# Patient Record
Sex: MALE | Race: WHITE | HISPANIC OR LATINO | URBAN - METROPOLITAN AREA
[De-identification: names, ages, dates, MRNs, and addresses within clinical notes are randomized per-mention and may not be internally consistent; named-entity substitution may affect disease eponyms.]

---

## 2018-01-14 ENCOUNTER — EMERGENCY (EMERGENCY)
Facility: HOSPITAL | Age: 78
LOS: 1 days | Discharge: HOME | End: 2018-01-14
Attending: EMERGENCY MEDICINE | Admitting: EMERGENCY MEDICINE
Payer: MEDICARE

## 2018-01-14 VITALS
DIASTOLIC BLOOD PRESSURE: 76 MMHG | HEART RATE: 102 BPM | RESPIRATION RATE: 18 BRPM | SYSTOLIC BLOOD PRESSURE: 140 MMHG | OXYGEN SATURATION: 96 %

## 2018-01-14 VITALS — RESPIRATION RATE: 18 BRPM | TEMPERATURE: 98 F | HEART RATE: 75 BPM | OXYGEN SATURATION: 100 %

## 2018-01-14 LAB
ALBUMIN SERPL ELPH-MCNC: 4.1 G/DL — SIGNIFICANT CHANGE UP (ref 3.3–5)
ALP SERPL-CCNC: 125 U/L — HIGH (ref 40–120)
ALT FLD-CCNC: <5 U/L RC — LOW (ref 10–45)
ANION GAP SERPL CALC-SCNC: 12 MMOL/L — SIGNIFICANT CHANGE UP (ref 5–17)
AST SERPL-CCNC: 11 U/L — SIGNIFICANT CHANGE UP (ref 10–40)
BASOPHILS # BLD AUTO: 0 K/UL — SIGNIFICANT CHANGE UP (ref 0–0.2)
BASOPHILS NFR BLD AUTO: 0.5 % — SIGNIFICANT CHANGE UP (ref 0–2)
BILIRUB SERPL-MCNC: 0.4 MG/DL — SIGNIFICANT CHANGE UP (ref 0.2–1.2)
BUN SERPL-MCNC: 18 MG/DL — SIGNIFICANT CHANGE UP (ref 7–23)
CALCIUM SERPL-MCNC: 9.1 MG/DL — SIGNIFICANT CHANGE UP (ref 8.4–10.5)
CHLORIDE SERPL-SCNC: 98 MMOL/L — SIGNIFICANT CHANGE UP (ref 96–108)
CO2 SERPL-SCNC: 26 MMOL/L — SIGNIFICANT CHANGE UP (ref 22–31)
CREAT SERPL-MCNC: 0.63 MG/DL — SIGNIFICANT CHANGE UP (ref 0.5–1.3)
EOSINOPHIL # BLD AUTO: 0.5 K/UL — SIGNIFICANT CHANGE UP (ref 0–0.5)
EOSINOPHIL NFR BLD AUTO: 5.4 % — SIGNIFICANT CHANGE UP (ref 0–6)
GLUCOSE SERPL-MCNC: 209 MG/DL — HIGH (ref 70–99)
HCT VFR BLD CALC: 37.4 % — LOW (ref 39–50)
HGB BLD-MCNC: 12.7 G/DL — LOW (ref 13–17)
LYMPHOCYTES # BLD AUTO: 2 K/UL — SIGNIFICANT CHANGE UP (ref 1–3.3)
LYMPHOCYTES # BLD AUTO: 22.3 % — SIGNIFICANT CHANGE UP (ref 13–44)
MCHC RBC-ENTMCNC: 32 PG — SIGNIFICANT CHANGE UP (ref 27–34)
MCHC RBC-ENTMCNC: 33.9 GM/DL — SIGNIFICANT CHANGE UP (ref 32–36)
MCV RBC AUTO: 94.3 FL — SIGNIFICANT CHANGE UP (ref 80–100)
MONOCYTES # BLD AUTO: 0.8 K/UL — SIGNIFICANT CHANGE UP (ref 0–0.9)
MONOCYTES NFR BLD AUTO: 8.2 % — SIGNIFICANT CHANGE UP (ref 2–14)
NEUTROPHILS # BLD AUTO: 5.8 K/UL — SIGNIFICANT CHANGE UP (ref 1.8–7.4)
NEUTROPHILS NFR BLD AUTO: 63.5 % — SIGNIFICANT CHANGE UP (ref 43–77)
PLATELET # BLD AUTO: 288 K/UL — SIGNIFICANT CHANGE UP (ref 150–400)
POTASSIUM SERPL-MCNC: 4.3 MMOL/L — SIGNIFICANT CHANGE UP (ref 3.5–5.3)
POTASSIUM SERPL-SCNC: 4.3 MMOL/L — SIGNIFICANT CHANGE UP (ref 3.5–5.3)
PROT SERPL-MCNC: 7.6 G/DL — SIGNIFICANT CHANGE UP (ref 6–8.3)
RBC # BLD: 3.97 M/UL — LOW (ref 4.2–5.8)
RBC # FLD: 11.4 % — SIGNIFICANT CHANGE UP (ref 10.3–14.5)
SODIUM SERPL-SCNC: 136 MMOL/L — SIGNIFICANT CHANGE UP (ref 135–145)
WBC # BLD: 9.1 K/UL — SIGNIFICANT CHANGE UP (ref 3.8–10.5)
WBC # FLD AUTO: 9.1 K/UL — SIGNIFICANT CHANGE UP (ref 3.8–10.5)

## 2018-01-14 PROCEDURE — 99284 EMERGENCY DEPT VISIT MOD MDM: CPT | Mod: 25,GC

## 2018-01-14 PROCEDURE — 85027 COMPLETE CBC AUTOMATED: CPT

## 2018-01-14 PROCEDURE — 94640 AIRWAY INHALATION TREATMENT: CPT

## 2018-01-14 PROCEDURE — 99283 EMERGENCY DEPT VISIT LOW MDM: CPT | Mod: 25

## 2018-01-14 PROCEDURE — 71046 X-RAY EXAM CHEST 2 VIEWS: CPT | Mod: 26

## 2018-01-14 PROCEDURE — 93010 ELECTROCARDIOGRAM REPORT: CPT

## 2018-01-14 PROCEDURE — 80053 COMPREHEN METABOLIC PANEL: CPT

## 2018-01-14 PROCEDURE — 93005 ELECTROCARDIOGRAM TRACING: CPT

## 2018-01-14 PROCEDURE — 82962 GLUCOSE BLOOD TEST: CPT

## 2018-01-14 PROCEDURE — 71046 X-RAY EXAM CHEST 2 VIEWS: CPT

## 2018-01-14 RX ORDER — SODIUM CHLORIDE 9 MG/ML
1000 INJECTION INTRAMUSCULAR; INTRAVENOUS; SUBCUTANEOUS ONCE
Qty: 0 | Refills: 0 | Status: COMPLETED | OUTPATIENT
Start: 2018-01-14 | End: 2018-01-14

## 2018-01-14 RX ORDER — IPRATROPIUM/ALBUTEROL SULFATE 18-103MCG
3 AEROSOL WITH ADAPTER (GRAM) INHALATION ONCE
Qty: 0 | Refills: 0 | Status: COMPLETED | OUTPATIENT
Start: 2018-01-14 | End: 2018-01-14

## 2018-01-14 RX ORDER — IPRATROPIUM/ALBUTEROL SULFATE 18-103MCG
3 AEROSOL WITH ADAPTER (GRAM) INHALATION
Qty: 0 | Refills: 0 | COMMUNITY
Start: 2018-01-14

## 2018-01-14 RX ORDER — IPRATROPIUM/ALBUTEROL SULFATE 18-103MCG
3 AEROSOL WITH ADAPTER (GRAM) INHALATION
Qty: 50 | Refills: 0 | OUTPATIENT
Start: 2018-01-14 | End: 2018-01-18

## 2018-01-14 RX ADMIN — SODIUM CHLORIDE 1000 MILLILITER(S): 9 INJECTION INTRAMUSCULAR; INTRAVENOUS; SUBCUTANEOUS at 14:19

## 2018-01-14 RX ADMIN — Medication 60 MILLIGRAM(S): at 14:42

## 2018-01-14 RX ADMIN — Medication 3 MILLILITER(S): at 14:19

## 2018-01-14 NOTE — ED PROVIDER NOTE - CARE PLAN
Instructions for follow-up, activity and diet:	You have been diagnosed with bronchitis that has likely been caused by a virus. Your symptoms should improve with Instructions for follow-up, activity and diet:	You have been diagnosed with bronchitis that has likely been caused by a virus. Your symptoms should improve with time. We sent some medications to the pharmacy for you that will help. If you have any new or concerning symptoms please come right back to the emergency room. You will likely need to increase your insulin while you are taking the steroids. Principal Discharge DX:	Bronchitis  Assessment and plan of treatment:	You have been diagnosed with bronchitis that has likely been caused by a virus. Your symptoms should improve with time. We sent some medications to the pharmacy for you that will help. If you have any new or concerning symptoms please come right back to the emergency room. You will likely need to increase your insulin while you are taking the steroids.

## 2018-01-14 NOTE — ED PROVIDER NOTE - PLAN OF CARE
You have been diagnosed with bronchitis that has likely been caused by a virus. Your symptoms should improve with You have been diagnosed with bronchitis that has likely been caused by a virus. Your symptoms should improve with time. We sent some medications to the pharmacy for you that will help. If you have any new or concerning symptoms please come right back to the emergency room. You will likely need to increase your insulin while you are taking the steroids.

## 2018-01-14 NOTE — ED ADULT NURSE NOTE - PMH
CAD (coronary artery disease)    Cancer of Prostate Seed implant 2004    Diabetes Mellitus    Hyperlipidemia    Hypertension    Neuropathy    Osteoarthrosis    Parkinson's Disease

## 2018-01-14 NOTE — ED PROVIDER NOTE - MEDICAL DECISION MAKING DETAILS
Patient is 77 year old male with cough x 2 weeks. DDx: Bronchitis, asthma, PNA, tracheitis. Will get CXR, basic labs and start a breathing tx for wheeze. Given dry mucous membranes will get BMP to eval for JAKUB and likely DC home if there are no lab abnormalities.

## 2018-01-14 NOTE — ED PROVIDER NOTE - OBJECTIVE STATEMENT
Patient is a 77 year old male with PMHx of Parkinson's DM, abdominal hernia who presents with cough x 2 week. Patient had sick contact at home before the cough started. He saw his primary care provider and was diagnosed with bronchitis, he was given rx for amoxicillin and prednisone on the 27th. His symptoms improved and he felt fine on Wednesday and was able to clean the bathroom. Yesterday he began having a very severe cough that did not improve with tessalon pearls or the nebulizer that he had from his primary doctor. He states that his cough is non-productive but so severe that he cannot sleep and feels like he is choking. He denies fever, nausea, vomiting, weakness, syncope, or exertional dyspnea.

## 2018-01-14 NOTE — ED ADULT NURSE NOTE - CHPI ED SYMPTOMS NEG
no decreased eating/drinking/no chills/no pain/no numbness/no fever/no nausea/no dizziness/no vomiting/no tingling/no weakness

## 2018-01-14 NOTE — ED PROVIDER NOTE - PHYSICAL EXAMINATION
Gen: WDWN, NAD  HEENT: EOMI, no nasal discharge, mucous membranes DRY  CV: RRR, +S1/S2, no M/R/G  Resp: Bilateral diffuse wheezing  GI: Abdomen soft non-distended, NTTP, no masses  MSK: No open wounds, no bruising, no LE edema  Neuro: A&Ox4, following commands, moving all four extremities spontaneously  Psych: appropriate mood, denies AH, VH, SI

## 2018-01-14 NOTE — ED ADULT NURSE NOTE - PSH
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

## 2018-01-14 NOTE — ED ADULT NURSE NOTE - OBJECTIVE STATEMENT
76 y/o male BIBA for worsening cough. Pt states he was diagnosed with bronchitis last week and has been treating with nebulizer treatments. He began to feel better and then cleaned the house with bleach and started to feel sick again. Denies chest pain, sob, ha, n/v/d, abdominal pain, f/c, urinary symptoms, hematuria. A&Ox4, vss, skin warm dry and intact, MAEx4, , none productive dry cough, lungs CTA, abd soft nondistended. Pt resting comfortably with VSS, no complaints at this time. Patient's bed in the lowest position, explained plan of care to patient and family members. Will continue to reassess. 76 y/o male BIBA for worsening cough. Pt states he was diagnosed with bronchitis last week and has been treating with nebulizer treatments. He began to feel better and then cleaned the house with bleach and started to feel sick again. Denies chest pain, sob, ha, n/v/d, abdominal pain, f/c, urinary symptoms, hematuria. A&Ox4, vss, skin warm dry and intact, MAEx4, , none productive dry cough, wheezing noted to lungs, abd soft nondistended. Pt resting comfortably with VSS, no complaints at this time. Patient's bed in the lowest position, explained plan of care to patient and family members. Will continue to reassess.

## 2018-01-14 NOTE — ED PROVIDER NOTE - NS ED ROS FT
Gen: Denies fever, weight loss  CV: Denies chest pain, palpitations  Skin: Denies rash, erythema, color changes  Resp: Admits to  cough, Denies SOB,  Endo: Denies sensitivity to heat, cold, increased urination  GI: Denies constipation, nausea, vomiting  Msk: Denies back pain, LE swelling, extremity pain  : Denies dysuria, increased frequency  Neuro: Denies LOC, weakness, seizures  Psych: Denies hx of psych, hallucinations

## 2018-01-14 NOTE — ED PROVIDER NOTE - ATTENDING CONTRIBUTION TO CARE
76yo M with cough and wheezing.  NAD, nontoxic, afebrile, NCAT, PERRL, CN grossly intact, S1S2, wheezing B, abd soft nontender with no rebound/guarding/masses, no LE edema.  Nebs, cxr, steroids, reassess.

## 2018-01-25 ENCOUNTER — INPATIENT (INPATIENT)
Facility: HOSPITAL | Age: 78
LOS: 4 days | Discharge: HOME | DRG: 175 | End: 2018-01-30
Attending: INTERNAL MEDICINE | Admitting: INTERNAL MEDICINE
Payer: MEDICARE

## 2018-01-25 VITALS
HEART RATE: 89 BPM | RESPIRATION RATE: 20 BRPM | TEMPERATURE: 99 F | SYSTOLIC BLOOD PRESSURE: 167 MMHG | OXYGEN SATURATION: 97 % | DIASTOLIC BLOOD PRESSURE: 61 MMHG

## 2018-01-25 DIAGNOSIS — I10 ESSENTIAL (PRIMARY) HYPERTENSION: ICD-10-CM

## 2018-01-25 DIAGNOSIS — E78.5 HYPERLIPIDEMIA, UNSPECIFIED: ICD-10-CM

## 2018-01-25 DIAGNOSIS — E11.65 TYPE 2 DIABETES MELLITUS WITH HYPERGLYCEMIA: ICD-10-CM

## 2018-01-25 DIAGNOSIS — J40 BRONCHITIS, NOT SPECIFIED AS ACUTE OR CHRONIC: ICD-10-CM

## 2018-01-25 LAB
ALBUMIN SERPL ELPH-MCNC: 4 G/DL — SIGNIFICANT CHANGE UP (ref 3.3–5)
ALP SERPL-CCNC: 130 U/L — HIGH (ref 40–120)
ALT FLD-CCNC: <5 U/L RC — LOW (ref 10–45)
ANION GAP SERPL CALC-SCNC: 13 MMOL/L — SIGNIFICANT CHANGE UP (ref 5–17)
AST SERPL-CCNC: 8 U/L — LOW (ref 10–40)
BASOPHILS # BLD AUTO: 0.1 K/UL — SIGNIFICANT CHANGE UP (ref 0–0.2)
BASOPHILS NFR BLD AUTO: 1 % — SIGNIFICANT CHANGE UP (ref 0–2)
BILIRUB SERPL-MCNC: 0.2 MG/DL — SIGNIFICANT CHANGE UP (ref 0.2–1.2)
BUN SERPL-MCNC: 18 MG/DL — SIGNIFICANT CHANGE UP (ref 7–23)
CALCIUM SERPL-MCNC: 8.8 MG/DL — SIGNIFICANT CHANGE UP (ref 8.4–10.5)
CHLORIDE SERPL-SCNC: 100 MMOL/L — SIGNIFICANT CHANGE UP (ref 96–108)
CO2 SERPL-SCNC: 27 MMOL/L — SIGNIFICANT CHANGE UP (ref 22–31)
CREAT SERPL-MCNC: 0.62 MG/DL — SIGNIFICANT CHANGE UP (ref 0.5–1.3)
EOSINOPHIL # BLD AUTO: 0.7 K/UL — HIGH (ref 0–0.5)
EOSINOPHIL NFR BLD AUTO: 6.9 % — HIGH (ref 0–6)
GAS PNL BLDV: SIGNIFICANT CHANGE UP
GLUCOSE BLDC GLUCOMTR-MCNC: 194 MG/DL — HIGH (ref 70–99)
GLUCOSE BLDC GLUCOMTR-MCNC: 232 MG/DL — HIGH (ref 70–99)
GLUCOSE BLDC GLUCOMTR-MCNC: 339 MG/DL — HIGH (ref 70–99)
GLUCOSE BLDC GLUCOMTR-MCNC: 366 MG/DL — HIGH (ref 70–99)
GLUCOSE SERPL-MCNC: 282 MG/DL — HIGH (ref 70–99)
HCT VFR BLD CALC: 38.4 % — LOW (ref 39–50)
HGB BLD-MCNC: 12.7 G/DL — LOW (ref 13–17)
LYMPHOCYTES # BLD AUTO: 2.2 K/UL — SIGNIFICANT CHANGE UP (ref 1–3.3)
LYMPHOCYTES # BLD AUTO: 20.9 % — SIGNIFICANT CHANGE UP (ref 13–44)
MCHC RBC-ENTMCNC: 31.3 PG — SIGNIFICANT CHANGE UP (ref 27–34)
MCHC RBC-ENTMCNC: 33 GM/DL — SIGNIFICANT CHANGE UP (ref 32–36)
MCV RBC AUTO: 94.9 FL — SIGNIFICANT CHANGE UP (ref 80–100)
MONOCYTES # BLD AUTO: 0.9 K/UL — SIGNIFICANT CHANGE UP (ref 0–0.9)
MONOCYTES NFR BLD AUTO: 8.1 % — SIGNIFICANT CHANGE UP (ref 2–14)
NEUTROPHILS # BLD AUTO: 6.7 K/UL — SIGNIFICANT CHANGE UP (ref 1.8–7.4)
NEUTROPHILS NFR BLD AUTO: 63.1 % — SIGNIFICANT CHANGE UP (ref 43–77)
PLATELET # BLD AUTO: 320 K/UL — SIGNIFICANT CHANGE UP (ref 150–400)
POTASSIUM SERPL-MCNC: 3.9 MMOL/L — SIGNIFICANT CHANGE UP (ref 3.5–5.3)
POTASSIUM SERPL-SCNC: 3.9 MMOL/L — SIGNIFICANT CHANGE UP (ref 3.5–5.3)
PROT SERPL-MCNC: 7.3 G/DL — SIGNIFICANT CHANGE UP (ref 6–8.3)
RAPID RVP RESULT: SIGNIFICANT CHANGE UP
RBC # BLD: 4.05 M/UL — LOW (ref 4.2–5.8)
RBC # FLD: 11.8 % — SIGNIFICANT CHANGE UP (ref 10.3–14.5)
SODIUM SERPL-SCNC: 140 MMOL/L — SIGNIFICANT CHANGE UP (ref 135–145)
WBC # BLD: 10.6 K/UL — HIGH (ref 3.8–10.5)
WBC # FLD AUTO: 10.6 K/UL — HIGH (ref 3.8–10.5)

## 2018-01-25 PROCEDURE — 71045 X-RAY EXAM CHEST 1 VIEW: CPT | Mod: 26

## 2018-01-25 PROCEDURE — 99223 1ST HOSP IP/OBS HIGH 75: CPT

## 2018-01-25 PROCEDURE — 99285 EMERGENCY DEPT VISIT HI MDM: CPT | Mod: GC

## 2018-01-25 RX ORDER — OXYCODONE HYDROCHLORIDE 5 MG/1
5 TABLET ORAL EVERY 4 HOURS
Qty: 0 | Refills: 0 | Status: DISCONTINUED | OUTPATIENT
Start: 2018-01-25 | End: 2018-01-30

## 2018-01-25 RX ORDER — GLUCAGON INJECTION, SOLUTION 0.5 MG/.1ML
1 INJECTION, SOLUTION SUBCUTANEOUS ONCE
Qty: 0 | Refills: 0 | Status: DISCONTINUED | OUTPATIENT
Start: 2018-01-25 | End: 2018-01-30

## 2018-01-25 RX ORDER — DOCUSATE SODIUM 100 MG
100 CAPSULE ORAL THREE TIMES A DAY
Qty: 0 | Refills: 0 | Status: DISCONTINUED | OUTPATIENT
Start: 2018-01-25 | End: 2018-01-30

## 2018-01-25 RX ORDER — IPRATROPIUM/ALBUTEROL SULFATE 18-103MCG
3 AEROSOL WITH ADAPTER (GRAM) INHALATION ONCE
Qty: 0 | Refills: 0 | Status: COMPLETED | OUTPATIENT
Start: 2018-01-25 | End: 2018-01-25

## 2018-01-25 RX ORDER — INSULIN GLARGINE 100 [IU]/ML
40 INJECTION, SOLUTION SUBCUTANEOUS AT BEDTIME
Qty: 0 | Refills: 0 | Status: DISCONTINUED | OUTPATIENT
Start: 2018-01-25 | End: 2018-01-25

## 2018-01-25 RX ORDER — CARBIDOPA AND LEVODOPA 25; 100 MG/1; MG/1
2 TABLET ORAL THREE TIMES A DAY
Qty: 0 | Refills: 0 | Status: DISCONTINUED | OUTPATIENT
Start: 2018-01-25 | End: 2018-01-25

## 2018-01-25 RX ORDER — DEXTROSE 50 % IN WATER 50 %
25 SYRINGE (ML) INTRAVENOUS ONCE
Qty: 0 | Refills: 0 | Status: DISCONTINUED | OUTPATIENT
Start: 2018-01-25 | End: 2018-01-30

## 2018-01-25 RX ORDER — CARBIDOPA AND LEVODOPA 25; 100 MG/1; MG/1
5 TABLET ORAL EVERY 4 HOURS
Qty: 0 | Refills: 0 | Status: DISCONTINUED | OUTPATIENT
Start: 2018-01-25 | End: 2018-01-26

## 2018-01-25 RX ORDER — INSULIN GLARGINE 100 [IU]/ML
20 INJECTION, SOLUTION SUBCUTANEOUS AT BEDTIME
Qty: 0 | Refills: 0 | Status: DISCONTINUED | OUTPATIENT
Start: 2018-01-25 | End: 2018-01-26

## 2018-01-25 RX ORDER — INSULIN LISPRO 100/ML
VIAL (ML) SUBCUTANEOUS AT BEDTIME
Qty: 0 | Refills: 0 | Status: DISCONTINUED | OUTPATIENT
Start: 2018-01-25 | End: 2018-01-30

## 2018-01-25 RX ORDER — SODIUM CHLORIDE 9 MG/ML
1000 INJECTION, SOLUTION INTRAVENOUS
Qty: 0 | Refills: 0 | Status: DISCONTINUED | OUTPATIENT
Start: 2018-01-25 | End: 2018-01-30

## 2018-01-25 RX ORDER — ALBUTEROL 90 UG/1
1.25 AEROSOL, METERED ORAL EVERY 6 HOURS
Qty: 0 | Refills: 0 | Status: DISCONTINUED | OUTPATIENT
Start: 2018-01-25 | End: 2018-01-28

## 2018-01-25 RX ORDER — BUDESONIDE, MICRONIZED 100 %
0.25 POWDER (GRAM) MISCELLANEOUS
Qty: 0 | Refills: 0 | Status: DISCONTINUED | OUTPATIENT
Start: 2018-01-25 | End: 2018-01-29

## 2018-01-25 RX ORDER — DEXTROSE 50 % IN WATER 50 %
1 SYRINGE (ML) INTRAVENOUS ONCE
Qty: 0 | Refills: 0 | Status: DISCONTINUED | OUTPATIENT
Start: 2018-01-25 | End: 2018-01-30

## 2018-01-25 RX ORDER — LOSARTAN POTASSIUM 100 MG/1
50 TABLET, FILM COATED ORAL DAILY
Qty: 0 | Refills: 0 | Status: DISCONTINUED | OUTPATIENT
Start: 2018-01-25 | End: 2018-01-30

## 2018-01-25 RX ORDER — HEPARIN SODIUM 5000 [USP'U]/ML
5000 INJECTION INTRAVENOUS; SUBCUTANEOUS EVERY 12 HOURS
Qty: 0 | Refills: 0 | Status: DISCONTINUED | OUTPATIENT
Start: 2018-01-25 | End: 2018-01-26

## 2018-01-25 RX ORDER — SODIUM CHLORIDE 9 MG/ML
1000 INJECTION INTRAMUSCULAR; INTRAVENOUS; SUBCUTANEOUS
Qty: 0 | Refills: 0 | Status: COMPLETED | OUTPATIENT
Start: 2018-01-25 | End: 2018-01-26

## 2018-01-25 RX ORDER — INSULIN LISPRO 100/ML
8 VIAL (ML) SUBCUTANEOUS
Qty: 0 | Refills: 0 | Status: DISCONTINUED | OUTPATIENT
Start: 2018-01-25 | End: 2018-01-26

## 2018-01-25 RX ORDER — DEXTROSE 50 % IN WATER 50 %
12.5 SYRINGE (ML) INTRAVENOUS ONCE
Qty: 0 | Refills: 0 | Status: DISCONTINUED | OUTPATIENT
Start: 2018-01-25 | End: 2018-01-30

## 2018-01-25 RX ORDER — INSULIN LISPRO 100/ML
VIAL (ML) SUBCUTANEOUS
Qty: 0 | Refills: 0 | Status: DISCONTINUED | OUTPATIENT
Start: 2018-01-25 | End: 2018-01-30

## 2018-01-25 RX ORDER — ASPIRIN/CALCIUM CARB/MAGNESIUM 324 MG
325 TABLET ORAL DAILY
Qty: 0 | Refills: 0 | Status: DISCONTINUED | OUTPATIENT
Start: 2018-01-25 | End: 2018-01-30

## 2018-01-25 RX ADMIN — ALBUTEROL 1.25 MILLIGRAM(S): 90 AEROSOL, METERED ORAL at 18:41

## 2018-01-25 RX ADMIN — Medication 100 MILLIGRAM(S): at 22:20

## 2018-01-25 RX ADMIN — Medication 3 MILLILITER(S): at 02:36

## 2018-01-25 RX ADMIN — INSULIN GLARGINE 20 UNIT(S): 100 INJECTION, SOLUTION SUBCUTANEOUS at 22:20

## 2018-01-25 RX ADMIN — SODIUM CHLORIDE 50 MILLILITER(S): 9 INJECTION INTRAMUSCULAR; INTRAVENOUS; SUBCUTANEOUS at 08:09

## 2018-01-25 RX ADMIN — LOSARTAN POTASSIUM 50 MILLIGRAM(S): 100 TABLET, FILM COATED ORAL at 13:24

## 2018-01-25 RX ADMIN — Medication 20 MILLIGRAM(S): at 22:20

## 2018-01-25 RX ADMIN — Medication 20 MILLIGRAM(S): at 13:24

## 2018-01-25 RX ADMIN — Medication 50 MILLIGRAM(S): at 04:01

## 2018-01-25 RX ADMIN — Medication 10: at 18:40

## 2018-01-25 RX ADMIN — Medication 8: at 13:23

## 2018-01-25 RX ADMIN — SODIUM CHLORIDE 50 MILLILITER(S): 9 INJECTION INTRAMUSCULAR; INTRAVENOUS; SUBCUTANEOUS at 22:24

## 2018-01-25 RX ADMIN — Medication 8 UNIT(S): at 18:41

## 2018-01-25 RX ADMIN — ALBUTEROL 1.25 MILLIGRAM(S): 90 AEROSOL, METERED ORAL at 23:03

## 2018-01-25 RX ADMIN — Medication 325 MILLIGRAM(S): at 13:24

## 2018-01-25 RX ADMIN — Medication 100 MILLIGRAM(S): at 13:24

## 2018-01-25 RX ADMIN — OXYCODONE HYDROCHLORIDE 5 MILLIGRAM(S): 5 TABLET ORAL at 13:55

## 2018-01-25 RX ADMIN — HEPARIN SODIUM 5000 UNIT(S): 5000 INJECTION INTRAVENOUS; SUBCUTANEOUS at 18:41

## 2018-01-25 NOTE — H&P ADULT - ASSESSMENT
pt w/ sob/ wheezing /ochoa/ cough / w/ bronchitis/ bronchospasm/ hypoxia  pulm evalid eval  dm cont lantus  fsg riss  endo eval  dvt proph  parkinsonns / cont sinemet  dm diet  pt  htn / cont meds

## 2018-01-25 NOTE — ED ADULT NURSE NOTE - OBJECTIVE STATEMENT
77 yr old male by EMS from home with wife (in wheelchair) with c/o difficulty breathing, +cough productive of green phlegm, +taking cough medicine and duoneb nebulizers, dx: bronchitis, treated with amoxicillin by pulmonologist, +stopped prednisone, +nonsmoker, (denies fever/chills), +lives with wife, ambulatory, independent with ADLs, at present A&ox3, +wheezing throughout, skin wdi, (denies cp/dizzy/lightheaded, +"tightness")

## 2018-01-25 NOTE — H&P ADULT - NSHPLABSRESULTS_GEN_ALL_CORE
12.7   10.6  )-----------( 320      ( 25 Jan 2018 03:01 )             38.4       01-25    140  |  100  |  18  ----------------------------<  282<H>  3.9   |  27  |  0.62    Ca    8.8      25 Jan 2018 03:01    TPro  7.3  /  Alb  4.0  /  TBili  0.2  /  DBili  x   /  AST  8<L>  /  ALT  <5<L>  /  AlkPhos  130<H>  01-25                      Lactate Trend            CAPILLARY BLOOD GLUCOSE      POCT Blood Glucose.: 248 mg/dL (25 Jan 2018 02:33)

## 2018-01-25 NOTE — ED PROVIDER NOTE - ATTENDING CONTRIBUTION TO CARE
77M hx of Parkinsons, DM, abdominal hernia, anxiety on xanax presents cough and shortness of breath tonight.  Improved with nebulizer.  Recent dx of bronchitis 10d, on last day of amoxicillin.  Cough productive of white sputum.  No fever/chills.  Received influenza vaccine.  Has had prior episodes of bronchitis.  Never smoked.  Wife/daughter with recent viral uri/sinusitis.

## 2018-01-25 NOTE — CONSULT NOTE ADULT - PROBLEM SELECTOR RECOMMENDATION 9
Diabetes Education and Nutrition Eval  Start Lantus 20 units qhs  Start Humalog 8 units qac  Mod correction scale qac + bedtime  Goal glucose 100-180  Outpt. endo follow-up  Outpt. optho, podiatry, micro/cr  Plan to d/c on basal bolus. Needs education.

## 2018-01-25 NOTE — ED PROVIDER NOTE - OBJECTIVE STATEMENT
77M hx of Parkinsons, DM, abdominal hernia, anxiety on xanax presents cough and shortness of breath tonight.  Improved with nebulizer.  Recent dx of bronchitis 10d, on last day of amoxicillin.  Cough productive of white sputum.  No fever/chills.  Received influenza vaccine.  Has had multiple prior episodes of bronchitis, follows with pulmonologist.  Never smoked.  Wife/daughter with recent viral uri/sinusitis.

## 2018-01-25 NOTE — ED ADULT NURSE REASSESSMENT NOTE - NS ED NURSE REASSESS COMMENT FT1
pt assisted to bathroom ambulatory no distress lung sound clear wife at bedside pt pending med bed assignnment vital signs stable

## 2018-01-25 NOTE — ED ADULT NURSE REASSESSMENT NOTE - NS ED NURSE REASSESS COMMENT FT1
Received report from previous shift RN. Patient completed neb treatment, reports improvement in breathing. Denies CP or discomfort at this time, abd pain, n/v/d, fever, chills. Patient reporting productive cough with green sputum. Pt able to speak in full sentences without difficulty breathing. VSS

## 2018-01-25 NOTE — PROGRESS NOTE ADULT - SUBJECTIVE AND OBJECTIVE BOX
PULMONARY PROGRESS NOTE    INGE ESPOSITO  MRN-[id number]    Patient is a 77y old  Male who presents with a chief complaint of     HPI:  -shortness of breath somewhat improving, still with dry cough.  Reports never being hospitalized for his asthma in the past.  Taking only PRN nebs at home. RVP negative but sick contacts.    ROS:   -denies abdominal pain  -appetite good    ACTIVE MEDICATION LIST:  MEDICATIONS  (STANDING):  ALBUTerol   0.042% 1.25 milliGRAM(s) Nebulizer every 6 hours  aspirin enteric coated 325 milliGRAM(s) Oral daily  buDESOnide   0.25 milliGRAM(s) Respule 0.25 milliGRAM(s) Inhalation two times a day  carbidopa/levodopa  25/100 2 Tablet(s) Oral three times a day  dextrose 5%. 1000 milliLiter(s) (50 mL/Hr) IV Continuous <Continuous>  dextrose 50% Injectable 12.5 Gram(s) IV Push once  dextrose 50% Injectable 25 Gram(s) IV Push once  dextrose 50% Injectable 25 Gram(s) IV Push once  docusate sodium 100 milliGRAM(s) Oral three times a day  heparin  Injectable 5000 Unit(s) SubCutaneous every 12 hours  insulin glargine Injectable (LANTUS) 40 Unit(s) SubCutaneous at bedtime  insulin lispro (HumaLOG) corrective regimen sliding scale   SubCutaneous three times a day before meals  losartan 50 milliGRAM(s) Oral daily  methylPREDNISolone sodium succinate Injectable 20 milliGRAM(s) IV Push every 8 hours  sodium chloride 0.9%. 1000 milliLiter(s) (50 mL/Hr) IV Continuous <Continuous>    MEDICATIONS  (PRN):  dextrose Gel 1 Dose(s) Oral once PRN Blood Glucose LESS THAN 70 milliGRAM(s)/deciliter  glucagon  Injectable 1 milliGRAM(s) IntraMuscular once PRN Glucose LESS THAN 70 milligrams/deciliter  oxyCODONE    IR 5 milliGRAM(s) Oral every 4 hours PRN Moderate Pain (4 - 6)      EXAM:  Vital Signs Last 24 Hrs  T(C): 36.9 (25 Jan 2018 10:00), Max: 37 (25 Jan 2018 02:30)  T(F): 98.4 (25 Jan 2018 10:00), Max: 98.6 (25 Jan 2018 02:30)  HR: 80 (25 Jan 2018 10:00) (77 - 89)  BP: 145/77 (25 Jan 2018 10:00) (139/79 - 167/61)  BP(mean): --  RR: 18 (25 Jan 2018 10:00) (16 - 20)  SpO2: 95% (25 Jan 2018 10:00) (95% - 97%)    GENERAL: The patient is awake and alert in no apparent distress.     SKIN: Warm, dry, no rashes    LUNGS: bilat exp wheeze    HEART: S1/S2    ABDOMEN: +BS, Soft, Nontender    EXTREMITIES: No clubbing, cyanosis, edema    LABS/IMGAING: reviewed                          12.7   10.6  )-----------( 320      ( 25 Jan 2018 03:01 )             38.4     01-25    140  |  100  |  18  ----------------------------<  282<H>  3.9   |  27  |  0.62    Ca    8.8      25 Jan 2018 03:01    TPro  7.3  /  Alb  4.0  /  TBili  0.2  /  DBili  x   /  AST  8<L>  /  ALT  <5<L>  /  AlkPhos  130<H>  01-25    < from: Xray Chest 1 View AP- PORTABLE-Urgent (01.25.18 @ 03:47) >  IMPRESSION: Clear lungs.    < end of copied text >        PROBLEM LIST:  77y Male with HEALTH ISSUES - PROBLEM Dx:  Asthma exacerbation    RECS:  -Continue pulmicort nebs bid and albuterol Q6  -Continue solumedrol 20mgIV Q8 will taper per clinical course  -Incentive spirometry, OOB to chair      Rita Pearson MD  [phone]

## 2018-01-25 NOTE — ED PROVIDER NOTE - PHYSICAL EXAMINATION
(Attending - Khushbu) mild respiratory distress, AAOx3, NCAT, MMM, Trachea midline, PERRL, mild tachypnea, coarse expiratory wheeze b/l throuhgout, s1s2, Normal perfusion, soft, NTND, No CVAT b/l, No edema, No deformity of extremities, Appropriate, Cooperative, No rashes, CN grossly intact,

## 2018-01-25 NOTE — H&P ADULT - HISTORY OF PRESENT ILLNESS
pt with increasing sob/ cough  pt saw pmd who referred to pulmonologist dr evans  given meds without much improvement  no chills

## 2018-01-25 NOTE — ED PROVIDER NOTE - NS ED ROS FT
No fever, no chills, no change in vision, no chest pain, + SOB, + cough, no abdominal pain, no nausea, no vomiting, no joint pain, no rashes, +tremors, no psychiatric issues, otherwise as HPI or negative

## 2018-01-25 NOTE — ED PROVIDER NOTE - MEDICAL DECISION MAKING DETAILS
exacerbation of obstructive lung pathology likely viral bronchitis.  will continue nebs, steroids and continue to monitor finger sticks given hx DM.  chest xray, rvp.  reassess, tba given failed outpatient treatment

## 2018-01-25 NOTE — H&P ADULT - FAMILY HISTORY
Family history of diabetes mellitus     Father  Still living? Unknown  Family history of cancer, Age at diagnosis: Age Unknown

## 2018-01-26 ENCOUNTER — TRANSCRIPTION ENCOUNTER (OUTPATIENT)
Age: 78
End: 2018-01-26

## 2018-01-26 LAB
ANION GAP SERPL CALC-SCNC: 16 MMOL/L — SIGNIFICANT CHANGE UP (ref 5–17)
BUN SERPL-MCNC: 25 MG/DL — HIGH (ref 7–23)
CALCIUM SERPL-MCNC: 8.5 MG/DL — SIGNIFICANT CHANGE UP (ref 8.4–10.5)
CHLORIDE SERPL-SCNC: 100 MMOL/L — SIGNIFICANT CHANGE UP (ref 96–108)
CO2 SERPL-SCNC: 22 MMOL/L — SIGNIFICANT CHANGE UP (ref 22–31)
CREAT SERPL-MCNC: 1.01 MG/DL — SIGNIFICANT CHANGE UP (ref 0.5–1.3)
GLUCOSE BLDC GLUCOMTR-MCNC: 154 MG/DL — HIGH (ref 70–99)
GLUCOSE BLDC GLUCOMTR-MCNC: 177 MG/DL — HIGH (ref 70–99)
GLUCOSE BLDC GLUCOMTR-MCNC: 190 MG/DL — HIGH (ref 70–99)
GLUCOSE BLDC GLUCOMTR-MCNC: 292 MG/DL — HIGH (ref 70–99)
GLUCOSE BLDC GLUCOMTR-MCNC: 297 MG/DL — HIGH (ref 70–99)
GLUCOSE SERPL-MCNC: 321 MG/DL — HIGH (ref 70–99)
HBA1C BLD-MCNC: 6.9 % — HIGH (ref 4–5.6)
POTASSIUM SERPL-MCNC: 4.7 MMOL/L — SIGNIFICANT CHANGE UP (ref 3.5–5.3)
POTASSIUM SERPL-SCNC: 4.7 MMOL/L — SIGNIFICANT CHANGE UP (ref 3.5–5.3)
SODIUM SERPL-SCNC: 138 MMOL/L — SIGNIFICANT CHANGE UP (ref 135–145)

## 2018-01-26 PROCEDURE — 93010 ELECTROCARDIOGRAM REPORT: CPT

## 2018-01-26 PROCEDURE — 71275 CT ANGIOGRAPHY CHEST: CPT | Mod: 26

## 2018-01-26 PROCEDURE — 99232 SBSQ HOSP IP/OBS MODERATE 35: CPT

## 2018-01-26 RX ORDER — ALPRAZOLAM 0.25 MG
0.5 TABLET ORAL AT BEDTIME
Qty: 0 | Refills: 0 | Status: DISCONTINUED | OUTPATIENT
Start: 2018-01-26 | End: 2018-01-30

## 2018-01-26 RX ORDER — ENOXAPARIN SODIUM 100 MG/ML
80 INJECTION SUBCUTANEOUS
Qty: 0 | Refills: 0 | Status: DISCONTINUED | OUTPATIENT
Start: 2018-01-26 | End: 2018-01-30

## 2018-01-26 RX ORDER — CARBIDOPA AND LEVODOPA 25; 100 MG/1; MG/1
5 TABLET ORAL EVERY 4 HOURS
Qty: 0 | Refills: 0 | Status: DISCONTINUED | OUTPATIENT
Start: 2018-01-26 | End: 2018-01-30

## 2018-01-26 RX ORDER — ACETAMINOPHEN 500 MG
650 TABLET ORAL ONCE
Qty: 0 | Refills: 0 | Status: COMPLETED | OUTPATIENT
Start: 2018-01-26 | End: 2018-01-26

## 2018-01-26 RX ORDER — CARBIDOPA AND LEVODOPA 25; 100 MG/1; MG/1
4 TABLET ORAL ONCE
Qty: 0 | Refills: 0 | Status: COMPLETED | OUTPATIENT
Start: 2018-01-26 | End: 2018-01-26

## 2018-01-26 RX ORDER — ALPRAZOLAM 0.25 MG
0.25 TABLET ORAL ONCE
Qty: 0 | Refills: 0 | Status: DISCONTINUED | OUTPATIENT
Start: 2018-01-26 | End: 2018-01-26

## 2018-01-26 RX ORDER — INSULIN LISPRO 100/ML
14 VIAL (ML) SUBCUTANEOUS
Qty: 0 | Refills: 0 | Status: DISCONTINUED | OUTPATIENT
Start: 2018-01-26 | End: 2018-01-27

## 2018-01-26 RX ORDER — INSULIN GLARGINE 100 [IU]/ML
30 INJECTION, SOLUTION SUBCUTANEOUS AT BEDTIME
Qty: 0 | Refills: 0 | Status: DISCONTINUED | OUTPATIENT
Start: 2018-01-26 | End: 2018-01-27

## 2018-01-26 RX ORDER — CARBIDOPA AND LEVODOPA 25; 100 MG/1; MG/1
1 TABLET ORAL
Qty: 0 | Refills: 0 | Status: DISCONTINUED | OUTPATIENT
Start: 2018-01-26 | End: 2018-01-26

## 2018-01-26 RX ADMIN — CARBIDOPA AND LEVODOPA 5 TABLET(S): 25; 100 TABLET ORAL at 17:38

## 2018-01-26 RX ADMIN — HEPARIN SODIUM 5000 UNIT(S): 5000 INJECTION INTRAVENOUS; SUBCUTANEOUS at 17:37

## 2018-01-26 RX ADMIN — CARBIDOPA AND LEVODOPA 5 TABLET(S): 25; 100 TABLET ORAL at 05:45

## 2018-01-26 RX ADMIN — CARBIDOPA AND LEVODOPA 1 TABLET(S): 25; 100 TABLET ORAL at 22:44

## 2018-01-26 RX ADMIN — ALBUTEROL 1.25 MILLIGRAM(S): 90 AEROSOL, METERED ORAL at 05:45

## 2018-01-26 RX ADMIN — ENOXAPARIN SODIUM 80 MILLIGRAM(S): 100 INJECTION SUBCUTANEOUS at 19:52

## 2018-01-26 RX ADMIN — Medication 650 MILLIGRAM(S): at 23:23

## 2018-01-26 RX ADMIN — Medication 2: at 18:32

## 2018-01-26 RX ADMIN — ALBUTEROL 1.25 MILLIGRAM(S): 90 AEROSOL, METERED ORAL at 12:15

## 2018-01-26 RX ADMIN — SODIUM CHLORIDE 50 MILLILITER(S): 9 INJECTION INTRAMUSCULAR; INTRAVENOUS; SUBCUTANEOUS at 08:15

## 2018-01-26 RX ADMIN — Medication 14 UNIT(S): at 18:32

## 2018-01-26 RX ADMIN — Medication 20 MILLIGRAM(S): at 17:37

## 2018-01-26 RX ADMIN — Medication 100 MILLIGRAM(S): at 13:30

## 2018-01-26 RX ADMIN — CARBIDOPA AND LEVODOPA 5 TABLET(S): 25; 100 TABLET ORAL at 01:31

## 2018-01-26 RX ADMIN — Medication 100 MILLIGRAM(S): at 22:43

## 2018-01-26 RX ADMIN — Medication 0.25 MILLIGRAM(S): at 15:17

## 2018-01-26 RX ADMIN — Medication 8 UNIT(S): at 08:14

## 2018-01-26 RX ADMIN — ALBUTEROL 1.25 MILLIGRAM(S): 90 AEROSOL, METERED ORAL at 18:36

## 2018-01-26 RX ADMIN — Medication 0.25 MILLIGRAM(S): at 18:36

## 2018-01-26 RX ADMIN — Medication 8 UNIT(S): at 12:13

## 2018-01-26 RX ADMIN — Medication 20 MILLIGRAM(S): at 05:45

## 2018-01-26 RX ADMIN — CARBIDOPA AND LEVODOPA 5 TABLET(S): 25; 100 TABLET ORAL at 09:51

## 2018-01-26 RX ADMIN — INSULIN GLARGINE 30 UNIT(S): 100 INJECTION, SOLUTION SUBCUTANEOUS at 22:43

## 2018-01-26 RX ADMIN — Medication 0.25 MILLIGRAM(S): at 05:45

## 2018-01-26 RX ADMIN — CARBIDOPA AND LEVODOPA 5 TABLET(S): 25; 100 TABLET ORAL at 13:30

## 2018-01-26 RX ADMIN — Medication 325 MILLIGRAM(S): at 12:15

## 2018-01-26 RX ADMIN — LOSARTAN POTASSIUM 50 MILLIGRAM(S): 100 TABLET, FILM COATED ORAL at 05:45

## 2018-01-26 RX ADMIN — Medication 6: at 12:13

## 2018-01-26 RX ADMIN — Medication 100 MILLIGRAM(S): at 05:45

## 2018-01-26 RX ADMIN — Medication 6: at 08:11

## 2018-01-26 RX ADMIN — HEPARIN SODIUM 5000 UNIT(S): 5000 INJECTION INTRAVENOUS; SUBCUTANEOUS at 05:45

## 2018-01-26 RX ADMIN — Medication 0.5 MILLIGRAM(S): at 22:45

## 2018-01-26 NOTE — DISCHARGE NOTE ADULT - MEDICATION SUMMARY - MEDICATIONS TO STOP TAKING
I will STOP taking the medications listed below when I get home from the hospital:    Avalide 12.5 mg-150 mg oral tablet  -- 1 tab(s) by mouth once a day

## 2018-01-26 NOTE — DISCHARGE NOTE ADULT - CARE PROVIDER_API CALL
Rito Martinez), Vascular Surgery  [address] [facility]  [address] [place] [address]  Phone: [phone]  Fax: [phone]    Alexis Hutchinson), Critical Care Medicine; Internal Medicine; Pulmonary Disease; Sleep Medicine  [address] [facility]  [address]  [place] [address]  Phone: [phone]  Fax: [phone]    Ngozi Erickson), Hematology; Internal Medicine; Medical Oncology  [address]  [facility] [address]  [place] [address]  Phone: [phone]  Fax: [phone] Rito Martinez), Vascular Surgery  [address] [facility] [address] [place] [address]  Phone: [phone]  Fax: [phone]    Alexis Hutchinson), Critical Care Medicine; Internal Medicine; Pulmonary Disease; Sleep Medicine  [facility] [facility] [address]  [place] [address]  Phone: [phone]  Fax: [phone]    Jose Tobar), [facility]; Internal Medicine  [address] [facility] [address]  [place] [address]  Phone: [phone]  Fax: [phone] Rito Martinez), Vascular Surgery  [address] [facility]  [address] [place] [address]  Phone: [phone]  Fax: [phone]    Alexis Hutchinson), Critical Care Medicine; Internal Medicine; Pulmonary Disease; Sleep Medicine  [address] [facility] [address]  [place] [address]  Phone: [phone]  Fax: [phone]    Jose Tobar), [facility]; Internal Medicine  [address] [facility] [address]  [place] [address]  Phone: [phone]  Fax: [phone]    Cr Escobar (DO), EndocrinologyMetabDiabetes; Internal Medicine  [address] [facility] [address]  [place] [address]  Phone: [phone]  Fax: [phone]

## 2018-01-26 NOTE — PROGRESS NOTE ADULT - SUBJECTIVE AND OBJECTIVE BOX
PULMONARY PROGRESS NOTE    INGE ESPOSITO  MRN-[id number]    Patient is a 77y old  Male who presents with a chief complaint of     HPI:  -feels much better, cough improving, feels a little shakey and requests xanax low dose which usually helps him at home.    ROS:   -denies abdominal pain  -appetite good    MEDICATIONS  (STANDING):  ALBUTerol   0.042% 1.25 milliGRAM(s) Nebulizer every 6 hours  aspirin enteric coated 325 milliGRAM(s) Oral daily  buDESOnide   0.25 milliGRAM(s) Respule 0.25 milliGRAM(s) Inhalation two times a day  carbidopa/levodopa  25/100 5 Tablet(s) Oral every 4 hours  dextrose 5%. 1000 milliLiter(s) (50 mL/Hr) IV Continuous <Continuous>  dextrose 50% Injectable 12.5 Gram(s) IV Push once  dextrose 50% Injectable 25 Gram(s) IV Push once  dextrose 50% Injectable 25 Gram(s) IV Push once  docusate sodium 100 milliGRAM(s) Oral three times a day  heparin  Injectable 5000 Unit(s) SubCutaneous every 12 hours  insulin glargine Injectable (LANTUS) 20 Unit(s) SubCutaneous at bedtime  insulin lispro (HumaLOG) corrective regimen sliding scale   SubCutaneous at bedtime  insulin lispro (HumaLOG) corrective regimen sliding scale   SubCutaneous three times a day before meals  insulin lispro Injectable (HumaLOG) 8 Unit(s) SubCutaneous three times a day before meals  losartan 50 milliGRAM(s) Oral daily  methylPREDNISolone sodium succinate Injectable 20 milliGRAM(s) IV Push every 12 hours    MEDICATIONS  (PRN):  dextrose Gel 1 Dose(s) Oral once PRN Blood Glucose LESS THAN 70 milliGRAM(s)/deciliter  glucagon  Injectable 1 milliGRAM(s) IntraMuscular once PRN Glucose LESS THAN 70 milligrams/deciliter  oxyCODONE    IR 5 milliGRAM(s) Oral every 4 hours PRN Moderate Pain (4 - 6)        EXAM:  Vital Signs Last 24 Hrs  T(C): 36.7 (26 Jan 2018 04:34), Max: 36.8 (25 Jan 2018 19:31)  T(F): 98 (26 Jan 2018 04:34), Max: 98.2 (25 Jan 2018 19:31)  HR: 76 (26 Jan 2018 04:34) (76 - 83)  BP: 150/67 (26 Jan 2018 04:34) (147/85 - 168/68)  BP(mean): --  RR: 17 (26 Jan 2018 04:34) (17 - 18)  SpO2: 94% (26 Jan 2018 04:34) (93% - 95%)    GENERAL: The patient is awake and alert in no apparent distress.     SKIN: Warm, dry    LUNGS: clear lungs    HEART: S1/S2    ABDOMEN: +BS, Soft, Nontender    EXTREMITIES: No clubbing, cyanosis    LABS/IMGAING: reviewed                          12.7   10.6  )-----------( 320      ( 25 Jan 2018 03:01 )             38.4     01-26    138  |  100  |  25<H>  ----------------------------<  321<H>  4.7   |  22  |  1.01    Ca    8.5      26 Jan 2018 07:33    TPro  7.3  /  Alb  4.0  /  TBili  0.2  /  DBili  x   /  AST  8<L>  /  ALT  <5<L>  /  AlkPhos  130<H>  01-25      < from: Xray Chest 1 View AP- PORTABLE-Urgent (01.25.18 @ 03:47) >  IMPRESSION: Clear lungs.    < end of copied text >      PROBLEM LIST:  77y Male with HEALTH ISSUES - PROBLEM Dx:  Asthma exacerbation    RECS:  -Continue pulmicort nebs bid and albuterol Q6  -Can change solumedrol to prednisone 40mg PO Qd  -Pt request home med of xanax 0.5mg PRN for tremulousness  -Incentive spirometry, OOB to chair      Rita Pearson MD  [phone]

## 2018-01-26 NOTE — PROGRESS NOTE ADULT - ASSESSMENT
pt w/ sob/ wheezing /ochoa/ cough / w/ bronchitis/ bronchospasm/ hypoxia  pulm eval  taper steroids  some exer cp  cards eval  check cta of chest   dr evans saw recently  dm cont lantus  fsg riss  endo eval noted  dvt proph  parkinsonns / cont sinemet/dr rodríguez notified to see  dm diet  pt  htn / cont meds

## 2018-01-26 NOTE — DISCHARGE NOTE ADULT - PATIENT PORTAL LINK FT
“You can access the [employer] Patient Portal, offered by [facility], by registering with the following website: [email or web address]”

## 2018-01-26 NOTE — PROGRESS NOTE ADULT - SUBJECTIVE AND OBJECTIVE BOX
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 No nausea, vomiting, or hematemesis; No diarrhea or constipation. No melena or hematochezia.  GENITOURINARY: No dysuria, frequency, hematuria, or incontinence  NEUROLOGICAL: No headaches, memory loss, loss of strength, numbness, or tremors  MUSCULOSKELETAL: No joint pain or swelling; No muscle, back, or extremity pain    Medications:  MEDICATIONS  (STANDING):  ALBUTerol   0.042% 1.25 milliGRAM(s) Nebulizer every 6 hours  aspirin enteric coated 325 milliGRAM(s) Oral daily  buDESOnide   0.25 milliGRAM(s) Respule 0.25 milliGRAM(s) Inhalation two times a day  carbidopa/levodopa  25/100 5 Tablet(s) Oral every 4 hours  dextrose 5%. 1000 milliLiter(s) (50 mL/Hr) IV Continuous <Continuous>  dextrose 50% Injectable 12.5 Gram(s) IV Push once  dextrose 50% Injectable 25 Gram(s) IV Push once  dextrose 50% Injectable 25 Gram(s) IV Push once  docusate sodium 100 milliGRAM(s) Oral three times a day  heparin  Injectable 5000 Unit(s) SubCutaneous every 12 hours  insulin glargine Injectable (LANTUS) 20 Unit(s) SubCutaneous at bedtime  insulin lispro (HumaLOG) corrective regimen sliding scale   SubCutaneous at bedtime  insulin lispro (HumaLOG) corrective regimen sliding scale   SubCutaneous three times a day before meals  insulin lispro Injectable (HumaLOG) 8 Unit(s) SubCutaneous three times a day before meals  losartan 50 milliGRAM(s) Oral daily  methylPREDNISolone sodium succinate Injectable 40 milliGRAM(s) IV Push every 12 hours    MEDICATIONS  (PRN):  dextrose Gel 1 Dose(s) Oral once PRN Blood Glucose LESS THAN 70 milliGRAM(s)/deciliter  glucagon  Injectable 1 milliGRAM(s) IntraMuscular once PRN Glucose LESS THAN 70 milligrams/deciliter  oxyCODONE    IR 5 milliGRAM(s) Oral every 4 hours PRN Moderate Pain (4 - 6)    	    PHYSICAL EXAM:  T(C): 36.7 (01-26-18 @ 04:34), Max: 36.8 (01-25-18 @ 19:31)  HR: 76 (01-26-18 @ 04:34) (76 - 83)  BP: 150/67 (01-26-18 @ 04:34) (147/85 - 168/68)  RR: 17 (01-26-18 @ 04:34) (17 - 18)  SpO2: 94% (01-26-18 @ 04:34) (93% - 95%)  Wt(kg): --  I&O's Summary    25 Jan 2018 07:01  -  26 Jan 2018 07:00  --------------------------------------------------------  IN: 240 mL / OUT: 600 mL / NET: -360 mL    26 Jan 2018 07:01  -  26 Jan 2018 11:11  --------------------------------------------------------  IN: 240 mL / OUT: 0 mL / NET: 240 mL        Appearance: Normal	  HEENT:   Normal oral mucosa, PERRL, EOMI	  Lymphatic: No lymphadenopathy  Cardiovascular: Normal S1 S2, No JVD, No murmurs, No edema  Respiratory: dec wheezes   Psychiatry: A & O x 3,   Gastrointestinal:  Soft, Non-tender, + BS	  Skin: No rashes, No ecchymoses, No cyanosis	  Neurologic: Non-focal parkinsonian movements  Extremities: Normal range of motion, No clubbing, cyanosis or edema  Vascular: Peripheral pulses palpable     TELEMETRY: 	    ECG:  	  RADIOLOGY:  OTHER: 	  	  LABS:	 	    CARDIAC MARKERS:                                12.7   10.6  )-----------( 320      ( 25 Jan 2018 03:01 )             38.4     01-26    138  |  100  |  25<H>  ----------------------------<  321<H>  4.7   |  22  |  1.01    Ca    8.5      26 Jan 2018 07:33    TPro  7.3  /  Alb  4.0  /  TBili  0.2  /  DBili  x   /  AST  8<L>  /  ALT  <5<L>  /  AlkPhos  130<H>  01-25    proBNP:   Lipid Profile:   HgA1c: Hemoglobin A1C, Whole Blood: 6.9 % (01-26 @ 07:33)    TSH:

## 2018-01-26 NOTE — DISCHARGE NOTE ADULT - CARE PROVIDERS DIRECT ADDRESSES
,[email or web address],DirectAddress_Unknown,DirectAddress_Unknown ,[email or web address],DirectAddress_Unknown,DirectAddress_Unknown,[email or web address]

## 2018-01-26 NOTE — DISCHARGE NOTE ADULT - MEDICATION SUMMARY - MEDICATIONS TO CHANGE
I will SWITCH the dose or number of times a day I take the medications listed below when I get home from the hospital:    Humalog 100 units/mL subcutaneous solution  --  subcutaneous    Lantus 100 units/mL subcutaneous solution  --  subcutaneous 36 units at hs    Lantus Solostar Pen 100 units/mL subcutaneous solution  -- 40 unit(s) subcutaneous once a day at night

## 2018-01-26 NOTE — PROGRESS NOTE ADULT - SUBJECTIVE AND OBJECTIVE BOX
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 Fevers absent    Skin:No rash  	  Ophthalmologic:Denies any visual complaints,discharge redness or photophobia  	  ENMT:No nasal discharge,headache,sinus congestion or throat pain.No dental complaints    Respiratory and Thorax:No cough,sputum or chest pain.Denies shortness of breath  	  Cardiovascular:  chest pain, this am     Gastrointestinal:	NO nausea,abdominal pain or diarrhea.    Genitourinary:	No dysuria,frequency. No flank pain    Musculoskeletal:	No joint swelling or pain.No weakness    Neurological:No confusion,diziness.No extremity weakness.No bladder or bowel incontinence	    Psychiatric:No delusions or hallucinations	    Hematology/Lymphatics:	No LN swelling.No gum bleeding     Endocrine:	No recent weight gain or loss.No abnormal heat/cold intolerance    Allergic/Immunologic:	No hives or rash   Allergies    No Known Allergies    Intolerances        Antimicrobials:          Vital Signs Last 24 Hrs  T(C): 36.7 (26 Jan 2018 04:34), Max: 36.9 (25 Jan 2018 10:00)  T(F): 98 (26 Jan 2018 04:34), Max: 98.4 (25 Jan 2018 10:00)  HR: 76 (26 Jan 2018 04:34) (76 - 83)  BP: 150/67 (26 Jan 2018 04:34) (145/77 - 168/68)  BP(mean): --  RR: 17 (26 Jan 2018 04:34) (17 - 18)  SpO2: 94% (26 Jan 2018 04:34) (93% - 95%)    PHYSICAL EXAM:Pleasant patient in no acute distress.           Eyes:PERRL EOMI.NO discharge or conjunctival injection    ENMT:No sinus tenderness.No thrush.No pharyngeal exudate or erythema.Fair dental hygiene    Neck:Supple,No LN,no JVD      Respiratory:Good air entry bilaterally,CTA          Gastrointestinal:Soft BS(+) no tenderness no masses ,No rebound or guarding    Genitourinary:No CVA tendereness     Rectal:    Extremities:No cyanosis,clubbing or edema.                                        12.7   10.6  )-----------( 320      ( 25 Jan 2018 03:01 )             38.4         01-25    140  |  100  |  18  ----------------------------<  282<H>  3.9   |  27  |  0.62    Ca    8.8      25 Jan 2018 03:01    TPro  7.3  /  Alb  4.0  /  TBili  0.2  /  DBili  x   /  AST  8<L>  /  ALT  <5<L>  /  AlkPhos  130<H>  01-25      RECENT CULTURES:      MICROBIOLOGY:          Radiology:      Assessment:        Recommendations and Plan:    Pager [phone]  After 5 pm/weekends or if no response :[phone]

## 2018-01-26 NOTE — PHYSICAL THERAPY INITIAL EVALUATION ADULT - BALANCE DISTURBANCE, IDENTIFIED IMPAIRMENT CONTRIBUTE, REHAB EVAL
Pt has PD with R LE tigthness at times/decreased strength/abnormal muscle tone/impaired sensory feedback

## 2018-01-26 NOTE — PROGRESS NOTE ADULT - ASSESSMENT
77 yr old with exacerbation of possible asthma or lung disease. Multiple members of his family have sandra sick despite the negative RVP.    No evidence of pneumonia  defer to medicine and pulmonary the need for CTA.    Can hold off on antibiotics at present   cp workup as per team  thx

## 2018-01-26 NOTE — CHART NOTE - NSCHARTNOTEFT_GEN_A_CORE
Called by radiologist that patient has a PE in the RUL.  Discussed with Dr. Thomas who states to start patient on therapeutic lovenox.  Dr. Thomas to follow up with patient in am.

## 2018-01-26 NOTE — DISCHARGE NOTE ADULT - PROVIDER TOKENS
TOKEN:'[id number]:'[id number]' TOKEN:'[id number]:'[id number]' TOKEN:'[id number]'

## 2018-01-26 NOTE — DISCHARGE NOTE ADULT - HOSPITAL COURSE
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 seen by pulm, [facility], vascular. incidental renal lesion found on CT and US. started on xarelto. d.c home. 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 seen by pulm, heme, vascular. incidental renal lesion found on CT and US. started on xarelto. d.c home. outpt f/u endo, heme, vascular, pulm.

## 2018-01-26 NOTE — DISCHARGE NOTE ADULT - PLAN OF CARE
resolution of symptoms Asthma attacks happen when the airways in the lungs become narrow and inflamed  Asthma symptoms can include - Wheezing or noisy breathing, coughing, tight feeling in the chest, shortness of breath  Almost everyone with asthma has a quick-relief inhaler that works in 5- 10mins that they carry with them and long-term controller medicines control asthma and prevent future symptoms. People with frequent asthma symptoms take these 1 or 2 times each day.  You can stay away from things that cause your symptoms or make them worse. Doctors call these "triggers."  avoid them as much as possible. Some common triggers include - Dust, mold, animals, such as dogs and cats, pollen and plants, cigarette smoke, getting sick with a cold or flu (that's why it's important to get a flu shot), stress  Talk to your caregiver about an action plan for managing asthma attacks. This includes the use of a peak flow meter which measures the severity of the attack and medicines that can help stop the attack.   SEEK MEDICAL CARE IF:  You have wheezing, shortness of breath, or a cough even if taking medicine to prevent attacks.   You have thickening of sputum, sputum changes from clear or white to yellow, green, gray, or bloody.   You have any problems that may be related to the medicines you are taking (such as a rash, itching, swelling, or trouble breathing).  You are using a reliever medicine more than 2–3 times per week.  Your peak flow is still at 50–79% of personal best after following your action plan for 1 hour.  SEEK IMMEDIATE MEDICAL CARE IF:  You are short of breath even at rest,or with very little physical activity, chest pain, heart beating fast, bluish color to your lips or fingernails, fever, dizziness,   You seem to be getting worse and are unresponsive to treatment during an asthma attack.   Your peak flow is less than 50% of personal best. Take your anticoagulation (lovenox, coumadin) as directed.  Follow up with your health care provider within one week. Call for appointment.  If you develop shortness of breath or if your shortness of breath worsens call your Health Care Provider or go to the Emergency Department. Continue medications as prescribed  Follow up with Vascular surgery to monitor HgA1C this admission -6.9  Make sure you get your HgA1c checked every three months.  If you take oral diabetes medications, check your blood glucose two times a day.  If you take insulin, check your blood glucose before meals and at bedtime.  It's important not to skip any meals.  Keep a log of your blood glucose results and always take it with you to your doctor appointments.  Keep a list of your current medications including injectables and over the counter medications and bring this medication list with you to all your doctor appointments.  If you have not seen your ophthalmologist this year call for appointment.  Check your feet daily for redness, sores, or openings. Do not self treat. If no improvement in two days call your primary care physician for an appointment.  Low blood sugar (hypoglycemia) is a blood sugar below 70mg/dl. Check your blood sugar if you feel signs/symptoms of hypoglycemia. If your blood sugar is below 70 take 15 grams of carbohydrates (ex 4 oz of apple juice, 3-4 glucose tablets, or 4-6 oz of regular soda) wait 15 minutes and repeat blood sugar to make sure it comes up above 70.  If your blood sugar is above 70 and you are due for a meal, have a meal.  If you are not due for a meal have a snack.  This snack helps keeps your blood sugar at a safe range. Take your anticoagulation as directed.  Follow up with your health care provider within one week. Call for appointment.  If you develop shortness of breath or if your shortness of breath worsens call your Health Care Provider or go to the Emergency Department. Follow up with PMD to monitor

## 2018-01-26 NOTE — PHYSICAL THERAPY INITIAL EVALUATION ADULT - ADDITIONAL COMMENTS
Pt lives with his wife in a private house with 3 steps to enter with + handrail and living area all one level. Pt lives with his wife in a private house with 3 steps to enter with + handrail and living area all one level. Pt primarily used cane for amb but has RW in home.

## 2018-01-26 NOTE — DISCHARGE NOTE ADULT - SECONDARY DIAGNOSIS.
Other pulmonary embolism without acute cor pulmonale, unspecified chronicity Mesenteric artery thrombosis Renal cyst Type 2 diabetes mellitus with hyperglycemia, with long-term current use of insulin

## 2018-01-26 NOTE — PROGRESS NOTE ADULT - SUBJECTIVE AND OBJECTIVE BOX
Chief Complaint: Evaluating this 76 y/o M for uncontrolled Type 2 DM w/ hyperglycemia exacerbated by steroids      Interval History: Hyperglycemia on steroids. Good appetite. SOB improved, + chest pain only when he coughs. No nausea or vomiting.     MEDICATIONS  (STANDING):  ALBUTerol   0.042% 1.25 milliGRAM(s) Nebulizer every 6 hours  aspirin enteric coated 325 milliGRAM(s) Oral daily  buDESOnide   0.25 milliGRAM(s) Respule 0.25 milliGRAM(s) Inhalation two times a day  carbidopa/levodopa  25/100 5 Tablet(s) Oral every 4 hours  dextrose 5%. 1000 milliLiter(s) (50 mL/Hr) IV Continuous <Continuous>  dextrose 50% Injectable 12.5 Gram(s) IV Push once  dextrose 50% Injectable 25 Gram(s) IV Push once  dextrose 50% Injectable 25 Gram(s) IV Push once  docusate sodium 100 milliGRAM(s) Oral three times a day  heparin  Injectable 5000 Unit(s) SubCutaneous every 12 hours  insulin glargine Injectable (LANTUS) 30 Unit(s) SubCutaneous at bedtime  insulin lispro (HumaLOG) corrective regimen sliding scale   SubCutaneous at bedtime  insulin lispro (HumaLOG) corrective regimen sliding scale   SubCutaneous three times a day before meals  insulin lispro Injectable (HumaLOG) 14 Unit(s) SubCutaneous three times a day before meals  losartan 50 milliGRAM(s) Oral daily  methylPREDNISolone sodium succinate Injectable 20 milliGRAM(s) IV Push every 12 hours    MEDICATIONS  (PRN):  ALPRAZolam 0.5 milliGRAM(s) Oral at bedtime PRN Insomnia/Tremulousness  dextrose Gel 1 Dose(s) Oral once PRN Blood Glucose LESS THAN 70 milliGRAM(s)/deciliter  glucagon  Injectable 1 milliGRAM(s) IntraMuscular once PRN Glucose LESS THAN 70 milligrams/deciliter  oxyCODONE    IR 5 milliGRAM(s) Oral every 4 hours PRN Moderate Pain (4 - 6)      Allergies    No Known Allergies    Intolerances      Review of Systems:  Constitutional: No fever  Eyes: No blurry vision  Cardiovascular: +chest pain with coughing  Respiratory: +SOB improved  GI: No abdominal pain, No nausea, No vomiting  Endocrine: as noted in HPI    All other negative      PHYSICAL EXAM:  VITALS: T(C): 36.7 (01-26-18 @ 12:14)  T(F): 98.1 (01-26-18 @ 12:14), Max: 98.2 (01-25-18 @ 19:31)  HR: 77 (01-26-18 @ 12:14) (76 - 83)  BP: 127/59 (01-26-18 @ 12:14) (127/59 - 168/68)  RR:  (17 - 18)  SpO2:  (93% - 95%)  Wt(kg): --  GENERAL: NAD at this time  EYES: EOMI, No proptosis  HEENT:  Atraumatic, Normocephalic,   RESPIRATORY: full excursion, non labored  CARDIOVASCULAR: Regular rhythm; normal S1/S2, no peripheral edema  GI: Soft, nontender, non distended, normal bowel sounds  SKIN: Warm and dry  PSYCH: normal affect, normal mood      POCT Blood Glucose.: 297 mg/dL (01-26-18 @ 12:04)  POCT Blood Glucose.: 292 mg/dL (01-26-18 @ 08:08)  POCT Blood Glucose.: 194 mg/dL (01-25-18 @ 21:52)  POCT Blood Glucose.: 366 mg/dL (01-25-18 @ 17:58)  POCT Blood Glucose.: 339 mg/dL (01-25-18 @ 12:32)  POCT Blood Glucose.: 232 mg/dL (01-25-18 @ 07:52)  POCT Blood Glucose.: 248 mg/dL (01-25-18 @ 02:33)        01-26    138  |  100  |  25<H>  ----------------------------<  321<H>  4.7   |  22  |  1.01    EGFR if : 82  EGFR if non : 70    Ca    8.5      01-26    TPro  7.3  /  Alb  4.0  /  TBili  0.2  /  DBili  x   /  AST  8<L>  /  ALT  <5<L>  /  AlkPhos  130<H>  01-25        Thyroid Function Tests:      Hemoglobin A1C, Whole Blood: 6.9 % <H> [4.0 - 5.6] (01-26-18 @ 07:33)

## 2018-01-26 NOTE — DISCHARGE NOTE ADULT - CARE PLAN
Principal Discharge DX:	Exacerbation of asthma, unspecified asthma severity, unspecified whether persistent  Goal:	resolution of symptoms  Assessment and plan of treatment:	Asthma attacks happen when the airways in the lungs become narrow and inflamed  Asthma symptoms can include - Wheezing or noisy breathing, coughing, tight feeling in the chest, shortness of breath  Almost everyone with asthma has a quick-relief inhaler that works in 5- 10mins that they carry with them and long-term controller medicines control asthma and prevent future symptoms. People with frequent asthma symptoms take these 1 or 2 times each day.  You can stay away from things that cause your symptoms or make them worse. Doctors call these "triggers."  avoid them as much as possible. Some common triggers include - Dust, mold, animals, such as dogs and cats, pollen and plants, cigarette smoke, getting sick with a cold or flu (that's why it's important to get a flu shot), stress  Talk to your caregiver about an action plan for managing asthma attacks. This includes the use of a peak flow meter which measures the severity of the attack and medicines that can help stop the attack.   SEEK MEDICAL CARE IF:  You have wheezing, shortness of breath, or a cough even if taking medicine to prevent attacks.   You have thickening of sputum, sputum changes from clear or white to yellow, green, gray, or bloody.   You have any problems that may be related to the medicines you are taking (such as a rash, itching, swelling, or trouble breathing).  You are using a reliever medicine more than 2–3 times per week.  Your peak flow is still at 50–79% of personal best after following your action plan for 1 hour.  SEEK IMMEDIATE MEDICAL CARE IF:  You are short of breath even at rest,or with very little physical activity, chest pain, heart beating fast, bluish color to your lips or fingernails, fever, dizziness,   You seem to be getting worse and are unresponsive to treatment during an asthma attack.   Your peak flow is less than 50% of personal best.  Secondary Diagnosis:	Other pulmonary embolism without acute cor pulmonale, unspecified chronicity  Assessment and plan of treatment:	Take your anticoagulation (lovenox, coumadin) as directed.  Follow up with your health care provider within one week. Call for appointment.  If you develop shortness of breath or if your shortness of breath worsens call your Health Care Provider or go to the Emergency Department.  Secondary Diagnosis:	Mesenteric artery thrombosis  Assessment and plan of treatment:	Continue medications as prescribed  Follow up with Vascular surgery to monitor  Secondary Diagnosis:	Renal cyst  Secondary Diagnosis:	Type 2 diabetes mellitus with hyperglycemia, with long-term current use of insulin  Assessment and plan of treatment:	HgA1C this admission -6.9  Make sure you get your HgA1c checked every three months.  If you take oral diabetes medications, check your blood glucose two times a day.  If you take insulin, check your blood glucose before meals and at bedtime.  It's important not to skip any meals.  Keep a log of your blood glucose results and always take it with you to your doctor appointments.  Keep a list of your current medications including injectables and over the counter medications and bring this medication list with you to all your doctor appointments.  If you have not seen your ophthalmologist this year call for appointment.  Check your feet daily for redness, sores, or openings. Do not self treat. If no improvement in two days call your primary care physician for an appointment.  Low blood sugar (hypoglycemia) is a blood sugar below 70mg/dl. Check your blood sugar if you feel signs/symptoms of hypoglycemia. If your blood sugar is below 70 take 15 grams of carbohydrates (ex 4 oz of apple juice, 3-4 glucose tablets, or 4-6 oz of regular soda) wait 15 minutes and repeat blood sugar to make sure it comes up above 70.  If your blood sugar is above 70 and you are due for a meal, have a meal.  If you are not due for a meal have a snack.  This snack helps keeps your blood sugar at a safe range. Principal Discharge DX:	Exacerbation of asthma, unspecified asthma severity, unspecified whether persistent  Goal:	resolution of symptoms  Assessment and plan of treatment:	Asthma attacks happen when the airways in the lungs become narrow and inflamed  Asthma symptoms can include - Wheezing or noisy breathing, coughing, tight feeling in the chest, shortness of breath  Almost everyone with asthma has a quick-relief inhaler that works in 5- 10mins that they carry with them and long-term controller medicines control asthma and prevent future symptoms. People with frequent asthma symptoms take these 1 or 2 times each day.  You can stay away from things that cause your symptoms or make them worse. Doctors call these "triggers."  avoid them as much as possible. Some common triggers include - Dust, mold, animals, such as dogs and cats, pollen and plants, cigarette smoke, getting sick with a cold or flu (that's why it's important to get a flu shot), stress  Talk to your caregiver about an action plan for managing asthma attacks. This includes the use of a peak flow meter which measures the severity of the attack and medicines that can help stop the attack.   SEEK MEDICAL CARE IF:  You have wheezing, shortness of breath, or a cough even if taking medicine to prevent attacks.   You have thickening of sputum, sputum changes from clear or white to yellow, green, gray, or bloody.   You have any problems that may be related to the medicines you are taking (such as a rash, itching, swelling, or trouble breathing).  You are using a reliever medicine more than 2–3 times per week.  Your peak flow is still at 50–79% of personal best after following your action plan for 1 hour.  SEEK IMMEDIATE MEDICAL CARE IF:  You are short of breath even at rest,or with very little physical activity, chest pain, heart beating fast, bluish color to your lips or fingernails, fever, dizziness,   You seem to be getting worse and are unresponsive to treatment during an asthma attack.   Your peak flow is less than 50% of personal best.  Secondary Diagnosis:	Other pulmonary embolism without acute cor pulmonale, unspecified chronicity  Assessment and plan of treatment:	Take your anticoagulation as directed.  Follow up with your health care provider within one week. Call for appointment.  If you develop shortness of breath or if your shortness of breath worsens call your Health Care Provider or go to the Emergency Department.  Secondary Diagnosis:	Mesenteric artery thrombosis  Assessment and plan of treatment:	Continue medications as prescribed  Follow up with Vascular surgery to monitor  Secondary Diagnosis:	Renal cyst  Secondary Diagnosis:	Type 2 diabetes mellitus with hyperglycemia, with long-term current use of insulin  Assessment and plan of treatment:	HgA1C this admission -6.9  Make sure you get your HgA1c checked every three months.  If you take oral diabetes medications, check your blood glucose two times a day.  If you take insulin, check your blood glucose before meals and at bedtime.  It's important not to skip any meals.  Keep a log of your blood glucose results and always take it with you to your doctor appointments.  Keep a list of your current medications including injectables and over the counter medications and bring this medication list with you to all your doctor appointments.  If you have not seen your ophthalmologist this year call for appointment.  Check your feet daily for redness, sores, or openings. Do not self treat. If no improvement in two days call your primary care physician for an appointment.  Low blood sugar (hypoglycemia) is a blood sugar below 70mg/dl. Check your blood sugar if you feel signs/symptoms of hypoglycemia. If your blood sugar is below 70 take 15 grams of carbohydrates (ex 4 oz of apple juice, 3-4 glucose tablets, or 4-6 oz of regular soda) wait 15 minutes and repeat blood sugar to make sure it comes up above 70.  If your blood sugar is above 70 and you are due for a meal, have a meal.  If you are not due for a meal have a snack.  This snack helps keeps your blood sugar at a safe range. Principal Discharge DX:	Exacerbation of asthma, unspecified asthma severity, unspecified whether persistent  Goal:	resolution of symptoms  Assessment and plan of treatment:	Asthma attacks happen when the airways in the lungs become narrow and inflamed  Asthma symptoms can include - Wheezing or noisy breathing, coughing, tight feeling in the chest, shortness of breath  Almost everyone with asthma has a quick-relief inhaler that works in 5- 10mins that they carry with them and long-term controller medicines control asthma and prevent future symptoms. People with frequent asthma symptoms take these 1 or 2 times each day.  You can stay away from things that cause your symptoms or make them worse. Doctors call these "triggers."  avoid them as much as possible. Some common triggers include - Dust, mold, animals, such as dogs and cats, pollen and plants, cigarette smoke, getting sick with a cold or flu (that's why it's important to get a flu shot), stress  Talk to your caregiver about an action plan for managing asthma attacks. This includes the use of a peak flow meter which measures the severity of the attack and medicines that can help stop the attack.   SEEK MEDICAL CARE IF:  You have wheezing, shortness of breath, or a cough even if taking medicine to prevent attacks.   You have thickening of sputum, sputum changes from clear or white to yellow, green, gray, or bloody.   You have any problems that may be related to the medicines you are taking (such as a rash, itching, swelling, or trouble breathing).  You are using a reliever medicine more than 2–3 times per week.  Your peak flow is still at 50–79% of personal best after following your action plan for 1 hour.  SEEK IMMEDIATE MEDICAL CARE IF:  You are short of breath even at rest,or with very little physical activity, chest pain, heart beating fast, bluish color to your lips or fingernails, fever, dizziness,   You seem to be getting worse and are unresponsive to treatment during an asthma attack.   Your peak flow is less than 50% of personal best.  Secondary Diagnosis:	Other pulmonary embolism without acute cor pulmonale, unspecified chronicity  Assessment and plan of treatment:	Take your anticoagulation as directed.  Follow up with your health care provider within one week. Call for appointment.  If you develop shortness of breath or if your shortness of breath worsens call your Health Care Provider or go to the Emergency Department.  Secondary Diagnosis:	Mesenteric artery thrombosis  Assessment and plan of treatment:	Continue medications as prescribed  Follow up with Vascular surgery to monitor  Secondary Diagnosis:	Renal cyst  Assessment and plan of treatment:	Follow up with PMD to monitor  Secondary Diagnosis:	Type 2 diabetes mellitus with hyperglycemia, with long-term current use of insulin  Assessment and plan of treatment:	HgA1C this admission -6.9  Make sure you get your HgA1c checked every three months.  If you take oral diabetes medications, check your blood glucose two times a day.  If you take insulin, check your blood glucose before meals and at bedtime.  It's important not to skip any meals.  Keep a log of your blood glucose results and always take it with you to your doctor appointments.  Keep a list of your current medications including injectables and over the counter medications and bring this medication list with you to all your doctor appointments.  If you have not seen your ophthalmologist this year call for appointment.  Check your feet daily for redness, sores, or openings. Do not self treat. If no improvement in two days call your primary care physician for an appointment.  Low blood sugar (hypoglycemia) is a blood sugar below 70mg/dl. Check your blood sugar if you feel signs/symptoms of hypoglycemia. If your blood sugar is below 70 take 15 grams of carbohydrates (ex 4 oz of apple juice, 3-4 glucose tablets, or 4-6 oz of regular soda) wait 15 minutes and repeat blood sugar to make sure it comes up above 70.  If your blood sugar is above 70 and you are due for a meal, have a meal.  If you are not due for a meal have a snack.  This snack helps keeps your blood sugar at a safe range.

## 2018-01-26 NOTE — DISCHARGE NOTE ADULT - ADDITIONAL INSTRUCTIONS
Follow up with PMD in 1 week  Follow up with Hematologist  Follow up with Vascular surgery  Follow up with Pulmonologist  Follow up with Endocrinologist Follow up with PMD in 1 week  Follow up with Hematologist in 1-2 weeks  Follow up with Vascular surgery in 2-3 weeks  Follow up with Pulmonologist 1-2 weeks  Follow up with Endocrinologist in 1 week Follow up with PMD in 1 week  Follow up with Hematologist in 1-2 weeks  Follow up with Vascular surgery in 2-3 weeks  Follow up with Pulmonologist 1-2 weeks Follow up with PMD in 1 week  Follow up with Hematologist in 1-2 weeks  Follow up with Vascular surgery in 2-3 weeks  Follow up with Endocrinologist in 1 week   Follow up with Pulmonologist 1-2 weeks

## 2018-01-26 NOTE — DISCHARGE NOTE ADULT - MEDICATION SUMMARY - MEDICATIONS TO TAKE
I will START or STAY ON the medications listed below when I get home from the hospital:    Rolling walker  -- Rolling walker    Length of need - 99    ICD 10 code - G20  -- Indication: For walker    aspirin 325 mg oral delayed release tablet  -- 1 tab(s) by mouth once a day  -- Indication: For Other pulmonary embolism without acute cor pulmonale, unspecified chronicity    losartan 50 mg oral tablet  -- 1 tab(s) by mouth once a day  -- Indication: For Htn    Xarelto 15 mg oral tablet  -- 1 tab(s) by mouth 2 times a day   -- Check with your doctor before becoming pregnant.  It is very important that you take or use this exactly as directed.  Do not skip doses or discontinue unless directed by your doctor.  Obtain medical advice before taking any non-prescription drugs as some may affect the action of this medication.  Take with food.    -- Indication: For Other pulmonary embolism without acute cor pulmonale, unspecified chronicity    Lantus Solostar Pen 100 units/mL subcutaneous solution  -- 16 unit(s) subcutaneous once a day  -- Indication: For diabetes    NovoLOG FlexPen 100 units/mL injectable solution  -- 3 unit(s) injectable 3 times a day (before meals)  -- Indication: For diabetes    carbidopa-levodopa 25 mg-100 mg oral tablet  -- 5 tab(s) by mouth every 4 hours  -- Indication: For parkinsons    Xanax 0.5 mg oral tablet  -- 0.5 tab(s) by mouth once a day at night as needed  -- Indication: For anxiety     budesonide-formoterol 160 mcg-4.5 mcg/inh inhalation aerosol  -- 2 puff(s) inhaled 2 times a day  -- Indication: For Exacerbation of asthma, unspecified asthma severity, unspecified whether persistent    albuterol 1.25 mg/3 mL (0.042%) inhalation solution  -- 1.25 milligram(s) inhaled every 6 hours, As Needed for SOB  -- Indication: For Exacerbation of asthma, unspecified asthma severity, unspecified whether persistent    docusate sodium 100 mg oral capsule  -- 1 cap(s) by mouth 3 times a day  -- Indication: For constipation    polyethylene glycol 3350 oral powder for reconstitution  -- 17 gram(s) by mouth 2 times a day  -- Indication: For constipation    Protonix 40 mg oral delayed release tablet  -- 1 tab(s) by mouth once a day   -- It is very important that you take or use this exactly as directed.  Do not skip doses or discontinue unless directed by your doctor.  Obtain medical advice before taking any non-prescription drugs as some may affect the action of this medication.  Swallow whole.  Do not crush.    -- Indication: For gerd I will START or STAY ON the medications listed below when I get home from the hospital:    Rolling walker  -- Rolling walker    Length of need - 99    ICD 10 code - G20  -- Indication: For walker    aspirin 325 mg oral delayed release tablet  -- 1 tab(s) by mouth once a day  -- Indication: For Other pulmonary embolism without acute cor pulmonale, unspecified chronicity    losartan 50 mg oral tablet  -- 1 tab(s) by mouth once a day  -- Indication: For Htn    Xarelto 15 mg oral tablet  -- 1 tab(s) by mouth 2 times a day x 21 days then 20 mg po daily  -- Check with your doctor before becoming pregnant.  It is very important that you take or use this exactly as directed.  Do not skip doses or discontinue unless directed by your doctor.  Obtain medical advice before taking any non-prescription drugs as some may affect the action of this medication.  Take with food.    -- Indication: For Other pulmonary embolism without acute cor pulmonale, unspecified chronicity    Lantus Solostar Pen 100 units/mL subcutaneous solution  -- 16 unit(s) subcutaneous once a day  -- Indication: For diabetes    NovoLOG FlexPen 100 units/mL injectable solution  -- 3 unit(s) injectable 3 times a day (before meals)  -- Indication: For diabetes    carbidopa-levodopa 25 mg-100 mg oral tablet  -- 5 tab(s) by mouth every 4 hours  -- Indication: For parkinsons    Xanax 0.5 mg oral tablet  -- 0.5 tab(s) by mouth once a day at night as needed  -- Indication: For anxiety     budesonide-formoterol 160 mcg-4.5 mcg/inh inhalation aerosol  -- 2 puff(s) inhaled 2 times a day  -- Indication: For Exacerbation of asthma, unspecified asthma severity, unspecified whether persistent    albuterol 1.25 mg/3 mL (0.042%) inhalation solution  -- 1.25 milligram(s) inhaled every 6 hours, As Needed for SOB  -- Indication: For Exacerbation of asthma, unspecified asthma severity, unspecified whether persistent    docusate sodium 100 mg oral capsule  -- 1 cap(s) by mouth 3 times a day  -- Indication: For constipation    polyethylene glycol 3350 oral powder for reconstitution  -- 17 gram(s) by mouth 2 times a day  -- Indication: For constipation    Protonix 40 mg oral delayed release tablet  -- 1 tab(s) by mouth once a day   -- It is very important that you take or use this exactly as directed.  Do not skip doses or discontinue unless directed by your doctor.  Obtain medical advice before taking any non-prescription drugs as some may affect the action of this medication.  Swallow whole.  Do not crush.    -- Indication: For gerd

## 2018-01-26 NOTE — PROGRESS NOTE ADULT - PROBLEM SELECTOR PLAN 1
Goal glucose 100-180  Increase Lantus to 30 units qhs  Increase Humalog to 14 untis with meals  Moderate correction qac and bedtime  Adjust further if needed. May need to titrate insulin doses down as steroids are tapered.   Plan to d/c on basal bolus

## 2018-01-26 NOTE — PHYSICAL THERAPY INITIAL EVALUATION ADULT - ACTIVE RANGE OF MOTION EXAMINATION, REHAB EVAL
bilateral upper extremity Active ROM was WFL (within functional limits)/bilateral  lower extremity Active ROM was WFL (within functional limits)/R shoulder 0-90 flexion and abd

## 2018-01-27 LAB
ANION GAP SERPL CALC-SCNC: 13 MMOL/L — SIGNIFICANT CHANGE UP (ref 5–17)
ANION GAP SERPL CALC-SCNC: 15 MMOL/L — SIGNIFICANT CHANGE UP (ref 5–17)
BUN SERPL-MCNC: 22 MG/DL — SIGNIFICANT CHANGE UP (ref 7–23)
BUN SERPL-MCNC: 27 MG/DL — HIGH (ref 7–23)
CALCIUM SERPL-MCNC: 8.7 MG/DL — SIGNIFICANT CHANGE UP (ref 8.4–10.5)
CALCIUM SERPL-MCNC: 9.2 MG/DL — SIGNIFICANT CHANGE UP (ref 8.4–10.5)
CHLORIDE SERPL-SCNC: 100 MMOL/L — SIGNIFICANT CHANGE UP (ref 96–108)
CHLORIDE SERPL-SCNC: 101 MMOL/L — SIGNIFICANT CHANGE UP (ref 96–108)
CK MB CFR SERPL CALC: 3.2 NG/ML — SIGNIFICANT CHANGE UP (ref 0–6.7)
CK SERPL-CCNC: 79 U/L — SIGNIFICANT CHANGE UP (ref 30–200)
CO2 SERPL-SCNC: 22 MMOL/L — SIGNIFICANT CHANGE UP (ref 22–31)
CO2 SERPL-SCNC: 26 MMOL/L — SIGNIFICANT CHANGE UP (ref 22–31)
CREAT SERPL-MCNC: 0.65 MG/DL — SIGNIFICANT CHANGE UP (ref 0.5–1.3)
CREAT SERPL-MCNC: 0.79 MG/DL — SIGNIFICANT CHANGE UP (ref 0.5–1.3)
GAS PNL BLDA: SIGNIFICANT CHANGE UP
GLUCOSE BLDC GLUCOMTR-MCNC: 108 MG/DL — HIGH (ref 70–99)
GLUCOSE BLDC GLUCOMTR-MCNC: 157 MG/DL — HIGH (ref 70–99)
GLUCOSE BLDC GLUCOMTR-MCNC: 161 MG/DL — HIGH (ref 70–99)
GLUCOSE BLDC GLUCOMTR-MCNC: 182 MG/DL — HIGH (ref 70–99)
GLUCOSE BLDC GLUCOMTR-MCNC: 206 MG/DL — HIGH (ref 70–99)
GLUCOSE BLDC GLUCOMTR-MCNC: 62 MG/DL — LOW (ref 70–99)
GLUCOSE BLDC GLUCOMTR-MCNC: 64 MG/DL — LOW (ref 70–99)
GLUCOSE BLDC GLUCOMTR-MCNC: 71 MG/DL — SIGNIFICANT CHANGE UP (ref 70–99)
GLUCOSE SERPL-MCNC: 181 MG/DL — HIGH (ref 70–99)
GLUCOSE SERPL-MCNC: 204 MG/DL — HIGH (ref 70–99)
HCT VFR BLD CALC: 35.2 % — LOW (ref 39–50)
HCT VFR BLD CALC: 40.2 % — SIGNIFICANT CHANGE UP (ref 39–50)
HGB BLD-MCNC: 11 G/DL — LOW (ref 13–17)
HGB BLD-MCNC: 13.4 G/DL — SIGNIFICANT CHANGE UP (ref 13–17)
MAGNESIUM SERPL-MCNC: 1.9 MG/DL — SIGNIFICANT CHANGE UP (ref 1.6–2.6)
MCHC RBC-ENTMCNC: 29.4 PG — SIGNIFICANT CHANGE UP (ref 27–34)
MCHC RBC-ENTMCNC: 31.3 GM/DL — LOW (ref 32–36)
MCHC RBC-ENTMCNC: 31.3 PG — SIGNIFICANT CHANGE UP (ref 27–34)
MCHC RBC-ENTMCNC: 33.2 GM/DL — SIGNIFICANT CHANGE UP (ref 32–36)
MCV RBC AUTO: 94.1 FL — SIGNIFICANT CHANGE UP (ref 80–100)
MCV RBC AUTO: 94.2 FL — SIGNIFICANT CHANGE UP (ref 80–100)
PHOSPHATE SERPL-MCNC: 3.3 MG/DL — SIGNIFICANT CHANGE UP (ref 2.5–4.5)
PLATELET # BLD AUTO: 324 K/UL — SIGNIFICANT CHANGE UP (ref 150–400)
PLATELET # BLD AUTO: 340 K/UL — SIGNIFICANT CHANGE UP (ref 150–400)
POTASSIUM SERPL-MCNC: 4.5 MMOL/L — SIGNIFICANT CHANGE UP (ref 3.5–5.3)
POTASSIUM SERPL-MCNC: 4.6 MMOL/L — SIGNIFICANT CHANGE UP (ref 3.5–5.3)
POTASSIUM SERPL-SCNC: 4.5 MMOL/L — SIGNIFICANT CHANGE UP (ref 3.5–5.3)
POTASSIUM SERPL-SCNC: 4.6 MMOL/L — SIGNIFICANT CHANGE UP (ref 3.5–5.3)
RBC # BLD: 3.74 M/UL — LOW (ref 4.2–5.8)
RBC # BLD: 4.27 M/UL — SIGNIFICANT CHANGE UP (ref 4.2–5.8)
RBC # FLD: 11.5 % — SIGNIFICANT CHANGE UP (ref 10.3–14.5)
RBC # FLD: 13.5 % — SIGNIFICANT CHANGE UP (ref 10.3–14.5)
SODIUM SERPL-SCNC: 137 MMOL/L — SIGNIFICANT CHANGE UP (ref 135–145)
SODIUM SERPL-SCNC: 140 MMOL/L — SIGNIFICANT CHANGE UP (ref 135–145)
TROPONIN T SERPL-MCNC: 0.01 NG/ML — SIGNIFICANT CHANGE UP (ref 0–0.06)
WBC # BLD: 13.2 K/UL — HIGH (ref 3.8–10.5)
WBC # BLD: 13.75 K/UL — HIGH (ref 3.8–10.5)
WBC # FLD AUTO: 13.2 K/UL — HIGH (ref 3.8–10.5)
WBC # FLD AUTO: 13.75 K/UL — HIGH (ref 3.8–10.5)

## 2018-01-27 PROCEDURE — 93010 ELECTROCARDIOGRAM REPORT: CPT

## 2018-01-27 PROCEDURE — 99221 1ST HOSP IP/OBS SF/LOW 40: CPT | Mod: GC

## 2018-01-27 RX ORDER — ACETAMINOPHEN 500 MG
650 TABLET ORAL ONCE
Qty: 0 | Refills: 0 | Status: COMPLETED | OUTPATIENT
Start: 2018-01-27 | End: 2018-01-27

## 2018-01-27 RX ORDER — INSULIN LISPRO 100/ML
10 VIAL (ML) SUBCUTANEOUS
Qty: 0 | Refills: 0 | Status: DISCONTINUED | OUTPATIENT
Start: 2018-01-27 | End: 2018-01-28

## 2018-01-27 RX ORDER — DEXTROSE 50 % IN WATER 50 %
1 SYRINGE (ML) INTRAVENOUS ONCE
Qty: 0 | Refills: 0 | Status: COMPLETED | OUTPATIENT
Start: 2018-01-27 | End: 2018-01-27

## 2018-01-27 RX ORDER — ALPRAZOLAM 0.25 MG
0.25 TABLET ORAL ONCE
Qty: 0 | Refills: 0 | Status: DISCONTINUED | OUTPATIENT
Start: 2018-01-27 | End: 2018-01-27

## 2018-01-27 RX ORDER — INSULIN GLARGINE 100 [IU]/ML
25 INJECTION, SOLUTION SUBCUTANEOUS AT BEDTIME
Qty: 0 | Refills: 0 | Status: DISCONTINUED | OUTPATIENT
Start: 2018-01-27 | End: 2018-01-29

## 2018-01-27 RX ADMIN — Medication 0.5 MILLIGRAM(S): at 21:25

## 2018-01-27 RX ADMIN — Medication 0.25 MILLIGRAM(S): at 13:57

## 2018-01-27 RX ADMIN — CARBIDOPA AND LEVODOPA 5 TABLET(S): 25; 100 TABLET ORAL at 03:01

## 2018-01-27 RX ADMIN — ENOXAPARIN SODIUM 80 MILLIGRAM(S): 100 INJECTION SUBCUTANEOUS at 17:17

## 2018-01-27 RX ADMIN — Medication 0.25 MILLIGRAM(S): at 23:38

## 2018-01-27 RX ADMIN — CARBIDOPA AND LEVODOPA 5 TABLET(S): 25; 100 TABLET ORAL at 17:20

## 2018-01-27 RX ADMIN — ALBUTEROL 1.25 MILLIGRAM(S): 90 AEROSOL, METERED ORAL at 17:17

## 2018-01-27 RX ADMIN — Medication 2: at 08:24

## 2018-01-27 RX ADMIN — LOSARTAN POTASSIUM 50 MILLIGRAM(S): 100 TABLET, FILM COATED ORAL at 06:52

## 2018-01-27 RX ADMIN — ALBUTEROL 1.25 MILLIGRAM(S): 90 AEROSOL, METERED ORAL at 00:11

## 2018-01-27 RX ADMIN — Medication 650 MILLIGRAM(S): at 23:45

## 2018-01-27 RX ADMIN — Medication 0.25 MILLIGRAM(S): at 17:18

## 2018-01-27 RX ADMIN — ALBUTEROL 1.25 MILLIGRAM(S): 90 AEROSOL, METERED ORAL at 23:38

## 2018-01-27 RX ADMIN — Medication 1 DOSE(S): at 17:16

## 2018-01-27 RX ADMIN — Medication 20 MILLIGRAM(S): at 06:51

## 2018-01-27 RX ADMIN — Medication 650 MILLIGRAM(S): at 21:40

## 2018-01-27 RX ADMIN — Medication 2: at 12:23

## 2018-01-27 RX ADMIN — Medication 100 MILLIGRAM(S): at 13:57

## 2018-01-27 RX ADMIN — ENOXAPARIN SODIUM 80 MILLIGRAM(S): 100 INJECTION SUBCUTANEOUS at 06:51

## 2018-01-27 RX ADMIN — CARBIDOPA AND LEVODOPA 5 TABLET(S): 25; 100 TABLET ORAL at 14:30

## 2018-01-27 RX ADMIN — CARBIDOPA AND LEVODOPA 5 TABLET(S): 25; 100 TABLET ORAL at 21:25

## 2018-01-27 RX ADMIN — Medication 20 MILLIGRAM(S): at 17:18

## 2018-01-27 RX ADMIN — CARBIDOPA AND LEVODOPA 5 TABLET(S): 25; 100 TABLET ORAL at 11:19

## 2018-01-27 RX ADMIN — CARBIDOPA AND LEVODOPA 5 TABLET(S): 25; 100 TABLET ORAL at 00:09

## 2018-01-27 RX ADMIN — Medication 14 UNIT(S): at 12:23

## 2018-01-27 RX ADMIN — CARBIDOPA AND LEVODOPA 5 TABLET(S): 25; 100 TABLET ORAL at 06:53

## 2018-01-27 RX ADMIN — CARBIDOPA AND LEVODOPA 4 TABLET(S): 25; 100 TABLET ORAL at 00:11

## 2018-01-27 RX ADMIN — Medication 0.25 MILLIGRAM(S): at 06:51

## 2018-01-27 RX ADMIN — Medication 325 MILLIGRAM(S): at 11:20

## 2018-01-27 RX ADMIN — Medication 14 UNIT(S): at 08:23

## 2018-01-27 RX ADMIN — ALBUTEROL 1.25 MILLIGRAM(S): 90 AEROSOL, METERED ORAL at 11:20

## 2018-01-27 RX ADMIN — Medication 100 MILLIGRAM(S): at 06:52

## 2018-01-27 RX ADMIN — ALBUTEROL 1.25 MILLIGRAM(S): 90 AEROSOL, METERED ORAL at 06:51

## 2018-01-27 RX ADMIN — INSULIN GLARGINE 25 UNIT(S): 100 INJECTION, SOLUTION SUBCUTANEOUS at 23:34

## 2018-01-27 NOTE — CONSULT NOTE ADULT - ASSESSMENT
77yM w/ Subsegmental PE and mild SMA thrombosis    - c/w therapeutic anticoagulation  - Venous duplex of BLE to assess for DVT  - SMA thrombosis asymptomatic  - No acute surgical intervention at this time  - Pt discussed w/ Vascular surgery fellow, Dr. Yu  - Please page [phone] w/ any questions    EMMANUELLE Meadows PGY-2
76 yo M with h/o osteoarthrosis, HTN, DM, CAD, Parkinsons, distant history of prostate cancer admitted with bronchitis and found to have PE and SMA thrombosis.    - agree with lovenox q12 dosing for now. may be related to recent illness. will consider switch to NOAC for discharge.  - will check partial hypercoag w/up for LA, APLS antibodies  - agree with LE dopplers  - up to date with malignancy screeing    #Bronchitis - on steroids, pulm following    #Parkinson's - on sinemet, neuro consulted
77 yr old with exacerbation of possible asthma or lung disease. Multiple members of his family have sandra sick despite the negative RVP.    No evidence of pneumonia  defer to medicine and pulmonary the need for CTA.    Can hold off on antibiotics at present
76 y/o M w/ uncontrolled Type 2 DM w/ neuropathy exacerbated by steroids with glucose values > 300, HTN, HLD, a/w SOB (high risk patient with high level decision-making).
77 year old man with parkinson's admitted for bronchitis.  At this time I would recommend that he continue his home medications with strict adherence to medication timing.  10, 2, 6, 10, 2, 6.      Will follow up as outpatient.

## 2018-01-27 NOTE — CONSULT NOTE ADULT - SUBJECTIVE AND OBJECTIVE BOX
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 He denies any food fear, bloody diarrhea, or abdominal pain. He is c/o of bloating after meals, but this has been an issue for over 50 years. He is also chronically constipated, for which he takes Miralax w/ some relief. The patient denies fever, chills; chest pain, palpitation; dizziness, weakness; nausea, vomiting; diarrhea; abdominal pain; dysuria, hematuria; leg swelling; sick contacts; and recent travel.      ROS: 10-system review is otherwise negative except HPI above.      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  History of Total Knee Replacement left: 2004  History of Cholecystectomy: 1998  History of Appendectomy  left Inguinal Hernia    FAMILY HISTORY:  Family history of cancer (Father)  Family history of diabetes mellitus      SOCIAL HISTORY:      ALLERGIES: No Known Allergies      HOME MEDICATIONS:     CURRENT MEDICATIONS  MEDICATIONS (STANDING): ALBUTerol   0.042% 1.25 milliGRAM(s) Nebulizer every 6 hours  aspirin enteric coated 325 milliGRAM(s) Oral daily  buDESOnide   0.25 milliGRAM(s) Respule 0.25 milliGRAM(s) Inhalation two times a day  carbidopa/levodopa  25/100 5 Tablet(s) Oral every 4 hours  dextrose 5%. 1000 milliLiter(s) IV Continuous <Continuous>  dextrose 50% Injectable 12.5 Gram(s) IV Push once  dextrose 50% Injectable 25 Gram(s) IV Push once  dextrose 50% Injectable 25 Gram(s) IV Push once  docusate sodium 100 milliGRAM(s) Oral three times a day  enoxaparin Injectable 80 milliGRAM(s) SubCutaneous two times a day  insulin glargine Injectable (LANTUS) 30 Unit(s) SubCutaneous at bedtime  insulin lispro (HumaLOG) corrective regimen sliding scale   SubCutaneous at bedtime  insulin lispro (HumaLOG) corrective regimen sliding scale   SubCutaneous three times a day before meals  insulin lispro Injectable (HumaLOG) 14 Unit(s) SubCutaneous three times a day before meals  losartan 50 milliGRAM(s) Oral daily  methylPREDNISolone sodium succinate Injectable 20 milliGRAM(s) IV Push every 12 hours    MEDICATIONS (PRN):ALPRAZolam 0.5 milliGRAM(s) Oral at bedtime PRN Insomnia/Tremulousness  dextrose Gel 1 Dose(s) Oral once PRN Blood Glucose LESS THAN 70 milliGRAM(s)/deciliter  glucagon  Injectable 1 milliGRAM(s) IntraMuscular once PRN Glucose LESS THAN 70 milligrams/deciliter  oxyCODONE    IR 5 milliGRAM(s) Oral every 4 hours PRN Moderate Pain (4 - 6)    --------------------------------------------------------------------------------------------    Vitals:   T(C): 37.2 (01-27-18 @ 11:52), Max: 37.2 (01-27-18 @ 11:52)  HR: 80 (01-27-18 @ 11:52) (70 - 80)  BP: 152/72 (01-27-18 @ 11:52) (137/79 - 178/81)  BP(mean): --  RR: 18 (01-27-18 @ 11:52) (17 - 18)  SpO2: 94% (01-27-18 @ 11:52) (94% - 96%)  Wt(kg): --  CAPILLARY BLOOD GLUCOSE      POCT Blood Glucose.: 182 mg/dL (27 Jan 2018 11:31)  POCT Blood Glucose.: 161 mg/dL (27 Jan 2018 08:04)  POCT Blood Glucose.: 190 mg/dL (26 Jan 2018 21:48)  POCT Blood Glucose.: 154 mg/dL (26 Jan 2018 18:28)  POCT Blood Glucose.: 177 mg/dL (26 Jan 2018 16:45)    CAPILLARY BLOOD GLUCOSE      POCT Blood Glucose.: 182 mg/dL (27 Jan 2018 11:31)  POCT Blood Glucose.: 161 mg/dL (27 Jan 2018 08:04)  POCT Blood Glucose.: 190 mg/dL (26 Jan 2018 21:48)  POCT Blood Glucose.: 154 mg/dL (26 Jan 2018 18:28)  POCT Blood Glucose.: 177 mg/dL (26 Jan 2018 16:45)      01-26 @ 07:01 - 01-27 @ 07:00  --------------------------------------------------------  IN:    Oral Fluid: 720 mL  Total IN: 720 mL    OUT:    Voided: 1350 mL  Total OUT: 1350 mL    Total NET: -630 mL      01-27 @ 07:01 - 01-27 @ 15:21  --------------------------------------------------------  IN:    Oral Fluid: 480 mL  Total IN: 480 mL    OUT:  Total OUT: 0 mL    Total NET: 480 mL        Height (cm): 170.18 (01-25 @ 19:31)  Weight (kg): 82 (01-25 @ 19:31)  BMI (kg/m2): 28.3 (01-25 @ 19:31)  BSA (m2): 1.94 (01-25 @ 19:31)    PHYSICAL EXAM:   General: NAD, Sitting in chair comfortably  Neuro: A+Ox3  HEENT: NC/AT, EOMI  Neck: Soft, supple  Cardio: RRR, nml S1/S2  Resp: Good effort, CTA b/l  GI/Abd: Soft, NT/ND, no rebound/guarding, no masses palpated  Vascular: Palp distal pulses  Skin: Intact, no breakdown  Lymphatic/Nodes: No palpable lymphadenopathy  Musculoskeletal: All 4 extremities moving spontaneously, no limitations.  --------------------------------------------------------------------------------------------    LABS  CBC (01-27 @ 09:02)                              11.0<L>                         13.75<H>  )----------------(  324        --    % Neutrophils, --    % Lymphocytes, ANC: --                                  35.2<L>    BMP (01-27 @ 09:13)             137     |  100     |  27<H> 		Ca++ --      Ca 8.7                ---------------------------------( 204<H>		Mg --                 4.6     |  22      |  0.79  			Ph --      BMP (01-26 @ 07:33)             138     |  100     |  25<H> 		Ca++ --      Ca 8.5                ---------------------------------( 321<H>		Mg --                 4.7     |  22      |  1.01  			Ph --                  --------------------------------------------------------------------------------------------    MICROBIOLOGY      --------------------------------------------------------------------------------------------    IMAGING  < from: CT Angio Chest w/ IV Cont (01.26.18 @ 18:02) >  FINDINGS:    CHEST:     LUNGS, PLEURA, AND LARGE AIRWAYS: Bibasilar atelectasis. Left upper lobe   calcified granuloma. Patent central airways. Nonspecific mild bilateral   lower lobe bronchiectasis. No pleural effusion orpneumothorax.  VESSELS: Subsegmental filling defect in right upper lobe pulmonary artery   (series 6, image 198). Normal size thoracic aorta and main pulmonary   artery. Atherosclerotic calcification with mild narrowing of   brachiocephalic trunk  HEART: Normal size. No pericardial effusion. Coronary artery   calcifications.  MEDIASTINUM AND JESSI: No lymphadenopathy. Tiny right hilar calcified   nodes.  CHEST WALL AND LOWER NECK: Within normal limits.  VISUALIZED UPPER ABDOMEN: Postcholecystectomy. Diverticulosis. Partially   imaged right renal hypodense lesion with greater than fluid Hounsfield   units.  Mild to moderate mural thrombus is noted to involve the proximal superior   mesenteric artery.  BONES: Degenerative changes of the shoulders with effusion, subchondral   cyst formation and nonspecific heterotopic calcifications. Spinal   degenerative changes.    IMPRESSION:   1.  Subsegmental right upper lobe pulmonary embolism. This finding was   discussed by Dr. Massey with SILVIA Michel 1/26/2018 at 1839 hours.  2.  Indeterminate, partially imaged right renal hypodense lesion.   Recommend correlation with ultrasound to assess for complexity.  3. Mild to moderate mural thrombus of the proximal superior mesenteric   artery.      < end of copied text >
Chief Complaint:    HPI:  pt has been sick with bronchitis for the last few weeks. He had worsening sob/ cough and some tightness with coughing, so he came to hospital. CTA done in ED showed subsegmental PE. also showed SMA thrombosis. 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 He has been more sedentary the last few weeks with the bronchitis. He reports having had a colonoscopy a few times, most recently about two years ago. His prostate cancer was treated with brachytherapy about 10 years ago and his last PSA was 0.007. He has no personal history of blood clots and no FH of clots. He is now on lovenox. He feels better today.   He reports that when he was a child he had esophagram that showed narrowing of the esophagus, but he never had it repaired. He reports chronic abdominal pain and constipation and sometimes feeling like the food does not go down.    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  History of Total Knee Replacement left: 2004  History of Cholecystectomy: 1998  History of Appendectomy  left Inguinal Hernia      SOCIAL HISTORY:  Smoking - Non smoker   Alcohol - Social  Drugs - No drug use  , 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 1000 milliLiter(s) (50 mL/Hr) IV Continuous <Continuous>  dextrose 50% Injectable 12.5 Gram(s) IV Push once  dextrose 50% Injectable 25 Gram(s) IV Push once  dextrose 50% Injectable 25 Gram(s) IV Push once  docusate sodium 100 milliGRAM(s) Oral three times a day  enoxaparin Injectable 80 milliGRAM(s) SubCutaneous two times a day  insulin glargine Injectable (LANTUS) 25 Unit(s) SubCutaneous at bedtime  insulin lispro (HumaLOG) corrective regimen sliding scale   SubCutaneous at bedtime  insulin lispro (HumaLOG) corrective regimen sliding scale   SubCutaneous three times a day before meals  insulin lispro Injectable (HumaLOG) 10 Unit(s) SubCutaneous three times a day before meals  losartan 50 milliGRAM(s) Oral daily  methylPREDNISolone sodium succinate Injectable 20 milliGRAM(s) IV Push every 12 hours    MEDICATIONS  (PRN):  ALPRAZolam 0.5 milliGRAM(s) Oral at bedtime PRN Insomnia/Tremulousness  dextrose Gel 1 Dose(s) Oral once PRN Blood Glucose LESS THAN 70 milliGRAM(s)/deciliter  glucagon  Injectable 1 milliGRAM(s) IntraMuscular once PRN Glucose LESS THAN 70 milligrams/deciliter  oxyCODONE    IR 5 milliGRAM(s) Oral every 4 hours PRN Moderate Pain (4 - 6)      Allergies    No Known Allergies    Intolerances        Vital Signs Last 24 Hrs  T(C): 37.2 (27 Jan 2018 11:52), Max: 37.2 (27 Jan 2018 11:52)  T(F): 99 (27 Jan 2018 11:52), Max: 99 (27 Jan 2018 11:52)  HR: 80 (27 Jan 2018 11:52) (70 - 80)  BP: 152/72 (27 Jan 2018 11:52) (137/79 - 178/81)  BP(mean): --  RR: 18 (27 Jan 2018 11:52) (17 - 18)  SpO2: 94% (27 Jan 2018 11:52) (94% - 96%)    PHYSICAL EXAM  General: NAD  HEENT: clear oropharynx, anicteric sclera, pink conjunctiva  Neck: supple  CV: normal S1/S2 with no murmur rubs or gallops  Lungs: clear to auscultation, no wheezes, no rales  Abdomen: soft non-tender non-distended, no hepatosplenomegaly, positive bowel sounds  Ext: no clubbing cyanosis or edema  Skin: no rashes and no petechiae  Lymph Nodes: No LAD in axillae, neck  Neuro: alert and oriented X 3, no focal deficits, slight tremors    LABS:                          11.0   13.75 )-----------( 324      ( 27 Jan 2018 09:02 )             35.2         Mean Cell Volume : 94.1 fl  Mean Cell Hemoglobin : 29.4 pg  Mean Cell Hemoglobin Concentration : 31.3 gm/dL  Auto Neutrophil # : x  Auto Lymphocyte # : x  Auto Monocyte # : x  Auto Eosinophil # : x  Auto Basophil # : x  Auto Neutrophil % : x  Auto Lymphocyte % : x  Auto Monocyte % : x  Auto Eosinophil % : x  Auto Basophil % : x      Serial CBC's  01-27 @ 09:02  Hct-35.2 / Hgb-11.0 / Plat-324 / RBC-3.74 / WBC-13.75  Serial CBC's  01-25 @ 03:01  Hct-38.4 / Hgb-12.7 / Plat-320 / RBC-4.05 / WBC-10.6      01-27    137  |  100  |  27<H>  ----------------------------<  204<H>  4.6   |  22  |  0.79    Ca    8.7      27 Jan 2018 09:13    < from: CT Angio Chest w/ IV Cont (01.26.18 @ 18:02) >  IMPRESSION:   1.  Subsegmental right upper lobe pulmonary embolism. This finding was   discussed by Dr. Massey with NP Anand 1/26/2018 at 1839 hours.  2.  Indeterminate, partially imaged right renal hypodense lesion.   Recommend correlation with ultrasound to assess for complexity.  3. Mild to moderate mural thrombus of the proximal superior mesenteric   artery.    < end of copied text >    < from: CT Abdomen w/o Cont (11.19.07 @ 11:18) >  IMPRESSION:       Fluid filled distended gallbladder with moderate adjacent inflammatory   change consistent with acute cholecystitis.    < end of copied text >
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 Fevers absent    Skin:No rash  	  Ophthalmologic:Denies any visual complaints,discharge redness or photophobia  	  ENMT:No nasal discharge,headache,sinus congestion or throat pain.No dental complaints    Respiratory and Thorax:  cough,sputum or chest pain.  shortness of breath  	  Cardiovascular:	No chest pain,palpitaions or dizziness    Gastrointestinal:	NO nausea,abdominal pain or diarrhea.    Genitourinary:	No dysuria,frequency. No flank pain    Musculoskeletal:	No joint swelling or pain.No weakness    Neurological:No confusion,diziness.No extremity weakness.No bladder or bowel incontinence	    Psychiatric:No delusions or hallucinations	    Hematology/Lymphatics:	No LN swelling.No gum bleeding     Endocrine:	No recent weight gain or loss.No abnormal heat/cold intolerance    Allergic/Immunologic:	No hives or rash   Allergies    No Known Allergies    Intolerances        Antimicrobials:          Vital Signs Last 24 Hrs  T(C): 36.6 (25 Jan 2018 07:35), Max: 37 (25 Jan 2018 02:30)  T(F): 97.9 (25 Jan 2018 07:35), Max: 98.6 (25 Jan 2018 02:30)  HR: 77 (25 Jan 2018 07:35) (77 - 89)  BP: 161/74 (25 Jan 2018 07:35) (139/79 - 167/61)  BP(mean): --  RR: 16 (25 Jan 2018 07:35) (16 - 20)  SpO2: 96% (25 Jan 2018 07:35) (95% - 97%)    PHYSICAL EXAM:Pleasant patient in no acute distress.      Constitutional:    No cachexia     Eyes:PERRL EOMI.NO discharge or conjunctival injection    ENMT:No sinus tenderness.No thrush.No pharyngeal exudate or erythema.Fair dental hygiene    Neck:Supple,No LN,no JVD      Respiratory:Good air entry  no wheezes    Cardiovascular:S1 S2 wnl, No murmurs,rub or gallops    Gastrointestinal:Soft BS(+) no tenderness no masses ,No rebound or guarding    Genitourinary:No CVA tendereness     Rectal:    Extremities:No cyanosis,clubbing or edema.    Vascular:peripheral pulses felt    Neurological:AAO X 3,No grossly focal deficits    Skin:No rash     Lymph Nodes:No palpable LNs    Musculoskeletal:No joint swelling or LOM    Psychiatric:Affect normal.                                12.7   10.6  )-----------( 320      ( 25 Jan 2018 03:01 )             38.4         01-25    140  |  100  |  18  ----------------------------<  282<H>  3.9   |  27  |  0.62    Ca    8.8      25 Jan 2018 03:01    TPro  7.3  /  Alb  4.0  /  TBili  0.2  /  DBili  x   /  AST  8<L>  /  ALT  <5<L>  /  AlkPhos  130<H>  01-25      RECENT CULTURES:      MICROBIOLOGY:          Radiology:      Assessment:        Recommendations and Plan:    Pager [phone]  After 5 pm/weekends or if no response :[phone]
77 year old make with a history of HTN, IDDM, elevated cholesterol and asthma / COPD presents with increased cough and wheezing.  He is followed by Dr [facility] who performed a stress test on him 2 months ago which is reportedly normal.  He has chest pain now due to increased cough.    PMH: Parkinson's    Meds: [facility] (at home)            Insulin            Livalo (at home)             mg qd            Solumedrol    /59  HR 77 sinus  Bilateral wheezing and rhonchi  RR 1/6 systolic murmur  No edema    EKG: NSR first degree AV block  non specific ST-T changes    Imp: Chest pain is not ischemic in etiology given its atypical nature and reported normal stress test 2 months ago  No further cardiac work up  Please reconsult as needed.
HPI:  78 y/o M w/ hx of Type 2 DM x 30 years with neuropathy. No other known microvascular complications. Seen by optho this year without retinopathy. No recent A1c. At home on Lantus 40 units qhs and Humalog 14-18 units only if glucose >250 at mealtime. If <250 does not take any Humalog. Checks glucose 3 x/day. With variable values from . No pattern to hyper or hypoglycemia. Hypoglycemia occurs with symptoms occasionally fasting and occasioanlly after meals when he gives himself too much Humalog. Was on metformin in the past with GI side effects. Also was on Actos but discontinued. Does not take any oral hypoglycemic agents with current regimen. Parents and siblings with Type 2 DM. Drinks regular soda. Drinks pear juice. Eats pasta and rice. Limits bread. +occasional polydipsia, no polyuria, no nausea or vomiting. +weight loss. Admitted with SOB now on steroids. Received prednisone 50mg x 1 today at 3am now on solumedrol 20 IV q8.    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  History of Total Knee Replacement left: 2004  History of Cholecystectomy: 1998  History of Appendectomy  left Inguinal Hernia      FAMILY HISTORY:  Family history of cancer (Father)  Family history of diabetes mellitus- Parents, Siblings      Social History: Denies tobacco use or alcohol use    Outpatient Medications:  · 	DuoNeb 0.5 mg-2.5 mg/3 mL inhalation solution: 3 milliliter(s) by nebulizer every 8 hours   · 	HYDROcodone-homatropine 5 mg-1.5 mg/5 mL oral syrup: 5 milliliter(s) orally every 8 hours x 3 days, As Needed -for cough MDD:Maximum 15 ML per day   · 	oxyCODONE 10 mg oral tablet: 1 tab(s) orally every 4 hours, As needed, Moderate Pain  · 	oxyCODONE 5 mg oral tablet: 1 tab(s) orally every 4 hours, As needed, Mild Pain  · 	aspirin 325 mg oral delayed release tablet: 1 tab(s) orally 2 times a day  · 	docusate sodium 100 mg oral capsule: 1 cap(s) orally 3 times a day  · 	Multiple Vitamins oral tablet: 1 tab(s) orally once a day  · 	Humalog 100 units/mL subcutaneous solution:  subcutaneous   · 	Lantus 100 units/mL subcutaneous solution:  subcutaneous 40 units at hs  · 	levodopa: 25/100  2 tablets 3x/day  · 	Allegra:  orally a tab daily  · 	Livalo 4 mg oral tablet: 1 tab(s) orally once a day  · 	Avalide 12.5 mg-150 mg oral tablet: 1 tab(s) orally once a day    MEDICATIONS  (STANDING):  ALBUTerol   0.042% 1.25 milliGRAM(s) Nebulizer every 6 hours  aspirin enteric coated 325 milliGRAM(s) Oral daily  buDESOnide   0.25 milliGRAM(s) Respule 0.25 milliGRAM(s) Inhalation two times a day  carbidopa/levodopa  25/100 2 Tablet(s) Oral three times a day  dextrose 5%. 1000 milliLiter(s) (50 mL/Hr) IV Continuous <Continuous>  dextrose 50% Injectable 12.5 Gram(s) IV Push once  dextrose 50% Injectable 25 Gram(s) IV Push once  dextrose 50% Injectable 25 Gram(s) IV Push once  docusate sodium 100 milliGRAM(s) Oral three times a day  heparin  Injectable 5000 Unit(s) SubCutaneous every 12 hours  insulin glargine Injectable (LANTUS) 40 Unit(s) SubCutaneous at bedtime  insulin lispro (HumaLOG) corrective regimen sliding scale   SubCutaneous three times a day before meals  losartan 50 milliGRAM(s) Oral daily  methylPREDNISolone sodium succinate Injectable 20 milliGRAM(s) IV Push every 8 hours  sodium chloride 0.9%. 1000 milliLiter(s) (50 mL/Hr) IV Continuous <Continuous>    MEDICATIONS  (PRN):  dextrose Gel 1 Dose(s) Oral once PRN Blood Glucose LESS THAN 70 milliGRAM(s)/deciliter  glucagon  Injectable 1 milliGRAM(s) IntraMuscular once PRN Glucose LESS THAN 70 milligrams/deciliter  oxyCODONE    IR 5 milliGRAM(s) Oral every 4 hours PRN Moderate Pain (4 - 6)      Allergies    No Known Allergies    Intolerances      Review of Systems:  Constitutional: No fever, +weight loss  Eyes: No blurry vision  Neuro: No headache, +neuropathy  HEENT: No throat pain  Cardiovascular: No chest pain  Respiratory: +SOB  GI: No nausea or vomiting  : No polyuria  Skin: no rash  Psych: no depression  Endocrine: +occasional polydipsia, No heat or cold intolerance, rest as noted in HPI  Hem/lymph: no swelling    All other review of systems negative      PHYSICAL EXAM:  VITALS: T(C): 36.6 (01-25-18 @ 16:04)  T(F): 97.9 (01-25-18 @ 16:04), Max: 98.6 (01-25-18 @ 02:30)  HR: 78 (01-25-18 @ 16:04) (77 - 89)  BP: 147/85 (01-25-18 @ 16:04) (139/79 - 167/61)  RR:  (16 - 20)  SpO2:  (93% - 97%)  Wt(kg): --  GENERAL: NAD at this time  EYES: No proptosis, EOMI  HEENT:  Atraumatic, Normocephalic,   THYROID: Normal size, no palpable nodules  RESPIRATORY: Clear to auscultation bilaterally, full excursion, non-labored  CARDIOVASCULAR: Regular rhythm; No murmurs; +peripheral edema  GI: Soft, nontender, non distended, normal bowel sounds  SKIN: Dry, intact,   MUSCULOSKELETAL: normal strength  NEURO: follows commands  PSYCH: Alert and oriented x 3, normal affect, normal mood  CUSHING'S SIGNS: no striae      POCT Blood Glucose.: 339 mg/dL (01-25-18 @ 12:32)  POCT Blood Glucose.: 232 mg/dL (01-25-18 @ 07:52)  POCT Blood Glucose.: 248 mg/dL (01-25-18 @ 02:33)                              12.7   10.6  )-----------( 320      ( 25 Jan 2018 03:01 )             38.4       01-25    140  |  100  |  18  ----------------------------<  282<H>  3.9   |  27  |  0.62    EGFR if : 109  EGFR if non : 94    Ca    8.8      01-25    TPro  7.3  /  Alb  4.0  /  TBili  0.2  /  DBili  x   /  AST  8<L>  /  ALT  <5<L>  /  AlkPhos  130<H>  01-25    Thyroid Function Tests:            Radiology:
Neurology Consult    Patient is a 77y old  Male who presents with a chief complaint of " I am having knee pain" (26 Jan 2018 09:34).  The patient reports that he is having some increasing tremor on the right hand side of his body no marked rigidity. The patient notes rare dyskinesia.      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  History of Total Knee Replacement left: 2004  History of Cholecystectomy: 1998  History of Appendectomy  left Inguinal Hernia      Allergies    No Known Allergies    Intolerances        MEDICATIONS  (STANDING):  ALBUTerol   0.042% 1.25 milliGRAM(s) Nebulizer every 6 hours  aspirin enteric coated 325 milliGRAM(s) Oral daily  buDESOnide   0.25 milliGRAM(s) Respule 0.25 milliGRAM(s) Inhalation two times a day  carbidopa/levodopa  25/100 5 Tablet(s) Oral every 4 hours  heparin  Injectable 5000 Unit(s) SubCutaneous every 12 hours  insulin glargine Injectable (LANTUS) 30 Unit(s) SubCutaneous at bedtime  insulin lispro (HumaLOG) corrective regimen sliding scale   SubCutaneous at bedtime  insulin lispro (HumaLOG) corrective regimen sliding scale   SubCutaneous three times a day before meals  insulin lispro Injectable (HumaLOG) 14 Unit(s) SubCutaneous three times a day before meals  losartan 50 milliGRAM(s) Oral daily  methylPREDNISolone sodium succinate Injectable 20 milliGRAM(s) IV Push every 12 hours    MEDICATIONS  (PRN):  ALPRAZolam 0.5 milliGRAM(s) Oral at bedtime PRN Insomnia/Tremulousness  dextrose Gel 1 Dose(s) Oral once PRN Blood Glucose LESS THAN 70 milliGRAM(s)/deciliter  glucagon  Injectable 1 milliGRAM(s) IntraMuscular once PRN Glucose LESS THAN 70 milligrams/deciliter  oxyCODONE    IR 5 milliGRAM(s) Oral every 4 hours PRN Moderate Pain (4 - 6)      Social History: Denies tob, EtOH use     FAMILY HISTORY:  Family history of cancer (Father)  Family history of diabetes mellitus      Review of Systems:  CONSTITUTIONAL:  No weight loss, fever, chills, weakness or fatigue.  HEENT:  Eyes:  No visual loss, blurred vision, double vision or yellow sclerae. Ears, Nose, Throat:  No hearing loss, sneezing, congestion, runny nose or sore throat.  SKIN:  No rash or itching.  CARDIOVASCULAR:  No chest pain, chest pressure or chest discomfort. No palpitations or edema.  RESPIRATORY:  No shortness of breath, cough or sputum.  GASTROINTESTINAL:  No anorexia, nausea, vomiting or diarrhea. No abdominal pain or blood.  GENITOURINARY:  Burning on urination. Pregnancy. Last menstrual period, MM/DD/YYYY.  NEUROLOGICAL:  No headache, dizziness, syncope, paralysis, ataxia, numbness or tingling in the extremities. No change in bowel or bladder control.  MUSCULOSKELETAL:  No muscle, back pain, joint pain or stiffness.  HEMATOLOGIC:  No anemia, bleeding or bruising.  LYMPHATICS:  No enlarged nodes. No history of splenectomy.  PSYCHIATRIC:  No history of depression or anxiety.  ENDOCRINOLOGIC:  No reports of sweating, cold or heat intolerance. No polyuria or polydipsia.      Physical Exam:   Vital Signs Last 24 Hrs  T(C): 36.7 (26 Jan 2018 12:14), Max: 36.8 (25 Jan 2018 19:31)  T(F): 98.1 (26 Jan 2018 12:14), Max: 98.2 (25 Jan 2018 19:31)  HR: 77 (26 Jan 2018 12:14) (76 - 83)  BP: 127/59 (26 Jan 2018 12:14) (127/59 - 168/68)  BP(mean): --  RR: 18 (26 Jan 2018 12:14) (17 - 18)  SpO2: 94% (26 Jan 2018 12:14) (94% - 95%)    General Exam:   General appearance: No acute distress                   Neurological Exam:  Mental Status: AAOx3, fluent speech, follows simple commands, able to name    Cranial Nerves: EOMI, PERRL, VFF, V1-V3 intact, facial symmetric, no dysarthria, tongue midline    Motor: No drift in UE  Left UE:  5/5  Right UE:  5/5  Left LE:  5/5  Right LE:  5/5    Sensation:   Intact to LT throughout  Intact to PP throughout  Intact to Vibration throughout    Coordination: FTN intact b/l    Increased tone on right, tremor 5-7 Hz,     Labs:     CBC Full  -  ( 25 Jan 2018 03:01 )  WBC Count : 10.6 K/uL  Hemoglobin : 12.7 g/dL  Hematocrit : 38.4 %  Platelet Count - Automated : 320 K/uL  Mean Cell Volume : 94.9 fl  Mean Cell Hemoglobin : 31.3 pg  Mean Cell Hemoglobin Concentration : 33.0 gm/dL  Auto Neutrophil # : 6.7 K/uL  Auto Lymphocyte # : 2.2 K/uL  Auto Monocyte # : 0.9 K/uL  Auto Eosinophil # : 0.7 K/uL  Auto Basophil # : 0.1 K/uL  Auto Neutrophil % : 63.1 %  Auto Lymphocyte % : 20.9 %  Auto Monocyte % : 8.1 %  Auto Eosinophil % : 6.9 %  Auto Basophil % : 1.0 %    01-26    138  |  100  |  25<H>  ----------------------------<  321<H>  4.7   |  22  |  1.01    Ca    8.5      26 Jan 2018 07:33    TPro  7.3  /  Alb  4.0  /  TBili  0.2  /  DBili  x   /  AST  8<L>  /  ALT  <5<L>  /  AlkPhos  130<H>  01-25    LIVER FUNCTIONS - ( 25 Jan 2018 03:01 )  Alb: 4.0 g/dL / Pro: 7.3 g/dL / ALK PHOS: 130 U/L / ALT: <5 U/L RC / AST: 8 U/L / GGT: x                 Radiology:

## 2018-01-27 NOTE — CONSULT NOTE ADULT - CONSULT REASON
Pulmonary embolism
sob
Parkinson's
Uncontrolled Type 2 DM w/ hyperglycemia exacerbated by steroids.
chest pain
SMA thrombosis

## 2018-01-27 NOTE — CHART NOTE - NSCHARTNOTEFT_GEN_A_CORE
MEDICINE NP    pt w/ sob/ wheezing /ochoa/ cough / w/ bronchitis/ bronchospasm/ hypoxia. patient home dose of sinemet 25/100 5 tab 4x daily. Dr. Lorenz changed the dose to 1 tab 5x daily. Patient complained that he needs his home dose or he'll become symptomatic. Spoke with Dr. Nissenbaum from [facility] and he agreed to start the patient on his home dose verified by patient's pharmacy.      John MCNULTY

## 2018-01-27 NOTE — CONSULT NOTE ADULT - ATTENDING COMMENTS
77 year old male with history of PD currently suffering bronchitis.
Seen ex'ed dw'ed res/fellow agree w above  Incidental SMA mural thrombus  No signs/symp of mesenteric isch  CTA reviewed: SMA lesion appears to be ch soft plaque.  Rec:   Med mgmt  Antiplt/statins if safe (pt on AC already)  Outpt fu

## 2018-01-27 NOTE — PROGRESS NOTE ADULT - SUBJECTIVE AND OBJECTIVE BOX
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 No nausea, vomiting, or hematemesis; No diarrhea or constipation. No melena or hematochezia.  GENITOURINARY: No dysuria, frequency, hematuria, or incontinence  NEUROLOGICAL: No headaches, memory loss, loss of strength, numbness, or tremors  MUSCULOSKELETAL: No joint pain or swelling; No muscle, back, or extremity pain      Medications:  MEDICATIONS  (STANDING):  ALBUTerol   0.042% 1.25 milliGRAM(s) Nebulizer every 6 hours  aspirin enteric coated 325 milliGRAM(s) Oral daily  buDESOnide   0.25 milliGRAM(s) Respule 0.25 milliGRAM(s) Inhalation two times a day  carbidopa/levodopa  25/100 5 Tablet(s) Oral every 4 hours  dextrose 5%. 1000 milliLiter(s) (50 mL/Hr) IV Continuous <Continuous>  dextrose 50% Injectable 12.5 Gram(s) IV Push once  dextrose 50% Injectable 25 Gram(s) IV Push once  dextrose 50% Injectable 25 Gram(s) IV Push once  docusate sodium 100 milliGRAM(s) Oral three times a day  enoxaparin Injectable 80 milliGRAM(s) SubCutaneous two times a day  insulin glargine Injectable (LANTUS) 30 Unit(s) SubCutaneous at bedtime  insulin lispro (HumaLOG) corrective regimen sliding scale   SubCutaneous at bedtime  insulin lispro (HumaLOG) corrective regimen sliding scale   SubCutaneous three times a day before meals  insulin lispro Injectable (HumaLOG) 14 Unit(s) SubCutaneous three times a day before meals  losartan 50 milliGRAM(s) Oral daily  methylPREDNISolone sodium succinate Injectable 20 milliGRAM(s) IV Push every 12 hours    MEDICATIONS  (PRN):  ALPRAZolam 0.5 milliGRAM(s) Oral at bedtime PRN Insomnia/Tremulousness  dextrose Gel 1 Dose(s) Oral once PRN Blood Glucose LESS THAN 70 milliGRAM(s)/deciliter  glucagon  Injectable 1 milliGRAM(s) IntraMuscular once PRN Glucose LESS THAN 70 milligrams/deciliter  oxyCODONE    IR 5 milliGRAM(s) Oral every 4 hours PRN Moderate Pain (4 - 6)    	    PHYSICAL EXAM:  T(C): 37.2 (01-27-18 @ 11:52), Max: 37.2 (01-27-18 @ 11:52)  HR: 80 (01-27-18 @ 11:52) (70 - 80)  BP: 152/72 (01-27-18 @ 11:52) (127/59 - 178/81)  RR: 18 (01-27-18 @ 11:52) (17 - 18)  SpO2: 94% (01-27-18 @ 11:52) (94% - 96%)  Wt(kg): --  I&O's Summary    26 Jan 2018 07:01  -  27 Jan 2018 07:00  --------------------------------------------------------  IN: 720 mL / OUT: 1350 mL / NET: -630 mL    27 Jan 2018 07:01  -  27 Jan 2018 11:58  --------------------------------------------------------  IN: 240 mL / OUT: 0 mL / NET: 240 mL      Appearance: Normal	  HEENT:   Normal oral mucosa, PERRL, EOMI	  Lymphatic: No lymphadenopathy  Cardiovascular: Normal S1 S2, No JVD, No murmurs, No edema  Respiratory: dec wheezes   Psychiatry: A & O x 3,   Gastrointestinal:  Soft, Non-tender, + BS	  Skin: No rashes, No ecchymoses, No cyanosis	  Neurologic: Non-focal parkinsonian movements  Extremities: Normal range of motion, No clubbing, cyanosis or edema  Vascular: Peripheral pulses palpable     LABS:	 	    CARDIAC MARKERS:                                11.0   13.75 )-----------( 324      ( 27 Jan 2018 09:02 )             35.2     01-27    137  |  100  |  27<H>  ----------------------------<  204<H>  4.6   |  22  |  0.79    Ca    8.7      27 Jan 2018 09:13      proBNP:   Lipid Profile:   HgA1c:   TSH:

## 2018-01-27 NOTE — PROGRESS NOTE ADULT - SUBJECTIVE AND OBJECTIVE BOX
PULMONARY PROGRESS NOTE    INGE ESPOSITO  MRN-[id number]    Patient is a 77y old  Male who presents with a chief complaint of " I am having knee pain" (26 Jan 2018 09:34)      HPI:  Pt feeling significantly improved. Pt had cta revealing subsegmental PE.  Did have some left chest pain. Pt unclear symptom CTA ordered for.       ROS:   neg    ACTIVE MEDICATION LIST:  MEDICATIONS  (STANDING):  ALBUTerol   0.042% 1.25 milliGRAM(s) Nebulizer every 6 hours  aspirin enteric coated 325 milliGRAM(s) Oral daily  buDESOnide   0.25 milliGRAM(s) Respule 0.25 milliGRAM(s) Inhalation two times a day  carbidopa/levodopa  25/100 5 Tablet(s) Oral every 4 hours  dextrose 5%. 1000 milliLiter(s) (50 mL/Hr) IV Continuous <Continuous>  dextrose 50% Injectable 12.5 Gram(s) IV Push once  dextrose 50% Injectable 25 Gram(s) IV Push once  dextrose 50% Injectable 25 Gram(s) IV Push once  docusate sodium 100 milliGRAM(s) Oral three times a day  enoxaparin Injectable 80 milliGRAM(s) SubCutaneous two times a day  insulin glargine Injectable (LANTUS) 30 Unit(s) SubCutaneous at bedtime  insulin lispro (HumaLOG) corrective regimen sliding scale   SubCutaneous at bedtime  insulin lispro (HumaLOG) corrective regimen sliding scale   SubCutaneous three times a day before meals  insulin lispro Injectable (HumaLOG) 14 Unit(s) SubCutaneous three times a day before meals  losartan 50 milliGRAM(s) Oral daily  methylPREDNISolone sodium succinate Injectable 20 milliGRAM(s) IV Push every 12 hours    MEDICATIONS  (PRN):  ALPRAZolam 0.5 milliGRAM(s) Oral at bedtime PRN Insomnia/Tremulousness  dextrose Gel 1 Dose(s) Oral once PRN Blood Glucose LESS THAN 70 milliGRAM(s)/deciliter  glucagon  Injectable 1 milliGRAM(s) IntraMuscular once PRN Glucose LESS THAN 70 milligrams/deciliter  oxyCODONE    IR 5 milliGRAM(s) Oral every 4 hours PRN Moderate Pain (4 - 6)      EXAM:  Vital Signs Last 24 Hrs  T(C): 36.7 (27 Jan 2018 03:32), Max: 37.1 (26 Jan 2018 22:38)  T(F): 98.1 (27 Jan 2018 03:32), Max: 98.7 (26 Jan 2018 22:38)  HR: 70 (27 Jan 2018 03:32) (70 - 77)  BP: 137/79 (27 Jan 2018 03:32) (127/59 - 178/81)  BP(mean): --  RR: 17 (27 Jan 2018 03:32) (17 - 18)  SpO2: 95% (27 Jan 2018 03:32) (94% - 96%)    GENERAL: The patient is awake and alert in no apparent distress.     LUNGS: Clear to auscultation without wheezing, rales or rhonchi; respirations unlabored    HEART: Regular rate and rhythm without murmur.    LABS/IMAGING: reviewed      PROBLEM LIST:  77y Male with HEALTH ISSUES - PROBLEM Dx:  Hyperlipidemia, unspecified hyperlipidemia type: Hyperlipidemia, unspecified hyperlipidemia type  Essential hypertension: Essential hypertension  Type 2 diabetes mellitus with hyperglycemia, with long-term current use of insulin: Type 2 diabetes mellitus with hyperglycemia, with long-term current use of insulin  Subsegmental PE  Mesenteric art thrombosis          RECS:   1. Cont anticoag. for now.   2. Venous dopplers  3. ? vascular eval.  4. Discuss with PMD CTA  5. Ok to change to oral steroids. Prednisone 40 0D        Jerzy Barreto MD   [phone]

## 2018-01-27 NOTE — PROGRESS NOTE ADULT - ASSESSMENT
pt w/ sob/ wheezing /ochoa/ cough / w/ bronchitis/ bronchospasm/ hypoxia  pulm eval  taper steroids  some exer cp  cards eval  check cta of chest - demonstrating PE and mesenteric artery thrombus  Lovenox for AC  Vascular and Heme eval  Pulm appreciated  dm cont lantus  fsg riss  endo eval noted  dvt proph  parkinsonns / cont sinemet/dr rodríguez notified to see  dm diet  pt  htn / cont meds

## 2018-01-28 LAB
CK MB CFR SERPL CALC: 2.2 NG/ML — SIGNIFICANT CHANGE UP (ref 0–6.7)
CK SERPL-CCNC: 55 U/L — SIGNIFICANT CHANGE UP (ref 30–200)
GLUCOSE BLDC GLUCOMTR-MCNC: 104 MG/DL — HIGH (ref 70–99)
GLUCOSE BLDC GLUCOMTR-MCNC: 180 MG/DL — HIGH (ref 70–99)
GLUCOSE BLDC GLUCOMTR-MCNC: 212 MG/DL — HIGH (ref 70–99)
GLUCOSE BLDC GLUCOMTR-MCNC: 216 MG/DL — HIGH (ref 70–99)
GLUCOSE BLDC GLUCOMTR-MCNC: 61 MG/DL — LOW (ref 70–99)
GLUCOSE BLDC GLUCOMTR-MCNC: 67 MG/DL — LOW (ref 70–99)
GLUCOSE BLDC GLUCOMTR-MCNC: 98 MG/DL — SIGNIFICANT CHANGE UP (ref 70–99)
TROPONIN T SERPL-MCNC: 0.02 NG/ML — SIGNIFICANT CHANGE UP (ref 0–0.06)

## 2018-01-28 PROCEDURE — 99232 SBSQ HOSP IP/OBS MODERATE 35: CPT

## 2018-01-28 RX ORDER — INSULIN GLARGINE 100 [IU]/ML
20 INJECTION, SOLUTION SUBCUTANEOUS ONCE
Qty: 0 | Refills: 0 | Status: COMPLETED | OUTPATIENT
Start: 2018-01-28 | End: 2018-01-28

## 2018-01-28 RX ORDER — ALBUTEROL 90 UG/1
1.25 AEROSOL, METERED ORAL EVERY 6 HOURS
Qty: 0 | Refills: 0 | Status: DISCONTINUED | OUTPATIENT
Start: 2018-01-28 | End: 2018-01-30

## 2018-01-28 RX ORDER — ALPRAZOLAM 0.25 MG
0.5 TABLET ORAL ONCE
Qty: 0 | Refills: 0 | Status: DISCONTINUED | OUTPATIENT
Start: 2018-01-28 | End: 2018-01-28

## 2018-01-28 RX ORDER — POLYETHYLENE GLYCOL 3350 17 G/17G
17 POWDER, FOR SOLUTION ORAL
Qty: 0 | Refills: 0 | Status: DISCONTINUED | OUTPATIENT
Start: 2018-01-28 | End: 2018-01-30

## 2018-01-28 RX ORDER — INSULIN LISPRO 100/ML
8 VIAL (ML) SUBCUTANEOUS
Qty: 0 | Refills: 0 | Status: DISCONTINUED | OUTPATIENT
Start: 2018-01-28 | End: 2018-01-29

## 2018-01-28 RX ORDER — BUDESONIDE AND FORMOTEROL FUMARATE DIHYDRATE 160; 4.5 UG/1; UG/1
2 AEROSOL RESPIRATORY (INHALATION)
Qty: 0 | Refills: 0 | Status: DISCONTINUED | OUTPATIENT
Start: 2018-01-28 | End: 2018-01-30

## 2018-01-28 RX ADMIN — Medication 100 MILLIGRAM(S): at 18:01

## 2018-01-28 RX ADMIN — CARBIDOPA AND LEVODOPA 5 TABLET(S): 25; 100 TABLET ORAL at 18:00

## 2018-01-28 RX ADMIN — Medication 0.5 MILLIGRAM(S): at 22:18

## 2018-01-28 RX ADMIN — Medication 4: at 18:13

## 2018-01-28 RX ADMIN — Medication 100 MILLIGRAM(S): at 05:12

## 2018-01-28 RX ADMIN — Medication 10 UNIT(S): at 08:38

## 2018-01-28 RX ADMIN — Medication 100 MILLIGRAM(S): at 22:18

## 2018-01-28 RX ADMIN — Medication 100 MILLIGRAM(S): at 00:20

## 2018-01-28 RX ADMIN — INSULIN GLARGINE 20 UNIT(S): 100 INJECTION, SOLUTION SUBCUTANEOUS at 23:04

## 2018-01-28 RX ADMIN — BUDESONIDE AND FORMOTEROL FUMARATE DIHYDRATE 2 PUFF(S): 160; 4.5 AEROSOL RESPIRATORY (INHALATION) at 18:59

## 2018-01-28 RX ADMIN — Medication 0.5 MILLIGRAM(S): at 02:44

## 2018-01-28 RX ADMIN — Medication 0.25 MILLIGRAM(S): at 18:14

## 2018-01-28 RX ADMIN — Medication 20 MILLIGRAM(S): at 05:13

## 2018-01-28 RX ADMIN — CARBIDOPA AND LEVODOPA 5 TABLET(S): 25; 100 TABLET ORAL at 13:57

## 2018-01-28 RX ADMIN — ALBUTEROL 1.25 MILLIGRAM(S): 90 AEROSOL, METERED ORAL at 18:14

## 2018-01-28 RX ADMIN — Medication 0.25 MILLIGRAM(S): at 05:11

## 2018-01-28 RX ADMIN — Medication 2: at 08:38

## 2018-01-28 RX ADMIN — Medication 8 UNIT(S): at 18:13

## 2018-01-28 RX ADMIN — CARBIDOPA AND LEVODOPA 5 TABLET(S): 25; 100 TABLET ORAL at 02:09

## 2018-01-28 RX ADMIN — CARBIDOPA AND LEVODOPA 5 TABLET(S): 25; 100 TABLET ORAL at 22:17

## 2018-01-28 RX ADMIN — ENOXAPARIN SODIUM 80 MILLIGRAM(S): 100 INJECTION SUBCUTANEOUS at 18:00

## 2018-01-28 RX ADMIN — Medication 325 MILLIGRAM(S): at 12:56

## 2018-01-28 RX ADMIN — LOSARTAN POTASSIUM 50 MILLIGRAM(S): 100 TABLET, FILM COATED ORAL at 05:12

## 2018-01-28 RX ADMIN — ENOXAPARIN SODIUM 80 MILLIGRAM(S): 100 INJECTION SUBCUTANEOUS at 05:13

## 2018-01-28 RX ADMIN — ALBUTEROL 1.25 MILLIGRAM(S): 90 AEROSOL, METERED ORAL at 05:10

## 2018-01-28 RX ADMIN — CARBIDOPA AND LEVODOPA 5 TABLET(S): 25; 100 TABLET ORAL at 06:14

## 2018-01-28 RX ADMIN — POLYETHYLENE GLYCOL 3350 17 GRAM(S): 17 POWDER, FOR SOLUTION ORAL at 10:56

## 2018-01-28 RX ADMIN — CARBIDOPA AND LEVODOPA 5 TABLET(S): 25; 100 TABLET ORAL at 10:06

## 2018-01-28 NOTE — CHART NOTE - NSCHARTNOTEFT_GEN_A_CORE
CHIEF COMPLAINT: palpitations    SUBJECTIVE / OVERNIGHT EVENTS: Notified by RN that patient is complaining of palpitations. Patient states that in the past 2 weeks he has felt similar to this feeling. EKG done and similar to baseline normal sinus rhythm. Pt also complained of generalized headache relieved by Tylenol. Patient also belched and the feeling decreased. patient attributing respiratory treatments to the feeling of palpitations although last treatment was 3 hours prior to feeling palpitations Denies chest pain, shortness of breath, nausea, vomiting, dizziness    Vital Signs Last 24 Hrs  T(C): 36.7 (28 Jan 2018 04:08), Max: 37.2 (27 Jan 2018 11:52)  T(F): 98.1 (28 Jan 2018 04:08), Max: 99 (27 Jan 2018 11:52)  HR: 75 (28 Jan 2018 04:08) (75 - 82)  BP: 143/67 (28 Jan 2018 04:08) (143/67 - 192/90)  BP(mean): --  RR: 18 (28 Jan 2018 04:08) (18 - 20)  SpO2: 96% (28 Jan 2018 04:08) (94% - 97%)    REVIEW OF SYSTEMS:  Constitutional: No fever, fatigue or weight loss.  Skin: No rash.  Eyes: No recent vision problems or eye pain.  ENT: No congestion, ear pain, or sore throat.  Endocrine: No thyroid problems.  Cardiovascular: No chest pain or palpation.  Respiratory: frequent dry cough, shortness of breath, congestion, or wheezing.  Gastrointestinal: No abdominal pain, nausea, vomiting, or diarrhea.  Genitourinary: No dysuria.  Musculoskeletal: No joint swelling.  Neurologic: No headache.    CAPILLARY BLOOD GLUCOSE    POCT Blood Glucose.: 206 mg/dL (27 Jan 2018 23:32)  POCT Blood Glucose.: 157 mg/dL (27 Jan 2018 20:56)  POCT Blood Glucose.: 108 mg/dL (27 Jan 2018 17:36)  POCT Blood Glucose.: 71 mg/dL (27 Jan 2018 17:07)  POCT Blood Glucose.: 64 mg/dL (27 Jan 2018 16:54)  POCT Blood Glucose.: 62 mg/dL (27 Jan 2018 16:51)  POCT Blood Glucose.: 182 mg/dL (27 Jan 2018 11:31)  POCT Blood Glucose.: 161 mg/dL (27 Jan 2018 08:04)    I&O's Summary    26 Jan 2018 07:01  -  27 Jan 2018 07:00  --------------------------------------------------------  IN: 720 mL / OUT: 1350 mL / NET: -630 mL    27 Jan 2018 07:01  -  28 Jan 2018 04:10  --------------------------------------------------------  IN: 760 mL / OUT: 0 mL / NET: 760 mL        LABS:                        13.4   13.2  )-----------( 340      ( 27 Jan 2018 21:37 )             40.2     01-27    140  |  101  |  22  ----------------------------<  181<H>  4.5   |  26  |  0.65    Ca    9.2      27 Jan 2018 21:37  Phos  3.3     01-27  Mg     1.9     01-27        CARDIAC MARKERS ( 27 Jan 2018 21:37 )  x     / 0.01 ng/mL / 79 U/L / x     / 3.2 ng/mL    RADIOLOGY & ADDITIONAL TESTS:  < from: CT Angio Chest w/ IV Cont (01.26.18 @ 18:02) >    IMPRESSION:   1.  Subsegmental right upper lobe pulmonary embolism. This finding was   discussed by Dr. Massey with SILVIA Michel 1/26/2018 at 1839 hours.  2.  Indeterminate, partially imaged right renal hypodense lesion.   Recommend correlation with ultrasound to assess for complexity.  3. Mild to moderate mural thrombus of the proximal superior mesenteric   artery.      < end of copied text >      MEDICATIONS  (STANDING):  ALBUTerol   0.042% 1.25 milliGRAM(s) Nebulizer every 6 hours  aspirin enteric coated 325 milliGRAM(s) Oral daily  buDESOnide   0.25 milliGRAM(s) Respule 0.25 milliGRAM(s) Inhalation two times a day  carbidopa/levodopa  25/100 5 Tablet(s) Oral every 4 hours  dextrose 5%. 1000 milliLiter(s) (50 mL/Hr) IV Continuous <Continuous>  dextrose 50% Injectable 12.5 Gram(s) IV Push once  dextrose 50% Injectable 25 Gram(s) IV Push once  dextrose 50% Injectable 25 Gram(s) IV Push once  docusate sodium 100 milliGRAM(s) Oral three times a day  enoxaparin Injectable 80 milliGRAM(s) SubCutaneous two times a day  insulin glargine Injectable (LANTUS) 25 Unit(s) SubCutaneous at bedtime  insulin lispro (HumaLOG) corrective regimen sliding scale   SubCutaneous at bedtime  insulin lispro (HumaLOG) corrective regimen sliding scale   SubCutaneous three times a day before meals  insulin lispro Injectable (HumaLOG) 10 Unit(s) SubCutaneous three times a day before meals  losartan 50 milliGRAM(s) Oral daily  methylPREDNISolone sodium succinate Injectable 20 milliGRAM(s) IV Push every 12 hours    MEDICATIONS  (PRN):  ALPRAZolam 0.5 milliGRAM(s) Oral at bedtime PRN Insomnia/Tremulousness  dextrose Gel 1 Dose(s) Oral once PRN Blood Glucose LESS THAN 70 milliGRAM(s)/deciliter  glucagon  Injectable 1 milliGRAM(s) IntraMuscular once PRN Glucose LESS THAN 70 milligrams/deciliter  oxyCODONE    IR 5 milliGRAM(s) Oral every 4 hours PRN Moderate Pain (4 - 6)      PHYSICAL EXAM:  GENERAL: anxious NAD, well-developed  NEUROLOGY/PSYCHIATRIC: tremulous, dysarthria (speech at baseline) awake alert and oriented x3 non-focal deficits  CARDIOVASCULAR: Regular rate and rhythm; No murmurs, rubs, or gallops  RESPIRATORY: Clear to auscultation bilaterally; No wheeze, NO accessory muscle use   GASTROINTESTINAL: Soft, Nontender, Nondistended; Bowel sounds present  EXTREMITIES:  edema to bilateral lower extremities 2+ Peripheral Pulses, No clubbing, cyanosis  SKIN: No rashes or lesions  GENITOURINARY: Non distended bladder    PMH/PSH  Osteoarthrosis  Neuropathy  Hypertension  CAD (coronary artery disease)  Hyperlipidemia  Parkinson's Disease  Hypertension  Dyslipidemia  Diabetes Mellitus  Cancer of Prostate Seed implant 2004  CAD (Coronary Artery Disease)  Bronchitis  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    DDx anxiety ddx parkinson's, panic attack     Plan  EKG  cardiac enzymes x1 and in AM 1/28/18  bmp, mag, phos  xanax given for tremors  Tylenol for headache    Discussed with Dr. Jung regarding the above no need for tele at time.    will endorse to primary day team    Andreea Cope NP  [id number]

## 2018-01-28 NOTE — CHART NOTE - NSCHARTNOTEFT_GEN_A_CORE
MEDICINE PA - LATE NOTE ENTRY  1/28/18 @22:12      INGE ESPOSITO  Patient is a 77 year old male who presents with a chief complaint of "I am having knee pain." (26 Jan 2018 09:34)       Event Summary:  Called by RN to report patient with hypoglycemia - fingerstick of 61 and repeat of 67.  Patient asymptomatic at the time.      CAPILLARY BLOOD GLUCOSE  POCT Blood Glucose.: 104 mg/dL (28 Jan 2018 22:31)  POCT Blood Glucose.: 67 mg/dL (28 Jan 2018 22:12)  POCT Blood Glucose.: 61 mg/dL (28 Jan 2018 22:07)      Plan: Hypoglycemia  - Hypoglycemia protocol followed.  Repeat fingerstick noted to be 104  - Discussed with endocrine fellow, Dr. Mooney.  Per her recommendations, bedtime Lantus decreased to 20 units for tonight  - Will continue to monitor closely  - Primary team to follow up in AM      #[phone]  Mary Mariscal PA-C  Medicine Department

## 2018-01-28 NOTE — PROGRESS NOTE ADULT - ASSESSMENT
77 year old male w/T2DM (controlled) w/neuropathy a/w asthma exacerbation on steroid taper. Pt w/erratic PO intake and fluctuating glucose levels. Had hypoglycemic episode 1/27 w/decrease of basal/bolus. Given decrease of steroids will further decrease basal/bolus to prevent hypoglycemia. BG goal (100-180mg/dl). Pt requiring less insulin while on steroids vs home doses-discussed importance of following cons carb diet as outpt to decrease insulin requirements.

## 2018-01-28 NOTE — PROGRESS NOTE ADULT - ASSESSMENT
76 yo M with h/o osteoarthrosis, HTN, DM, CAD, Parkinsons, distant history of prostate cancer admitted with bronchitis and found to have PE and SMA thrombosis.    - agree with lovenox q12 dosing for now. PE may be related to recent illness, not likely the cause of his symptoms. will consider switch to NOAC for discharge.  - will check partial hypercoag w/up for LA, APLS antibodies  - agree with LE dopplers  - Patient is up to date with malignancy screening    #Bronchitis - on steroids, pulm following    #Parkinson's - on sinemet, neuro consulted    #tachycardia / palpitations - likely related to steroids - taper per pulm recs

## 2018-01-28 NOTE — PROGRESS NOTE ADULT - SUBJECTIVE AND OBJECTIVE BOX
Chief Complaint:  fu  History of Present Illness:  had some palpitations and tremors overnight but given Xanax and slept well. He feels better today. Denies bleeding, CP, SOB, ABd pain, N/V/D/C    MEDICATIONS  (STANDING):  aspirin enteric coated 325 milliGRAM(s) Oral daily  buDESOnide   0.25 milliGRAM(s) Respule 0.25 milliGRAM(s) Inhalation two times a day  buDESOnide 160 MICROgram(s)/formoterol 4.5 MICROgram(s) Inhaler 2 Puff(s) Inhalation two times a day  carbidopa/levodopa  25/100 5 Tablet(s) Oral every 4 hours  dextrose 5%. 1000 milliLiter(s) (50 mL/Hr) IV Continuous <Continuous>  dextrose 50% Injectable 12.5 Gram(s) IV Push once  dextrose 50% Injectable 25 Gram(s) IV Push once  dextrose 50% Injectable 25 Gram(s) IV Push once  docusate sodium 100 milliGRAM(s) Oral three times a day  enoxaparin Injectable 80 milliGRAM(s) SubCutaneous two times a day  insulin glargine Injectable (LANTUS) 25 Unit(s) SubCutaneous at bedtime  insulin lispro (HumaLOG) corrective regimen sliding scale   SubCutaneous at bedtime  insulin lispro (HumaLOG) corrective regimen sliding scale   SubCutaneous three times a day before meals  insulin lispro Injectable (HumaLOG) 8 Unit(s) SubCutaneous three times a day before meals  losartan 50 milliGRAM(s) Oral daily  polyethylene glycol 3350 17 Gram(s) Oral two times a day  predniSONE   Tablet 40 milliGRAM(s) Oral daily    MEDICATIONS  (PRN):  ALBUTerol   0.042% 1.25 milliGRAM(s) Nebulizer every 6 hours PRN Shortness of Breath and/or Wheezing  ALPRAZolam 0.5 milliGRAM(s) Oral at bedtime PRN Insomnia/Tremulousness  dextrose Gel 1 Dose(s) Oral once PRN Blood Glucose LESS THAN 70 milliGRAM(s)/deciliter  glucagon  Injectable 1 milliGRAM(s) IntraMuscular once PRN Glucose LESS THAN 70 milligrams/deciliter  oxyCODONE    IR 5 milliGRAM(s) Oral every 4 hours PRN Moderate Pain (4 - 6)      Allergies    No Known Allergies    Intolerances        Vital Signs Last 24 Hrs  T(C): 36.7 (28 Jan 2018 04:08), Max: 37.1 (27 Jan 2018 20:38)  T(F): 98.1 (28 Jan 2018 04:08), Max: 98.8 (27 Jan 2018 20:38)  HR: 75 (28 Jan 2018 04:08) (75 - 82)  BP: 143/67 (28 Jan 2018 04:08) (143/67 - 192/90)  BP(mean): --  RR: 18 (28 Jan 2018 04:08) (18 - 20)  SpO2: 96% (28 Jan 2018 04:08) (95% - 97%)    PHYSICAL EXAM  General: NAD  HEENT: clear oropharynx, anicteric sclera, pink conjunctiva  CV: normal S1/S2 with no murmur rubs or gallops  Abdomen: soft non-tender non-distended, no hepatosplenomegaly, positive bowel sounds  Ext: trace edema B/L LE  Skin: no rashes and no petechiae  Neuro: alert and oriented X 3, no focal deficits, slight tremors    LABS:                          13.4   13.2  )-----------( 340      ( 27 Jan 2018 21:37 )             40.2         Mean Cell Volume : 94.2 fl  Mean Cell Hemoglobin : 31.3 pg  Mean Cell Hemoglobin Concentration : 33.2 gm/dL  Auto Neutrophil # : x  Auto Lymphocyte # : x  Auto Monocyte # : x  Auto Eosinophil # : x  Auto Basophil # : x  Auto Neutrophil % : x  Auto Lymphocyte % : x  Auto Monocyte % : x  Auto Eosinophil % : x  Auto Basophil % : x      Serial CBC's  01-27 @ 21:37  Hct-40.2 / Hgb-13.4 / Plat-340 / RBC-4.27 / WBC-13.2  Serial CBC's  01-27 @ 09:02  Hct-35.2 / Hgb-11.0 / Plat-324 / RBC-3.74 / WBC-13.75  Serial CBC's  01-25 @ 03:01  Hct-38.4 / Hgb-12.7 / Plat-320 / RBC-4.05 / WBC-10.6      01-27    140  |  101  |  22  ----------------------------<  181<H>  4.5   |  26  |  0.65    Ca    9.2      27 Jan 2018 21:37  Phos  3.3     01-27  Mg     1.9     01-27

## 2018-01-28 NOTE — PROGRESS NOTE ADULT - PROBLEM SELECTOR PLAN 1
-test BG AC/HS  -c/w Lantus 25 units QHS  -Decrease Humalog 8 units AC meals  -c/w Humalog moderate correction scale AC and Mod HS scale  -will need careful titration as steroids further tapered. CONTACT ENDO TEAM WHEN DOSE CHANGES  -discharge on basal/bolus w/dose adjustment  -discussed w/pt and RN  pager: [phone]

## 2018-01-28 NOTE — PROGRESS NOTE ADULT - SUBJECTIVE AND OBJECTIVE BOX
Diabetes Follow up note:  Interval Hx:  77 year old male w/T2DM (basal/bolus at home) w/neuropathy here w/asthma exacerbation on steroids. Pt w/hypoglycemic episode yesterday at dinner time. basal/bolus reduced. Today, changed from solumedrol to Prednisone 40mg x 2 days. Fasting glucose 180 this AM but now 98 at lunch. Pt reports having decreased appetite. Has snacks at bedside in case BG drops.     Review of Systems:  General: Denies pain.   GI: Tolerating POs without any N/V/D/ABD PAIN.  CV: No CP/SOB  ENDO: No S&Sx of hypoglycemia. Reports gets symptomatic when hypoglycemic.   MEDS:    insulin glargine Injectable (LANTUS) 25 Unit(s) SubCutaneous at bedtime  insulin lispro (HumaLOG) corrective regimen sliding scale   SubCutaneous at bedtime  insulin lispro (HumaLOG) corrective regimen sliding scale   SubCutaneous three times a day before meals  insulin lispro Injectable (HumaLOG) 10 Unit(s) SubCutaneous three times a day before meals  predniSONE   Tablet 40 milliGRAM(s) Oral daily      Allergies    No Known Allergies        PE:  General: Male sitting in chair. NAD.   Vital Signs Last 24 Hrs  T(C): 36.7 (28 Jan 2018 04:08), Max: 37.1 (27 Jan 2018 20:38)  T(F): 98.1 (28 Jan 2018 04:08), Max: 98.8 (27 Jan 2018 20:38)  HR: 75 (28 Jan 2018 04:08) (75 - 82)  BP: 143/67 (28 Jan 2018 04:08) (143/67 - 192/90)  BP(mean): --  RR: 18 (28 Jan 2018 04:08) (18 - 20)  SpO2: 96% (28 Jan 2018 04:08) (95% - 97%)  Abd: Soft, NT,ND,   Extremities: Warm. No edema  Neuro: A&O X3    LABS:    POCT Blood Glucose.: 98 mg/dL (01-28-18 @ 11:58)  POCT Blood Glucose.: 180 mg/dL (01-28-18 @ 08:20)  POCT Blood Glucose.: 206 mg/dL (01-27-18 @ 23:32)  POCT Blood Glucose.: 157 mg/dL (01-27-18 @ 20:56)  POCT Blood Glucose.: 108 mg/dL (01-27-18 @ 17:36)  POCT Blood Glucose.: 71 mg/dL (01-27-18 @ 17:07)  POCT Blood Glucose.: 64 mg/dL (01-27-18 @ 16:54)  POCT Blood Glucose.: 62 mg/dL (01-27-18 @ 16:51)  POCT Blood Glucose.: 182 mg/dL (01-27-18 @ 11:31)  POCT Blood Glucose.: 161 mg/dL (01-27-18 @ 08:04)  POCT Blood Glucose.: 190 mg/dL (01-26-18 @ 21:48)  POCT Blood Glucose.: 154 mg/dL (01-26-18 @ 18:28)  POCT Blood Glucose.: 177 mg/dL (01-26-18 @ 16:45)  POCT Blood Glucose.: 297 mg/dL (01-26-18 @ 12:04)  POCT Blood Glucose.: 292 mg/dL (01-26-18 @ 08:08)  POCT Blood Glucose.: 194 mg/dL (01-25-18 @ 21:52)  POCT Blood Glucose.: 366 mg/dL (01-25-18 @ 17:58)  POCT Blood Glucose.: 339 mg/dL (01-25-18 @ 12:32)                            13.4   13.2  )-----------( 340      ( 27 Jan 2018 21:37 )             40.2       01-27    140  |  101  |  22  ----------------------------<  181<H>  4.5   |  26  |  0.65    Ca    9.2      27 Jan 2018 21:37  Phos  3.3     01-27  Mg     1.9     01-27            Hemoglobin A1C, Whole Blood: 6.9 % <H> [4.0 - 5.6] (01-26-18 @ 07:33)            Contact number: jenise [phone] or [phone]

## 2018-01-28 NOTE — PROGRESS NOTE ADULT - SUBJECTIVE AND OBJECTIVE BOX
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 No nausea, vomiting, or hematemesis; No diarrhea or constipation. No melena or hematochezia.  GENITOURINARY: No dysuria, frequency, hematuria, or incontinence  NEUROLOGICAL: No headaches, memory loss, loss of strength, numbness, or tremors  MUSCULOSKELETAL: No joint pain or swelling; No muscle, back, or extremity pain        Medications:  MEDICATIONS  (STANDING):  aspirin enteric coated 325 milliGRAM(s) Oral daily  buDESOnide   0.25 milliGRAM(s) Respule 0.25 milliGRAM(s) Inhalation two times a day  buDESOnide 160 MICROgram(s)/formoterol 4.5 MICROgram(s) Inhaler 2 Puff(s) Inhalation two times a day  carbidopa/levodopa  25/100 5 Tablet(s) Oral every 4 hours  dextrose 5%. 1000 milliLiter(s) (50 mL/Hr) IV Continuous <Continuous>  dextrose 50% Injectable 12.5 Gram(s) IV Push once  dextrose 50% Injectable 25 Gram(s) IV Push once  dextrose 50% Injectable 25 Gram(s) IV Push once  docusate sodium 100 milliGRAM(s) Oral three times a day  enoxaparin Injectable 80 milliGRAM(s) SubCutaneous two times a day  insulin glargine Injectable (LANTUS) 25 Unit(s) SubCutaneous at bedtime  insulin lispro (HumaLOG) corrective regimen sliding scale   SubCutaneous at bedtime  insulin lispro (HumaLOG) corrective regimen sliding scale   SubCutaneous three times a day before meals  insulin lispro Injectable (HumaLOG) 8 Unit(s) SubCutaneous three times a day before meals  losartan 50 milliGRAM(s) Oral daily  polyethylene glycol 3350 17 Gram(s) Oral two times a day  predniSONE   Tablet 40 milliGRAM(s) Oral daily    MEDICATIONS  (PRN):  ALBUTerol   0.042% 1.25 milliGRAM(s) Nebulizer every 6 hours PRN Shortness of Breath and/or Wheezing  ALPRAZolam 0.5 milliGRAM(s) Oral at bedtime PRN Insomnia/Tremulousness  dextrose Gel 1 Dose(s) Oral once PRN Blood Glucose LESS THAN 70 milliGRAM(s)/deciliter  glucagon  Injectable 1 milliGRAM(s) IntraMuscular once PRN Glucose LESS THAN 70 milligrams/deciliter  oxyCODONE    IR 5 milliGRAM(s) Oral every 4 hours PRN Moderate Pain (4 - 6)    	    PHYSICAL EXAM:  T(C): 36.7 (01-28-18 @ 04:08), Max: 37.1 (01-27-18 @ 20:38)  HR: 75 (01-28-18 @ 04:08) (75 - 82)  BP: 143/67 (01-28-18 @ 04:08) (143/67 - 192/90)  RR: 18 (01-28-18 @ 04:08) (18 - 20)  SpO2: 96% (01-28-18 @ 04:08) (95% - 97%)  Wt(kg): --  I&O's Summary    27 Jan 2018 07:01  -  28 Jan 2018 07:00  --------------------------------------------------------  IN: 760 mL / OUT: 0 mL / NET: 760 mL    28 Jan 2018 07:01  -  28 Jan 2018 13:03  --------------------------------------------------------  IN: 200 mL / OUT: 0 mL / NET: 200 mL      Appearance: Normal	  HEENT:   Normal oral mucosa, PERRL, EOMI	  Lymphatic: No lymphadenopathy  Cardiovascular: Normal S1 S2, No JVD, No murmurs, No edema  Respiratory: dec wheezes   Psychiatry: A & O x 3,   Gastrointestinal:  Soft, Non-tender, + BS	  Skin: No rashes, No ecchymoses, No cyanosis	  Neurologic: Non-focal parkinsonian movements  Extremities: Normal range of motion, No clubbing, cyanosis or edema  Vascular: Peripheral pulses palpable     LABS:	 	    CARDIAC MARKERS:  CARDIAC MARKERS ( 28 Jan 2018 05:24 )  x     / 0.02 ng/mL / 55 U/L / x     / 2.2 ng/mL  CARDIAC MARKERS ( 27 Jan 2018 21:37 )  x     / 0.01 ng/mL / 79 U/L / x     / 3.2 ng/mL                                13.4   13.2  )-----------( 340      ( 27 Jan 2018 21:37 )             40.2     01-27    140  |  101  |  22  ----------------------------<  181<H>  4.5   |  26  |  0.65    Ca    9.2      27 Jan 2018 21:37  Phos  3.3     01-27  Mg     1.9     01-27      proBNP:   Lipid Profile:   HgA1c:   TSH:

## 2018-01-28 NOTE — PROGRESS NOTE ADULT - SUBJECTIVE AND OBJECTIVE BOX
PULMONARY PROGRESS NOTE    INGE ESPOSITO  MRN-[id number]    Patient is a 77y old  Male who presents with a chief complaint of " I am having knee pain" (26 Jan 2018 09:34). noted increasing cough. Found to have asthma exacerbation. Workup in hosp showed subsegmental PE and SMA thrombosis. Seen by heme,    HPI: Still coughing but better. Very anxious; possibly from steroids.   -Increased tremor noted.  Hx of parkinson disease; coughing with meals reported.    -  -  -    ACTIVE MEDICATION LIST:  MEDICATIONS  (STANDING):  ALBUTerol   0.042% 1.25 milliGRAM(s) Nebulizer every 6 hours  aspirin enteric coated 325 milliGRAM(s) Oral daily  buDESOnide   0.25 milliGRAM(s) Respule 0.25 milliGRAM(s) Inhalation two times a day  carbidopa/levodopa  25/100 5 Tablet(s) Oral every 4 hours  dextrose 5%. 1000 milliLiter(s) (50 mL/Hr) IV Continuous <Continuous>  dextrose 50% Injectable 12.5 Gram(s) IV Push once  dextrose 50% Injectable 25 Gram(s) IV Push once  dextrose 50% Injectable 25 Gram(s) IV Push once  docusate sodium 100 milliGRAM(s) Oral three times a day  enoxaparin Injectable 80 milliGRAM(s) SubCutaneous two times a day  insulin glargine Injectable (LANTUS) 25 Unit(s) SubCutaneous at bedtime  insulin lispro (HumaLOG) corrective regimen sliding scale   SubCutaneous at bedtime  insulin lispro (HumaLOG) corrective regimen sliding scale   SubCutaneous three times a day before meals  insulin lispro Injectable (HumaLOG) 10 Unit(s) SubCutaneous three times a day before meals  losartan 50 milliGRAM(s) Oral daily  polyethylene glycol 3350 17 Gram(s) Oral two times a day  predniSONE   Tablet 40 milliGRAM(s) Oral daily    MEDICATIONS  (PRN):  ALPRAZolam 0.5 milliGRAM(s) Oral at bedtime PRN Insomnia/Tremulousness  dextrose Gel 1 Dose(s) Oral once PRN Blood Glucose LESS THAN 70 milliGRAM(s)/deciliter  glucagon  Injectable 1 milliGRAM(s) IntraMuscular once PRN Glucose LESS THAN 70 milligrams/deciliter  oxyCODONE    IR 5 milliGRAM(s) Oral every 4 hours PRN Moderate Pain (4 - 6)      EXAM:  Vital Signs Last 24 Hrs  T(C): 36.7 (28 Jan 2018 04:08), Max: 37.2 (27 Jan 2018 11:52)  T(F): 98.1 (28 Jan 2018 04:08), Max: 99 (27 Jan 2018 11:52)  HR: 75 (28 Jan 2018 04:08) (75 - 82)  BP: 143/67 (28 Jan 2018 04:08) (143/67 - 192/90)  BP(mean): --  RR: 18 (28 Jan 2018 04:08) (18 - 20)  SpO2: 96% (28 Jan 2018 04:08) (94% - 97%)    GENERAL: The patient is awake and alert in no apparent distress.     SKIN: Warm, dry, no rashes    LUNGS: Clear to auscultation without wheezing, rales or rhonchi; respirations unlabored    HEART: Regular rate and rhythm without murmur.    ABDOMEN: +BS, Soft, Nontender    EXTREMITIES: No clubbing, cyanosis, edema    ABG - ( 27 Jan 2018 22:06 )  pH: 7.44  /  pCO2: 40    /  pO2: 91    / HCO3: 27    / Base Excess: 2.9   /  SaO2: 97                                13.4   13.2  )-----------( 340      ( 27 Jan 2018 21:37 )             40.2       01-27    140  |  101  |  22  ----------------------------<  181<H>  4.5   |  26  |  0.65    Ca    9.2      27 Jan 2018 21:37  Phos  3.3     01-27  Mg     1.9     01-27    < from: CT Angio Chest w/ IV Cont (01.26.18 @ 18:02) >  IMPRESSION:   1.  Subsegmental right upper lobe pulmonary embolism. This finding was   discussed by Dr. Massey with NP Anand 1/26/2018 at 1839 hours.  2.  Indeterminate, partially imaged right renal hypodense lesion.   Recommend correlation with ultrasound to assess for complexity.  3. Mild to moderate mural thrombus of the proximal superior mesenteric   artery.    < end of copied text >          PROBLEM LIST:  77y Male with HEALTH ISSUES - PROBLEM Dx:  Hyperlipidemia, unspecified hyperlipidemia type: Hyperlipidemia, unspecified hyperlipidemia type  Essential hypertension: Essential hypertension  Type 2 diabetes mellitus with hyperglycemia, with long-term current use of insulin: Type 2 diabetes mellitus with hyperglycemia, with long-term current use of insulin  Parkinsons disease.  SMA thombosis; subsegmental PE. PE not likely cause of current resp symptoms.         RECS:  Change to po steroids times 2 days, then d/c  Change nebs to PRN  Add symbicort,.  Anticoagulation and workup per heme.   Swallow evaluation       Alexis Hutchinson MD  [phone]

## 2018-01-28 NOTE — PROGRESS NOTE ADULT - ASSESSMENT
pt w/ sob/ wheezing /ochoa/ cough / w/ bronchitis/ bronchospasm/ hypoxia  pulm eval  taper steroids  some exer cp  cards eval  check cta of chest - demonstrating PE and mesenteric artery thrombus  Lovenox for AC  Vascular and Heme eval  Pulm appreciated  dm better controlled as steroids tapered  endo eval noted  today lunchtime Insulin held 2/2 pt not eating and FS 98, likely need to further decrease regimen  dvt proph  parkinsonns / cont sinemet/dr rodríguez notified to see  dm diet  pt  htn / cont meds  Xanax PRN anxiety

## 2018-01-29 LAB
ANION GAP SERPL CALC-SCNC: 13 MMOL/L — SIGNIFICANT CHANGE UP (ref 5–17)
BUN SERPL-MCNC: 19 MG/DL — SIGNIFICANT CHANGE UP (ref 7–23)
CALCIUM SERPL-MCNC: 8.7 MG/DL — SIGNIFICANT CHANGE UP (ref 8.4–10.5)
CHLORIDE SERPL-SCNC: 97 MMOL/L — SIGNIFICANT CHANGE UP (ref 96–108)
CO2 SERPL-SCNC: 25 MMOL/L — SIGNIFICANT CHANGE UP (ref 22–31)
CREAT SERPL-MCNC: 0.45 MG/DL — LOW (ref 0.5–1.3)
DRVVT SCREEN TO CONFIRM RATIO: SIGNIFICANT CHANGE UP
GLUCOSE BLDC GLUCOMTR-MCNC: 115 MG/DL — HIGH (ref 70–99)
GLUCOSE BLDC GLUCOMTR-MCNC: 169 MG/DL — HIGH (ref 70–99)
GLUCOSE BLDC GLUCOMTR-MCNC: 254 MG/DL — HIGH (ref 70–99)
GLUCOSE BLDC GLUCOMTR-MCNC: 357 MG/DL — HIGH (ref 70–99)
GLUCOSE BLDC GLUCOMTR-MCNC: 77 MG/DL — SIGNIFICANT CHANGE UP (ref 70–99)
GLUCOSE BLDC GLUCOMTR-MCNC: 93 MG/DL — SIGNIFICANT CHANGE UP (ref 70–99)
GLUCOSE SERPL-MCNC: 324 MG/DL — HIGH (ref 70–99)
HCT VFR BLD CALC: 38.6 % — LOW (ref 39–50)
HGB BLD-MCNC: 13.2 G/DL — SIGNIFICANT CHANGE UP (ref 13–17)
LA NT DPL PPP QL: 25.2 SEC — SIGNIFICANT CHANGE UP
MCHC RBC-ENTMCNC: 31.9 PG — SIGNIFICANT CHANGE UP (ref 27–34)
MCHC RBC-ENTMCNC: 34.1 GM/DL — SIGNIFICANT CHANGE UP (ref 32–36)
MCV RBC AUTO: 93.6 FL — SIGNIFICANT CHANGE UP (ref 80–100)
NORMALIZED SCT PPP-RTO: 0.8 RATIO — SIGNIFICANT CHANGE UP (ref 0–1.16)
NORMALIZED SCT PPP-RTO: SIGNIFICANT CHANGE UP
PLATELET # BLD AUTO: 323 K/UL — SIGNIFICANT CHANGE UP (ref 150–400)
POTASSIUM SERPL-MCNC: 4.4 MMOL/L — SIGNIFICANT CHANGE UP (ref 3.5–5.3)
POTASSIUM SERPL-SCNC: 4.4 MMOL/L — SIGNIFICANT CHANGE UP (ref 3.5–5.3)
RBC # BLD: 4.12 M/UL — LOW (ref 4.2–5.8)
RBC # FLD: 11.6 % — SIGNIFICANT CHANGE UP (ref 10.3–14.5)
SODIUM SERPL-SCNC: 135 MMOL/L — SIGNIFICANT CHANGE UP (ref 135–145)
WBC # BLD: 9.5 K/UL — SIGNIFICANT CHANGE UP (ref 3.8–10.5)
WBC # FLD AUTO: 9.5 K/UL — SIGNIFICANT CHANGE UP (ref 3.8–10.5)

## 2018-01-29 PROCEDURE — 99232 SBSQ HOSP IP/OBS MODERATE 35: CPT

## 2018-01-29 PROCEDURE — 93970 EXTREMITY STUDY: CPT | Mod: 26

## 2018-01-29 RX ORDER — INSULIN LISPRO 100/ML
4 VIAL (ML) SUBCUTANEOUS
Qty: 0 | Refills: 0 | Status: DISCONTINUED | OUTPATIENT
Start: 2018-01-29 | End: 2018-01-29

## 2018-01-29 RX ORDER — LACTULOSE 10 G/15ML
20 SOLUTION ORAL ONCE
Qty: 0 | Refills: 0 | Status: COMPLETED | OUTPATIENT
Start: 2018-01-29 | End: 2018-01-29

## 2018-01-29 RX ORDER — INSULIN LISPRO 100/ML
4 VIAL (ML) SUBCUTANEOUS
Qty: 0 | Refills: 0 | Status: DISCONTINUED | OUTPATIENT
Start: 2018-01-29 | End: 2018-01-30

## 2018-01-29 RX ORDER — SIMETHICONE 80 MG/1
80 TABLET, CHEWABLE ORAL ONCE
Qty: 0 | Refills: 0 | Status: COMPLETED | OUTPATIENT
Start: 2018-01-29 | End: 2018-01-29

## 2018-01-29 RX ORDER — INSULIN LISPRO 100/ML
5 VIAL (ML) SUBCUTANEOUS
Qty: 0 | Refills: 0 | Status: DISCONTINUED | OUTPATIENT
Start: 2018-01-29 | End: 2018-01-29

## 2018-01-29 RX ORDER — ALPRAZOLAM 0.25 MG
0.25 TABLET ORAL ONCE
Qty: 0 | Refills: 0 | Status: DISCONTINUED | OUTPATIENT
Start: 2018-01-29 | End: 2018-01-29

## 2018-01-29 RX ORDER — INSULIN GLARGINE 100 [IU]/ML
16 INJECTION, SOLUTION SUBCUTANEOUS AT BEDTIME
Qty: 0 | Refills: 0 | Status: DISCONTINUED | OUTPATIENT
Start: 2018-01-29 | End: 2018-01-30

## 2018-01-29 RX ORDER — ALPRAZOLAM 0.25 MG
0.5 TABLET ORAL ONCE
Qty: 0 | Refills: 0 | Status: DISCONTINUED | OUTPATIENT
Start: 2018-01-29 | End: 2018-01-30

## 2018-01-29 RX ORDER — INSULIN LISPRO 100/ML
5 VIAL (ML) SUBCUTANEOUS
Qty: 0 | Refills: 0 | Status: DISCONTINUED | OUTPATIENT
Start: 2018-01-29 | End: 2018-01-30

## 2018-01-29 RX ADMIN — LACTULOSE 20 GRAM(S): 10 SOLUTION ORAL at 15:18

## 2018-01-29 RX ADMIN — Medication 2: at 17:34

## 2018-01-29 RX ADMIN — Medication 100 MILLIGRAM(S): at 14:03

## 2018-01-29 RX ADMIN — BUDESONIDE AND FORMOTEROL FUMARATE DIHYDRATE 2 PUFF(S): 160; 4.5 AEROSOL RESPIRATORY (INHALATION) at 08:29

## 2018-01-29 RX ADMIN — Medication 4 UNIT(S): at 14:01

## 2018-01-29 RX ADMIN — ENOXAPARIN SODIUM 80 MILLIGRAM(S): 100 INJECTION SUBCUTANEOUS at 05:55

## 2018-01-29 RX ADMIN — POLYETHYLENE GLYCOL 3350 17 GRAM(S): 17 POWDER, FOR SOLUTION ORAL at 17:35

## 2018-01-29 RX ADMIN — CARBIDOPA AND LEVODOPA 5 TABLET(S): 25; 100 TABLET ORAL at 21:59

## 2018-01-29 RX ADMIN — CARBIDOPA AND LEVODOPA 5 TABLET(S): 25; 100 TABLET ORAL at 05:56

## 2018-01-29 RX ADMIN — Medication 325 MILLIGRAM(S): at 14:03

## 2018-01-29 RX ADMIN — Medication 40 MILLIGRAM(S): at 05:56

## 2018-01-29 RX ADMIN — POLYETHYLENE GLYCOL 3350 17 GRAM(S): 17 POWDER, FOR SOLUTION ORAL at 05:56

## 2018-01-29 RX ADMIN — Medication 0.25 MILLIGRAM(S): at 06:42

## 2018-01-29 RX ADMIN — LOSARTAN POTASSIUM 50 MILLIGRAM(S): 100 TABLET, FILM COATED ORAL at 05:56

## 2018-01-29 RX ADMIN — Medication 10 MILLIGRAM(S): at 21:59

## 2018-01-29 RX ADMIN — Medication 100 MILLIGRAM(S): at 05:55

## 2018-01-29 RX ADMIN — CARBIDOPA AND LEVODOPA 5 TABLET(S): 25; 100 TABLET ORAL at 17:35

## 2018-01-29 RX ADMIN — Medication 0.5 MILLIGRAM(S): at 19:31

## 2018-01-29 RX ADMIN — BUDESONIDE AND FORMOTEROL FUMARATE DIHYDRATE 2 PUFF(S): 160; 4.5 AEROSOL RESPIRATORY (INHALATION) at 17:35

## 2018-01-29 RX ADMIN — CARBIDOPA AND LEVODOPA 5 TABLET(S): 25; 100 TABLET ORAL at 14:02

## 2018-01-29 RX ADMIN — Medication 10: at 14:00

## 2018-01-29 RX ADMIN — Medication 0.25 MILLIGRAM(S): at 05:56

## 2018-01-29 RX ADMIN — CARBIDOPA AND LEVODOPA 5 TABLET(S): 25; 100 TABLET ORAL at 02:52

## 2018-01-29 RX ADMIN — Medication 100 MILLIGRAM(S): at 21:59

## 2018-01-29 RX ADMIN — Medication 4 UNIT(S): at 17:33

## 2018-01-29 RX ADMIN — ENOXAPARIN SODIUM 80 MILLIGRAM(S): 100 INJECTION SUBCUTANEOUS at 17:35

## 2018-01-29 RX ADMIN — SIMETHICONE 80 MILLIGRAM(S): 80 TABLET, CHEWABLE ORAL at 10:21

## 2018-01-29 RX ADMIN — CARBIDOPA AND LEVODOPA 5 TABLET(S): 25; 100 TABLET ORAL at 10:43

## 2018-01-29 RX ADMIN — INSULIN GLARGINE 16 UNIT(S): 100 INJECTION, SOLUTION SUBCUTANEOUS at 22:26

## 2018-01-29 NOTE — SWALLOW BEDSIDE ASSESSMENT ADULT - SWALLOW EVAL: DIAGNOSIS
Pt presents with 1. an oral and suspected pharyngoesphageal dysphagia marked by mildly prolonged (but functional) mastication, delayed oral transit time, suspected uncontrolled loss, episodes of delayed pharyngeal swallow, and history of c/o midthoracic retention of material. 2. Dysarthria with reduced vocal volume, reduced intelligibility, and episodes of neurogenic stuttering.

## 2018-01-29 NOTE — CHART NOTE - NSCHARTNOTEFT_GEN_A_CORE
MEDICINE INGE [facility]ILE[facility]  Patient is a 77 year old male who presents with a chief complaint of "I am having knee pain." (26 Jan 2018 09:34)       Event Summary:  Called by RN to report patient complaining of anxiety this AM.  Patient seen at bedside.  He reports feeling extremely anxious this morning and tremulous.  Patient states that he gets this feeling often and Xanax usually helps to resolve the symptoms.  Does not endorse any further symptoms.      Vital Signs Last 24 Hrs  T(C): 36.8 (29 Jan 2018 03:49), Max: 36.8 (29 Jan 2018 03:49)  T(F): 98.2 (29 Jan 2018 03:49), Max: 98.2 (29 Jan 2018 03:49)  HR: 69 (29 Jan 2018 03:49) (69 - 74)  BP: 167/75 (29 Jan 2018 03:49) (133/63 - 167/75)  RR: 18 (29 Jan 2018 03:49) (18 - 18)  SpO2: 96% (29 Jan 2018 03:49) (95% - 96%)      Plan: Anxiety / Tremulous  - Xanax 0.25mg PO x1 dose ordered  - Will continue to monitor closely  - Primary team to follow up this AM      #[id number]  Mary Mariscal PA-C  Medicine Department

## 2018-01-29 NOTE — SWALLOW BEDSIDE ASSESSMENT ADULT - SLP PERTINENT HISTORY OF CURRENT PROBLEM
pt with increasing sob/ cough. pt saw pmd who referred to pulmonologist dr evans. given meds without much improvement. no chills. Per Assessment: pt w/ sob/ wheezing /ochoa/ cough / w/ bronchitis/ bronchospasm/ hypoxia. pulm evalid eval. dm cont lantus fsg riss. endo eval. dvt proph. parkinsonns / cont sinemet. dm diet. pt. htn / cont meds. HC: 1/25: CXR on admit showed clear lungs. ID called-> 77 yr old with exacerbation of possible asthma or lung disease. Multiple members of his family have sandra sick despite the negative RVP. No evidence of pneumonia  defer to medicine and pulmonary the need for CTA. Can hold off on antibiotics at present.

## 2018-01-29 NOTE — SWALLOW BEDSIDE ASSESSMENT ADULT - ORAL PHASE
Delayed oral transit time/suspect uncontrolled loss suspect uncontrolled loss Delayed oral transit time

## 2018-01-29 NOTE — PROGRESS NOTE ADULT - ASSESSMENT
77 yr old with exacerbation of possible asthma or lung disease. Multiple members of his family have sandra sick despite the negative RVP.    No evidence of pneumonia  defer to medicine and pulmonary the need for CTA.     pt stable off  off on antibiotics  will see prn thx

## 2018-01-29 NOTE — PROGRESS NOTE ADULT - ASSESSMENT
78 yo M with h/o osteoarthrosis, HTN, DM, CAD, Parkinsons, distant history of prostate cancer admitted with bronchitis and found to have PE and SMA thrombosis.    #PE- no prior thrombosis, no family history  - also with small SMA thrombosis- Vascular input noted  - with decreased mobility with recent illness, also reports much less active last few months during football season  - unlikely to be cause of symptom  - on Lovenox BID currently; repeat labs ordered, if Cr remains stable, can be changed to Xarelto 15mg bid on discharge  - LE duplex pending  - up to date with malignancy screening; hx of prostate ca s/p seed implant, PSA few months prior normal  - APLS panel- DRVVT negative, antibodies pending- can fu as outpatient    #renal lesion- US renal ordered for further evaluation, to determine if simple cyst prior to placing on NOAC    #Bronchitis - on steroids, pulm following    #Parkinson's - on sinemet    d/w NP  d/w Dr. Jung

## 2018-01-29 NOTE — SWALLOW BEDSIDE ASSESSMENT ADULT - MODE OF PRESENTATION
~ 2 ounces ensure pudding and applesauce/spoon/self fed ~ 3 ounces thickened apple juice ~ 8 ounces water, single sips and consecutive sips/straw/self fed/cup 2 [place] cookies/self fed

## 2018-01-29 NOTE — SWALLOW BEDSIDE ASSESSMENT ADULT - ASR SWALLOW ASPIRATION MONITOR
fever/pneumonia/change of breathing pattern/cough/gurgly voice/throat clearing/upper respiratory infection/Monitor for s/s aspiration/laryngeal penetration. If noted:  D/C p.o. intake, provide non-oral nutrition/hydration/meds, and contact this service @ [phone]

## 2018-01-29 NOTE — PROGRESS NOTE ADULT - SUBJECTIVE AND OBJECTIVE BOX
PULMONARY PROGRESS NOTE    INGE ESPOSITO  MRN-[id number]    Patient is a 77y old  Male who presents with a chief complaint of     HPI:  breathing comfortably, no cough or wheeze, is constipated.    ROS:   -afebrile  -appetite good    MEDICATIONS  (STANDING):  aspirin enteric coated 325 milliGRAM(s) Oral daily  bisacodyl Suppository 10 milliGRAM(s) Rectal once  buDESOnide 160 MICROgram(s)/formoterol 4.5 MICROgram(s) Inhaler 2 Puff(s) Inhalation two times a day  carbidopa/levodopa  25/100 5 Tablet(s) Oral every 4 hours  dextrose 5%. 1000 milliLiter(s) (50 mL/Hr) IV Continuous <Continuous>  dextrose 50% Injectable 12.5 Gram(s) IV Push once  dextrose 50% Injectable 25 Gram(s) IV Push once  dextrose 50% Injectable 25 Gram(s) IV Push once  docusate sodium 100 milliGRAM(s) Oral three times a day  enoxaparin Injectable 80 milliGRAM(s) SubCutaneous two times a day  insulin glargine Injectable (LANTUS) 16 Unit(s) SubCutaneous at bedtime  insulin lispro (HumaLOG) corrective regimen sliding scale   SubCutaneous at bedtime  insulin lispro (HumaLOG) corrective regimen sliding scale   SubCutaneous three times a day before meals  insulin lispro Injectable (HumaLOG) 5 Unit(s) SubCutaneous before breakfast  insulin lispro Injectable (HumaLOG) 4 Unit(s) SubCutaneous before lunch  insulin lispro Injectable (HumaLOG) 4 Unit(s) SubCutaneous before dinner  losartan 50 milliGRAM(s) Oral daily  polyethylene glycol 3350 17 Gram(s) Oral two times a day  predniSONE   Tablet 40 milliGRAM(s) Oral daily    MEDICATIONS  (PRN):  ALBUTerol   0.042% 1.25 milliGRAM(s) Nebulizer every 6 hours PRN Shortness of Breath and/or Wheezing  ALPRAZolam 0.5 milliGRAM(s) Oral at bedtime PRN Insomnia/Tremulousness  dextrose Gel 1 Dose(s) Oral once PRN Blood Glucose LESS THAN 70 milliGRAM(s)/deciliter  glucagon  Injectable 1 milliGRAM(s) IntraMuscular once PRN Glucose LESS THAN 70 milligrams/deciliter  oxyCODONE    IR 5 milliGRAM(s) Oral every 4 hours PRN Moderate Pain (4 - 6)      Vital Signs Last 24 Hrs  T(C): 36.8 (29 Jan 2018 11:45), Max: 36.8 (29 Jan 2018 03:49)  T(F): 98.3 (29 Jan 2018 11:45), Max: 98.3 (29 Jan 2018 11:45)  HR: 73 (29 Jan 2018 11:45) (69 - 74)  BP: 178/84 (29 Jan 2018 11:45) (152/85 - 178/84)  BP(mean): --  RR: 18 (29 Jan 2018 11:45) (18 - 18)  SpO2: 95% (29 Jan 2018 11:45) (95% - 96%)    GENERAL: The patient is awake and alert in no apparent distress.     SKIN: Warm, dry    LUNGS: clear lungs    HEART: S1/S2    ABDOMEN: +BS, Soft, Nontender    EXTREMITIES: No clubbing, cyanosis    LABS/IMGAING: reviewed                                  13.2   9.5   )-----------( 323      ( 29 Jan 2018 15:07 )             38.6     01-29    135  |  97  |  19  ----------------------------<  324<H>  4.4   |  25  |  0.45<L>    Ca    8.7      29 Jan 2018 15:07  Phos  3.3     01-27  Mg     1.9     01-27    < from: CT Angio Chest w/ IV Cont (01.26.18 @ 18:02) >  IMPRESSION:   1.  Subsegmental right upper lobe pulmonary embolism. This finding was   discussed by Dr. Massey with SILVIA Michel 1/26/2018 at 1839 hours.  2.  Indeterminate, partially imaged right renal hypodense lesion.   Recommend correlation with ultrasound to assess for complexity.  3. Mild to moderate mural thrombus of the proximal superior mesenteric   artery.    < end of copied text >        PROBLEM LIST:  77y Male with HEALTH ISSUES - PROBLEM Dx:  Asthma exacerbation    RECS:  -Would send patient home on pulmicort nebs bid and albuterol Q6 PRN  -prednisone 40mg PO daily x 5 days total  -full ac for subsegmental PE, if goes home on coumadin can follow up with  for INR check.  -Incentive spirometry, OOB to chair  -Follow up with  in 1 week for asthma check up      Rita Pearson MD  [phone]

## 2018-01-29 NOTE — SWALLOW BEDSIDE ASSESSMENT ADULT - SWALLOW EVAL: PATIENT/FAMILY GOALS STATEMENT
Pt was diagnosed with Parkinson's Disease ~ 7-8 years ago subsequently developed neurogenic stuttering and dysarthria. Pt reported longstanding history of trouble swallowing (> 50 years) initially describing choking with eating and drinking but later specified that choking indicates food feeling stuck in esophagus (pointing to midthoracic area). Pt reported when this occurs he coughs to try and alleviate discomfort and pain. Pt reported XR evaluation ? esophagram ~ 40 years ago where he was found to have "small opening" and advised to have surgery however he did not follow through. Pt is followed by a GI doctor as outpatient.

## 2018-01-29 NOTE — PROGRESS NOTE ADULT - ASSESSMENT
77 year old male w/T2DM (controlled) w/neuropathy a/w asthma exacerbation on steroid taper. Pt w/erratic PO intake and fluctuating glucose levels. Had hypoglycemic episode 1/27 and 1/28 w/decrease of basal/bolus. Given decrease of steroid doses, unpredictable and variable PO intake  will further decrease basal/bolus to prevent hypoglycemia. BG goal (100-180mg/dl).   Discussed with pt and wife in depth about action of basal and bolus insulins and how they are making his BGs levels so variable that his A1C refelcts a "goal number" that probably combines hypo and hyperglycemia given a false result. Pt is aware that high doses of Lantus are causing hiw hypoglycemia at home and that he should take Humalog ac meals (lower doses)  regardless of BG levels if eating carbs with meals.  Pt verbalized understanding and agree with plan but asked if ok to give Humalog right after eating because of his unpredictable PO intake.   Spent over 35 minutes providing this education to pt and wife.

## 2018-01-29 NOTE — PROGRESS NOTE ADULT - SUBJECTIVE AND OBJECTIVE BOX
Chief Complaint: fu    History of Present Illness: denies dyspnea, sl cough; mid chest discomfort- not new; no n/v, no abd pain, +constipation, no leg pain, no bleeding,       MEDICATIONS  (STANDING):  aspirin enteric coated 325 milliGRAM(s) Oral daily  bisacodyl Suppository 10 milliGRAM(s) Rectal once  buDESOnide 160 MICROgram(s)/formoterol 4.5 MICROgram(s) Inhaler 2 Puff(s) Inhalation two times a day  carbidopa/levodopa  25/100 5 Tablet(s) Oral every 4 hours  dextrose 5%. 1000 milliLiter(s) (50 mL/Hr) IV Continuous <Continuous>  dextrose 50% Injectable 12.5 Gram(s) IV Push once  dextrose 50% Injectable 25 Gram(s) IV Push once  dextrose 50% Injectable 25 Gram(s) IV Push once  docusate sodium 100 milliGRAM(s) Oral three times a day  enoxaparin Injectable 80 milliGRAM(s) SubCutaneous two times a day  insulin glargine Injectable (LANTUS) 25 Unit(s) SubCutaneous at bedtime  insulin lispro (HumaLOG) corrective regimen sliding scale   SubCutaneous at bedtime  insulin lispro (HumaLOG) corrective regimen sliding scale   SubCutaneous three times a day before meals  insulin lispro Injectable (HumaLOG) 5 Unit(s) SubCutaneous before breakfast  insulin lispro Injectable (HumaLOG) 4 Unit(s) SubCutaneous before lunch  insulin lispro Injectable (HumaLOG) 4 Unit(s) SubCutaneous before dinner  lactulose Syrup 20 Gram(s) Oral once  losartan 50 milliGRAM(s) Oral daily  polyethylene glycol 3350 17 Gram(s) Oral two times a day  predniSONE   Tablet 40 milliGRAM(s) Oral daily    MEDICATIONS  (PRN):  ALBUTerol   0.042% 1.25 milliGRAM(s) Nebulizer every 6 hours PRN Shortness of Breath and/or Wheezing  ALPRAZolam 0.5 milliGRAM(s) Oral at bedtime PRN Insomnia/Tremulousness  dextrose Gel 1 Dose(s) Oral once PRN Blood Glucose LESS THAN 70 milliGRAM(s)/deciliter  glucagon  Injectable 1 milliGRAM(s) IntraMuscular once PRN Glucose LESS THAN 70 milligrams/deciliter  oxyCODONE    IR 5 milliGRAM(s) Oral every 4 hours PRN Moderate Pain (4 - 6)      Allergies    No Known Allergies    Intolerances        Vital Signs Last 24 Hrs  T(C): 36.8 (29 Jan 2018 11:45), Max: 36.8 (29 Jan 2018 03:49)  T(F): 98.3 (29 Jan 2018 11:45), Max: 98.3 (29 Jan 2018 11:45)  HR: 73 (29 Jan 2018 11:45) (69 - 74)  BP: 178/84 (29 Jan 2018 11:45) (152/85 - 178/84)  BP(mean): --  RR: 18 (29 Jan 2018 11:45) (18 - 18)  SpO2: 95% (29 Jan 2018 11:45) (95% - 96%)    PHYSICAL EXAM  General: adult in NAD  HEENT: clear oropharynx, anicteric sclera, pink conjunctiva  Neck: supple  CV: normal S1/S2 with no murmur rubs or gallops  Lungs: clear to auscultation, no wheezes, no rales  Abdomen: soft non-tender non-distended, positive bowel sounds  Ext: no clubbing cyanosis or edema      LABS:                          13.4   13.2  )-----------( 340      ( 27 Jan 2018 21:37 )             40.2         Mean Cell Volume : 94.2 fl  Mean Cell Hemoglobin : 31.3 pg  Mean Cell Hemoglobin Concentration : 33.2 gm/dL  Auto Neutrophil # : x  Auto Lymphocyte # : x  Auto Monocyte # : x  Auto Eosinophil # : x  Auto Basophil # : x  Auto Neutrophil % : x  Auto Lymphocyte % : x  Auto Monocyte % : x  Auto Eosinophil % : x  Auto Basophil % : x      Serial CBC's  01-27 @ 21:37  Hct-40.2 / Hgb-13.4 / Plat-340 / RBC-4.27 / WBC-13.2  Serial CBC's  01-27 @ 09:02  Hct-35.2 / Hgb-11.0 / Plat-324 / RBC-3.74 / WBC-13.75      01-27    140  |  101  |  22  ----------------------------<  181<H>  4.5   |  26  |  0.65    Ca    9.2      27 Jan 2018 21:37  Phos  3.3     01-27  Mg     1.9     01-27              Radiology;    < from: CT Angio Chest w/ IV Cont (01.26.18 @ 18:02) >    IMPRESSION:   1.  Subsegmental right upper lobe pulmonary embolism. This finding was   discussed by Dr. Massey with NP Anand 1/26/2018 at 1839 hours.  2.  Indeterminate, partially imaged right renal hypodense lesion.   Recommend correlation with ultrasound to assess for complexity.  3. Mild to moderate mural thrombus of the proximal superior mesenteric   artery.

## 2018-01-29 NOTE — PROGRESS NOTE ADULT - SUBJECTIVE AND OBJECTIVE BOX
Diabetes Follow up note:  Interval Hx: 77 year old male w/T2DM (basal/bolus at home) w/neuropathy here w/asthma exacerbation on steroids> Prednisone 40mg today and tomorrow before stopping. Pt w/hypoglycemic episode yesterday again at bedtime (received lower dose of Lantus last nigth). Pt reports he eats but has a very variable PO intake.   Noted pt takes Lantus 40 units at hs and 14-18 units of Humalog at home for BG >250 while OFF steroids. However pt is hypoglycemic here while on lower doses of Lantus and humalog even though pt is ON steroids. Discussed this with pt who states having frequent hypoglycemic events at night or early morning at home but denies events during the day if he takes Humalog only for BG >250.   Pt FBG was 115 this am (per pt he tested with his own meter later on and was 98)> He refused premeal humalog this am even though he ate, had Prednisone and then had a swallowing test for evaluation of shocking sensation> BG ac lunch >250s.     Review of Systems:  General:  Reports breathing better but c/o shocking sensation this morning after feeling "gas" pain. Also can't recall when was his last BM.  GI: Tolerating POs without any N/V/D/ABD PAIN.  CV: No CP/SOB  ENDO: No S&Sx of hypoglycemia today but shaky and dizzy last night       MEDS:  insulin glargine Injectable (LANTUS) 25 Unit(s) SubCutaneous at bedtime  insulin lispro (HumaLOG) corrective regimen sliding scale   SubCutaneous at bedtime  insulin lispro (HumaLOG) corrective regimen sliding scale   SubCutaneous three times a day before meals  insulin lispro Injectable (HumaLOG) 8 Unit(s) SubCutaneous three times a day before meals  predniSONE   Tablet 40 milliGRAM(s) Oral daily      Allergies    No Known Allergies    PE:  General: Male laying in bed in NAD. Wife at bedside  Vital Signs Last 24 Hrs  T(C): 36.8 (01-29-18 @ 11:45), Max: 36.8 (01-29-18 @ 03:49)  T(F): 98.3 (01-29-18 @ 11:45), Max: 98.3 (01-29-18 @ 11:45)  HR: 73 (01-29-18 @ 11:45) (69 - 74)  BP: 178/84 (01-29-18 @ 11:45) (152/85 - 178/84)  BP(mean): --  RR: 18 (01-29-18 @ 11:45) (18 - 18)  SpO2: 95% (01-29-18 @ 11:45) (95% - 96%)  Abd: Soft, NT, slightly distended at touch.   Extremities: Warm. No edema in all 4 exts   Neuro: A&O X3    LABS:  POCT Blood Glucose.: 357 mg/dL (01-29-18 @ 13:54)  POCT Blood Glucose.: 254 mg/dL (01-29-18 @ 12:04)  POCT Blood Glucose.: 115 mg/dL (01-29-18 @ 07:40)  POCT Blood Glucose.: 104 mg/dL (01-28-18 @ 22:31)  POCT Blood Glucose.: 67 mg/dL (01-28-18 @ 22:12)  POCT Blood Glucose.: 61 mg/dL (01-28-18 @ 22:07)  POCT Blood Glucose.: 212 mg/dL (01-28-18 @ 18:03)  POCT Blood Glucose.: 216 mg/dL (01-28-18 @ 16:40)  POCT Blood Glucose.: 98 mg/dL (01-28-18 @ 11:58)  POCT Blood Glucose.: 180 mg/dL (01-28-18 @ 08:20)  POCT Blood Glucose.: 206 mg/dL (01-27-18 @ 23:32)  POCT Blood Glucose.: 157 mg/dL (01-27-18 @ 20:56)  POCT Blood Glucose.: 108 mg/dL (01-27-18 @ 17:36)  POCT Blood Glucose.: 71 mg/dL (01-27-18 @ 17:07)  POCT Blood Glucose.: 64 mg/dL (01-27-18 @ 16:54)  POCT Blood Glucose.: 62 mg/dL (01-27-18 @ 16:51)                          13.4   13.2  )-----------( 340      ( 27 Jan 2018 21:37 )             40.2       01-27    140  |  101  |  22  ----------------------------<  181<H>  4.5   |  26  |  0.65    Ca    9.2      27 Jan 2018 21:37  Phos  3.3     01-27  Mg     1.9     01-27        Hemoglobin A1C, Whole Blood: 6.9 % <H> [4.0 - 5.6] (01-26-18 @ 07:33)

## 2018-01-29 NOTE — SWALLOW BEDSIDE ASSESSMENT ADULT - SLP GENERAL OBSERVATIONS
Pt awake in bed, oriented x3, able to communicate needs and follow directions for exam. Spouse present at bedside. + Episodes of neurogenic stuttering. + Dysarthria. + Facial and lingual tremors.

## 2018-01-29 NOTE — PROGRESS NOTE ADULT - SUBJECTIVE AND OBJECTIVE BOX
infectious diseases progress note:    Patient is a 77y old  Male who presents with a chief complaint of " I am having knee pain" (26 Jan 2018 09:34)        Bronchitis             Allergies    No Known Allergies    Intolerances        ANTIBIOTICS/RELEVANT:  antimicrobials    immunologic:    OTHER:  ALBUTerol   0.042% 1.25 milliGRAM(s) Nebulizer every 6 hours PRN  ALPRAZolam 0.5 milliGRAM(s) Oral at bedtime PRN  aspirin enteric coated 325 milliGRAM(s) Oral daily  buDESOnide   0.25 milliGRAM(s) Respule 0.25 milliGRAM(s) Inhalation two times a day  buDESOnide 160 MICROgram(s)/formoterol 4.5 MICROgram(s) Inhaler 2 Puff(s) Inhalation two times a day  carbidopa/levodopa  25/100 5 Tablet(s) Oral every 4 hours  dextrose 5%. 1000 milliLiter(s) IV Continuous <Continuous>  dextrose 50% Injectable 12.5 Gram(s) IV Push once  dextrose 50% Injectable 25 Gram(s) IV Push once  dextrose 50% Injectable 25 Gram(s) IV Push once  dextrose Gel 1 Dose(s) Oral once PRN  docusate sodium 100 milliGRAM(s) Oral three times a day  enoxaparin Injectable 80 milliGRAM(s) SubCutaneous two times a day  glucagon  Injectable 1 milliGRAM(s) IntraMuscular once PRN  insulin glargine Injectable (LANTUS) 25 Unit(s) SubCutaneous at bedtime  insulin lispro (HumaLOG) corrective regimen sliding scale   SubCutaneous at bedtime  insulin lispro (HumaLOG) corrective regimen sliding scale   SubCutaneous three times a day before meals  insulin lispro Injectable (HumaLOG) 8 Unit(s) SubCutaneous three times a day before meals  losartan 50 milliGRAM(s) Oral daily  oxyCODONE    IR 5 milliGRAM(s) Oral every 4 hours PRN  polyethylene glycol 3350 17 Gram(s) Oral two times a day  predniSONE   Tablet 40 milliGRAM(s) Oral daily      Objective:  Vital Signs Last 24 Hrs  T(C): 36.8 (29 Jan 2018 03:49), Max: 36.8 (29 Jan 2018 03:49)  T(F): 98.2 (29 Jan 2018 03:49), Max: 98.2 (29 Jan 2018 03:49)  HR: 69 (29 Jan 2018 03:49) (69 - 74)  BP: 167/75 (29 Jan 2018 03:49) (133/63 - 167/75)  BP(mean): --  RR: 18 (29 Jan 2018 03:49) (18 - 18)  SpO2: 96% (29 Jan 2018 03:49) (95% - 96%)    PHYSICAL EXAM:    well nourished--no acute distress  Eyes:ELSY, EOMI  Ear/Nose/Throat: no oral lesion, no sinus tenderness on percussion	  Neck:no JVD, no lymphadenopathy, supple  Respiratory: CTA romi  Cardiovascular: S1S2 RRR, no murmurs  Gastrointestinal:soft, (+) BS, no HSM  Extremities:no e/e/c        LABS:                        13.4   13.2  )-----------( 340      ( 27 Jan 2018 21:37 )             40.2     01-27    140  |  101  |  22  ----------------------------<  181<H>  4.5   |  26  |  0.65    Ca    9.2      27 Jan 2018 21:37  Phos  3.3     01-27  Mg     1.9     01-27              MICROBIOLOGY:    RECENT CULTURES:        RESPIRATORY CULTURES:              RADIOLOGY & ADDITIONAL STUDIES:        Pager [phone]  After 5 pm/weekends or if no response :[phone]

## 2018-01-29 NOTE — SWALLOW BEDSIDE ASSESSMENT ADULT - SWALLOW EVAL: RECOMMENDED FEEDING/EATING TECHNIQUES
strict reflux precautions/maintain upright posture during/after eating for 30 mins/position upright (90 degrees)/small sips/bites

## 2018-01-29 NOTE — PROGRESS NOTE ADULT - SUBJECTIVE AND OBJECTIVE BOX
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 No nausea, vomiting, or hematemesis; No diarrhea or constipation. No melena or hematochezia.  GENITOURINARY: No dysuria, frequency, hematuria, or incontinence  NEUROLOGICAL: No headaches, memory loss, loss of strength, numbness, or tremors  MUSCULOSKELETAL: No joint pain or swelling; No muscle, back, or extremity pain        Medications:  MEDICATIONS  (STANDING):  aspirin enteric coated 325 milliGRAM(s) Oral daily  buDESOnide 160 MICROgram(s)/formoterol 4.5 MICROgram(s) Inhaler 2 Puff(s) Inhalation two times a day  carbidopa/levodopa  25/100 5 Tablet(s) Oral every 4 hours  dextrose 5%. 1000 milliLiter(s) (50 mL/Hr) IV Continuous <Continuous>  dextrose 50% Injectable 12.5 Gram(s) IV Push once  dextrose 50% Injectable 25 Gram(s) IV Push once  dextrose 50% Injectable 25 Gram(s) IV Push once  docusate sodium 100 milliGRAM(s) Oral three times a day  enoxaparin Injectable 80 milliGRAM(s) SubCutaneous two times a day  insulin glargine Injectable (LANTUS) 25 Unit(s) SubCutaneous at bedtime  insulin lispro (HumaLOG) corrective regimen sliding scale   SubCutaneous at bedtime  insulin lispro (HumaLOG) corrective regimen sliding scale   SubCutaneous three times a day before meals  insulin lispro Injectable (HumaLOG) 5 Unit(s) SubCutaneous before breakfast  insulin lispro Injectable (HumaLOG) 4 Unit(s) SubCutaneous before lunch  insulin lispro Injectable (HumaLOG) 4 Unit(s) SubCutaneous before dinner  losartan 50 milliGRAM(s) Oral daily  polyethylene glycol 3350 17 Gram(s) Oral two times a day  predniSONE   Tablet 40 milliGRAM(s) Oral daily    MEDICATIONS  (PRN):  ALBUTerol   0.042% 1.25 milliGRAM(s) Nebulizer every 6 hours PRN Shortness of Breath and/or Wheezing  ALPRAZolam 0.5 milliGRAM(s) Oral at bedtime PRN Insomnia/Tremulousness  dextrose Gel 1 Dose(s) Oral once PRN Blood Glucose LESS THAN 70 milliGRAM(s)/deciliter  glucagon  Injectable 1 milliGRAM(s) IntraMuscular once PRN Glucose LESS THAN 70 milligrams/deciliter  oxyCODONE    IR 5 milliGRAM(s) Oral every 4 hours PRN Moderate Pain (4 - 6)    	    PHYSICAL EXAM:  T(C): 36.8 (01-29-18 @ 11:45), Max: 36.8 (01-29-18 @ 03:49)  HR: 73 (01-29-18 @ 11:45) (69 - 74)  BP: 178/84 (01-29-18 @ 11:45) (133/63 - 178/84)  RR: 18 (01-29-18 @ 11:45) (18 - 18)  SpO2: 95% (01-29-18 @ 11:45) (95% - 96%)  Wt(kg): --  I&O's Summary    28 Jan 2018 07:01  -  29 Jan 2018 07:00  --------------------------------------------------------  IN: 400 mL / OUT: 0 mL / NET: 400 mL    29 Jan 2018 07:01  -  29 Jan 2018 12:12  --------------------------------------------------------  IN: 360 mL / OUT: 0 mL / NET: 360 mL      Appearance: Normal	  HEENT:   Normal oral mucosa, PERRL, EOMI	  Lymphatic: No lymphadenopathy  Cardiovascular: Normal S1 S2, No JVD, No murmurs, No edema  Respiratory: dec wheezes   Psychiatry: A & O x 3,   Gastrointestinal:  Soft, Non-tender, + BS	  Skin: No rashes, No ecchymoses, No cyanosis	  Neurologic: Non-focal parkinsonian movements  Extremities: Normal range of motion, No clubbing, cyanosis or edema  Vascular: Peripheral pulses palpable     LABS:	 	    CARDIAC MARKERS:  CARDIAC MARKERS ( 28 Jan 2018 05:24 )  x     / 0.02 ng/mL / 55 U/L / x     / 2.2 ng/mL  CARDIAC MARKERS ( 27 Jan 2018 21:37 )  x     / 0.01 ng/mL / 79 U/L / x     / 3.2 ng/mL                                13.4   13.2  )-----------( 340      ( 27 Jan 2018 21:37 )             40.2     01-27    140  |  101  |  22  ----------------------------<  181<H>  4.5   |  26  |  0.65    Ca    9.2      27 Jan 2018 21:37  Phos  3.3     01-27  Mg     1.9     01-27      proBNP:   Lipid Profile:   HgA1c:   TSH:

## 2018-01-29 NOTE — PROGRESS NOTE ADULT - PROBLEM SELECTOR PLAN 1
-test BG AC/HS/2am every day  -Decrease Lantus dose to 16 units QHS  -Decrease Humalog to 5-4-4 units AC meals  -c/w Humalog moderate correction scale AC and Mod HS scale for now  -discharge on basal/bolus w/dose adjustment TBD  -discussed w/pt/wife/staff/team. Team aware of GI complaints.   pager: [phone](24/7) -test BG AC/HS/2am every day  -Decrease Lantus dose to 16 units QHS  -Decrease Humalog to 5-4-4 units right after pt eats his meals  -c/w Humalog moderate correction scale AC and Mod HS scale for now  -discharge on basal/bolus w/dose adjustment TBD  -discussed w/pt/wife/staff/team. Team aware of GI complaints.   pager: [phone](24/7)

## 2018-01-29 NOTE — SWALLOW BEDSIDE ASSESSMENT ADULT - COMMENTS
Pulm called. Dx: Asthma exacerbation. Recommend: Continue pulmicort nebs bid and albuterol Q6, continue solumedrol 20mgIV Q8 will taper per clinical course, incentive spirometry, OOB to chair.  Endocrine following for Uncontrolled Type 2 DM w/ hyperglycemia exacerbated by steroids.  1/26: Cardiology called for chest pain-> Chest pain is not ischemic in etiology given its atypical nature and reported normal stress test 2 months ago. No further cardiac work up  Please reconsult as needed.  CTA chest: 1.  Subsegmental right upper lobe pulmonary embolism. This finding was discussed by Dr. Massey with NP Anand 1/26/2018 at 1839 hours. 2.  Indeterminate, partially imaged right renal hypodense lesion.   Recommend correlation with ultrasound to assess for complexity. 3. Mild to moderate mural thrombus of the proximal superior mesenteric artery.  Event note initiated: NP called by radiologist that patient has a PE in the RUL.  Discussed with Dr. Thomas who states to start patient on therapeutic lovenox.  Dr. Thomas to follow up with patient in am.  1/27: Vascular called for subsegmental PE and mild SMA thrombosis. Per attending: No signs/symp of mesenteric isch. CTA reviewed: SMA lesion appears to be ch soft plaque. Recommend: Med mgmt  Antiplt/statins if safe (pt on AC already) and outpt fu.  Heme onc called for Pulmonary embolism-> agree with lovenox q12 dosing for now. may be related to recent illness. will consider switch to NOAC for discharge. Will check partial hypercoag w/up for LA, APLS antibodies. Up to date with malignancy screeing.  1/28: Event note initiated as pt c/o palpitations-> DDx anxiety ddx parkinson's, panic attack. Per NP: All ordered labs reviewed and wnl or at baseline. Patient woke up this early morning and complained of anxiety asking for more xanax to calm him down. xanax x1 given, consider increasing frequency, consider psych consult for anxiety disorder  Pulm f/u: coughing with meals reported->s+s ordered.   Hypoglycemia event noted.

## 2018-01-29 NOTE — PROGRESS NOTE ADULT - ASSESSMENT
pt w/ sob/ wheezing /ochoa/ cough / w/ bronchitis/ bronchospasm/ hypoxia  pulm eval  taper steroids  some exer cp  cards eval  check cta of chest - demonstrating PE and mesenteric artery thrombus  Lovenox for AC, transition to NOAC  Vascular and Heme eval appreciated  Pulm appreciated  dm better controlled as steroids tapered  hypoglycemia noted, now FS up, Endo to modify Insulin regimen  dvt proph  parkinsonns / cont sinemet/dr rodríguez notified to see  dm diet  pt  htn / cont meds  Xanax PRN anxiety  d/c planning pt w/ sob/ wheezing /ochoa/ cough / w/ bronchitis/ bronchospasm/ hypoxia  pulm eval  taper steroids  some exer cp  cards eval  check cta of chest - demonstrating PE and mesenteric artery thrombus  Lovenox for AC, transition to NOAC if Cr ok todya  Rt renal hypodense lesion - US renal intp vs outpt  Vascular and Heme eval appreciated  Pulm appreciated  dm better controlled as steroids tapered  hypoglycemia noted, now FS up, Endo to modify Insulin regimen  dvt proph  parkinsonns / cont sinemet/dr rodríguez notified to see  dm diet  pt  htn / cont meds  Xanax PRN anxiety  d/c planning

## 2018-01-30 VITALS
OXYGEN SATURATION: 95 % | DIASTOLIC BLOOD PRESSURE: 76 MMHG | HEART RATE: 79 BPM | SYSTOLIC BLOOD PRESSURE: 138 MMHG | TEMPERATURE: 99 F | RESPIRATION RATE: 18 BRPM

## 2018-01-30 LAB
ANION GAP SERPL CALC-SCNC: 13 MMOL/L — SIGNIFICANT CHANGE UP (ref 5–17)
APTT BLD: 30.3 SEC — SIGNIFICANT CHANGE UP (ref 27.5–37.4)
BUN SERPL-MCNC: 20 MG/DL — SIGNIFICANT CHANGE UP (ref 7–23)
CALCIUM SERPL-MCNC: 9 MG/DL — SIGNIFICANT CHANGE UP (ref 8.4–10.5)
CHLORIDE SERPL-SCNC: 100 MMOL/L — SIGNIFICANT CHANGE UP (ref 96–108)
CO2 SERPL-SCNC: 26 MMOL/L — SIGNIFICANT CHANGE UP (ref 22–31)
CREAT SERPL-MCNC: 0.77 MG/DL — SIGNIFICANT CHANGE UP (ref 0.5–1.3)
GLUCOSE BLDC GLUCOMTR-MCNC: 152 MG/DL — HIGH (ref 70–99)
GLUCOSE BLDC GLUCOMTR-MCNC: 232 MG/DL — HIGH (ref 70–99)
GLUCOSE BLDC GLUCOMTR-MCNC: 335 MG/DL — HIGH (ref 70–99)
GLUCOSE BLDC GLUCOMTR-MCNC: 82 MG/DL — SIGNIFICANT CHANGE UP (ref 70–99)
GLUCOSE SERPL-MCNC: 114 MG/DL — HIGH (ref 70–99)
HCT VFR BLD CALC: 38.1 % — LOW (ref 39–50)
HGB BLD-MCNC: 11.8 G/DL — LOW (ref 13–17)
INR BLD: 1.06 RATIO — SIGNIFICANT CHANGE UP (ref 0.88–1.16)
MCHC RBC-ENTMCNC: 29.4 PG — SIGNIFICANT CHANGE UP (ref 27–34)
MCHC RBC-ENTMCNC: 31 GM/DL — LOW (ref 32–36)
MCV RBC AUTO: 95 FL — SIGNIFICANT CHANGE UP (ref 80–100)
PLATELET # BLD AUTO: 266 K/UL — SIGNIFICANT CHANGE UP (ref 150–400)
POTASSIUM SERPL-MCNC: 3.5 MMOL/L — SIGNIFICANT CHANGE UP (ref 3.5–5.3)
POTASSIUM SERPL-SCNC: 3.5 MMOL/L — SIGNIFICANT CHANGE UP (ref 3.5–5.3)
PROTHROM AB SERPL-ACNC: 12 SEC — SIGNIFICANT CHANGE UP (ref 10–13.1)
RBC # BLD: 4.01 M/UL — LOW (ref 4.2–5.8)
RBC # FLD: 12.9 % — SIGNIFICANT CHANGE UP (ref 10.3–14.5)
SODIUM SERPL-SCNC: 139 MMOL/L — SIGNIFICANT CHANGE UP (ref 135–145)
WBC # BLD: 12.16 K/UL — HIGH (ref 3.8–10.5)
WBC # FLD AUTO: 12.16 K/UL — HIGH (ref 3.8–10.5)

## 2018-01-30 PROCEDURE — 85027 COMPLETE CBC AUTOMATED: CPT

## 2018-01-30 PROCEDURE — 92610 EVALUATE SWALLOWING FUNCTION: CPT

## 2018-01-30 PROCEDURE — 99285 EMERGENCY DEPT VISIT HI MDM: CPT | Mod: 25

## 2018-01-30 PROCEDURE — 93005 ELECTROCARDIOGRAM TRACING: CPT

## 2018-01-30 PROCEDURE — 82962 GLUCOSE BLOOD TEST: CPT

## 2018-01-30 PROCEDURE — 84100 ASSAY OF PHOSPHORUS: CPT

## 2018-01-30 PROCEDURE — 83036 HEMOGLOBIN GLYCOSYLATED A1C: CPT

## 2018-01-30 PROCEDURE — 76775 US EXAM ABDO BACK WALL LIM: CPT

## 2018-01-30 PROCEDURE — 82550 ASSAY OF CK (CPK): CPT

## 2018-01-30 PROCEDURE — 36600 WITHDRAWAL OF ARTERIAL BLOOD: CPT

## 2018-01-30 PROCEDURE — 93970 EXTREMITY STUDY: CPT

## 2018-01-30 PROCEDURE — 83605 ASSAY OF LACTIC ACID: CPT

## 2018-01-30 PROCEDURE — 84132 ASSAY OF SERUM POTASSIUM: CPT

## 2018-01-30 PROCEDURE — 97161 PT EVAL LOW COMPLEX 20 MIN: CPT

## 2018-01-30 PROCEDURE — 87633 RESP VIRUS 12-25 TARGETS: CPT

## 2018-01-30 PROCEDURE — 87486 CHLMYD PNEUM DNA AMP PROBE: CPT

## 2018-01-30 PROCEDURE — 71045 X-RAY EXAM CHEST 1 VIEW: CPT

## 2018-01-30 PROCEDURE — 80053 COMPREHEN METABOLIC PANEL: CPT

## 2018-01-30 PROCEDURE — 85014 HEMATOCRIT: CPT

## 2018-01-30 PROCEDURE — 82553 CREATINE MB FRACTION: CPT

## 2018-01-30 PROCEDURE — 84295 ASSAY OF SERUM SODIUM: CPT

## 2018-01-30 PROCEDURE — 94640 AIRWAY INHALATION TREATMENT: CPT

## 2018-01-30 PROCEDURE — 80048 BASIC METABOLIC PNL TOTAL CA: CPT

## 2018-01-30 PROCEDURE — 87798 DETECT AGENT NOS DNA AMP: CPT

## 2018-01-30 PROCEDURE — 87581 M.PNEUMON DNA AMP PROBE: CPT

## 2018-01-30 PROCEDURE — 82803 BLOOD GASES ANY COMBINATION: CPT

## 2018-01-30 PROCEDURE — 82435 ASSAY OF BLOOD CHLORIDE: CPT

## 2018-01-30 PROCEDURE — 85610 PROTHROMBIN TIME: CPT

## 2018-01-30 PROCEDURE — 71275 CT ANGIOGRAPHY CHEST: CPT

## 2018-01-30 PROCEDURE — 82947 ASSAY GLUCOSE BLOOD QUANT: CPT

## 2018-01-30 PROCEDURE — 83735 ASSAY OF MAGNESIUM: CPT

## 2018-01-30 PROCEDURE — 76775 US EXAM ABDO BACK WALL LIM: CPT | Mod: 26

## 2018-01-30 PROCEDURE — 82330 ASSAY OF CALCIUM: CPT

## 2018-01-30 PROCEDURE — 85730 THROMBOPLASTIN TIME PARTIAL: CPT

## 2018-01-30 PROCEDURE — 84484 ASSAY OF TROPONIN QUANT: CPT

## 2018-01-30 RX ORDER — CARBIDOPA AND LEVODOPA 25; 100 MG/1; MG/1
5 TABLET ORAL
Qty: 0 | Refills: 0 | DISCHARGE
Start: 2018-01-30

## 2018-01-30 RX ORDER — PANTOPRAZOLE SODIUM 20 MG/1
1 TABLET, DELAYED RELEASE ORAL
Qty: 30 | Refills: 0
Start: 2018-01-30 | End: 2018-02-28

## 2018-01-30 RX ORDER — FONDAPARINUX SODIUM 2.5 MG/.5ML
1 INJECTION, SOLUTION SUBCUTANEOUS
Qty: 42 | Refills: 0 | OUTPATIENT
Start: 2018-01-30 | End: 2018-02-19

## 2018-01-30 RX ORDER — INSULIN LISPRO 100/ML
3 VIAL (ML) SUBCUTANEOUS
Qty: 0 | Refills: 0 | Status: DISCONTINUED | OUTPATIENT
Start: 2018-01-30 | End: 2018-01-30

## 2018-01-30 RX ORDER — POLYETHYLENE GLYCOL 3350 17 G/17G
17 POWDER, FOR SOLUTION ORAL
Qty: 0 | Refills: 0 | DISCHARGE
Start: 2018-01-30

## 2018-01-30 RX ORDER — ASPIRIN/CALCIUM CARB/MAGNESIUM 324 MG
1 TABLET ORAL
Qty: 0 | Refills: 0 | DISCHARGE
Start: 2018-01-30

## 2018-01-30 RX ORDER — FONDAPARINUX SODIUM 2.5 MG/.5ML
1 INJECTION, SOLUTION SUBCUTANEOUS
Qty: 42 | Refills: 0
Start: 2018-01-30 | End: 2018-02-19

## 2018-01-30 RX ORDER — BUDESONIDE AND FORMOTEROL FUMARATE DIHYDRATE 160; 4.5 UG/1; UG/1
2 AEROSOL RESPIRATORY (INHALATION)
Qty: 1 | Refills: 0
Start: 2018-01-30 | End: 2018-02-28

## 2018-01-30 RX ORDER — INSULIN ASPART 100 [IU]/ML
0 INJECTION, SOLUTION SUBCUTANEOUS
Qty: 0 | Refills: 0 | COMMUNITY

## 2018-01-30 RX ORDER — ALBUTEROL 90 UG/1
2 AEROSOL, METERED ORAL
Qty: 0 | Refills: 0 | COMMUNITY

## 2018-01-30 RX ORDER — DOCUSATE SODIUM 100 MG
1 CAPSULE ORAL
Qty: 0 | Refills: 0 | DISCHARGE
Start: 2018-01-30

## 2018-01-30 RX ORDER — LOSARTAN POTASSIUM 100 MG/1
1 TABLET, FILM COATED ORAL
Qty: 30 | Refills: 0
Start: 2018-01-30 | End: 2018-02-28

## 2018-01-30 RX ORDER — EPINEPHRINE 0.3 MG/.3ML
1.25 INJECTION INTRAMUSCULAR; SUBCUTANEOUS
Qty: 0 | Refills: 0 | DISCHARGE
Start: 2018-01-30

## 2018-01-30 RX ORDER — CARBIDOPA AND LEVODOPA 25; 100 MG/1; MG/1
5 TABLET ORAL
Qty: 0 | Refills: 0 | COMMUNITY

## 2018-01-30 RX ORDER — MOMETASONE FUROATE 50 UG/1
1 SPRAY NASAL
Qty: 0 | Refills: 0 | COMMUNITY

## 2018-01-30 RX ORDER — ENOXAPARIN SODIUM 100 MG/ML
40 INJECTION SUBCUTANEOUS
Qty: 0 | Refills: 0 | COMMUNITY

## 2018-01-30 RX ORDER — RIVAROXABAN 15 MG-20MG
15 KIT ORAL
Qty: 0 | Refills: 0 | Status: DISCONTINUED | OUTPATIENT
Start: 2018-01-30 | End: 2018-01-30

## 2018-01-30 RX ADMIN — BUDESONIDE AND FORMOTEROL FUMARATE DIHYDRATE 2 PUFF(S): 160; 4.5 AEROSOL RESPIRATORY (INHALATION) at 05:48

## 2018-01-30 RX ADMIN — RIVAROXABAN 15 MILLIGRAM(S): KIT at 17:57

## 2018-01-30 RX ADMIN — POLYETHYLENE GLYCOL 3350 17 GRAM(S): 17 POWDER, FOR SOLUTION ORAL at 08:17

## 2018-01-30 RX ADMIN — CARBIDOPA AND LEVODOPA 5 TABLET(S): 25; 100 TABLET ORAL at 13:46

## 2018-01-30 RX ADMIN — Medication 5 UNIT(S): at 08:17

## 2018-01-30 RX ADMIN — ENOXAPARIN SODIUM 80 MILLIGRAM(S): 100 INJECTION SUBCUTANEOUS at 05:47

## 2018-01-30 RX ADMIN — Medication 100 MILLIGRAM(S): at 08:17

## 2018-01-30 RX ADMIN — Medication 0.5 MILLIGRAM(S): at 11:20

## 2018-01-30 RX ADMIN — Medication 0.5 MILLIGRAM(S): at 01:28

## 2018-01-30 RX ADMIN — CARBIDOPA AND LEVODOPA 5 TABLET(S): 25; 100 TABLET ORAL at 10:30

## 2018-01-30 RX ADMIN — Medication 8: at 17:02

## 2018-01-30 RX ADMIN — Medication 2: at 08:17

## 2018-01-30 RX ADMIN — LOSARTAN POTASSIUM 50 MILLIGRAM(S): 100 TABLET, FILM COATED ORAL at 05:47

## 2018-01-30 RX ADMIN — Medication 3 UNIT(S): at 17:02

## 2018-01-30 RX ADMIN — CARBIDOPA AND LEVODOPA 5 TABLET(S): 25; 100 TABLET ORAL at 05:47

## 2018-01-30 RX ADMIN — Medication 325 MILLIGRAM(S): at 12:48

## 2018-01-30 RX ADMIN — Medication 4: at 12:47

## 2018-01-30 RX ADMIN — CARBIDOPA AND LEVODOPA 5 TABLET(S): 25; 100 TABLET ORAL at 17:57

## 2018-01-30 RX ADMIN — BUDESONIDE AND FORMOTEROL FUMARATE DIHYDRATE 2 PUFF(S): 160; 4.5 AEROSOL RESPIRATORY (INHALATION) at 17:02

## 2018-01-30 RX ADMIN — Medication 40 MILLIGRAM(S): at 05:47

## 2018-01-30 RX ADMIN — Medication 4 UNIT(S): at 12:47

## 2018-01-30 RX ADMIN — CARBIDOPA AND LEVODOPA 5 TABLET(S): 25; 100 TABLET ORAL at 01:28

## 2018-01-30 RX ADMIN — Medication 100 MILLIGRAM(S): at 13:46

## 2018-01-30 NOTE — PROGRESS NOTE ADULT - ASSESSMENT
77 year old male w/T2DM (controlled) w/neuropathy a/w asthma exacerbation on steroid taper. Pt w/variable PO intake and fluctuating glucose levels. Had hypoglycemic episode 1/27 and 1/28 w/decrease of basal/bolus. Completed prednisone therapy and is going home today. No hypoglycemia in last 24 hours.  Reinforced with pt need to adjust insulin doses due to frequent hypoglycemic episodes with in hospital. Pt verbalized understanding 77 year old male w/T2DM (controlled) w/neuropathy a/w asthma exacerbation on steroid taper. Pt w/variable PO intake and fluctuating glucose levels. Had hypoglycemic episode 1/27 and 1/28 w/decrease of basal/bolus. Completed prednisone therapy and is going home today. No hypoglycemia in last 24 hours.  Came to see pt this pm but pt had already left. Spoke to team early about plan of care as follows.

## 2018-01-30 NOTE — PROGRESS NOTE ADULT - SUBJECTIVE AND OBJECTIVE BOX
Chief Complaint: fu    History of Present Illness: no complaints; no f/c, no cp, no dyspnea, no n/v/abd pain, +BM, no leg pain, +ambulating, no bleeding    MEDICATIONS  (STANDING):  aspirin enteric coated 325 milliGRAM(s) Oral daily  buDESOnide 160 MICROgram(s)/formoterol 4.5 MICROgram(s) Inhaler 2 Puff(s) Inhalation two times a day  carbidopa/levodopa  25/100 5 Tablet(s) Oral every 4 hours  dextrose 5%. 1000 milliLiter(s) (50 mL/Hr) IV Continuous <Continuous>  dextrose 50% Injectable 12.5 Gram(s) IV Push once  dextrose 50% Injectable 25 Gram(s) IV Push once  dextrose 50% Injectable 25 Gram(s) IV Push once  docusate sodium 100 milliGRAM(s) Oral three times a day  enoxaparin Injectable 80 milliGRAM(s) SubCutaneous two times a day  insulin glargine Injectable (LANTUS) 16 Unit(s) SubCutaneous at bedtime  insulin lispro (HumaLOG) corrective regimen sliding scale   SubCutaneous at bedtime  insulin lispro (HumaLOG) corrective regimen sliding scale   SubCutaneous three times a day before meals  insulin lispro Injectable (HumaLOG) 3 Unit(s) SubCutaneous before dinner  insulin lispro Injectable (HumaLOG) 5 Unit(s) SubCutaneous before breakfast  insulin lispro Injectable (HumaLOG) 4 Unit(s) SubCutaneous before lunch  losartan 50 milliGRAM(s) Oral daily  polyethylene glycol 3350 17 Gram(s) Oral two times a day    MEDICATIONS  (PRN):  ALBUTerol   0.042% 1.25 milliGRAM(s) Nebulizer every 6 hours PRN Shortness of Breath and/or Wheezing  ALPRAZolam 0.5 milliGRAM(s) Oral at bedtime PRN Insomnia/Tremulousness  dextrose Gel 1 Dose(s) Oral once PRN Blood Glucose LESS THAN 70 milliGRAM(s)/deciliter  glucagon  Injectable 1 milliGRAM(s) IntraMuscular once PRN Glucose LESS THAN 70 milligrams/deciliter  oxyCODONE    IR 5 milliGRAM(s) Oral every 4 hours PRN Moderate Pain (4 - 6)      Allergies    No Known Allergies    Intolerances        Vital Signs Last 24 Hrs  T(C): 37.1 (30 Jan 2018 12:37), Max: 37.1 (30 Jan 2018 12:37)  T(F): 98.8 (30 Jan 2018 12:37), Max: 98.8 (30 Jan 2018 12:37)  HR: 79 (30 Jan 2018 12:37) (71 - 80)  BP: 138/76 (30 Jan 2018 12:37) (116/67 - 178/72)  BP(mean): --  RR: 18 (30 Jan 2018 12:37) (17 - 18)  SpO2: 95% (30 Jan 2018 12:37) (95% - 98%)    PHYSICAL EXAM  General: adult in NAD  HEENT: clear oropharynx, anicteric sclera, pink conjunctiva  Neck: supple  CV: normal S1/S2 with no murmur rubs or gallops  Lungs: clear to auscultation, no wheezes, no rales  Abdomen: soft non-tender non-distended, positive bowel sounds  Ext: no clubbing cyanosis or edema      LABS:                          11.8   12.16 )-----------( 266      ( 30 Jan 2018 09:02 )             38.1         Mean Cell Volume : 95.0 fl  Mean Cell Hemoglobin : 29.4 pg  Mean Cell Hemoglobin Concentration : 31.0 gm/dL  Auto Neutrophil # : x  Auto Lymphocyte # : x  Auto Monocyte # : x  Auto Eosinophil # : x  Auto Basophil # : x  Auto Neutrophil % : x  Auto Lymphocyte % : x  Auto Monocyte % : x  Auto Eosinophil % : x  Auto Basophil % : x      Serial CBC's  01-30 @ 09:02  Hct-38.1 / Hgb-11.8 / Plat-266 / RBC-4.01 / WBC-12.16  Serial CBC's  01-29 @ 15:07  Hct-38.6 / Hgb-13.2 / Plat-323 / RBC-4.12 / WBC-9.5  Serial CBC's  01-27 @ 21:37  Hct-40.2 / Hgb-13.4 / Plat-340 / RBC-4.27 / WBC-13.2  Serial CBC's  01-27 @ 09:02  Hct-35.2 / Hgb-11.0 / Plat-324 / RBC-3.74 / WBC-13.75      01-30    139  |  100  |  20  ----------------------------<  114<H>  3.5   |  26  |  0.77    Ca    9.0      30 Jan 2018 09:03        PT/INR - ( 30 Jan 2018 07:45 )   PT: 12.0 sec;   INR: 1.06 ratio         PTT - ( 30 Jan 2018 07:45 )  PTT:30.3 sec      < from: US Renal (01.30.18 @ 11:49) >    IMPRESSION:     No hydronephrosis.    A 3.4 cm right renal interpolar cyst with a thin septation corresponds to   the indeterminate lesion noted on chest CT dated 1/26/2018.    < from: [facility] Duplex Lower Ext Vein Scan, Bilat (01.29.18 @ 13:03) >  IMPRESSION:     No evidence of bilateral lower extremity deep venous thrombosis.              Radiology:

## 2018-01-30 NOTE — PROGRESS NOTE ADULT - PROVIDER SPECIALTY LIST ADULT
Endocrinology
Heme/Onc
Infectious Disease
Infectious Disease
Internal Medicine
Pulmonology
Internal Medicine

## 2018-01-30 NOTE — PROGRESS NOTE ADULT - ASSESSMENT
pt w/ sob/ wheezing /ochoa/ cough / w/ bronchitis/ bronchospasm/ hypoxia  pulm eval  taper steroids  some exer cp  cards eval  check cta of chest - demonstrating PE and mesenteric artery thrombus  Lovenox for AC, transition to NOAC if Cr ok todya  Rt renal hypodense lesion - US showing Rt renal cyst  heme to comment if any more w/u for cyst needed prior to transitioning to PO AC  Vascular and Heme eval appreciated  Pulm appreciated  dm better controlled as steroids tapered  hypoglycemia noted, now FS up, Endo to modify Insulin regimen  dvt proph  parkinsonns / cont sinemet/dr rodríguez notified to see  dm diet  pt  htn / cont meds  Xanax PRN anxiety  difficulty swallowing chronic for pt, will f/u with his outpt GI  d/c planning

## 2018-01-30 NOTE — PROGRESS NOTE ADULT - SUBJECTIVE AND OBJECTIVE BOX
Diabetes Follow up note:  Interval Hx: 77 year old male w/T2DM (basal/bolus at home) w/neuropathy here w/asthma exacerbation on steroids> Prednisone 40mg (last dose today). Per primary team pt going home this pm. Tolerating POs. Last night BG in 70s without hypoglycemia while on present insulin doses which are much less than home regimen. Per primary team pt won't need any further steroids at discharge. Hyperglycemic ac lunch due to Prednisone effect.     Review of Systems:  General:   GI: Tolerating POs without any N/V/D/ABD PAIN.  CV: No CP/SOB  ENDO: No S&Sx of hypoglycemia today but shaky and dizzy last night       MEDS:  insulin glargine Injectable (LANTUS) 16 Unit(s) SubCutaneous at bedtime  insulin lispro (HumaLOG) corrective regimen sliding scale   SubCutaneous at bedtime  insulin lispro (HumaLOG) corrective regimen sliding scale   SubCutaneous three times a day before meals  insulin lispro Injectable (HumaLOG) 4 Unit(s) SubCutaneous before dinner  insulin lispro Injectable (HumaLOG) 5 Unit(s) SubCutaneous before breakfast  insulin lispro Injectable (HumaLOG) 4 Unit(s) SubCutaneous before lunch      Allergies    No Known Allergies    PE:  General: Male laying in bed in NAD. Wife at bedside  Vital Signs Last 24 Hrs  T(C): 37.1 (01-30-18 @ 12:37), Max: 37.1 (01-30-18 @ 12:37)  T(F): 98.8 (01-30-18 @ 12:37), Max: 98.8 (01-30-18 @ 12:37)  HR: 79 (01-30-18 @ 12:37) (71 - 80)  BP: 138/76 (01-30-18 @ 12:37) (116/67 - 178/72)  BP(mean): --  RR: 18 (01-30-18 @ 12:37) (17 - 18)  SpO2: 95% (01-30-18 @ 12:37) (95% - 98%)  Abd: Soft, NT, slightly distended at touch.   Extremities: Warm. No edema in all 4 exts   Neuro: A&O X3    LABS:  POCT Blood Glucose.: 232 mg/dL (01-30-18 @ 12:34)  POCT Blood Glucose.: 152 mg/dL (01-30-18 @ 07:45)  POCT Blood Glucose.: 82 mg/dL (01-30-18 @ 01:39)  POCT Blood Glucose.: 93 mg/dL (01-29-18 @ 22:05)  POCT Blood Glucose.: 77 mg/dL (01-29-18 @ 21:48)  POCT Blood Glucose.: 169 mg/dL (01-29-18 @ 16:55)  POCT Blood Glucose.: 357 mg/dL (01-29-18 @ 13:54)  POCT Blood Glucose.: 254 mg/dL (01-29-18 @ 12:04)  POCT Blood Glucose.: 115 mg/dL (01-29-18 @ 07:40)  POCT Blood Glucose.: 104 mg/dL (01-28-18 @ 22:31)  POCT Blood Glucose.: 67 mg/dL (01-28-18 @ 22:12)  POCT Blood Glucose.: 61 mg/dL (01-28-18 @ 22:07)  POCT Blood Glucose.: 212 mg/dL (01-28-18 @ 18:03)  POCT Blood Glucose.: 216 mg/dL (01-28-18 @ 16:40)                              11.8   12.16 )-----------( 266      ( 30 Jan 2018 09:02 )             38.1     01-30    139  |  100  |  20  ----------------------------<  114<H>  3.5   |  26  |  0.77    Ca    9.0      30 Jan 2018 09:03        Hemoglobin A1C, Whole Blood: 6.9 % <H> [4.0 - 5.6] (01-26-18 @ 07:33) Diabetes Follow up note:  Interval Hx: 77 year old male w/T2DM (basal/bolus at home) w/neuropathy here w/asthma exacerbation on steroids> Prednisone 40mg (last dose today). Per primary team pt going home this pm. Tolerating POs. Last night BG in 70s without hypoglycemia while on present insulin doses which are much less than home regimen. Per primary team pt won't need any further steroids at discharge. Hyperglycemic ac lunch due to Prednisone effect.   Pt was off the unit having a sono.       MEDS:  insulin glargine Injectable (LANTUS) 16 Unit(s) SubCutaneous at bedtime  insulin lispro (HumaLOG) corrective regimen sliding scale   SubCutaneous at bedtime  insulin lispro (HumaLOG) corrective regimen sliding scale   SubCutaneous three times a day before meals  insulin lispro Injectable (HumaLOG) 4 Unit(s) SubCutaneous before dinner  insulin lispro Injectable (HumaLOG) 5 Unit(s) SubCutaneous before breakfast  insulin lispro Injectable (HumaLOG) 4 Unit(s) SubCutaneous before lunch      Allergies    No Known Allergies    LABS:  POCT Blood Glucose.: 232 mg/dL (01-30-18 @ 12:34)  POCT Blood Glucose.: 152 mg/dL (01-30-18 @ 07:45)  POCT Blood Glucose.: 82 mg/dL (01-30-18 @ 01:39)  POCT Blood Glucose.: 93 mg/dL (01-29-18 @ 22:05)  POCT Blood Glucose.: 77 mg/dL (01-29-18 @ 21:48)  POCT Blood Glucose.: 169 mg/dL (01-29-18 @ 16:55)  POCT Blood Glucose.: 357 mg/dL (01-29-18 @ 13:54)  POCT Blood Glucose.: 254 mg/dL (01-29-18 @ 12:04)  POCT Blood Glucose.: 115 mg/dL (01-29-18 @ 07:40)  POCT Blood Glucose.: 104 mg/dL (01-28-18 @ 22:31)  POCT Blood Glucose.: 67 mg/dL (01-28-18 @ 22:12)  POCT Blood Glucose.: 61 mg/dL (01-28-18 @ 22:07)  POCT Blood Glucose.: 212 mg/dL (01-28-18 @ 18:03)  POCT Blood Glucose.: 216 mg/dL (01-28-18 @ 16:40)                              11.8   12.16 )-----------( 266      ( 30 Jan 2018 09:02 )             38.1     01-30    139  |  100  |  20  ----------------------------<  114<H>  3.5   |  26  |  0.77    Ca    9.0      30 Jan 2018 09:03        Hemoglobin A1C, Whole Blood: 6.9 % <H> [4.0 - 5.6] (01-26-18 @ 07:33)

## 2018-01-30 NOTE — PROGRESS NOTE ADULT - SUBJECTIVE AND OBJECTIVE BOX
PULMONARY PROGRESS NOTE    INGE ESPOSITO  MRN-[id number]    Patient is a 77y old  Male who presents with a chief complaint of     HPI:  breathing comfortably, no cough or wheeze, wants to go home    ROS:   -afebrile  -appetite good    MEDICATIONS  (STANDING):  aspirin enteric coated 325 milliGRAM(s) Oral daily  buDESOnide 160 MICROgram(s)/formoterol 4.5 MICROgram(s) Inhaler 2 Puff(s) Inhalation two times a day  carbidopa/levodopa  25/100 5 Tablet(s) Oral every 4 hours  dextrose 5%. 1000 milliLiter(s) (50 mL/Hr) IV Continuous <Continuous>  dextrose 50% Injectable 12.5 Gram(s) IV Push once  dextrose 50% Injectable 25 Gram(s) IV Push once  dextrose 50% Injectable 25 Gram(s) IV Push once  docusate sodium 100 milliGRAM(s) Oral three times a day  enoxaparin Injectable 80 milliGRAM(s) SubCutaneous two times a day  insulin glargine Injectable (LANTUS) 16 Unit(s) SubCutaneous at bedtime  insulin lispro (HumaLOG) corrective regimen sliding scale   SubCutaneous at bedtime  insulin lispro (HumaLOG) corrective regimen sliding scale   SubCutaneous three times a day before meals  insulin lispro Injectable (HumaLOG) 3 Unit(s) SubCutaneous before dinner  insulin lispro Injectable (HumaLOG) 5 Unit(s) SubCutaneous before breakfast  insulin lispro Injectable (HumaLOG) 4 Unit(s) SubCutaneous before lunch  losartan 50 milliGRAM(s) Oral daily  polyethylene glycol 3350 17 Gram(s) Oral two times a day    MEDICATIONS  (PRN):  ALBUTerol   0.042% 1.25 milliGRAM(s) Nebulizer every 6 hours PRN Shortness of Breath and/or Wheezing  ALPRAZolam 0.5 milliGRAM(s) Oral at bedtime PRN Insomnia/Tremulousness  ALPRAZolam 0.5 milliGRAM(s) Oral once PRN Anxiety  dextrose Gel 1 Dose(s) Oral once PRN Blood Glucose LESS THAN 70 milliGRAM(s)/deciliter  glucagon  Injectable 1 milliGRAM(s) IntraMuscular once PRN Glucose LESS THAN 70 milligrams/deciliter  oxyCODONE    IR 5 milliGRAM(s) Oral every 4 hours PRN Moderate Pain (4 - 6)      Vital Signs Last 24 Hrs  T(C): 36.8 (30 Jan 2018 04:47), Max: 36.8 (29 Jan 2018 11:45)  T(F): 98.2 (30 Jan 2018 04:47), Max: 98.3 (29 Jan 2018 11:45)  HR: 71 (30 Jan 2018 04:47) (71 - 80)  BP: 116/67 (30 Jan 2018 04:47) (116/67 - 178/84)  BP(mean): --  RR: 17 (30 Jan 2018 04:47) (17 - 18)  SpO2: 96% (30 Jan 2018 04:47) (95% - 98%)    GENERAL: The patient is awake and alert in no apparent distress.     SKIN: Warm, dry    LUNGS: clear lungs    HEART: S1/S2    ABDOMEN: +BS, Soft, Nontender    EXTREMITIES: No clubbing, cyanosis    LABS/IMGAING: reviewed                                             11.8 12.16 )-----------( 266      ( 30 Jan 2018 09:02 )             38.1     01-30    139  |  100  |  20  ----------------------------<  114<H>  3.5   |  26  |  0.77    Ca    9.0      30 Jan 2018 09:03        < from: CT Angio Chest w/ IV Cont (01.26.18 @ 18:02) >  IMPRESSION:   1.  Subsegmental right upper lobe pulmonary embolism. This finding was   discussed by Dr. Massey with NP Anand 1/26/2018 at 1839 hours.  2.  Indeterminate, partially imaged right renal hypodense lesion.   Recommend correlation with ultrasound to assess for complexity.  3. Mild to moderate mural thrombus of the proximal superior mesenteric   artery.    < end of copied text >        PROBLEM LIST:  77y Male with HEALTH ISSUES - PROBLEM Dx:  Asthma exacerbation    RECS:  -Would send patient home on pulmicort nebs bid and albuterol Q6 PRN  -prednisone 40mg PO daily x 5 days total  -full ac for subsegmental PE, if goes home on coumadin can follow up with  for INR check.  -Incentive spirometry, OOB to chair  -Follow up with  in 1 week for asthma check up      Rita Pearson MD  [phone]

## 2018-01-30 NOTE — PROGRESS NOTE ADULT - PROBLEM SELECTOR PLAN 1
DISCHARGE:  -test BG AC/HS  -Continue Lantus dose 16 units QHS  -Decrease Humalog to 3 units with meals. Pt can take it right after a meal at home  -Decrease Humalog correction scales while in hospital and decreased  Humalog to 3 units at dinner time today if pt remains here.  No correction scales for discharge  -discussed w/pt/wife/staff/team.   pager: [phone](24/7) DISCHARGE:  -test BG AC/HS  -Continue Lantus dose 16 units QHS  -Decrease Humalog to 3 units with meals. Pt can take it right after a meal at home  -No Humalog correction scale upon discharge  -discussed wstaff/team.   pager: [phone](24/7)

## 2018-03-15 ENCOUNTER — APPOINTMENT (OUTPATIENT)
Dept: VASCULAR SURGERY | Facility: CLINIC | Age: 78
End: 2018-03-15
Payer: MEDICARE

## 2018-03-15 VITALS — DIASTOLIC BLOOD PRESSURE: 79 MMHG | SYSTOLIC BLOOD PRESSURE: 136 MMHG | HEART RATE: 81 BPM

## 2018-03-15 VITALS
SYSTOLIC BLOOD PRESSURE: 136 MMHG | HEIGHT: 67 IN | DIASTOLIC BLOOD PRESSURE: 79 MMHG | BODY MASS INDEX: 27.31 KG/M2 | WEIGHT: 174 LBS | TEMPERATURE: 97.9 F | HEART RATE: 81 BPM

## 2018-03-15 PROCEDURE — 93923 UPR/LXTR ART STDY 3+ LVLS: CPT

## 2018-03-15 PROCEDURE — 93976 VASCULAR STUDY: CPT

## 2018-03-15 PROCEDURE — 99213 OFFICE O/P EST LOW 20 MIN: CPT | Mod: 25

## 2018-07-02 ENCOUNTER — APPOINTMENT (OUTPATIENT)
Dept: ORTHOPEDIC SURGERY | Facility: CLINIC | Age: 78
End: 2018-07-02

## 2018-07-12 ENCOUNTER — RECORD ABSTRACTING (OUTPATIENT)
Age: 78
End: 2018-07-12

## 2018-07-12 DIAGNOSIS — E11.9 TYPE 2 DIABETES MELLITUS W/OUT COMPLICATIONS: ICD-10-CM

## 2018-07-12 DIAGNOSIS — C61 MALIGNANT NEOPLASM OF PROSTATE: ICD-10-CM

## 2018-07-12 DIAGNOSIS — E78.00 PURE HYPERCHOLESTEROLEMIA, UNSPECIFIED: ICD-10-CM

## 2018-07-30 ENCOUNTER — APPOINTMENT (OUTPATIENT)
Dept: PULMONOLOGY | Facility: CLINIC | Age: 78
End: 2018-07-30
Payer: MEDICARE

## 2018-07-30 DIAGNOSIS — R06.83 SNORING: ICD-10-CM

## 2018-07-30 PROCEDURE — 99213 OFFICE O/P EST LOW 20 MIN: CPT | Mod: 25

## 2018-07-30 PROCEDURE — 94664 DEMO&/EVAL PT USE INHALER: CPT | Mod: 59

## 2018-07-30 PROCEDURE — 94750: CPT

## 2018-07-30 PROCEDURE — 88738 HGB QUANT TRANSCUTANEOUS: CPT

## 2018-07-30 PROCEDURE — 94727 GAS DIL/WSHOT DETER LNG VOL: CPT

## 2018-07-30 PROCEDURE — 94729 DIFFUSING CAPACITY: CPT

## 2018-07-30 PROCEDURE — 94060 EVALUATION OF WHEEZING: CPT

## 2018-09-10 NOTE — ED ADULT NURSE NOTE - CINV DISCH TEACH PARTICIP
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 No new pain or redness. No discharge. Vision stable. Father thinks redness is less since last visit. 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  I personally obtained the chief complaint(s) and history of present illness.  I confirmed and edited as necessary the review of systems, past medical/surgical history, family history, social history, and examination findings as documented by others; and I examined the patient myself.  I personally reviewed the relevant tests, images, and reports as documented above.  I formulated and edited as necessary the assessment and plan and discussed the findings and management plan with the patient and family. - Alexander Malik MD    
Patient/Spouse

## 2018-09-20 ENCOUNTER — APPOINTMENT (OUTPATIENT)
Dept: VASCULAR SURGERY | Facility: CLINIC | Age: 78
End: 2018-09-20
Payer: MEDICARE

## 2018-09-20 VITALS
HEART RATE: 78 BPM | DIASTOLIC BLOOD PRESSURE: 82 MMHG | TEMPERATURE: 98.8 F | WEIGHT: 178 LBS | SYSTOLIC BLOOD PRESSURE: 164 MMHG | HEIGHT: 67 IN | BODY MASS INDEX: 27.94 KG/M2

## 2018-09-20 VITALS — DIASTOLIC BLOOD PRESSURE: 79 MMHG | HEART RATE: 79 BPM | SYSTOLIC BLOOD PRESSURE: 159 MMHG

## 2018-09-20 PROCEDURE — 93923 UPR/LXTR ART STDY 3+ LVLS: CPT

## 2018-09-20 PROCEDURE — 99213 OFFICE O/P EST LOW 20 MIN: CPT

## 2018-10-10 NOTE — ED PROVIDER NOTE - CHIEF COMPLAINT
Subjective:     Chief Complaint   Patient presents with    Other     pt had a catheter in the hospital and since it was removed, they have been noticing blood on tip of penis after urinating        He  is a 46y.o. year old male  past medical history of seizure, dementia, Down syndrome, cerebral palsy, hypothyroid, anemia, who presented from a group home with his caregiver with a concern that the staff member has noticed blood on tip of the penis after urinating. Patient was recently hospitalized and intubated where he needed to be on urinary catheter for few days. According to the caregiver he has been using bathroom without any difficulty. Patient is non verbal, all info obtained from caregiver. Review of systems not obtained due to patient factors. Objective:     Vitals:    10/10/18 1401   BP: 128/83   Pulse: 88   Resp: 18   Temp: 98.7 °F (37.1 °C)   TempSrc: Oral   SpO2: 100%   Weight: 155 lb (70.3 kg)   Height: 5' 3\" (1.6 m)       Physical Examination: General appearance - alert, well appearing, and in no distress, oriented to person and overweight   Male - no penile lesions or discharge, non tender.      Allergies   Allergen Reactions    Morphine Not Reported This Time    Tegretol [Carbamazepine] Other (comments)     \"bad reaction\" \"changes attitude\"      Social History     Social History    Marital status: SINGLE     Spouse name: N/A    Number of children: N/A    Years of education: N/A     Social History Main Topics    Smoking status: Never Smoker    Smokeless tobacco: Never Used    Alcohol use No    Drug use: No    Sexual activity: Not Currently     Other Topics Concern    None     Social History Narrative      Family History   Problem Relation Age of Onset    Hypertension Mother     Cancer Mother     Lupus Mother     Arthritis-osteo Mother     Heart Disease Mother     Heart Disease Father     Diabetes Father     Hypertension Father     Elevated Lipids Father     Diabetes The patient is a 77y Male complaining of shortness of breath. Brother     Elevated Lipids Brother     Hypertension Brother     Mental Retardation Brother     Cancer Maternal Grandmother     Arthritis-osteo Maternal Grandmother     Alcohol abuse Maternal Grandfather     Cancer Maternal Grandfather     Arthritis-osteo Paternal Grandmother     Cancer Paternal Grandfather       Past Surgical History:   Procedure Laterality Date    HX OTHER SURGICAL  03/13/2015     STEVIE Lewis. Viraj Cotter  @ [facility]      Past Medical History:   Diagnosis Date    Anemia     Anxiety     Blindness of left eye     Cerebral palsy ([address]     DEMENTIA     Down syndrome     Endocrine disease     thyroid disorder    GERD (gastroesophageal reflux disease)     Ill-defined condition     blind in left eye    Ill-defined condition     imparied gait - uses walker    Ill-defined condition     dermatitis    Ill-defined condition     cerebral palsy    Ill-defined condition     prone to decubitus ulcers    Ill-defined condition     anemia    Neurological disorder     Other ill-defined conditions(799.89)     Down syndrome    Psychiatric disorder     mental retardation    Psychiatric disorder     anxiety disorder    Seizures (HCC)     Sleep apnea     Thyroid disease     Unspecified epilepsy without mention of intractable epilepsy       Current Outpatient Prescriptions   Medication Sig Dispense Refill    amoxicillin-clavulanate (AUGMENTIN) 875-125 mg per tablet Take 1 Tab by mouth two (2) times a day for 7 days. Indications: BACTERIAL PNEUMONIA 14 Tab 0    multivit-folic acid-herbal 016 (WELLESSE PLUS) 400 mcg-200 mg/30 mL liqd oral liquid Take 30 mL by mouth daily. 1 Bottle 0    lacosamide (VIMPAT) 100 mg tab tablet Take 1.5 Tabs by mouth two (2) times a day. Max Daily Amount: 300 mg. 90 Tab 0    ferrous sulfate 325 mg (65 mg iron) tablet Take 1 Tab by mouth Daily (before breakfast). 90 Tab 2    loratadine (CLARITIN) 10 mg tablet Take 1 Tab by mouth daily.  90 Tab 1    ascorbic acid, vitamin C, (VITAMIN C) 500 mg tablet Take 1 Tab by mouth daily. 30 Tab 6    levothyroxine (SYNTHROID) 50 mcg tablet Take 1 Tab by mouth Daily (before breakfast). 90 Tab 1    phenytoin (DILANTIN-125) suspension Take 4 mL by mouth two (2) times a day. 1 Bottle 5    sodium chloride (DEEP SEA NASAL) 0.65 % nasal squeeze bottle 1 Spray as needed for Congestion.  HYDROcodone-acetaminophen (HYCET) 0.5-21.7 mg/mL oral solution Take 10 mL by mouth four (4) times daily as needed for Pain.  fluticasone (FLONASE) 50 mcg/actuation nasal spray 2 Sprays by Both Nostrils route daily. 1 Bottle 2    levETIRAcetam (KEPPRA) 100 mg/mL solution 15ml by mouth twice a day. 600 mL 5    cholecalciferol (VITAMIN D3) 1,000 unit tablet Take 1 Tab by mouth daily. 90 Tab 4        Assessment/ Plan:   Diagnoses and all orders for this visit:    1. Complication of catheter, initial encounter  -     AMB POC URINALYSIS DIP STICK AUTO W/O MICRO showed trace blood. Likely from patient being on catheter. He is asymptomatic otherwise. Advised to monitor . Follow up if symptoms worsen. -     URINALYSIS W/ RFLX MICROSCOPIC           Medication risks/benefits/costs/interactions/alternatives discussed with patient. Advised patient to call back or return to office if symptoms worsen/change/persist. If patient cannot reach us or should anything more severe/urgent arise he/she should proceed directly to the nearest emergency department. Discussed expected course/resolution/complications of diagnosis in detail with patient. Patient given a written after visit summary which includes her diagnoses, current medications and vitals. Patient expressed understanding with the diagnosis and plan.        Follow-up Disposition: Not on File

## 2018-10-15 ENCOUNTER — APPOINTMENT (OUTPATIENT)
Dept: PULMONOLOGY | Facility: CLINIC | Age: 78
End: 2018-10-15
Payer: MEDICARE

## 2018-10-15 VITALS
BODY MASS INDEX: 26.22 KG/M2 | RESPIRATION RATE: 16 BRPM | TEMPERATURE: 98.2 F | OXYGEN SATURATION: 94 % | WEIGHT: 177 LBS | HEIGHT: 69 IN | HEART RATE: 79 BPM | DIASTOLIC BLOOD PRESSURE: 67 MMHG | SYSTOLIC BLOOD PRESSURE: 116 MMHG

## 2018-10-15 PROCEDURE — 71046 X-RAY EXAM CHEST 2 VIEWS: CPT

## 2018-10-15 PROCEDURE — 99213 OFFICE O/P EST LOW 20 MIN: CPT | Mod: 25

## 2018-10-15 PROCEDURE — 94060 EVALUATION OF WHEEZING: CPT

## 2018-10-15 RX ORDER — AZITHROMYCIN 250 MG/1
250 TABLET, FILM COATED ORAL
Qty: 1 | Refills: 0 | Status: ACTIVE | COMMUNITY
Start: 2018-10-15 | End: 1900-01-01

## 2018-10-25 ENCOUNTER — APPOINTMENT (OUTPATIENT)
Dept: PULMONOLOGY | Facility: CLINIC | Age: 78
End: 2018-10-25
Payer: MEDICARE

## 2018-10-25 VITALS
HEIGHT: 69 IN | SYSTOLIC BLOOD PRESSURE: 146 MMHG | WEIGHT: 177 LBS | BODY MASS INDEX: 26.22 KG/M2 | DIASTOLIC BLOOD PRESSURE: 74 MMHG | TEMPERATURE: 98.7 F | RESPIRATION RATE: 16 BRPM | HEART RATE: 81 BPM | OXYGEN SATURATION: 95 %

## 2018-10-25 PROCEDURE — 99213 OFFICE O/P EST LOW 20 MIN: CPT | Mod: 25

## 2018-10-25 PROCEDURE — 94060 EVALUATION OF WHEEZING: CPT

## 2018-10-25 RX ORDER — BUDESONIDE AND FORMOTEROL FUMARATE DIHYDRATE 160; 4.5 UG/1; UG/1
160-4.5 AEROSOL RESPIRATORY (INHALATION) TWICE DAILY
Qty: 1 | Refills: 3 | Status: ACTIVE | COMMUNITY
Start: 2018-10-25 | End: 1900-01-01

## 2018-12-06 ENCOUNTER — APPOINTMENT (OUTPATIENT)
Dept: PULMONOLOGY | Facility: CLINIC | Age: 78
End: 2018-12-06
Payer: MEDICARE

## 2018-12-06 VITALS
WEIGHT: 177 LBS | HEIGHT: 67 IN | SYSTOLIC BLOOD PRESSURE: 130 MMHG | TEMPERATURE: 98.2 F | OXYGEN SATURATION: 97 % | BODY MASS INDEX: 27.78 KG/M2 | DIASTOLIC BLOOD PRESSURE: 69 MMHG | HEART RATE: 84 BPM

## 2018-12-06 DIAGNOSIS — R05 COUGH: ICD-10-CM

## 2018-12-06 DIAGNOSIS — J45.909 UNSPECIFIED ASTHMA, UNCOMPLICATED: ICD-10-CM

## 2018-12-06 PROCEDURE — 88738 HGB QUANT TRANSCUTANEOUS: CPT

## 2018-12-06 PROCEDURE — 94729 DIFFUSING CAPACITY: CPT | Mod: 59

## 2018-12-06 PROCEDURE — 94727 GAS DIL/WSHOT DETER LNG VOL: CPT

## 2018-12-06 PROCEDURE — 94060 EVALUATION OF WHEEZING: CPT

## 2018-12-06 PROCEDURE — 99213 OFFICE O/P EST LOW 20 MIN: CPT | Mod: 25

## 2019-01-21 ENCOUNTER — APPOINTMENT (OUTPATIENT)
Dept: PULMONOLOGY | Facility: CLINIC | Age: 79
End: 2019-01-21

## 2019-02-04 ENCOUNTER — APPOINTMENT (OUTPATIENT)
Dept: PULMONOLOGY | Facility: CLINIC | Age: 79
End: 2019-02-04

## 2019-03-19 ENCOUNTER — APPOINTMENT (OUTPATIENT)
Dept: NEUROLOGY | Facility: CLINIC | Age: 79
End: 2019-03-19

## 2019-03-21 ENCOUNTER — APPOINTMENT (OUTPATIENT)
Dept: VASCULAR SURGERY | Facility: CLINIC | Age: 79
End: 2019-03-21

## 2019-03-27 ENCOUNTER — APPOINTMENT (OUTPATIENT)
Dept: OTOLARYNGOLOGY | Facility: CLINIC | Age: 79
End: 2019-03-27

## 2019-04-09 ENCOUNTER — APPOINTMENT (OUTPATIENT)
Dept: VASCULAR SURGERY | Facility: CLINIC | Age: 79
End: 2019-04-09

## 2019-07-30 ENCOUNTER — INPATIENT (INPATIENT)
Facility: HOSPITAL | Age: 79
LOS: 1 days | Discharge: HOME | DRG: 66 | End: 2019-08-01
Attending: SPECIALIST | Admitting: SPECIALIST
Payer: MEDICARE

## 2019-07-30 VITALS
RESPIRATION RATE: 16 BRPM | DIASTOLIC BLOOD PRESSURE: 68 MMHG | HEART RATE: 74 BPM | OXYGEN SATURATION: 95 % | WEIGHT: 175.05 LBS | TEMPERATURE: 98 F | HEIGHT: 67 IN | SYSTOLIC BLOOD PRESSURE: 130 MMHG

## 2019-07-30 DIAGNOSIS — H53.47 HETERONYMOUS BILATERAL FIELD DEFECTS: ICD-10-CM

## 2019-07-30 LAB
ALBUMIN SERPL ELPH-MCNC: 3.9 G/DL — SIGNIFICANT CHANGE UP (ref 3.3–5)
ALP SERPL-CCNC: 95 U/L — SIGNIFICANT CHANGE UP (ref 40–120)
ALT FLD-CCNC: <5 U/L — LOW (ref 10–45)
ANION GAP SERPL CALC-SCNC: 13 MMOL/L — SIGNIFICANT CHANGE UP (ref 5–17)
APTT BLD: 27.8 SEC — SIGNIFICANT CHANGE UP (ref 27.5–36.3)
AST SERPL-CCNC: 8 U/L — LOW (ref 10–40)
BASOPHILS # BLD AUTO: 0.1 K/UL — SIGNIFICANT CHANGE UP (ref 0–0.2)
BASOPHILS NFR BLD AUTO: 0.6 % — SIGNIFICANT CHANGE UP (ref 0–2)
BILIRUB SERPL-MCNC: 0.3 MG/DL — SIGNIFICANT CHANGE UP (ref 0.2–1.2)
BLD GP AB SCN SERPL QL: NEGATIVE — SIGNIFICANT CHANGE UP
BUN SERPL-MCNC: 17 MG/DL — SIGNIFICANT CHANGE UP (ref 7–23)
CALCIUM SERPL-MCNC: 9.2 MG/DL — SIGNIFICANT CHANGE UP (ref 8.4–10.5)
CHLORIDE SERPL-SCNC: 99 MMOL/L — SIGNIFICANT CHANGE UP (ref 96–108)
CK MB CFR SERPL CALC: 1.9 NG/ML — SIGNIFICANT CHANGE UP (ref 0–6.7)
CK SERPL-CCNC: 37 U/L — SIGNIFICANT CHANGE UP (ref 30–200)
CO2 SERPL-SCNC: 24 MMOL/L — SIGNIFICANT CHANGE UP (ref 22–31)
CREAT SERPL-MCNC: 0.68 MG/DL — SIGNIFICANT CHANGE UP (ref 0.5–1.3)
EOSINOPHIL # BLD AUTO: 0.2 K/UL — SIGNIFICANT CHANGE UP (ref 0–0.5)
EOSINOPHIL NFR BLD AUTO: 2.3 % — SIGNIFICANT CHANGE UP (ref 0–6)
GLUCOSE SERPL-MCNC: 111 MG/DL — HIGH (ref 70–99)
HCT VFR BLD CALC: 36.6 % — LOW (ref 39–50)
HGB BLD-MCNC: 12.2 G/DL — LOW (ref 13–17)
INR BLD: 0.98 RATIO — SIGNIFICANT CHANGE UP (ref 0.88–1.16)
LYMPHOCYTES # BLD AUTO: 2.3 K/UL — SIGNIFICANT CHANGE UP (ref 1–3.3)
LYMPHOCYTES # BLD AUTO: 26.6 % — SIGNIFICANT CHANGE UP (ref 13–44)
MCHC RBC-ENTMCNC: 30.8 PG — SIGNIFICANT CHANGE UP (ref 27–34)
MCHC RBC-ENTMCNC: 33.4 GM/DL — SIGNIFICANT CHANGE UP (ref 32–36)
MCV RBC AUTO: 92 FL — SIGNIFICANT CHANGE UP (ref 80–100)
MONOCYTES # BLD AUTO: 0.8 K/UL — SIGNIFICANT CHANGE UP (ref 0–0.9)
MONOCYTES NFR BLD AUTO: 9.5 % — SIGNIFICANT CHANGE UP (ref 2–14)
NEUTROPHILS # BLD AUTO: 5.3 K/UL — SIGNIFICANT CHANGE UP (ref 1.8–7.4)
NEUTROPHILS NFR BLD AUTO: 61 % — SIGNIFICANT CHANGE UP (ref 43–77)
PLATELET # BLD AUTO: 355 K/UL — SIGNIFICANT CHANGE UP (ref 150–400)
POTASSIUM SERPL-MCNC: 4.5 MMOL/L — SIGNIFICANT CHANGE UP (ref 3.5–5.3)
POTASSIUM SERPL-SCNC: 4.5 MMOL/L — SIGNIFICANT CHANGE UP (ref 3.5–5.3)
PROT SERPL-MCNC: 6.7 G/DL — SIGNIFICANT CHANGE UP (ref 6–8.3)
PROTHROM AB SERPL-ACNC: 11.3 SEC — SIGNIFICANT CHANGE UP (ref 10–12.9)
RBC # BLD: 3.97 M/UL — LOW (ref 4.2–5.8)
RBC # FLD: 12 % — SIGNIFICANT CHANGE UP (ref 10.3–14.5)
RH IG SCN BLD-IMP: POSITIVE — SIGNIFICANT CHANGE UP
SODIUM SERPL-SCNC: 136 MMOL/L — SIGNIFICANT CHANGE UP (ref 135–145)
TROPONIN T, HIGH SENSITIVITY RESULT: 14 NG/L — SIGNIFICANT CHANGE UP (ref 0–51)
WBC # BLD: 8.6 K/UL — SIGNIFICANT CHANGE UP (ref 3.8–10.5)
WBC # FLD AUTO: 8.6 K/UL — SIGNIFICANT CHANGE UP (ref 3.8–10.5)

## 2019-07-30 PROCEDURE — 70450 CT HEAD/BRAIN W/O DYE: CPT | Mod: 26,59

## 2019-07-30 PROCEDURE — 71045 X-RAY EXAM CHEST 1 VIEW: CPT | Mod: 26

## 2019-07-30 PROCEDURE — 93010 ELECTROCARDIOGRAM REPORT: CPT

## 2019-07-30 PROCEDURE — 70496 CT ANGIOGRAPHY HEAD: CPT | Mod: 26

## 2019-07-30 PROCEDURE — 99291 CRITICAL CARE FIRST HOUR: CPT | Mod: GC

## 2019-07-30 PROCEDURE — 70498 CT ANGIOGRAPHY NECK: CPT | Mod: 26

## 2019-07-30 RX ORDER — DEXTROSE 50 % IN WATER 50 %
25 SYRINGE (ML) INTRAVENOUS ONCE
Refills: 0 | Status: DISCONTINUED | OUTPATIENT
Start: 2019-07-30 | End: 2019-08-01

## 2019-07-30 RX ORDER — CARBIDOPA AND LEVODOPA 25; 100 MG/1; MG/1
2 TABLET ORAL
Refills: 0 | Status: DISCONTINUED | OUTPATIENT
Start: 2019-07-30 | End: 2019-08-01

## 2019-07-30 RX ORDER — PANTOPRAZOLE SODIUM 20 MG/1
40 TABLET, DELAYED RELEASE ORAL
Refills: 0 | Status: DISCONTINUED | OUTPATIENT
Start: 2019-07-30 | End: 2019-08-01

## 2019-07-30 RX ORDER — ALTEPLASE 100 MG
63.8 KIT INTRAVENOUS ONCE
Refills: 0 | Status: COMPLETED | OUTPATIENT
Start: 2019-07-30 | End: 2019-07-30

## 2019-07-30 RX ORDER — SODIUM CHLORIDE 9 MG/ML
1000 INJECTION INTRAMUSCULAR; INTRAVENOUS; SUBCUTANEOUS
Refills: 0 | Status: DISCONTINUED | OUTPATIENT
Start: 2019-07-30 | End: 2019-07-31

## 2019-07-30 RX ORDER — INSULIN LISPRO 100/ML
VIAL (ML) SUBCUTANEOUS EVERY 6 HOURS
Refills: 0 | Status: DISCONTINUED | OUTPATIENT
Start: 2019-07-30 | End: 2019-07-31

## 2019-07-30 RX ORDER — ATORVASTATIN CALCIUM 80 MG/1
80 TABLET, FILM COATED ORAL AT BEDTIME
Refills: 0 | Status: DISCONTINUED | OUTPATIENT
Start: 2019-07-30 | End: 2019-08-01

## 2019-07-30 RX ORDER — SODIUM CHLORIDE 9 MG/ML
1000 INJECTION, SOLUTION INTRAVENOUS
Refills: 0 | Status: DISCONTINUED | OUTPATIENT
Start: 2019-07-30 | End: 2019-07-31

## 2019-07-30 RX ORDER — ALPRAZOLAM 0.25 MG
0.5 TABLET ORAL
Qty: 0 | Refills: 0 | DISCHARGE

## 2019-07-30 RX ORDER — ACETAMINOPHEN 500 MG
975 TABLET ORAL ONCE
Refills: 0 | Status: COMPLETED | OUTPATIENT
Start: 2019-07-30 | End: 2019-07-30

## 2019-07-30 RX ORDER — DEXTROSE 50 % IN WATER 50 %
12.5 SYRINGE (ML) INTRAVENOUS ONCE
Refills: 0 | Status: DISCONTINUED | OUTPATIENT
Start: 2019-07-30 | End: 2019-08-01

## 2019-07-30 RX ORDER — DEXTROSE 50 % IN WATER 50 %
15 SYRINGE (ML) INTRAVENOUS ONCE
Refills: 0 | Status: DISCONTINUED | OUTPATIENT
Start: 2019-07-30 | End: 2019-08-01

## 2019-07-30 RX ORDER — ALTEPLASE 100 MG
7.1 KIT INTRAVENOUS ONCE
Refills: 0 | Status: COMPLETED | OUTPATIENT
Start: 2019-07-30 | End: 2019-07-30

## 2019-07-30 RX ORDER — ACETAMINOPHEN 500 MG
1000 TABLET ORAL ONCE
Refills: 0 | Status: COMPLETED | OUTPATIENT
Start: 2019-07-30 | End: 2019-07-30

## 2019-07-30 RX ORDER — METOCLOPRAMIDE HCL 10 MG
10 TABLET ORAL ONCE
Refills: 0 | Status: COMPLETED | OUTPATIENT
Start: 2019-07-30 | End: 2019-07-30

## 2019-07-30 RX ORDER — GLUCAGON INJECTION, SOLUTION 0.5 MG/.1ML
1 INJECTION, SOLUTION SUBCUTANEOUS ONCE
Refills: 0 | Status: DISCONTINUED | OUTPATIENT
Start: 2019-07-30 | End: 2019-08-01

## 2019-07-30 RX ADMIN — CARBIDOPA AND LEVODOPA 2 TABLET(S): 25; 100 TABLET ORAL at 22:00

## 2019-07-30 RX ADMIN — Medication 10 MILLIGRAM(S): at 18:38

## 2019-07-30 RX ADMIN — Medication 975 MILLIGRAM(S): at 20:01

## 2019-07-30 RX ADMIN — ALTEPLASE 426 MILLIGRAM(S): KIT at 18:53

## 2019-07-30 RX ADMIN — ATORVASTATIN CALCIUM 80 MILLIGRAM(S): 80 TABLET, FILM COATED ORAL at 22:12

## 2019-07-30 RX ADMIN — ALTEPLASE 63.8 MILLIGRAM(S): KIT at 18:54

## 2019-07-30 RX ADMIN — Medication 975 MILLIGRAM(S): at 18:38

## 2019-07-30 NOTE — H&P ADULT - NSICDXPASTMEDICALHX_GEN_ALL_CORE_FT
PAST MEDICAL HISTORY:  CAD (coronary artery disease)     Cancer of Prostate Seed implant 2004     Diabetes Mellitus     Hyperlipidemia     Hypertension     Neuropathy     Osteoarthrosis     Parkinson's Disease

## 2019-07-30 NOTE — ED PROVIDER NOTE - OBJECTIVE STATEMENT
Code stroke called upon initial physician evaluation in ED. Code stroke called upon initial physician evaluation in ED.    78M PMH Parkinson's dz, chronic headaches presents for sudden onset loss of left peripheral vision which started 2 hours prior to coming to the ED. Pt reports diffuse frontal headache which is chronic (during interview he states it never goes away, and also states its intermittent) Denies nausea, vomiting, weakness, numbness, chills, fevers. Wife states face is normal in appearance. Pt uses walker at baseline without issue.

## 2019-07-30 NOTE — H&P ADULT - NSICDXPASTSURGICALHX_GEN_ALL_CORE_FT
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

## 2019-07-30 NOTE — ED PROVIDER NOTE - CLINICAL SUMMARY MEDICAL DECISION MAKING FREE TEXT BOX
78M PMH parkinsons and chronic HA p/w sudden onset left sided hemianopsia; code stroke called; CT H ordered; TPA per neuro recs

## 2019-07-30 NOTE — ED ADULT NURSE NOTE - OBJECTIVE STATEMENT
79 yo male with PMH HTN, DM, Parkinsons presents to the ED via EMS from home c/o sudden severe HA with left sided blurry vision x3 hours prior to arrival around 1500. patient is AAOx3. pupils are 3mm bilaterally with sluggish left pupils noted. speech clear. moving all extremities with + sensation 77 yo male with PMH HTN, DM, Parkinsons presents to the ED via EMS from home c/o sudden severe HA with left sided blurry vision x3 hours prior to arrival around 1500. patient is AAOx3. pupils are 3mm bilaterally with sluggish left pupils noted. speech clear. moving all extremities with + sensation. patient has left visual 77 yo male with PMH HTN, DM, Parkinsons presents to the ED via EMS from home c/o sudden severe frontal HA with left sided blurry vision x3 hours prior to arrival around 1500. patient is AAOx3. pupils are 3mm bilaterally with sluggish left pupils noted. speech clear. moving all extremities with + sensation. patient has left visual gaze and bilateral hemianopsia. patient lung sounds CTA bilaterally. cap refill <3sec. +2 radial pulses noted. patient denies chest pain, SOB, fevers, chills, N/V/D, dizziness, cough, abdominal pain, back pain, dysuria, numbness or tingling, weakness. FS performed. EKG performed. code stroke called. patient sent to CT. LIZZETTE ADKINS at the bedside. TPA decided at 1842. Bolus pushed by neuro MD Hawley at 1853. Infusion started at 1854. patient tolerating well. will continue to monitor. NSR on monitor.

## 2019-07-30 NOTE — H&P ADULT - HISTORY OF PRESENT ILLNESS
78 year old RH  male with PMHx of HTN, HLD, parkinson's disease (follows with [facility]), hx of prostate Ca, not on AC or AT, who presented to ED for L sided vision loss, LKN confirmed by family is 3:35 PM on 7/30/19, patient took a 1 hour nap later and woke up with complete vision loss on the L side. He states that vision has improved slighlty since then but is not back to his baseline. He has had chronic headaches for the past few months but has no hx of strokes and has never had aura with his headaches.   CTh is unremarkable 78 year old RH  male with PMHx of HTN, HLD, parkinson's disease (follows with [facility]), hx of prostate Ca, not on AC or AT, who presented to ED for L sided vision loss, LKN confirmed by family is 3:35 PM on 7/30/19, patient took a 1 hour nap later and woke up with complete vision loss on the L side. He states that vision has improved slightly since then but is not back to his baseline. He has had chronic headaches for the past few months but has no hx of strokes and has never had aura with his headaches.   He does not smoke or drink  At baseline he ambulates with a walker, is able to do some ADLs on his own such as dressing himself, feeding himself.     NIHSS 1 for L homonymous hemianopsia  MRS 1 78 year old RH  male with PMHx of HTN, HLD, parkinson's disease (follows with [facility]), hx of prostate Ca, not on AC or AT, who presented to ED for L sided vision loss, LKN confirmed by family is 3:35 PM on 7/30/19, patient took a 1 hour nap later and woke up with complete vision loss on the L side. He states that vision has improved slightly since then but is not back to his baseline. He has had chronic headaches for the past few months but has no hx of strokes and has never had aura with his headaches.   He does not smoke or drink  At baseline he ambulates with a walker, is able to do some ADLs on his own such as dressing himself, feeding himself.     NIHSS 3 for L homonymous hemianopsia  MRS 1

## 2019-07-30 NOTE — ED PROVIDER NOTE - PROGRESS NOTE DETAILS
attending Chato: neurology recommending TPA given acute vision loss. Family and patient informed and risks and benefits discussed at length

## 2019-07-30 NOTE — H&P ADULT - NSHPLABSRESULTS_GEN_ALL_CORE
12.2   8.6   )-----------( 355      ( 30 Jul 2019 18:13 )             36.6     07-30    136  |  99  |  17  ----------------------------<  111<H>  4.5   |  24  |  0.68    Ca    9.2      30 Jul 2019 18:13    TPro  6.7  /  Alb  3.9  /  TBili  0.3  /  DBili  x   /  AST  8<L>  /  ALT  <5<L>  /  AlkPhos  95  07-30    POCT Blood Glucose.: 116 mg/dL (30 Jul 2019 17:58)    PT/INR - ( 30 Jul 2019 18:13 )   PT: 11.3 sec;   INR: 0.98 ratio    PTT - ( 30 Jul 2019 18:13 )  PTT:27.8 sec    Vital Signs Last 24 Hrs  T(C): 36.9 (30 Jul 2019 18:20), Max: 36.9 (30 Jul 2019 18:20)  T(F): 98.4 (30 Jul 2019 18:20), Max: 98.4 (30 Jul 2019 18:20)  HR: 69 (30 Jul 2019 19:38) (69 - 77)  BP: 124/56 (30 Jul 2019 19:38) (106/51 - 141/72)  BP(mean): --  RR: 13 (30 Jul 2019 19:38) (12 - 18)  SpO2: 98% (30 Jul 2019 19:38) (95% - 98%)

## 2019-07-30 NOTE — ED PROVIDER NOTE - ATTENDING CONTRIBUTION TO CARE
attending Chato: Code stroke called after initial evaluation  78yM h/o Parkinson disease, chronic headaches p/w sudden onset painless vision loss to L peripheral visual field. Denies similar symptoms with prior HA. No focal weakness. Will obtain labs STAT CTH, CTA head/neck, neuro eval, likely admit

## 2019-07-30 NOTE — ED ADULT NURSE REASSESSMENT NOTE - NS ED NURSE REASSESS COMMENT FT1
1908- (15 min after TPA infusion) patient states left eye blurry vision has improved but is not back to normal vision. see neuro flowsheet for full neuro exam in chart.

## 2019-07-30 NOTE — H&P ADULT - ATTENDING COMMENTS
VASCULAR NEUROLOGY ATTENDING  The patient is seen and examined the history and imaging are reviewed. I agree with the resident note unless otherwise noted. Patient with acute onset of LHH treated with IV tpa per protocol. On exam still with LHH. Continue post tpa protocol. MRI brain. Possible ILR. Cards eval (Dr. Acharya) TTE Stroke labs. early hospital discharge.

## 2019-07-30 NOTE — ED ADULT NURSE REASSESSMENT NOTE - NS ED NURSE REASSESS COMMENT FT1
Upon neuro assessment, pt L sided upper gums found to be bleeding. Spoke to MD #[id number] Kip. As per MD, continue TPA infusion at this time, no interventions required. Will continue to monitor.

## 2019-07-30 NOTE — ED PROVIDER NOTE - PHYSICAL EXAMINATION
Gen: NAD, AOx3  Head: NCAT  HEENT: PERRL, oral mucosa moist, normal conjunctiva, oropharynx clear without exudate or erythema; left eye hemianopsia   Lung: CTAB, no respiratory distress, no wheezing, rales, rhonchi  CV: normal s1/s2, rrr, no murmurs, Normal perfusion, pulses 2+ throughout  Abd: soft, NTND, no CVA tenderness  MSK: No edema, no visible deformities, full range of motion in all 4 extremities  Neuro: CN II-XII grossly intact, No focal neurologic deficits  Skin: No rash   Psych: normal affect

## 2019-07-30 NOTE — ED ADULT NURSE NOTE - NSIMPLEMENTINTERV_GEN_ALL_ED
Implemented All Fall Risk Interventions:  [place] to call system. Call bell, personal items and telephone within reach. Instruct patient to call for assistance. Room bathroom lighting operational. Non-slip footwear when patient is off stretcher. Physically safe environment: no spills, clutter or unnecessary equipment. Stretcher in lowest position, wheels locked, appropriate side rails in place. Provide visual cue, wrist band, yellow gown, etc. Monitor gait and stability. Monitor for mental status changes and reorient to person, place, and time. Review medications for side effects contributing to fall risk. Reinforce activity limits and safety measures with patient and family.

## 2019-07-30 NOTE — ED ADULT NURSE REASSESSMENT NOTE - NS ED NURSE REASSESS COMMENT FT1
Pt frontal headache returned. Spoke to MD Shin #[id number] who states IV Ofirmev to be ordered. MD Shin aware pt parkinson's medication are due at this time and states she will order them. [facility] ID band in place. Red socks on. Belongings with wife at bedside.

## 2019-07-30 NOTE — H&P ADULT - NSHPPHYSICALEXAM_GEN_ALL_CORE
Neurological Exam:  Mental Status: Orientated to self, date and place.  Attention intact.  No dysarthria, aphasia or neglect.  Short and long term memory grossly intact.      Cranial Nerves: PERRL, EOMI, L homonymous hemianopsia, no nystagmus or diplopia.  CN V1-3 intact to light touch and pinprick.  No facial asymmetry.  Hearing intact.  Tongue midline.     Motor:   Tone: normal            Strength:     Upper extremity                      Delt       Bicep    Tricep                                               R         5/5        5/5        5/5       5/5                                            L          5/5        5/5        5/5       5/5  Lower extremity                       HF          KE          KF        DF         PF                                            R        5/5        5/5        5/5       5/5       5/5                                            L         5/5        5/5       5/5       5/5        5/5  Pronator drift: none                 Dysmetria: None to finger-nose-finger   No truncal ataxia.    Tremor: + resting tremor (baseline parkinsonian tremor)  Sensation: intact to light touch    Gait: able to take few steps with minimal assistance Neurological Exam:  Mental Status: Orientated to self, date and place.  Attention intact.  No dysarthria, aphasia or neglect.  Short and long term memory grossly intact.      Cranial Nerves: PERRL, EOMI, L homonymous hemianopsia, + BTT on R, - BTT on L, no nystagmus or diplopia.  CN V1-3 intact to light touch and pinprick.  No facial asymmetry.  Hearing intact.  Tongue midline.     Motor:   Tone: normal            Strength:     Upper extremity                      Delt       Bicep    Tricep                                               R         5/5        5/5        5/5       5/5                                            L          5/5 5/5        5/5       5/5  Lower extremity                       HF          KE          KF        DF         PF                                            R        5/5 5/5        5/5       5/5       5/5                                            L         5/5        5/5       5/5       5/5        5/5  Pronator drift: none                 Dysmetria: None to finger-nose-finger   No truncal ataxia.    Tremor: + resting tremor (baseline parkinsonian tremor)  Sensation: intact to light touch    Gait: able to take few steps with minimal assistance

## 2019-07-30 NOTE — H&P ADULT - ASSESSMENT
78 year old RH  male who presented to ED for L sided vision loss, LKN confirmed by family is 3:35 PM on 7/30/19, patient took a 1 hour nap and woke up with complete vision loss on the L side. He states that vision has improved slightly since then but is not back to his baseline. He has had chronic headaches for the past few months but has no hx of strokes and has never had aura with his headaches.   He does not smoke or drink  At baseline he ambulates with a walker, is able to do some ADLs on his own such as dressing himself, feeding himself.   CTh is unremarkable, CTA H&N unremarkable  Risks vs benefits discussed with patient and family at bedside, they confirmed LKN and that patient is not on any blood thinning meds (was on Xarelto in 2018 per chart review but per the wife he has been off it for > 1 year), he got tPa 7.cc and 70.4 cc drip.     Admission scores  NIHSS 1 for L homonymous hemianopsia  MRS 1    Plan  General  [] permissive /105    Imaging   [x] CT head: negative for acute infarct  [x] CTA H&N: negative  [] MRI head non contrast  [] repeat CTH in 24 hours  [] STAT repeat CTH if change in neuro status    Meds  [] start ASA 81 daily if 24 hr CTh negative  [] start atorva 80 mg daily, titrate based on lipid profile  [] DVT ppx (venodynes until negative repeat CTh)    Studies  [] TTE with bubble    Labs   [] CBC, BMP  [] Lipid panel  [] HbA1C    Other  [] PT/OT  [] dysphagia screen    Pharmacy called for med 78 year old RH  male who presented to ED for L sided vision loss, LKN confirmed by family is 3:35 PM on 7/30/19, patient took a 1 hour nap and woke up with complete vision loss on the L side. He states that vision has improved slightly since then but is not back to his baseline. He has had chronic headaches for the past few months but has no hx of strokes and has never had aura with his headaches.   He does not smoke or drink  At baseline he ambulates with a walker, is able to do some ADLs on his own such as dressing himself, feeding himself.   CTh is unremarkable, CTA H&N unremarkable  Risks vs benefits discussed with patient and family at bedside, they confirmed LKN and that patient is not on any blood thinning meds (was on Xarelto in 2018 per chart review but per the wife he has been off it for > 1 year), he got tPa 7.cc and 70.4 cc drip.     Admission scores  NIHSS 1 for L homonymous hemianopsia  MRS 1    Plan  General  [] permissive /105    Imaging   [x] CT head: negative for acute infarct  [x] CTA H&N: negative  [] MRI head non contrast  [] repeat CTH in 24 hours  [] STAT repeat CTH if change in neuro status    Meds  [] start ASA 81 daily if 24 hr CTh negative  [] start atorva 80 mg daily, titrate based on lipid profile  [] DVT ppx (venodynes until negative repeat CTh)    Studies  [] TTE with bubble    Labs   [] CBC, BMP  [] Lipid panel  [] HbA1C    Other  [] PT/OT  [] dysphagia screen    Pharmacy called for med recc 78 year old RH  male who presented to ED for L sided vision loss, LKN confirmed by family is 3:35 PM on 7/30/19, patient took a 1 hour nap and woke up with complete vision loss on the L side. He states that vision has improved slightly since then but is not back to his baseline. He has had chronic headaches for the past few months but has no hx of strokes and has never had aura with his headaches.   He does not smoke or drink  At baseline he ambulates with a walker, is able to do some ADLs on his own such as dressing himself, feeding himself.   CTh is unremarkable, CTA H&N unremarkable  Risks vs benefits discussed with patient and family at bedside, they confirmed LKN and that patient is not on any blood thinning meds (was on Xarelto in 2018 per chart review but per the wife he has been off it for > 1 year), he got tPa 7.cc and 70.4 cc drip.     Admission scores  NIHSS 3 for L homonymous hemianopsia  MRS 1    Plan  General  [] permissive /105    Imaging   [x] CT head: negative for acute infarct  [x] CTA H&N: negative  [] MRI head non contrast  [] repeat CTH in 24 hours  [] STAT repeat CTH if change in neuro status    Meds  [] start ASA 81 daily if 24 hr CTh negative  [] start atorva 80 mg daily, titrate based on lipid profile  [] DVT ppx (venodynes until negative repeat CTh)    Studies  [] TTE with bubble    Labs   [] CBC, BMP  [] Lipid panel  [] HbA1C    Other  [] PT/OT  [] dysphagia screen    Pharmacy called for med recc 78 year old RH  male who presented to ED for L sided vision loss, LKN confirmed by family is 3:35 PM on 7/30/19, patient took a 1 hour nap and woke up with complete vision loss on the L side. He states that vision has improved slightly since then but is not back to his baseline. He has had chronic headaches for the past few months but has no hx of strokes and has never had aura with his headaches.   He does not smoke or drink  At baseline he ambulates with a walker, is able to do some ADLs on his own such as dressing himself, feeding himself.   CTh is unremarkable, CTA H&N unremarkable  Risks vs benefits discussed with patient and family at bedside, they confirmed LKN and that patient is not on any blood thinning meds (was on Xarelto in 2018 per chart review but per the wife he has been off it for > 1 year), he got tPa 7.cc and 70.4 cc drip. not an endovascular candidate ; no LVO.   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, likely in PCA territory, etiology ESUS    Admission scores  NIHSS 3 for L homonymous hemianopsia  MRS 1    Plan  General  [] permissive /105    Imaging   [x] CT head: negative for acute infarct  [x] CTA H&N: negative  [] MRI head non contrast  [] repeat CTH in 24 hours  [] STAT repeat CTH if change in neuro status    Meds  [] start ASA 81 daily if 24 hr CTh negative  [] start atorva 80 mg daily, titrate based on lipid profile  [] DVT ppx (venodynes until negative repeat CTh)    Studies  [] TTE with bubble    Labs   [] CBC, BMP  [] Lipid panel  [] HbA1C    Other  [] PT/OT  [] dysphagia screen    Pharmacy called for med recc 78 year old RH  male who presented to ED for L sided vision loss, LKN confirmed by family is 3:35 PM on 7/30/19, patient took a 1 hour nap and woke up with complete vision loss on the L side. He states that vision has improved slightly since then but is not back to his baseline. He has had chronic headaches for the past few months but has no hx of strokes and has never had aura with his headaches.   He does not smoke or drink  At baseline he ambulates with a walker, is able to do some ADLs on his own such as dressing himself, feeding himself.   CTh is unremarkable, CTA H&N unremarkable  Risks vs benefits discussed with patient and family at bedside, they confirmed LKN and that patient is not on any blood thinning meds (was on Xarelto in 2018 per chart review but per the wife he has been off it for > 1 year), he got tPa 7.cc and 70.4 cc drip. not an endovascular candidate ; no LVO.   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, likely in PCA territory, etiology ESUS  delay in tPa due to timing of code stroke being called in ED (arrived at 17:49, called at 18:05) and necessity of confirmation of actual LKN (family was unclear at first, needed to call relative) and clarification of deficits, when they began, etc.     Admission scores  NIHSS 3 for L homonymous hemianopsia  MRS 1    Plan  General  [] permissive /105    Imaging   [x] CT head: negative for acute infarct  [x] CTA H&N: negative  [] MRI head non contrast  [] repeat CTH in 24 hours  [] STAT repeat CTH if change in neuro status    Meds  [] start ASA 81 daily if 24 hr CTh negative  [] start atorva 80 mg daily, titrate based on lipid profile  [] DVT ppx (venodynes until negative repeat CTh)    Studies  [] TTE with bubble    Labs   [] CBC, BMP  [] Lipid panel  [] HbA1C    Other  [] PT/OT  [] dysphagia screen    Pharmacy called for med recc

## 2019-07-31 LAB
ANION GAP SERPL CALC-SCNC: 16 MMOL/L — SIGNIFICANT CHANGE UP (ref 5–17)
BUN SERPL-MCNC: 14 MG/DL — SIGNIFICANT CHANGE UP (ref 7–23)
CALCIUM SERPL-MCNC: 8.6 MG/DL — SIGNIFICANT CHANGE UP (ref 8.4–10.5)
CHLORIDE SERPL-SCNC: 100 MMOL/L — SIGNIFICANT CHANGE UP (ref 96–108)
CHOLEST SERPL-MCNC: 194 MG/DL — SIGNIFICANT CHANGE UP (ref 10–199)
CO2 SERPL-SCNC: 18 MMOL/L — LOW (ref 22–31)
CREAT SERPL-MCNC: 0.53 MG/DL — SIGNIFICANT CHANGE UP (ref 0.5–1.3)
GLUCOSE SERPL-MCNC: 110 MG/DL — HIGH (ref 70–99)
HBA1C BLD-MCNC: 6.4 % — HIGH (ref 4–5.6)
HCT VFR BLD CALC: 36.1 % — LOW (ref 39–50)
HDLC SERPL-MCNC: 31 MG/DL — LOW
HGB BLD-MCNC: 11.6 G/DL — LOW (ref 13–17)
LIPID PNL WITH DIRECT LDL SERPL: 143 MG/DL — HIGH
MCHC RBC-ENTMCNC: 29.6 PG — SIGNIFICANT CHANGE UP (ref 27–34)
MCHC RBC-ENTMCNC: 32.1 GM/DL — SIGNIFICANT CHANGE UP (ref 32–36)
MCV RBC AUTO: 92.1 FL — SIGNIFICANT CHANGE UP (ref 80–100)
PLATELET # BLD AUTO: 344 K/UL — SIGNIFICANT CHANGE UP (ref 150–400)
POTASSIUM SERPL-MCNC: 4.6 MMOL/L — SIGNIFICANT CHANGE UP (ref 3.5–5.3)
POTASSIUM SERPL-SCNC: 4.6 MMOL/L — SIGNIFICANT CHANGE UP (ref 3.5–5.3)
RBC # BLD: 3.92 M/UL — LOW (ref 4.2–5.8)
RBC # FLD: 12.1 % — SIGNIFICANT CHANGE UP (ref 10.3–14.5)
SODIUM SERPL-SCNC: 134 MMOL/L — LOW (ref 135–145)
TOTAL CHOLESTEROL/HDL RATIO MEASUREMENT: 6.3 RATIO — SIGNIFICANT CHANGE UP (ref 3.4–9.6)
TRIGL SERPL-MCNC: 101 MG/DL — SIGNIFICANT CHANGE UP (ref 10–149)
WBC # BLD: 8.8 K/UL — SIGNIFICANT CHANGE UP (ref 3.8–10.5)
WBC # FLD AUTO: 8.8 K/UL — SIGNIFICANT CHANGE UP (ref 3.8–10.5)

## 2019-07-31 PROCEDURE — 93970 EXTREMITY STUDY: CPT | Mod: 26

## 2019-07-31 PROCEDURE — 70450 CT HEAD/BRAIN W/O DYE: CPT | Mod: 26

## 2019-07-31 RX ORDER — ENOXAPARIN SODIUM 100 MG/ML
40 INJECTION SUBCUTANEOUS ONCE
Refills: 0 | Status: COMPLETED | OUTPATIENT
Start: 2019-07-31 | End: 2019-07-31

## 2019-07-31 RX ORDER — SODIUM CHLORIDE 9 MG/ML
500 INJECTION INTRAMUSCULAR; INTRAVENOUS; SUBCUTANEOUS ONCE
Refills: 0 | Status: COMPLETED | OUTPATIENT
Start: 2019-07-31 | End: 2019-07-31

## 2019-07-31 RX ORDER — DOCUSATE SODIUM 100 MG
100 CAPSULE ORAL THREE TIMES A DAY
Refills: 0 | Status: DISCONTINUED | OUTPATIENT
Start: 2019-07-31 | End: 2019-08-01

## 2019-07-31 RX ORDER — INSULIN LISPRO 100/ML
VIAL (ML) SUBCUTANEOUS AT BEDTIME
Refills: 0 | Status: DISCONTINUED | OUTPATIENT
Start: 2019-07-31 | End: 2019-08-01

## 2019-07-31 RX ORDER — DIAZEPAM 5 MG
5 TABLET ORAL ONCE
Refills: 0 | Status: DISCONTINUED | OUTPATIENT
Start: 2019-07-31 | End: 2019-07-31

## 2019-07-31 RX ORDER — INSULIN LISPRO 100/ML
VIAL (ML) SUBCUTANEOUS
Refills: 0 | Status: DISCONTINUED | OUTPATIENT
Start: 2019-07-31 | End: 2019-08-01

## 2019-07-31 RX ORDER — ASPIRIN/CALCIUM CARB/MAGNESIUM 324 MG
81 TABLET ORAL DAILY
Refills: 0 | Status: DISCONTINUED | OUTPATIENT
Start: 2019-07-31 | End: 2019-08-01

## 2019-07-31 RX ORDER — ESCITALOPRAM OXALATE 10 MG/1
20 TABLET, FILM COATED ORAL AT BEDTIME
Refills: 0 | Status: DISCONTINUED | OUTPATIENT
Start: 2019-07-31 | End: 2019-08-01

## 2019-07-31 RX ADMIN — PANTOPRAZOLE SODIUM 40 MILLIGRAM(S): 20 TABLET, DELAYED RELEASE ORAL at 08:13

## 2019-07-31 RX ADMIN — CARBIDOPA AND LEVODOPA 2 TABLET(S): 25; 100 TABLET ORAL at 02:01

## 2019-07-31 RX ADMIN — CARBIDOPA AND LEVODOPA 2 TABLET(S): 25; 100 TABLET ORAL at 16:58

## 2019-07-31 RX ADMIN — SODIUM CHLORIDE 2000 MILLILITER(S): 9 INJECTION INTRAMUSCULAR; INTRAVENOUS; SUBCUTANEOUS at 04:27

## 2019-07-31 RX ADMIN — Medication 100 MILLIGRAM(S): at 21:53

## 2019-07-31 RX ADMIN — Medication 2: at 14:10

## 2019-07-31 RX ADMIN — Medication 81 MILLIGRAM(S): at 20:46

## 2019-07-31 RX ADMIN — Medication 1: at 17:21

## 2019-07-31 RX ADMIN — SODIUM CHLORIDE 50 MILLILITER(S): 9 INJECTION INTRAMUSCULAR; INTRAVENOUS; SUBCUTANEOUS at 06:05

## 2019-07-31 RX ADMIN — CARBIDOPA AND LEVODOPA 2 TABLET(S): 25; 100 TABLET ORAL at 11:45

## 2019-07-31 RX ADMIN — CARBIDOPA AND LEVODOPA 2 TABLET(S): 25; 100 TABLET ORAL at 04:21

## 2019-07-31 RX ADMIN — ENOXAPARIN SODIUM 40 MILLIGRAM(S): 100 INJECTION SUBCUTANEOUS at 20:45

## 2019-07-31 RX ADMIN — ATORVASTATIN CALCIUM 80 MILLIGRAM(S): 80 TABLET, FILM COATED ORAL at 21:53

## 2019-07-31 RX ADMIN — CARBIDOPA AND LEVODOPA 2 TABLET(S): 25; 100 TABLET ORAL at 08:13

## 2019-07-31 RX ADMIN — Medication 5 MILLIGRAM(S): at 14:18

## 2019-07-31 RX ADMIN — CARBIDOPA AND LEVODOPA 2 TABLET(S): 25; 100 TABLET ORAL at 20:46

## 2019-07-31 RX ADMIN — ESCITALOPRAM OXALATE 20 MILLIGRAM(S): 10 TABLET, FILM COATED ORAL at 21:52

## 2019-07-31 NOTE — DIETITIAN INITIAL EVALUATION ADULT. - OTHER INFO
Source: Comprehensive chart review, patient, Pt's wife at bedside     INFORMATION PTA    Diet PTA: Pt reports a well-balanced diet PTA. Pt would consume cereal, chicken, fish, vegetables, rice, beans.     Nutrition Status PTA: Pt with type 2 DM. Reports he checks his blood glucose values 8-10x/day. Values would range from 118-280mg/dL. Pt reports he takes Lantus every night and Novolog as needed. Per this admission, Pt's HgbA1c from today 7/31 is 6.4%    Nutrition Supplements PTA: Pt's wife states Pt will consume Glucerna Shake often at home PTA. Pt does not take any vitamins/minerals.    Food Allergies: Confirms no known food allergies.    Weight History PTA: States usual body weight ranges from 175-178 pounds. Per this admission, Pt's weight is 177.8 pounds. Denies any changes in weight.    INFORMATION THIS ADMISSION    Last BM: Pt reports he infrequently makes BM's. Last BM reported from 2 days ago. Requesting stool softener to assist with BM's, will recommend.    Other Subjective Information: Pt reports fair appetite in-house. Was on pureed foods post tPA, now advanced to regular consistency by team. Reports he needs soft foods because some foods such as steak are too difficult to chew. No reports of nausea/vomiting. Offered Glucerna Shake 8oz twice daily (providing 360kcal, 20g protein daily) to promote optimal po intake while hospitalized, Pt amenable.     Therapeutic Diet Education Provided: Reviewed well-balanced meals, sources of carbohydrates, encouraging good po intake.

## 2019-07-31 NOTE — PHYSICAL THERAPY INITIAL EVALUATION ADULT - GAIT DEVIATIONS NOTED, PT EVAL
decreased step length/decreased weight-shifting ability/shuffling gait, freezing gait with turns/decreased fabby/decreased velocity of limb motion

## 2019-07-31 NOTE — OCCUPATIONAL THERAPY INITIAL EVALUATION ADULT - PERTINENT HX OF CURRENT PROBLEM, REHAB EVAL
79yo M with parkinson's disease, presented to ED for L sided vision loss, LKN confirmed by family is 3:35 PM on 7/30/19, patient took a 1 hour nap later and woke up with complete vision loss on the L side. He states that vision has improved slightly since then but is not back to his baseline. He has had chronic headaches for the past few months.

## 2019-07-31 NOTE — PHYSICAL THERAPY INITIAL EVALUATION ADULT - PERTINENT HX OF CURRENT PROBLEM, REHAB EVAL
Pt is 78M admitted 7/30/19 PMHx HTN, HLD, PD, prostate Ca, presented to ED for L sided vision loss, LKN confirmed by family is 3:35 PM on 7/30/19, patient took a 1 hour nap later and woke up with complete vision loss on the L side.

## 2019-07-31 NOTE — PHYSICAL THERAPY INITIAL EVALUATION ADULT - ACTIVE RANGE OF MOTION EXAMINATION, REHAB EVAL
bilateral upper extremity Active ROM was WFL (within functional limits)/bilateral  lower extremity Active ROM was WFL (within functional limits)/RUE limited to ~90 degrees

## 2019-07-31 NOTE — DIETITIAN INITIAL EVALUATION ADULT. - REASON INDICATOR FOR ASSESSMENT
Pt seen for initial nutrition assessment for length of stay on NSCU. Pt is an 78 year old male, PMH osteoarthritis, neuropathy, HTN, CAD, HLD, Parkinson's disease, type 2 DM, prostate cancer. Pt admitted for left sided vision loss. Presents with left homonymous hemianopsia due to right hemispheric ischemic infarct. S/p tPA. Plan for MRI this afternoon.

## 2019-07-31 NOTE — OCCUPATIONAL THERAPY INITIAL EVALUATION ADULT - ADL RETRAINING, OT EVAL
Patient will dress lower body with supervision, AE as needed in 4 weeks. Patient will dress upper body with minimal assistance in 4 weeks

## 2019-07-31 NOTE — DIETITIAN INITIAL EVALUATION ADULT. - 20 CAL FROM
Eliseo Cobos is a 50 year old male presenting with right shoulder pain has a history of this aggravate recently also has a lump on left elbow does have some pain when over uses arm     Medications verified, no changes.  Pharmacy has been verified    Denies known Latex allergy or symptoms of Latex sensitivity.    History   Smoking Status   • Current Every Day Smoker   • Packs/day: 0.20   • Years: 24.00   Smokeless Tobacco   • Never Used     Patient would like communication of their results via:      Home Phone: [phone] (home)  Okay to leave a message containing results? Yes   [id number]

## 2019-07-31 NOTE — OCCUPATIONAL THERAPY INITIAL EVALUATION ADULT - LIVES WITH, PROFILE
Pvt home, 2 steps to enter with handrail. +Stair glide to basement. Rolling walker at baseline. +Shower stall with grab bars, shower chair/spouse

## 2019-07-31 NOTE — PHYSICAL THERAPY INITIAL EVALUATION ADULT - DISCHARGE DISPOSITION, PT EVAL
acute rehab. however, if pt prefers DC home, recommend cont home PT, pt owns RW, shower chair, wheelchair & chair lift; recommend 3:1 commode; recommend assist for ALL functional mobility at this time./rehabilitation facility

## 2019-07-31 NOTE — OCCUPATIONAL THERAPY INITIAL EVALUATION ADULT - ADDITIONAL COMMENTS
CT BRAIN 7/30- No acute intracranial bleeding, mass effect, or shift.  CT ANGIO HEAD 7/30- CTA BRAIN: Patent intracranial circulation. No flow-limiting stenosis or occlusion. CTA NECK: Patent cervical vasculature. Mild stenosis of the bilateral carotid siphons and the left internal carotid artery at its origin.

## 2019-07-31 NOTE — PHYSICAL THERAPY INITIAL EVALUATION ADULT - PLANNED THERAPY INTERVENTIONS, PT EVAL
bed mobility training/gait training/transfer training/GOALS: Pt will negotiate 6 steps with unilateral handrail & step to pattern with supervision in 4wks

## 2019-07-31 NOTE — PHYSICAL THERAPY INITIAL EVALUATION ADULT - ADDITIONAL COMMENTS
Pt resides in private home with spouse & dtr, about 2 steps to enter with handrail, chair lift within. PTA pt reports that he was mostly independent with mobility and ADL's, ambulatory with RW, also owns shower chair, & wheelchair. Pt was receiving home PT for RUE shoulder arthritis & gait, 2-3x/wk. Pt with leg length discrepancy, heel lift for RLE.

## 2019-07-31 NOTE — OCCUPATIONAL THERAPY INITIAL EVALUATION ADULT - PLANNED THERAPY INTERVENTIONS, OT EVAL
strengthening/transfer training/ADL retraining/balance training/bed mobility training/ROM/neuromuscular re-education

## 2019-07-31 NOTE — DIETITIAN INITIAL EVALUATION ADULT. - ADD RECOMMEND
1. Recommend change diet to consistent carbohydrates no snack, soft consistency. 2. Recommend adding Glucerna Shake 8oz twice daily to promote optimal po intake. 3. Consider adding stool softener to assist with BM's. 4. Monitor pt's PO intake, weight, skin, edema, GI distress.

## 2019-07-31 NOTE — PHYSICAL THERAPY INITIAL EVALUATION ADULT - CRITERIA FOR SKILLED THERAPEUTIC INTERVENTIONS
functional limitations in following categories/predicted duration of therapy intervention/anticipated equipment needs at discharge/anticipated discharge recommendation/rehab potential/impairments found

## 2019-07-31 NOTE — DIETITIAN INITIAL EVALUATION ADULT. - PHYSICAL APPEARANCE
well nourished/other (specify) As per visual observations, no overt signs of muscle wasting/fat loss.   As per flow sheets, Pt does not present with any edema or pressure injuries.

## 2019-07-31 NOTE — PHYSICAL THERAPY INITIAL EVALUATION ADULT - GENERAL OBSERVATIONS, REHAB EVAL
Pt received semisupine in bed, A&OX4, following all commands, pleasant & eager to participate, spouse at bedside, a/w left vision loss, s/p tPA, p/w left visual field cut, RUE tremors, dysarthric speech, requiring increased time for transfers, +IV, +NSCU monitoring.

## 2019-08-01 ENCOUNTER — TRANSCRIPTION ENCOUNTER (OUTPATIENT)
Age: 79
End: 2019-08-01

## 2019-08-01 VITALS
OXYGEN SATURATION: 97 % | HEART RATE: 68 BPM | DIASTOLIC BLOOD PRESSURE: 92 MMHG | RESPIRATION RATE: 17 BRPM | SYSTOLIC BLOOD PRESSURE: 134 MMHG

## 2019-08-01 LAB
ANION GAP SERPL CALC-SCNC: 13 MMOL/L — SIGNIFICANT CHANGE UP (ref 5–17)
BUN SERPL-MCNC: 12 MG/DL — SIGNIFICANT CHANGE UP (ref 7–23)
CALCIUM SERPL-MCNC: 9.6 MG/DL — SIGNIFICANT CHANGE UP (ref 8.4–10.5)
CHLORIDE SERPL-SCNC: 99 MMOL/L — SIGNIFICANT CHANGE UP (ref 96–108)
CO2 SERPL-SCNC: 26 MMOL/L — SIGNIFICANT CHANGE UP (ref 22–31)
CREAT SERPL-MCNC: 0.74 MG/DL — SIGNIFICANT CHANGE UP (ref 0.5–1.3)
GLUCOSE SERPL-MCNC: 157 MG/DL — HIGH (ref 70–99)
HBA1C BLD-MCNC: 6.5 % — HIGH (ref 4–5.6)
HCT VFR BLD CALC: 40.1 % — SIGNIFICANT CHANGE UP (ref 39–50)
HGB BLD-MCNC: 12.7 G/DL — LOW (ref 13–17)
MCHC RBC-ENTMCNC: 29.1 PG — SIGNIFICANT CHANGE UP (ref 27–34)
MCHC RBC-ENTMCNC: 31.7 GM/DL — LOW (ref 32–36)
MCV RBC AUTO: 91.9 FL — SIGNIFICANT CHANGE UP (ref 80–100)
PLATELET # BLD AUTO: 348 K/UL — SIGNIFICANT CHANGE UP (ref 150–400)
POTASSIUM SERPL-MCNC: 4.1 MMOL/L — SIGNIFICANT CHANGE UP (ref 3.5–5.3)
POTASSIUM SERPL-SCNC: 4.1 MMOL/L — SIGNIFICANT CHANGE UP (ref 3.5–5.3)
RBC # BLD: 4.37 M/UL — SIGNIFICANT CHANGE UP (ref 4.2–5.8)
RBC # FLD: 12.1 % — SIGNIFICANT CHANGE UP (ref 10.3–14.5)
SODIUM SERPL-SCNC: 138 MMOL/L — SIGNIFICANT CHANGE UP (ref 135–145)
WBC # BLD: 8 K/UL — SIGNIFICANT CHANGE UP (ref 3.8–10.5)
WBC # FLD AUTO: 8 K/UL — SIGNIFICANT CHANGE UP (ref 3.8–10.5)

## 2019-08-01 PROCEDURE — 86901 BLOOD TYPING SEROLOGIC RH(D): CPT

## 2019-08-01 PROCEDURE — 82962 GLUCOSE BLOOD TEST: CPT

## 2019-08-01 PROCEDURE — 80053 COMPREHEN METABOLIC PANEL: CPT

## 2019-08-01 PROCEDURE — 80048 BASIC METABOLIC PNL TOTAL CA: CPT

## 2019-08-01 PROCEDURE — 99291 CRITICAL CARE FIRST HOUR: CPT | Mod: 25

## 2019-08-01 PROCEDURE — 93005 ELECTROCARDIOGRAM TRACING: CPT

## 2019-08-01 PROCEDURE — 86900 BLOOD TYPING SEROLOGIC ABO: CPT

## 2019-08-01 PROCEDURE — 82550 ASSAY OF CK (CPK): CPT

## 2019-08-01 PROCEDURE — 70496 CT ANGIOGRAPHY HEAD: CPT

## 2019-08-01 PROCEDURE — 85610 PROTHROMBIN TIME: CPT

## 2019-08-01 PROCEDURE — 97162 PT EVAL MOD COMPLEX 30 MIN: CPT

## 2019-08-01 PROCEDURE — 84484 ASSAY OF TROPONIN QUANT: CPT

## 2019-08-01 PROCEDURE — 96375 TX/PRO/DX INJ NEW DRUG ADDON: CPT

## 2019-08-01 PROCEDURE — 71045 X-RAY EXAM CHEST 1 VIEW: CPT

## 2019-08-01 PROCEDURE — 80061 LIPID PANEL: CPT

## 2019-08-01 PROCEDURE — 96374 THER/PROPH/DIAG INJ IV PUSH: CPT

## 2019-08-01 PROCEDURE — 93970 EXTREMITY STUDY: CPT

## 2019-08-01 PROCEDURE — 70498 CT ANGIOGRAPHY NECK: CPT

## 2019-08-01 PROCEDURE — 97166 OT EVAL MOD COMPLEX 45 MIN: CPT

## 2019-08-01 PROCEDURE — 86850 RBC ANTIBODY SCREEN: CPT

## 2019-08-01 PROCEDURE — 85027 COMPLETE CBC AUTOMATED: CPT

## 2019-08-01 PROCEDURE — 70450 CT HEAD/BRAIN W/O DYE: CPT

## 2019-08-01 PROCEDURE — 85730 THROMBOPLASTIN TIME PARTIAL: CPT

## 2019-08-01 PROCEDURE — 83036 HEMOGLOBIN GLYCOSYLATED A1C: CPT

## 2019-08-01 PROCEDURE — 99222 1ST HOSP IP/OBS MODERATE 55: CPT | Mod: GC

## 2019-08-01 PROCEDURE — 82553 CREATINE MB FRACTION: CPT

## 2019-08-01 RX ORDER — ATORVASTATIN CALCIUM 80 MG/1
1 TABLET, FILM COATED ORAL
Qty: 30 | Refills: 0
Start: 2019-08-01 | End: 2019-08-30

## 2019-08-01 RX ORDER — ASPIRIN/CALCIUM CARB/MAGNESIUM 324 MG
1 TABLET ORAL
Qty: 30 | Refills: 0
Start: 2019-08-01 | End: 2019-08-30

## 2019-08-01 RX ORDER — POLYETHYLENE GLYCOL 3350 17 G/17G
17 POWDER, FOR SOLUTION ORAL EVERY 12 HOURS
Refills: 0 | Status: DISCONTINUED | OUTPATIENT
Start: 2019-08-01 | End: 2019-08-01

## 2019-08-01 RX ORDER — SENNA PLUS 8.6 MG/1
2 TABLET ORAL AT BEDTIME
Refills: 0 | Status: DISCONTINUED | OUTPATIENT
Start: 2019-08-01 | End: 2019-08-01

## 2019-08-01 RX ORDER — ENOXAPARIN SODIUM 100 MG/ML
40 INJECTION SUBCUTANEOUS DAILY
Refills: 0 | Status: DISCONTINUED | OUTPATIENT
Start: 2019-08-01 | End: 2019-08-01

## 2019-08-01 RX ADMIN — Medication 81 MILLIGRAM(S): at 12:59

## 2019-08-01 RX ADMIN — Medication 1: at 12:58

## 2019-08-01 RX ADMIN — CARBIDOPA AND LEVODOPA 2 TABLET(S): 25; 100 TABLET ORAL at 13:00

## 2019-08-01 RX ADMIN — PANTOPRAZOLE SODIUM 40 MILLIGRAM(S): 20 TABLET, DELAYED RELEASE ORAL at 06:02

## 2019-08-01 RX ADMIN — CARBIDOPA AND LEVODOPA 2 TABLET(S): 25; 100 TABLET ORAL at 07:51

## 2019-08-01 RX ADMIN — CARBIDOPA AND LEVODOPA 2 TABLET(S): 25; 100 TABLET ORAL at 00:00

## 2019-08-01 RX ADMIN — Medication 100 MILLIGRAM(S): at 06:02

## 2019-08-01 RX ADMIN — CARBIDOPA AND LEVODOPA 2 TABLET(S): 25; 100 TABLET ORAL at 04:21

## 2019-08-01 RX ADMIN — Medication 1: at 09:22

## 2019-08-01 NOTE — DISCHARGE NOTE PROVIDER - NSDCHOSPICE_GEN_A_CORE
05/18/19 1100   Group 1   Start Time 1030   Stop Time 1115   Length (min) 45 Min   Group Name Check in   Focus of Group Symptoms and Goals   Attendance Present   Mood Irritable;Stressed   Affect Positive;Tense   Behavior/Socialization Cooperative;Supportive   Thought Process Focused;Tracking   Patient Response Attentive;Easily frustrated;Good eye contact   Task Performance Follows directions   Safety Concerns Other  (depression 7/10, anxiety 5/10, +SI 0/10)   Degree Patient Ready for Discharge No   Group Notes Pt reported that she is still withdrawing and feeling poorly.  She was supportive of another member in group and offered suggestions.  Open and discussed some options that have worked for her in the past. Showed insight that she sometimes helps her son too much and that gets in the way of her wellness. Pt's goals are to take medications and go to group.    Guillermo Dominique, ELYSIA, CSAC, ICS     No

## 2019-08-01 NOTE — DISCHARGE NOTE NURSING/CASE MANAGEMENT/SOCIAL WORK - NSDCPEPTSTRK_GEN_ALL_CORE
Call 911 for stroke/Stroke support groups for patients, families, and friends/Need for follow up after discharge/Stroke education booklet/Prescribed medications/Risk factors for stroke/Stroke warning signs and symptoms/Signs and symptoms of stroke

## 2019-08-01 NOTE — DISCHARGE NOTE PROVIDER - HOSPITAL COURSE
78 year old RH  male with PMHx of HTN, HLD, parkinson's disease (follows with [facility]), hx of prostate Ca, not on AC or AT, who presented to ED for L sided vision loss, LKN confirmed by family is 3:35 PM on 7/30/19, patient took a 1 hour nap later and woke up with complete vision loss on the L side. He stated that vision has improved slightly since then but is not back to his baseline. He has had chronic headaches for the past few months but has no hx of strokes and has never had aura with his headaches. At baseline he ambulates with a walker, is able to do some ADLs on his own such as dressing himself, feeding himself. On admission NIHSS was 3 for L homonymous hemianopsia and MRS 1. He was treated with IV tPA on 7/30 at 1930.         CT Head No Cont (07.31.19)    No intracranial hemorrhage.         CT Head No Cont (07.30.19)     No acute intracranial bleeding, mass effect, or shift.        CTA Head w/ IV Cont (07.30.19)    Patent intracranial circulation. No flow-limiting stenosis or     occlusion.         CTA NECK w/ IV Cont (07.30.19)    Patent cervical vasculature. Mild stenosis of the bilateral     carotid siphons and the left internal carotid artery at its origin.        Impression: Cerebral embolism with cerebral infarction due to embolic stroke of unknown source        Patient was unable to tolerate MRI, plan for outpatient MRI.    LE doppler: negative for DVT    Patient discharged on ASA and Lipitor for stroke prevention.     Patient will follow with Dr. Acharay, cardiology as outpatient for echo and loop recorder.     HgA1C: 6.5, follow with outpatient PCP for lifestyle and diet modification.    Patient evaluated by PT/OT and was recommended acute rehab, but patient and family adamant about going home and will be discharged home with services.     Patient stable for discharge. 78 year old RH  male with PMHx of HTN, HLD, parkinson's disease (follows with [facility]), hx of prostate Ca, not on AC or AT, who presented to ED for L sided vision loss, LKN confirmed by family is 3:35 PM on 7/30/19, patient took a 1 hour nap later and woke up with complete vision loss on the L side. He stated that vision has improved slightly since then but is not back to his baseline. He has had chronic headaches for the past few months but has no hx of strokes and has never had aura with his headaches. At baseline he ambulates with a walker, is able to do some ADLs on his own such as dressing himself, feeding himself. On admission NIHSS was 3 for L homonymous hemianopsia and MRS 1. He was treated with IV tPA on 7/30 at 1930.         CT Head No Cont (07.31.19)    No intracranial hemorrhage.         CT Head No Cont (07.30.19)     No acute intracranial bleeding, mass effect, or shift.        CTA Head w/ IV Cont (07.30.19)    Patent intracranial circulation. No flow-limiting stenosis or     occlusion.         CTA NECK w/ IV Cont (07.30.19)    Patent cervical vasculature. Mild stenosis of the bilateral     carotid siphons and the left internal carotid artery at its origin.        Impression: Cerebral embolism with cerebral infarction due to embolic stroke of unknown source        Patient was unable to tolerate MRI, plan for outpatient MRI.    LE doppler: negative for DVT    Patient discharged on ASA and Lipitor for stroke prevention.     Patient will follow with Dr. Acharya, cardiology as outpatient for echo and loop recorder.     HgA1C: 6.5, follow with outpatient PCP and continue current insulin regimen    Patient evaluated by PT/OT and was recommended acute rehab, but patient and family adamant about going home and will be discharged home with services.     Patient stable for discharge.

## 2019-08-01 NOTE — DISCHARGE NOTE NURSING/CASE MANAGEMENT/SOCIAL WORK - NSDCDPATPORTLINK_GEN_ALL_CORE
You can access the [facility] Patient Portal, offered by [facility], by registering with the following website: [email or web address]

## 2019-08-01 NOTE — DISCHARGE NOTE NURSING/CASE MANAGEMENT/SOCIAL WORK - CAREGIVER ADDRESS
[phone] [facility] Barb Barr M.D.  [phone]            History and Physical       NAME:  Ирина Dey   :   1964   MRN:   [id number]       Referring Physician:  Madelin Ball MD      Consult Date: 10/30/2018 10:57 PM    Chief Complaint:  dysphagia    History of Present Illness: The patient is a 62year old female who presents with a complaint of difficulty swallowing. The patient presents for due to new symptoms. The difficulty swallowing has been occurring in an intermittent pattern for months. The course has been increasing. The difficulty swallowing is described as being located in the neck and throat. The symptoms are aggravated by solids only. The patient has been using H2 antagonist. Previous diagnostic tests have included EGD (remotely in [place]) and Barium swallow. The patient has normal/adequate nutrition. Note for \"Difficulty swallowing \": She reports feeling like something is stuck in her throat. She tries to chew thoroughly but there was a time where she was eating shrimp and it got stuck and she walked off and coughed it out. She will still have a globus sensation when she swallows. She will have repeated swallowing, especially at night. She reports barium swallow at [facility] and was told it was ok. She is on zantac but this does not help her. She c/o sore throats and sharp pain to fullness in her throat. PMH:  Past Medical History:   Diagnosis Date    Acute meniscal tear of knee 08/15/2017    Dr. Nicholas Cruz    Acute torn meniscus of knee     Right knee    Allergic rhinitis 2014    Anxiety 2014    Arthritis     Bone spur     Left shoulder    Dense breast tissue on mammogram     Depression 2014    Family history of premature CAD 7/15/2015    GERD (gastroesophageal reflux disease) 9/10/2014    Headache     Hx of mammogram 2014    WNL per pt.     Hyperlipidemia 2015    Insomnia 2014    Neuralgia     Occipital neuralgia    Occipital neuralgia 6/16/2014    RA (rheumatoid arthritis) ([facility] [address]     Screening for malignant neoplasm of the cervix Approx. 7 yrs ago    Negative per pt. PSH:  Past Surgical History:   Procedure Laterality Date    HX CHOLECYSTECTOMY  1986    HX ORTHOPAEDIC  4/16/14    Right knee    HX OTHER SURGICAL  March 2013    Shaved bone spur L shoulder    HX PELVIC LAPAROSCOPY  2001    Laproscopy of abdomen - removal of scar tissue    HX SHOULDER ARTHROSCOPY  6/2013    Left shoulder     HX TONSILLECTOMY      HX TOTAL ABDOMINAL HYSTERECTOMY  1999    DUB, fibroids       Allergies: Allergies   Allergen Reactions    Percocet [Oxycodone-Acetaminophen] Other (comments)     Slows heart rate. Home Medications:  Cannot display prior to admission medications because the patient has not been admitted in this contact. Hospital Medications:  No current facility-administered medications for this encounter. Current Outpatient Medications   Medication Sig    esomeprazole (NEXIUM) 40 mg capsule TAKE 1 CAPSULE BY MOUTH EVERY DAY    cetirizine (ZYRTEC) 10 mg tablet Take 1 Tab by mouth daily. For allergy    aspirin delayed-release 81 mg tablet Take  by mouth daily.  raNITIdine (ZANTAC) 300 mg tablet Take 1 Tab by mouth daily.  methotrexate (RHEUMATREX) 2.5 mg tablet     folic acid (FOLVITE) 1 mg tablet TAKE 1 TABLET BY MOUTH ONCE DAILY    mometasone (NASONEX) 50 mcg/actuation nasal spray 2 Sprays by Both Nostrils route daily. For allergy symptoms    predniSONE (DELTASONE) 5 mg tablet Take 5 mg by mouth daily.  PATADAY 0.2 % drop ophthalmic solution Apply 1 Drop to eye daily.  ascorbic acid (VITAMIN C) 500 mg tablet Take  by mouth daily.  mometasone (NASONEX) 50 mcg/actuation nasal spray 1 [place] by Both Nostrils route daily.  Cholecalciferol, Vitamin D3, 2,000 unit cap Take  by mouth daily.        Social History:  Social History     Tobacco Use    Smoking status: Never Smoker    Smokeless tobacco: Never Used   Substance Use Topics    Alcohol use: Yes     Alcohol/week: 1.8 oz     Types: 3 Cans of beer per week     Comment: Beer       Family History:  Family History   Problem Relation Age of Onset    Diabetes Mother     Cancer Mother         cervical    Heart Disease Father 80        MI    Cancer Father         prostate    Cancer Sister         breast    Breast Cancer Sister     Heart Disease Brother 62        MI    Cancer Sister         breast    Breast Cancer Sister     Diabetes Sister     Heart Disease Sister 61        CAD    No Known Problems Sister     No Known Problems Sister     Cancer Brother         leukemia    No Known Problems Brother     No Known Problems Brother     No Known Problems Brother     Thyroid Disease Daughter     No Known Problems Daughter     No Known Problems Son     Heart Disease Paternal Grandmother              Review of Systems:      Constitutional: negative fever, negative chills, negative weight loss  Eyes:   negative visual changes  ENT:   negative sore throat, tongue or lip swelling  Respiratory:  negative cough, negative dyspnea  Cards:  negative for chest pain, palpitations, lower extremity edema  GI:   See HPI  :  negative for frequency, dysuria  Integument:  negative for rash and pruritus  Heme:  negative for easy bruising and gum/nose bleeding  Musculoskel: negative for myalgias,  back pain and muscle weakness  Neuro: negative for headaches, dizziness, vertigo  Psych:  negative for feelings of anxiety, depression       Objective:   No data found. No intake/output data recorded. No intake/output data recorded. EXAM:     NEURO-a&o   HEENT-wnl   LUNGS-clear    COR-regular rate and rhythym     ABD-soft , no tenderness, no rebound, good bs     EXT-no edema     Data Review     No results for input(s): WBC, HGB, HCT, PLT, HGBEXT, HCTEXT, PLTEXT in the last 72 hours.   No results for input(s): NA, K, CL, CO2, BUN, CREA, GLU, PHOS, CA in the last 72 hours. No results for input(s): SGOT, GPT, AP, TBIL, TP, ALB, GLOB, GGT, AML, LPSE in the last 72 hours. No lab exists for component: AMYP, HLPSE  No results for input(s): INR, PTP, APTT in the last 72 hours. No lab exists for component: INREXT    Patient Active Problem List   Diagnosis Code    Heart palpitations R00.2    Occipital neuralgia M54.81    Allergic rhinitis J30.9    Anxiety F41.9    Depression F32.9    Insomnia G47.00    GERD (gastroesophageal reflux disease) K21.9    Headache(784.0) R51    Cervicalgia M54.2    Migraine without aura, without mention of intractable migraine without mention of status migrainosus G43.009    Family history of premature CAD Z82.49    Pure hypercholesterolemia E78.00    [facility] M06.9      Assessment:   · Dysphagia (787.20  R13.10)  · Globus sensation (306.4  F45.8)  · Impression: History of diagnosis of reflux on prior endoscopy in Ct. Proceed with endoscopy to evaluate for esophagitis, gastritis, duodenitis, H pylori, or PUD. Possible dilation discussed. Start on PPI therapy- reports Nexium worked the best for her in the past. Will resume this. Plan:   · EGD today.      Signed By: Marlen Zhong MD     10/30/2018  10:57 PM

## 2019-08-01 NOTE — DISCHARGE NOTE PROVIDER - CARE PROVIDER_API CALL
Javier Villarreal (DO)  Neurology; Vascular Neurology  [address]  [place] [address]  Phone: [phone]  Fax: [phone]  Follow Up Time: 1 week    Ezequiel Acharya)  Cardiology; Internal Medicine  [address] [facility], [address]  [place] [address]  Phone: [phone]  Fax: [phone]  Follow Up Time: 1 week

## 2019-08-01 NOTE — DISCHARGE NOTE PROVIDER - PROVIDER TOKENS
PROVIDER:[TOKEN:[[id number]],FOLLOWUP:[1 week]],PROVIDER:[TOKEN:[[id number]],FOLLOWUP:[1 week]]

## 2019-08-01 NOTE — CONSULT NOTE ADULT - SUBJECTIVE AND OBJECTIVE BOX
Patient is a 78y old  Male who presents with a chief complaint of visual loss    HPI:  78 year old RH  male with PMHx of HTN, HLD, parkinson's disease (follows with [facility]), hx of prostate Ca, not on AC or AT, who presented to ED for L sided vision loss, LKN confirmed by family is 3:35 PM on 7/30/19, patient took a 1 hour nap later and woke up with complete vision loss on the L side. He states that vision has improved slightly since then but is not back to his baseline. He has had chronic headaches for the past few months but has no hx of strokes and has never had aura with his headaches.   He does not smoke or drink  At baseline he ambulates with a walker, is able to do some ADLs on his own such as dressing himself, feeding himself.     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  History of Total Knee Replacement left: 2004  History of Cholecystectomy: 1998  History of Appendectomy  left Inguinal Hernia      MEDICATIONS  (STANDING):  aspirin enteric coated 81 milliGRAM(s) Oral daily  atorvastatin 80 milliGRAM(s) Oral at bedtime  carbidopa/levodopa  25/100 2 Tablet(s) Oral <User Schedule>  dextrose 50% Injectable 12.5 Gram(s) IV Push once  dextrose 50% Injectable 25 Gram(s) IV Push once  dextrose 50% Injectable 25 Gram(s) IV Push once  docusate sodium 100 milliGRAM(s) Oral three times a day  enoxaparin Injectable 40 milliGRAM(s) SubCutaneous daily  escitalopram 20 milliGRAM(s) Oral at bedtime  insulin lispro (HumaLOG) corrective regimen sliding scale   SubCutaneous three times a day before meals  insulin lispro (HumaLOG) corrective regimen sliding scale   SubCutaneous at bedtime  pantoprazole    Tablet 40 milliGRAM(s) Oral before breakfast    MEDICATIONS  (PRN):  dextrose 40% Gel 15 Gram(s) Oral once PRN Blood Glucose LESS THAN 70 milliGRAM(s)/deciliter  glucagon  Injectable 1 milliGRAM(s) IntraMuscular once PRN Glucose LESS THAN 70 milligrams/deciliter  LORazepam   Injectable 0.5 milliGRAM(s) IV Push once PRN Anxiety    ALLERGIES: NKDA    FAMILY HISTORY:  Family history of cancer  Family history of diabetes mellitus      SOCIAL HISTORY:     CIGARETTES: no tobacco    REVIEW OF SYSTEMS: See HPI, rest negative  	  Vital Signs Last 24 Hrs  T(C): 37.2 (01 Aug 2019 07:47), Max: 37.2 (31 Jul 2019 15:40)  T(F): 98.9 (01 Aug 2019 07:47), Max: 98.9 (31 Jul 2019 15:40)  HR: 71 (01 Aug 2019 10:00) (64 - 86)  BP: 122/70 (01 Aug 2019 10:00) (111/61 - 170/78)  BP(mean): 77 (01 Aug 2019 06:00) (72 - 137)  RR: 18 (01 Aug 2019 10:00) (12 - 19)  SpO2: 98% (01 Aug 2019 10:00) (94% - 100%)  PHYSICAL EXAM:      Constitutional: WD, WN  Eyes: anicteric  Neck: no JVD  Respiratory: clear lungs  Cardiovascular: RRR, 1/6 STEPHANY  Gastrointestinal: BS present, NT, ND, no masses  Extremities: no edema  Vascular: pulses intact  Neurological: L sided visual loss, + tremors    TELEMETRY: RSR with PVC's    ECG:  < from: 12 Lead ECG (07.30.19 @ 18:03) >  Ventricular Rate 76 BPM    Atrial Rate 76 BPM    P-R Interval 238 ms    QRS Duration 118 ms    Q-T Interval 408 ms    QTC Calculation(Bezet) 459 ms    P Axis 75 degrees    R Axis -15 degrees    T Axis 81 degrees    Diagnosis Line SINUS RHYTHM WITH 1ST DEGREE A-V BLOCK  NON-SPECIFIC INTRA-VENTRICULAR CONDUCTION DELAY  BORDERLINE ECG    Confirmed by ATTENDING, ED ([id number]),  FELTON HUSSEIN ([id number]) on 7/31/2019 6:42:26 AM    < end of copied text >      LABS:                        12.7   8.0   )-----------( 348      ( 01 Aug 2019 05:11 )             40.1     08-01    138  |  99  |  12  ----------------------------<  157<H>  4.1   |  26  |  0.74    Ca    9.6      01 Aug 2019 05:11    TPro  6.7  /  Alb  3.9  /  TBili  0.3  /  DBili  x   /  AST  8<L>  /  ALT  <5<L>  /  AlkPhos  95  07-30    CARDIAC MARKERS ( 30 Jul 2019 18:13 )  x     / x     / 37 U/L / x     / 1.9 ng/mL      PT/INR - ( 30 Jul 2019 18:13 )   PT: 11.3 sec;   INR: 0.98 ratio         PTT - ( 30 Jul 2019 18:13 )  PTT:27.8 sec    I&O's Summary    31 Jul 2019 07:01  -  01 Aug 2019 07:00  --------------------------------------------------------  IN: 350 mL / OUT: 3175 mL / NET: -2825 mL      BNP  RADIOLOGY & ADDITIONAL STUDIES:  < from: CT Head No Cont (07.31.19 @ 18:40) >  EXAM:  CT BRAIN                            PROCEDURE DATE:  07/31/2019            INTERPRETATION:  Clinical indication: Left homonymous hemianopsia.    Multiple axial sections were performed from base skull to vertex contrast   enhancement. [place]and sagittal reconstructions were performed as well    This exam is compared with prior noncontrast head CT performed on July 30, 2019.    Parenchymal volume loss and chronic microvascular ischemic changes are   again seen.    There is no evidence of acute hemorrhage mass mass effect in posterior   fossa or supratentorial region.    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    Impression: No significant change when allowing for differences in   technique.                      ASHLEY WORTHY M.D., ATTENDING RADIOLOGIST  This document has been electronically signed. Aug  1 2019  6:37AM                < end of copied text >  < from: CT Angio Neck w/ IV Cont (07.30.19 @ 18:31) >    EXAM:  CT ANGIO NECK (W)AW IC                          EXAM:  CT ANGIO BRAIN (W)AW IC                            PROCEDURE DATE:  07/30/2019            INTERPRETATION:  HISTORY: Last known normal at 3:00 PM. Left-sided   homonymous hemianopsia. No additional focal deficits.    COMPARISON: Noncontrast CT head dictated as separate report.    TECHNIQUE:  CT angiogram of the neck and brain was performed after   administration of intravenous contrast. MIP and 3D reconstructions were   performed. Total of 70 cc Omnipaque 300 intravenous contrast were   administered without complication. 30 cc were discarded.    FINDINGS:     CTA BRAIN    INTERNAL CAROTID ARTERIES: Atherosclerotic disease of the carotid siphons   bilaterally with mild stenosis. The intracranial segments of the ICA are   otherwise patent without hemodynamically significant stenosis, occlusion,   or aneurysm. The ICA bifurcations are unremarkable.    ANTERIOR CEREBRAL ARTERIES: No flow-limiting stenosis or occlusion.   Anterior communicating artery is unremarkable without aneurysm.     MIDDLE CEREBRAL ARTERIES: No flow-limiting stenosis or occlusion. MCA   bifurcations are unremarkable without aneurysm.     POSTERIOR CEREBRAL ARTERIES: No flow-limiting stenosis or occlusion.  Posterior communicating arteries are not well seen bilaterally.     VERTEBROBASILAR SYSTEM: No flow-limiting stenosis or occlusion. Basilar   tip is unremarkable.     CTA NECK      RIGHT CAROTID SYSTEM: Atherosclerotic disease at the carotid bulb. Normal   in course and caliber without flow-limiting stenosis or occlusion.     LEFT CAROTID SYSTEM: Atherosclerotic disease at the carotid bulb. Mild   stenosis (53% using the NASCET criteria) of the left internal carotid   artery at its origin.    VERTEBRAL SYSTEM:  Normal in course and caliber  without flow-limiting   stenosis or occlusion. Origin of the vertebral arteries are unremarkable.   Dominant left vertebral system.     AORTIC ARCH: Origin of the great vessels are unremarkable.     IMPRESSION:     CTA BRAIN: Patent intracranial circulation. No flow-limiting stenosis or   occlusion.     CTA NECK: Patent cervical vasculature. Mild stenosis of the bilateral   carotid siphons and the left internal carotid artery at its origin.                URBAN MARTINEZ M.D., RADIOLOGY RESIDENT  This document has been electronically signed.  ASHLEY WORTHY M.D., ATTENDING RADIOLOGIST  This document has been electronically signed. Jul 30 2019  6:47PM              < end of copied text >    IMPRESSION:  New CVA- L visual loss  HTN  DM  HLD  CAD-moderate    RECOMMENDATIONS:  Continue current meds  Neurology f/u  Outpt f/u for ILR to r/o AF  PT post CVA

## 2019-08-01 NOTE — PROGRESS NOTE ADULT - SUBJECTIVE AND OBJECTIVE BOX
THE PATIENT WAS SEEN AND EXAMINED BY ME WITH THE HOUSESTAFF AND STROKE TEAM DURING MORNING ROUNDS.   HPI:  78 year old RH  male with PMHx of HTN, HLD, parkinson's disease (follows with [facility]), hx of prostate Ca, not on AC or AT, who presented to ED for L sided vision loss, LKN confirmed by family is 3:35 PM on 7/30/19, patient took a 1 hour nap later and woke up with complete vision loss on the L side. He stated that vision has improved slightly since then but is not back to his baseline. He has had chronic headaches for the past few months but has no hx of strokes and has never had aura with his headaches. At baseline he ambulates with a walker, is able to do some ADLs on his own such as dressing himself, feeding himself. On admission NIHSS was 3 for L homonymous hemianopsia and MRS 1. He was treated with IV tPA on 7/30 at 1930.     SUBJECTIVE: No events overnight.  No new neurologic complaints.      aspirin enteric coated 81 milliGRAM(s) Oral daily  atorvastatin 80 milliGRAM(s) Oral at bedtime  carbidopa/levodopa  25/100 2 Tablet(s) Oral <User Schedule>  dextrose 40% Gel 15 Gram(s) Oral once PRN  dextrose 50% Injectable 12.5 Gram(s) IV Push once  dextrose 50% Injectable 25 Gram(s) IV Push once  dextrose 50% Injectable 25 Gram(s) IV Push once  docusate sodium 100 milliGRAM(s) Oral three times a day  escitalopram 20 milliGRAM(s) Oral at bedtime  glucagon  Injectable 1 milliGRAM(s) IntraMuscular once PRN  insulin lispro (HumaLOG) corrective regimen sliding scale   SubCutaneous three times a day before meals  insulin lispro (HumaLOG) corrective regimen sliding scale   SubCutaneous at bedtime  LORazepam   Injectable 0.5 milliGRAM(s) IV Push once PRN  pantoprazole    Tablet 40 milliGRAM(s) Oral before breakfast      PHYSICAL EXAM:   Vital Signs Last 24 Hrs  T(C): 36.9 (01 Aug 2019 00:50), Max: 37.2 (31 Jul 2019 15:40)  T(F): 98.5 (01 Aug 2019 00:50), Max: 98.9 (31 Jul 2019 15:40)  HR: 68 (01 Aug 2019 02:00) (63 - 83)  BP: 150/65 (01 Aug 2019 02:00) (109/57 - 170/78)  BP(mean): 91 (01 Aug 2019 02:00) (72 - 137)  RR: 16 (01 Aug 2019 02:00) (12 - 19)  SpO2: 98% (01 Aug 2019 02:00) (94% - 99%)    General: No acute distress  HEENT: [facility]  Abdomen: Soft, nontender, nondistended   Extremities: No edema    NEUROLOGICAL EXAM:  Mental status: Awake, alert, oriented x3, no neglect, normal memory   Cranial Nerves: No facial asymmetry, no nystagmus, no dysarthria,  tongue midline  Motor exam: Normal tone, no drift, 5/5 RUE, 5/5 RLE, 5/5 LUE, 5/5 LLE, normal fine finger movements.  Sensation: Intact to light touch   Coordination/ Gait: No dysmetria, gait not tested    LABS:                        12.7   8.0   )-----------( 348      ( 01 Aug 2019 05:11 )             40.1    08-01    138  |  99  |  12  ----------------------------<  157<H>  4.1   |  26  |  0.74    Ca    9.6      01 Aug 2019 05:11    TPro  6.7  /  Alb  3.9  /  TBili  0.3  /  DBili  x   /  AST  8<L>  /  ALT  <5<L>  /  AlkPhos  95  07-30  PT/INR - ( 30 Jul 2019 18:13 )   PT: 11.3 sec;   INR: 0.98 ratio         PTT - ( 30 Jul 2019 18:13 )  PTT:27.8 sec  Hemoglobin A1C, Whole Blood: 6.4 % (07-31 @ 08:03)      IMAGING: Reviewed by me.     CT Head No Cont (07.31.19)  No intracranial hemorrhage.     CT Head No Cont (07.30.19)   No acute intracranial bleeding, mass effect, or shift.    CTA Head w/ IV Cont (07.30.19)  Patent intracranial circulation. No flow-limiting stenosis or   occlusion.     CTA NECK w/ IV Cont (07.30.19)  Patent cervical vasculature. Mild stenosis of the bilateral   carotid siphons and the left internal carotid artery at its origin. THE PATIENT WAS SEEN AND EXAMINED BY ME WITH THE HOUSESTAFF AND STROKE TEAM DURING MORNING ROUNDS.   HPI:  78 year old RH  male with PMHx of HTN, HLD, parkinson's disease (follows with [facility]), hx of prostate Ca, not on AC or AT, who presented to ED for L sided vision loss, LKN confirmed by family is 3:35 PM on 7/30/19, patient took a 1 hour nap later and woke up with complete vision loss on the L side. He stated that vision has improved slightly since then but is not back to his baseline. He has had chronic headaches for the past few months but has no hx of strokes and has never had aura with his headaches. At baseline he ambulates with a walker, is able to do some ADLs on his own such as dressing himself, feeding himself. On admission NIHSS was 3 for L homonymous hemianopsia and MRS 1. He was treated with IV tPA on 7/30 at 1930.     SUBJECTIVE: No events overnight.  No new neurologic complaints.      aspirin enteric coated 81 milliGRAM(s) Oral daily  atorvastatin 80 milliGRAM(s) Oral at bedtime  carbidopa/levodopa  25/100 2 Tablet(s) Oral <User Schedule>  dextrose 40% Gel 15 Gram(s) Oral once PRN  dextrose 50% Injectable 12.5 Gram(s) IV Push once  dextrose 50% Injectable 25 Gram(s) IV Push once  dextrose 50% Injectable 25 Gram(s) IV Push once  docusate sodium 100 milliGRAM(s) Oral three times a day  escitalopram 20 milliGRAM(s) Oral at bedtime  glucagon  Injectable 1 milliGRAM(s) IntraMuscular once PRN  insulin lispro (HumaLOG) corrective regimen sliding scale   SubCutaneous three times a day before meals  insulin lispro (HumaLOG) corrective regimen sliding scale   SubCutaneous at bedtime  LORazepam   Injectable 0.5 milliGRAM(s) IV Push once PRN  pantoprazole    Tablet 40 milliGRAM(s) Oral before breakfast      PHYSICAL EXAM:   Vital Signs Last 24 Hrs  T(C): 36.9 (01 Aug 2019 00:50), Max: 37.2 (31 Jul 2019 15:40)  T(F): 98.5 (01 Aug 2019 00:50), Max: 98.9 (31 Jul 2019 15:40)  HR: 68 (01 Aug 2019 02:00) (63 - 83)  BP: 150/65 (01 Aug 2019 02:00) (109/57 - 170/78)  BP(mean): 91 (01 Aug 2019 02:00) (72 - 137)  RR: 16 (01 Aug 2019 02:00) (12 - 19)  SpO2: 98% (01 Aug 2019 02:00) (94% - 99%)    General: No acute distress  HEENT: [facility]  Abdomen: Soft, nontender, nondistended   Extremities: No edema    NEUROLOGICAL EXAM:  Mental status: Awake, alert, oriented x3, no neglect, normal memory, speech fluent, follows commands  Cranial Nerves: No facial asymmetry, LHH, no nystagmus, no dysarthria,  tongue midline  Motor exam: Normal tone, no drift, 5/5 RUE, 5/5 RLE, 5/5 LUE, 5/5 LLE, normal fine finger movements.  Sensation: Intact to light touch   Coordination/ Gait: No dysmetria, gait not tested    LABS:                        12.7   8.0   )-----------( 348      ( 01 Aug 2019 05:11 )             40.1    08-01    138  |  99  |  12  ----------------------------<  157<H>  4.1   |  26  |  0.74    Ca    9.6      01 Aug 2019 05:11    TPro  6.7  /  Alb  3.9  /  TBili  0.3  /  DBili  x   /  AST  8<L>  /  ALT  <5<L>  /  AlkPhos  95  07-30  PT/INR - ( 30 Jul 2019 18:13 )   PT: 11.3 sec;   INR: 0.98 ratio      PTT - ( 30 Jul 2019 18:13 )  PTT:27.8 sec  Hemoglobin A1C, Whole Blood: 6.4 % (07-31 @ 08:03)    IMAGING: Reviewed by me.     CT Head No Cont (07.31.19)  No intracranial hemorrhage.     CT Head No Cont (07.30.19)   No acute intracranial bleeding, mass effect, or shift.    CTA Head w/ IV Cont (07.30.19)  Patent intracranial circulation. No flow-limiting stenosis or   occlusion.     CTA NECK w/ IV Cont (07.30.19)  Patent cervical vasculature. Mild stenosis of the bilateral   carotid siphons and the left internal carotid artery at its origin.

## 2019-08-01 NOTE — CONSULT NOTE ADULT - ATTENDING COMMENTS
Seen and examined with resident. Agree with note.   Patient with  acute PCA infarct, vision loss, dysphagia, gait, ADLs, and functional deficits. .  Patient will need acute rehabilitation when stable.  Had discussion with patient and his wife regarding necessity for acute rehabilitation- patient hesitant to go- explained that he would benefit and should he choose to go home he would be a high risk of fall.

## 2019-08-01 NOTE — CONSULT NOTE ADULT - SUBJECTIVE AND OBJECTIVE BOX
HPI:  78 year old RH  male with PMHx of HTN, HLD, parkinson's disease (follows with [facility]), hx of prostate Ca, not on AC or AT, who presented to ED for L sided vision loss, LKN confirmed by family is 3:35 PM on 7/30/19, patient took a 1 hour nap later and woke up with complete vision loss on the L side. He states that vision has improved slightly since then but is not back to his baseline. He has had chronic headaches for the past few months but has no hx of strokes and has never had aura with his headaches. He does not smoke or drink    NIHSS 3 for L homonymous hemianopsia  MRS 1 (30 Jul 2019 18:45)    Hospital course:   Treated with IV TPA. 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 CTh is unremarkable, CTA H&N unremarkable. Plan for MRI head non-contrast, repeat head CT.       REVIEW OF SYSTEMS  [X] Constitutional WNL       [X] HEENT WNL  [X] Cardio WNL              [X] Resp WNL            [X] GI WNL                            [X]  WNL                               [X] MSK WNL            [X] Neuro WNL   [X] Pain WNL  [X] Cognitive WNL   [X] Psych WNL  [X] Skin WNL      [X] Heme WNL              [X] Endo WNL    PAST MEDICAL & SURGICAL HISTORY  Osteoarthrosis  Neuropathy  Hypertension  CAD (coronary artery disease)  Hyperlipidemia  Parkinson's Disease  Hypertension  Dyslipidemia  Diabetes Mellitus  Cancer of Prostate Seed implant 2004  CAD (Coronary Artery Disease)  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 PTA pt reports that he was mostly independent with mobility and ADL's, ambulatory with RW, also owns shower chair, & wheelchair. Pt was receiving home PT for RUE shoulder arthritis & gait, 2-3x/wk. Pt with leg length discrepancy, heel lift for RLE.      CURRENT FUNCTIONAL STATUS  - Bed Mobility: min a  - Transfers: min a  - Gait: min a  - ADLs: lower extremity dressing mod A    ALLERGIES  No Known Allergies      FAMILY HISTORY   Family history of cancer  Family history of diabetes mellitus      MEDICATIONS   aspirin enteric coated 81 milliGRAM(s) Oral daily  atorvastatin 80 milliGRAM(s) Oral at bedtime  carbidopa/levodopa  25/100 2 Tablet(s) Oral <User Schedule>  dextrose 40% Gel 15 Gram(s) Oral once PRN  dextrose 50% Injectable 12.5 Gram(s) IV Push once  dextrose 50% Injectable 25 Gram(s) IV Push once  dextrose 50% Injectable 25 Gram(s) IV Push once  docusate sodium 100 milliGRAM(s) Oral three times a day  enoxaparin Injectable 40 milliGRAM(s) SubCutaneous daily  escitalopram 20 milliGRAM(s) Oral at bedtime  glucagon  Injectable 1 milliGRAM(s) IntraMuscular once PRN  insulin lispro (HumaLOG) corrective regimen sliding scale   SubCutaneous three times a day before meals  insulin lispro (HumaLOG) corrective regimen sliding scale   SubCutaneous at bedtime  LORazepam   Injectable 0.5 milliGRAM(s) IV Push once PRN  pantoprazole    Tablet 40 milliGRAM(s) Oral before breakfast      VITALS  T(C): 37.2 (08-01-19 @ 07:47), Max: 37.2 (07-31-19 @ 15:40)  HR: 86 (08-01-19 @ 08:00) (64 - 86)  BP: 158/82 (08-01-19 @ 08:00) (109/57 - 170/78)  RR: 18 (08-01-19 @ 08:00) (12 - 19)  SpO2: 100% (08-01-19 @ 08:00) (94% - 100%)  Wt(kg): --    RECENT LABS/IMAGING  CBC Full  -  ( 01 Aug 2019 05:11 )  WBC Count : 8.0 K/uL  RBC Count : 4.37 M/uL  Hemoglobin : 12.7 g/dL  Hematocrit : 40.1 %  Platelet Count - Automated : 348 K/uL  Mean Cell Volume : 91.9 fl  Mean Cell Hemoglobin : 29.1 pg  Mean Cell Hemoglobin Concentration : 31.7 gm/dL  Auto Neutrophil # : x  Auto Lymphocyte # : x  Auto Monocyte # : x  Auto Eosinophil # : x  Auto Basophil # : x  Auto Neutrophil % : x  Auto Lymphocyte % : x  Auto Monocyte % : x  Auto Eosinophil % : x  Auto Basophil % : x    08-01    138  |  99  |  12  ----------------------------<  157<H>  4.1   |  26  |  0.74    Ca    9.6      01 Aug 2019 05:11    TPro  6.7  /  Alb  3.9  /  TBili  0.3  /  DBili  x   /  AST  8<L>  /  ALT  <5<L>  /  AlkPhos  95  07-30      Imaging:  CT Head No Cont (07.31.19)  No intracranial hemorrhage       CT Head No Cont (07.30.19)   No acute intracranial bleeding, mass effect, or shift.      CTA Head w/ IV Cont (07.30.19)  Patent intracranial circulation. No flow-limiting stenosis or   occlusion.       CTA NECK w/ IV Cont (07.30.19)  Patent cervical vasculature. Mild stenosis of the bilateral   carotid siphons and the left internal carotid artery at its origin.    ---------------------------------------------------------------------------------------- HPI:  78 year old RH  male with PMHx of HTN, HLD, parkinson's disease (follows with [facility]), hx of prostate Ca, not on AC or AT, who presented to ED for L sided vision loss, LKN confirmed by family is 3:35 PM on 7/30/19, patient took a 1 hour nap later and woke up with complete vision loss on the L side. He states that vision has improved slightly since then but is not back to his baseline. He has had chronic headaches for the past few months but has no hx of strokes and has never had aura with his headaches. He does not smoke or drink    NIHSS 3 for L homonymous hemianopsia  MRS 1 (30 Jul 2019 18:45)    Hospital course:   Treated with IV TPA. 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 CTh is unremarkable, CTA H&N unremarkable. Plan for MRI head non-contrast, repeat head CT.       REVIEW OF SYSTEMS  [X] Constitutional WNL       [X] HEENT +vision loss   [X] Cardio WNL              [X] Resp WNL            [X] GI +constipation   [X]  WNL                               [X] MSK WNL            [X] Neuro +PD, resting tremor  [X] Pain WNL  [X] Cognitive WNL   [X] Psych WNL  [X] Skin WNL      [X] Heme WNL              [X] Endo WNL    PAST MEDICAL & SURGICAL HISTORY  Osteoarthrosis  Neuropathy  Hypertension  CAD (coronary artery disease)  Hyperlipidemia  Parkinson's Disease  Hypertension  Dyslipidemia  Diabetes Mellitus  Cancer of Prostate Seed implant 2004  CAD (Coronary Artery Disease)  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 PTA pt reports that he was mostly independent with mobility and ADL's, ambulatory with RW, also owns shower chair, & wheelchair. Pt was receiving home PT for RUE shoulder arthritis & gait, 2-3x/wk. Pt with leg length discrepancy, heel lift for RLE.      CURRENT FUNCTIONAL STATUS  - Bed Mobility: min a  - Transfers: min a  - Gait: min a  - ADLs: lower extremity dressing mod A    ALLERGIES  No Known Allergies      FAMILY HISTORY   Family history of cancer  Family history of diabetes mellitus      MEDICATIONS   aspirin enteric coated 81 milliGRAM(s) Oral daily  atorvastatin 80 milliGRAM(s) Oral at bedtime  carbidopa/levodopa  25/100 2 Tablet(s) Oral <User Schedule>  dextrose 40% Gel 15 Gram(s) Oral once PRN  dextrose 50% Injectable 12.5 Gram(s) IV Push once  dextrose 50% Injectable 25 Gram(s) IV Push once  dextrose 50% Injectable 25 Gram(s) IV Push once  docusate sodium 100 milliGRAM(s) Oral three times a day  enoxaparin Injectable 40 milliGRAM(s) SubCutaneous daily  escitalopram 20 milliGRAM(s) Oral at bedtime  glucagon  Injectable 1 milliGRAM(s) IntraMuscular once PRN  insulin lispro (HumaLOG) corrective regimen sliding scale   SubCutaneous three times a day before meals  insulin lispro (HumaLOG) corrective regimen sliding scale   SubCutaneous at bedtime  LORazepam   Injectable 0.5 milliGRAM(s) IV Push once PRN  pantoprazole    Tablet 40 milliGRAM(s) Oral before breakfast      VITALS  T(C): 37.2 (08-01-19 @ 07:47), Max: 37.2 (07-31-19 @ 15:40)  HR: 86 (08-01-19 @ 08:00) (64 - 86)  BP: 158/82 (08-01-19 @ 08:00) (109/57 - 170/78)  RR: 18 (08-01-19 @ 08:00) (12 - 19)  SpO2: 100% (08-01-19 @ 08:00) (94% - 100%)  Wt(kg): --    RECENT LABS/IMAGING  CBC Full  -  ( 01 Aug 2019 05:11 )  WBC Count : 8.0 K/uL  RBC Count : 4.37 M/uL  Hemoglobin : 12.7 g/dL  Hematocrit : 40.1 %  Platelet Count - Automated : 348 K/uL  Mean Cell Volume : 91.9 fl  Mean Cell Hemoglobin : 29.1 pg  Mean Cell Hemoglobin Concentration : 31.7 gm/dL  Auto Neutrophil # : x  Auto Lymphocyte # : x  Auto Monocyte # : x  Auto Eosinophil # : x  Auto Basophil # : x  Auto Neutrophil % : x  Auto Lymphocyte % : x  Auto Monocyte % : x  Auto Eosinophil % : x  Auto Basophil % : x    08-01    138  |  99  |  12  ----------------------------<  157<H>  4.1   |  26  |  0.74    Ca    9.6      01 Aug 2019 05:11    TPro  6.7  /  Alb  3.9  /  TBili  0.3  /  DBili  x   /  AST  8<L>  /  ALT  <5<L>  /  AlkPhos  95  07-30      Imaging:  CT Head No Cont (07.31.19)  No intracranial hemorrhage       CT Head No Cont (07.30.19)   No acute intracranial bleeding, mass effect, or shift.      CTA Head w/ IV Cont (07.30.19)  Patent intracranial circulation. No flow-limiting stenosis or   occlusion.       CTA NECK w/ IV Cont (07.30.19)  Patent cervical vasculature. Mild stenosis of the bilateral   carotid siphons and the left internal carotid artery at its origin.    ----------------------------------------------------------------------------------------      PHYSICAL EXAM  Constitutional - NAD, Comfortable  Cardio: no edema  Pulm: no respiratory distress  GI: ND, NT, soft   MSK: wnl   Neuro Exam:                    Cognitive - AAOx3   Communication - Fluent stutter      Motor - UE and LE bilateral 5/5, SF NT on right due to limited ROM      Resting tremor on right, improves with action FTN test.      Sensory - Intact to LT  Extremities - No calf tenderness  Psychiatric - Mood WNL, Affect WNL    ASSESSMENT/PLAN  78y Male who is now with acute PCA infarct, vision loss, dysphagia, gait, ADLs, and functional deficits.     Recommend   - Disposition: Acute Rehab due to acute CVA   ACUTE inpatient rehabilitation for functional deficits when the patient is medically stable. The patient will benefit from 3 hours of therapy per day and 24 hour nursing care. The patient will benefit  from daily PMR physician oversight for comorbid medical management and has a high level of medical complexity due to acute PCA stroke, vision loss, underlying Parkinson disease, necessitating the need for daily physician oversight. Patient can tolerate intensive therapy consisting of PT/OT and if needed SLP.  Will continue to follow for ongoing rehab needs and recommendations.    Gait, ADLs, and Functional deficits  - c/w PT for Bed Mobility, Transfers, Ambulation with AD   - c/w OT for ADLs    Pressure Injury Prevention   - Turn Q2hrs while in bed to prevent Pressure Injury and OOB to chair when possible    DVT Prevention   - c/w lovenox     Dysphagia   - D3 diet

## 2019-08-01 NOTE — DISCHARGE NOTE NURSING/CASE MANAGEMENT/SOCIAL WORK - NSSCTYPOFSERV_GEN_ALL_CORE
Nurse will call you the day after discharge to set up appointment for evaluation and to put services into place

## 2019-08-01 NOTE — PROGRESS NOTE ADULT - ASSESSMENT
78 year old RH  male with PMHx of HTN, HLD, parkinson's disease (follows with [facility]), hx of prostate Ca, not on AC or AT, who presented to ED for L sided vision loss, LKN confirmed by family is 3:35 PM on 7/30/19, patient took a 1 hour nap later and woke up with complete vision loss on the L side. He stated that vision has improved slightly since then but is not back to his baseline. He has had chronic headaches for the past few months but has no hx of strokes and has never had aura with his headaches. At baseline he ambulates with a walker, is able to do some ADLs on his own such as dressing himself, feeding himself. On admission NIHSS was 3 for L homonymous hemianopsia and MRS 1. He was treated with IV tPA on 7/30 at 1930.     Impression: Cerebral embolism with cerebral infarction due to embolic stroke of unknown source    NEURO: Continue close monitoring for neurologic deterioration, permissive HTN to gradual normotension, LDL on admission 143- started on high intensity statin, MRI Brain w/o contrast pending. Physical therapy/OT recommended acute rehab however, if pt prefers DC home, recommend cont home PT.     ANTITHROMBOTIC THERAPY: ASA    PULMONARY: CXR on admission showed left lower lobe scarring, otherwise visualized lungs are clear, protecting airway, saturating well     CARDIOVASCULAR: check TTE to screen for cardiac source of embolism, cardiac monitoring shows NSR. Plan for ILR to screen for occult arrythmia, Cardiology consult with Dr. Acharya.                               SBP goal: Permissive HTN to gradual normotension    GASTROINTESTINAL:  dysphagia screen passed     Diet: Regular    RENAL: BUN/Cr within normal limits, good urine output      Na Goal: Greater than 135     Guardado:    HEMATOLOGY: H/H without acute change, Platelets 348 LE doppler negative for DVT.      DVT ppx: Heparin s.c [] LMWH []     ID: afebrile, no leukocytosis     OTHER: Plan endorsed to patient at bedside, all questions and concerns addressed.     DISPOSITION: Rehab or home depending on PT eval once stable and workup is complete    CORE MEASURES:        Admission NIHSS: 3      TPA: YES      LDL/HDL: 143/31     Depression Screen: p     Statin Therapy: YES     Dysphagia Screen: PASS     Smoking NO      Afib NO     Stroke Education YES    Obtain screening lower extremity venous ultrasound in patients who meet 1 or more of the following criteria as patient is high risk for DVT/PE on admission:   [] History of DVT/PE  []Hypercoagulable states (Factor V Leiden, Cancer, OCP, etc. )  []Prolonged immobility (hemiplegia/hemiparesis/post operative or any other extended immobilization)  [] Transferred from outside facility (Rehab or Long term care)  [] Age </= to 50 78 year old RH  male with PMHx of HTN, HLD, parkinson's disease (follows with [facility]), hx of prostate Ca, not on AC or AT, who presented to ED for L sided vision loss, LKN confirmed by family is 3:35 PM on 7/30/19, patient took a 1 hour nap later and woke up with complete vision loss on the L side. He stated that vision has improved slightly since then but is not back to his baseline. He has had chronic headaches for the past few months but has no hx of strokes and has never had aura with his headaches. At baseline he ambulates with a walker, is able to do some ADLs on his own such as dressing himself, feeding himself. On admission NIHSS was 3 for L homonymous hemianopsia and MRS 1. He was treated with IV tPA on 7/30 at 1930.     Impression: Cerebral embolism with cerebral infarction due to embolic stroke of unknown source    NEURO: Neurologically without acute change, permissive HTN to gradual normotension, LDL on admission 143- started on high intensity statin, MRI Brain w/o contrast pending. Physical therapy/OT recommended acute rehab however, if pt prefers DC home, recommend cont home PT.     ANTITHROMBOTIC THERAPY: ASA    PULMONARY: CXR on admission showed left lower lobe scarring, otherwise visualized lungs are clear, protecting airway, saturating well     CARDIOVASCULAR: check TTE to screen for cardiac source of embolism, cardiac monitoring shows NSR. Plan for ILR to screen for occult arrythmia with Dr. Acharya.                               SBP goal: Permissive HTN to gradual normotension    GASTROINTESTINAL:  dysphagia screen passed. Noted with constipation- given stool softeners.      Diet: Regular    RENAL: BUN/Cr within normal limits, good urine output      Na Goal: Greater than 135     Guardado:    HEMATOLOGY: H/H without acute change, Platelets 348. LE doppler negative for DVT.      DVT ppx: LMWH    ID: afebrile, no leukocytosis     OTHER: Plan endorsed to patient and wife at bedside, all questions and concerns addressed.     DISPOSITION: Home with outpatient loop.     CORE MEASURES:        Admission NIHSS: 3      TPA: YES      LDL/HDL: 143/31     Depression Screen: 0     Statin Therapy: YES     Dysphagia Screen: PASS     Smoking NO      Afib NO     Stroke Education YES    Obtain screening lower extremity venous ultrasound in patients who meet 1 or more of the following criteria as patient is high risk for DVT/PE on admission:   [] History of DVT/PE  []Hypercoagulable states (Factor V Leiden, Cancer, OCP, etc. )  []Prolonged immobility (hemiplegia/hemiparesis/post operative or any other extended immobilization)  [] Transferred from outside facility (Rehab or Long term care)  [] Age </= to 50

## 2021-08-18 NOTE — PHYSICAL THERAPY INITIAL EVALUATION ADULT - WEIGHT-BEARING RESTRICTIONS, REHAB EVAL
Patient is status post bilateral lower extremity vein surgery.  From this the vein surgery the patient has healed well.  Overall his and swelling aching and pain has improved .  He is happy with procedure  He denies any lower extremity claudication pain or rest pain symptoms.    On exam bilateral lower extremity incision is healed well, mild edema, palpable pedal pulses    Overall doing well status post venous surgery.  Again discussed with the patient he does have more swelling he can use compression stockings p.r.n. basis  Also discussed he also history of ectatic aorta/ iliac artery would recommend follow-up in 1 year at this time patient has no complaints, no claudication symptoms, no abdominal pain symptoms.  
full weight-bearing

## 2022-01-05 NOTE — PATIENT PROFILE ADULT. - AS SC BRADEN NUTRITION
Ultrasound or mammogram? I placed a diagnostic US for the small cyst seen in her L breast  (3) adequate

## 2022-02-21 NOTE — ED ADULT NURSE NOTE - NS ED NOTE ABUSE RESPONSE YN
Patient called into the office this morning and would like to know if he can have some blood work ordered to check his blood sugars.
Pt needs seen, appointment scheduled
Yes

## 2022-09-11 ENCOUNTER — EMERGENCY (EMERGENCY)
Facility: HOSPITAL | Age: 82
LOS: 1 days | Discharge: HOME | End: 2022-09-11
Attending: EMERGENCY MEDICINE
Payer: MEDICARE

## 2022-09-11 VITALS
HEIGHT: 67 IN | HEART RATE: 90 BPM | DIASTOLIC BLOOD PRESSURE: 81 MMHG | TEMPERATURE: 98 F | OXYGEN SATURATION: 98 % | SYSTOLIC BLOOD PRESSURE: 149 MMHG | RESPIRATION RATE: 18 BRPM

## 2022-09-11 LAB
ALBUMIN SERPL ELPH-MCNC: 3.8 G/DL — SIGNIFICANT CHANGE UP (ref 3.3–5)
ALP SERPL-CCNC: 79 U/L — SIGNIFICANT CHANGE UP (ref 40–120)
ALT FLD-CCNC: <5 U/L — LOW (ref 10–45)
ANION GAP SERPL CALC-SCNC: 12 MMOL/L — SIGNIFICANT CHANGE UP (ref 5–17)
APPEARANCE UR: CLEAR — SIGNIFICANT CHANGE UP
AST SERPL-CCNC: 12 U/L — SIGNIFICANT CHANGE UP (ref 10–40)
BASE EXCESS BLDV CALC-SCNC: 3 MMOL/L — SIGNIFICANT CHANGE UP (ref -2–3)
BASOPHILS # BLD AUTO: 0.04 K/UL — SIGNIFICANT CHANGE UP (ref 0–0.2)
BASOPHILS NFR BLD AUTO: 0.3 % — SIGNIFICANT CHANGE UP (ref 0–2)
BILIRUB SERPL-MCNC: 0.3 MG/DL — SIGNIFICANT CHANGE UP (ref 0.2–1.2)
BILIRUB UR-MCNC: NEGATIVE — SIGNIFICANT CHANGE UP
BUN SERPL-MCNC: 13 MG/DL — SIGNIFICANT CHANGE UP (ref 7–23)
CA-I SERPL-SCNC: 1.22 MMOL/L — SIGNIFICANT CHANGE UP (ref 1.15–1.33)
CALCIUM SERPL-MCNC: 9.4 MG/DL — SIGNIFICANT CHANGE UP (ref 8.4–10.5)
CHLORIDE BLDV-SCNC: 99 MMOL/L — SIGNIFICANT CHANGE UP (ref 96–108)
CHLORIDE SERPL-SCNC: 100 MMOL/L — SIGNIFICANT CHANGE UP (ref 96–108)
CO2 BLDV-SCNC: 31 MMOL/L — HIGH (ref 22–26)
CO2 SERPL-SCNC: 25 MMOL/L — SIGNIFICANT CHANGE UP (ref 22–31)
COLOR SPEC: YELLOW — SIGNIFICANT CHANGE UP
CREAT SERPL-MCNC: 0.32 MG/DL — LOW (ref 0.5–1.3)
DIFF PNL FLD: NEGATIVE — SIGNIFICANT CHANGE UP
EGFR: 116 ML/MIN/1.73M2 — SIGNIFICANT CHANGE UP
EOSINOPHIL # BLD AUTO: 0.29 K/UL — SIGNIFICANT CHANGE UP (ref 0–0.5)
EOSINOPHIL NFR BLD AUTO: 2.4 % — SIGNIFICANT CHANGE UP (ref 0–6)
GAS PNL BLDV: 135 MMOL/L — LOW (ref 136–145)
GAS PNL BLDV: SIGNIFICANT CHANGE UP
GLUCOSE BLDV-MCNC: 175 MG/DL — HIGH (ref 70–99)
GLUCOSE SERPL-MCNC: 178 MG/DL — HIGH (ref 70–99)
GLUCOSE UR QL: NEGATIVE — SIGNIFICANT CHANGE UP
HCO3 BLDV-SCNC: 29 MMOL/L — SIGNIFICANT CHANGE UP (ref 22–29)
HCT VFR BLD CALC: 38.2 % — LOW (ref 39–50)
HCT VFR BLDA CALC: 37 % — LOW (ref 39–51)
HGB BLD CALC-MCNC: 12.3 G/DL — LOW (ref 12.6–17.4)
HGB BLD-MCNC: 11.7 G/DL — LOW (ref 13–17)
IMM GRANULOCYTES NFR BLD AUTO: 0.5 % — SIGNIFICANT CHANGE UP (ref 0–1.5)
KETONES UR-MCNC: NEGATIVE — SIGNIFICANT CHANGE UP
LACTATE BLDV-MCNC: 1.8 MMOL/L — SIGNIFICANT CHANGE UP (ref 0.5–2)
LEUKOCYTE ESTERASE UR-ACNC: NEGATIVE — SIGNIFICANT CHANGE UP
LIDOCAIN IGE QN: 47 U/L — SIGNIFICANT CHANGE UP (ref 7–60)
LYMPHOCYTES # BLD AUTO: 16.8 % — SIGNIFICANT CHANGE UP (ref 13–44)
LYMPHOCYTES # BLD AUTO: 2.01 K/UL — SIGNIFICANT CHANGE UP (ref 1–3.3)
MAGNESIUM SERPL-MCNC: 1.7 MG/DL — SIGNIFICANT CHANGE UP (ref 1.6–2.6)
MCHC RBC-ENTMCNC: 29.5 PG — SIGNIFICANT CHANGE UP (ref 27–34)
MCHC RBC-ENTMCNC: 30.6 GM/DL — LOW (ref 32–36)
MCV RBC AUTO: 96.2 FL — SIGNIFICANT CHANGE UP (ref 80–100)
MONOCYTES # BLD AUTO: 0.85 K/UL — SIGNIFICANT CHANGE UP (ref 0–0.9)
MONOCYTES NFR BLD AUTO: 7.1 % — SIGNIFICANT CHANGE UP (ref 2–14)
NEUTROPHILS # BLD AUTO: 8.75 K/UL — HIGH (ref 1.8–7.4)
NEUTROPHILS NFR BLD AUTO: 72.9 % — SIGNIFICANT CHANGE UP (ref 43–77)
NITRITE UR-MCNC: NEGATIVE — SIGNIFICANT CHANGE UP
NRBC # BLD: 0 /100 WBCS — SIGNIFICANT CHANGE UP (ref 0–0)
PCO2 BLDV: 52 MMHG — SIGNIFICANT CHANGE UP (ref 42–55)
PH BLDV: 7.36 — SIGNIFICANT CHANGE UP (ref 7.32–7.43)
PH UR: 7 — SIGNIFICANT CHANGE UP (ref 5–8)
PLATELET # BLD AUTO: 317 K/UL — SIGNIFICANT CHANGE UP (ref 150–400)
PO2 BLDV: 30 MMHG — SIGNIFICANT CHANGE UP (ref 25–45)
POTASSIUM BLDV-SCNC: 3.9 MMOL/L — SIGNIFICANT CHANGE UP (ref 3.5–5.1)
POTASSIUM SERPL-MCNC: 4.1 MMOL/L — SIGNIFICANT CHANGE UP (ref 3.5–5.3)
POTASSIUM SERPL-SCNC: 4.1 MMOL/L — SIGNIFICANT CHANGE UP (ref 3.5–5.3)
PROT SERPL-MCNC: 7.1 G/DL — SIGNIFICANT CHANGE UP (ref 6–8.3)
PROT UR-MCNC: NEGATIVE — SIGNIFICANT CHANGE UP
RAPID RVP RESULT: SIGNIFICANT CHANGE UP
RBC # BLD: 3.97 M/UL — LOW (ref 4.2–5.8)
RBC # FLD: 13.2 % — SIGNIFICANT CHANGE UP (ref 10.3–14.5)
SAO2 % BLDV: 45.5 % — LOW (ref 67–88)
SARS-COV-2 RNA SPEC QL NAA+PROBE: SIGNIFICANT CHANGE UP
SODIUM SERPL-SCNC: 137 MMOL/L — SIGNIFICANT CHANGE UP (ref 135–145)
SP GR SPEC: 1.01 — SIGNIFICANT CHANGE UP (ref 1.01–1.02)
UROBILINOGEN FLD QL: ABNORMAL
WBC # BLD: 12 K/UL — HIGH (ref 3.8–10.5)
WBC # FLD AUTO: 12 K/UL — HIGH (ref 3.8–10.5)

## 2022-09-11 PROCEDURE — 70450 CT HEAD/BRAIN W/O DYE: CPT | Mod: 26,MA

## 2022-09-11 PROCEDURE — 99284 EMERGENCY DEPT VISIT MOD MDM: CPT | Mod: 25,GC

## 2022-09-11 PROCEDURE — 0225U NFCT DS DNA&RNA 21 SARSCOV2: CPT

## 2022-09-11 PROCEDURE — 82803 BLOOD GASES ANY COMBINATION: CPT

## 2022-09-11 PROCEDURE — 87086 URINE CULTURE/COLONY COUNT: CPT

## 2022-09-11 PROCEDURE — 83605 ASSAY OF LACTIC ACID: CPT

## 2022-09-11 PROCEDURE — 81003 URINALYSIS AUTO W/O SCOPE: CPT

## 2022-09-11 PROCEDURE — 93005 ELECTROCARDIOGRAM TRACING: CPT

## 2022-09-11 PROCEDURE — 82435 ASSAY OF BLOOD CHLORIDE: CPT

## 2022-09-11 PROCEDURE — 83690 ASSAY OF LIPASE: CPT

## 2022-09-11 PROCEDURE — 82330 ASSAY OF CALCIUM: CPT

## 2022-09-11 PROCEDURE — 71045 X-RAY EXAM CHEST 1 VIEW: CPT

## 2022-09-11 PROCEDURE — 71045 X-RAY EXAM CHEST 1 VIEW: CPT | Mod: 26

## 2022-09-11 PROCEDURE — 85018 HEMOGLOBIN: CPT

## 2022-09-11 PROCEDURE — 84295 ASSAY OF SERUM SODIUM: CPT

## 2022-09-11 PROCEDURE — 93010 ELECTROCARDIOGRAM REPORT: CPT | Mod: GC

## 2022-09-11 PROCEDURE — 85025 COMPLETE CBC W/AUTO DIFF WBC: CPT

## 2022-09-11 PROCEDURE — 99285 EMERGENCY DEPT VISIT HI MDM: CPT | Mod: 25

## 2022-09-11 PROCEDURE — 82962 GLUCOSE BLOOD TEST: CPT

## 2022-09-11 PROCEDURE — 70450 CT HEAD/BRAIN W/O DYE: CPT | Mod: MA

## 2022-09-11 PROCEDURE — 83735 ASSAY OF MAGNESIUM: CPT

## 2022-09-11 PROCEDURE — 82947 ASSAY GLUCOSE BLOOD QUANT: CPT

## 2022-09-11 PROCEDURE — 80053 COMPREHEN METABOLIC PANEL: CPT

## 2022-09-11 PROCEDURE — 85014 HEMATOCRIT: CPT

## 2022-09-11 PROCEDURE — 84132 ASSAY OF SERUM POTASSIUM: CPT

## 2022-09-11 RX ORDER — SODIUM CHLORIDE 9 MG/ML
1000 INJECTION INTRAMUSCULAR; INTRAVENOUS; SUBCUTANEOUS ONCE
Refills: 0 | Status: COMPLETED | OUTPATIENT
Start: 2022-09-11 | End: 2022-09-11

## 2022-09-11 RX ORDER — CARBIDOPA AND LEVODOPA 25; 100 MG/1; MG/1
1 TABLET ORAL ONCE
Refills: 0 | Status: COMPLETED | OUTPATIENT
Start: 2022-09-11 | End: 2022-09-11

## 2022-09-11 RX ORDER — CARBIDOPA AND LEVODOPA 25; 100 MG/1; MG/1
2 TABLET ORAL ONCE
Refills: 0 | Status: COMPLETED | OUTPATIENT
Start: 2022-09-11 | End: 2022-09-11

## 2022-09-11 RX ADMIN — CARBIDOPA AND LEVODOPA 2 TABLET(S): 25; 100 TABLET ORAL at 21:53

## 2022-09-11 RX ADMIN — SODIUM CHLORIDE 2000 MILLILITER(S): 9 INJECTION INTRAMUSCULAR; INTRAVENOUS; SUBCUTANEOUS at 19:33

## 2022-09-11 NOTE — ED ADULT NURSE REASSESSMENT NOTE - NS ED NURSE REASSESS COMMENT FT1
Patient cleaned and changed as he missed the urinal and wet the bed. Now dressed for nonemergent, wife given food for patient, bed locked and in lowest position for safety.

## 2022-09-11 NOTE — ED PROVIDER NOTE - PHYSICAL EXAMINATION
General: WN/WD NAD  Head: Atraumatic, normocephalic  Eyes: EOM grossly in tact, no scleral icterus, no discharge  ENT: dry mucous membranes  Neurology: A&Ox 3, resting tremor of LUE (baseline), dysarthria (baseline),   Respiratory: normal respiratory effort  CV:  Extremities warm and well perfused  Abdominal: Soft, non-distended, non-tender, no masses  Extremities: No edema, no deformities  Skin: warm and dry. No rashes

## 2022-09-11 NOTE — ED PROVIDER NOTE - OBJECTIVE STATEMENT
82 yo M with PMH of parkinson's, DM presenting with complaint of hallucinations. Pt has hallucinations with his parkinson's but they worsened over the last day. Pt slid out of a chair 2'2 his hallucinations. Pt has a hx of hallucinations that was worsened when he was started on Remeron. His remeron was discharged. PT has low PO intake. Has been urinating like normal, though there is concern for dehydration. No F/C, abd pain, N/V/D, chest pain, back pain. PT is ambulatory with assistance and lives at home with his wife.

## 2022-09-11 NOTE — ED PROVIDER NOTE - NSFOLLOWUPINSTRUCTIONS_ED_ALL_ED_FT
Please follow up with your primary care physician within 2-3 days.   Return to the ER for any new or concerning symptoms.   You may take 650 mg acetaminophen every eight hours as needed for pain.   Drink plenty of fluids and rest.

## 2022-09-11 NOTE — ED PROVIDER NOTE - CLINICAL SUMMARY MEDICAL DECISION MAKING FREE TEXT BOX
80 yo M with PMH of parkinson's, DM presenting with complaint of hallucinations. Differential diagnosis includes but is not limited to dehydration, worsening parkinson's, infection, UTI, electrolyte abnormality. would get labs, ecg, cxr, give fluids and reassess.

## 2022-09-11 NOTE — ED PROVIDER NOTE - ATTENDING CONTRIBUTION TO CARE
82 yo M with PMH of parkinson's, DM presenting with complaint of hallucinations getting worse and weakness noted, awake and alert answering questions with l sided tremor which is old, worsening sts thought to be due to remeron which was stopped by neuro who thought it was excerbating his sts, r/o infectious, metabolic cause. pt fell but no trauma noted the other day.

## 2022-09-11 NOTE — ED ADULT NURSE NOTE - OBJECTIVE STATEMENT
81 male, BIBEMS from home, c/o altered mental status. As per family, pt started Remeron x3 weeks ago, stopped it x1 week ago, and c/o intermittent hallucinations x1-2 weeks. Pt also c/o decreased PO intake since yesterday, dizziness, and diffuse 8/10 pain throughout body. Fingerstick 193. Upon assessment, pt presents A+Ox3 with difficulty speaking however is at baseline mental status as per wife at bedside. EKG completed.

## 2022-09-11 NOTE — ED ADULT NURSE REASSESSMENT NOTE - NS ED NURSE REASSESS COMMENT FT1
A&Ox3, breathing spontaneous and unlabored on room air, wife at bedside, denies pain at this time. Patient used urinal at bedside. IV placed with labs drawn and urine sample drawn and sent to lab as per MD orders. Patient has not been ambulating at home. Patient has no complaints at this time, vitals stable, bed locked and in lowest position for safety.

## 2022-09-11 NOTE — ED PROVIDER NOTE - PATIENT PORTAL LINK FT
You can access the FollowMyHealth Patient Portal offered by [facility] by registering at the following website: [email or web address]. By joining [facility]’s FollowMyHealth portal, you will also be able to view your health information using other applications (apps) compatible with our system.

## 2022-09-11 NOTE — ED PROVIDER NOTE - PROGRESS NOTE DETAILS
Kaylen Freeman PGY2: I received sign out on this patient. I have reassessed patient and agree with the current plan. Will follow-up labs/imaging. CT head, CXR, UA. Patient to be dc if normal Kaylen Freeman MD PGY2: No infection, no metabolic abnormalities, CT no acute findings, CXR clear. Will d/c.

## 2022-09-12 VITALS
HEART RATE: 80 BPM | SYSTOLIC BLOOD PRESSURE: 156 MMHG | RESPIRATION RATE: 20 BRPM | OXYGEN SATURATION: 100 % | DIASTOLIC BLOOD PRESSURE: 82 MMHG

## 2022-09-12 LAB
CULTURE RESULTS: SIGNIFICANT CHANGE UP
SPECIMEN SOURCE: SIGNIFICANT CHANGE UP

## 2022-09-13 NOTE — ED POST DISCHARGE NOTE - DETAILS
9/13/22: results d/w pt wife/caregiver, recommended rpt outpt urine testing if new/worsening sxs or urinary sxs. -Chino Woo PA-C

## 2022-09-30 ENCOUNTER — INPATIENT (INPATIENT)
Facility: HOSPITAL | Age: 82
LOS: 16 days | Discharge: NURSING, REHAB OR LONG-TERM CARE | End: 2022-10-17
Attending: STUDENT IN AN ORGANIZED HEALTH CARE EDUCATION/TRAINING PROGRAM | Admitting: STUDENT IN AN ORGANIZED HEALTH CARE EDUCATION/TRAINING PROGRAM

## 2022-09-30 VITALS
RESPIRATION RATE: 16 BRPM | SYSTOLIC BLOOD PRESSURE: 110 MMHG | DIASTOLIC BLOOD PRESSURE: 98 MMHG | OXYGEN SATURATION: 100 % | HEART RATE: 94 BPM | HEIGHT: 67 IN | TEMPERATURE: 98 F

## 2022-09-30 LAB
ALBUMIN SERPL ELPH-MCNC: 3.9 G/DL — SIGNIFICANT CHANGE UP (ref 3.3–5)
ALP SERPL-CCNC: 79 U/L — SIGNIFICANT CHANGE UP (ref 40–120)
ALT FLD-CCNC: <5 U/L — LOW (ref 4–41)
ANION GAP SERPL CALC-SCNC: 25 MMOL/L — HIGH (ref 7–14)
APPEARANCE UR: CLEAR — SIGNIFICANT CHANGE UP
AST SERPL-CCNC: 12 U/L — SIGNIFICANT CHANGE UP (ref 4–40)
BASE EXCESS BLDV CALC-SCNC: -1.7 MMOL/L — SIGNIFICANT CHANGE UP (ref -2–3)
BASOPHILS # BLD AUTO: 0.13 K/UL — SIGNIFICANT CHANGE UP (ref 0–0.2)
BASOPHILS NFR BLD AUTO: 0.8 % — SIGNIFICANT CHANGE UP (ref 0–2)
BILIRUB SERPL-MCNC: 0.3 MG/DL — SIGNIFICANT CHANGE UP (ref 0.2–1.2)
BILIRUB UR-MCNC: NEGATIVE — SIGNIFICANT CHANGE UP
BLOOD GAS VENOUS COMPREHENSIVE RESULT: SIGNIFICANT CHANGE UP
BLOOD GAS VENOUS COMPREHENSIVE RESULT: SIGNIFICANT CHANGE UP
BUN SERPL-MCNC: 17 MG/DL — SIGNIFICANT CHANGE UP (ref 7–23)
CALCIUM SERPL-MCNC: 9.8 MG/DL — SIGNIFICANT CHANGE UP (ref 8.4–10.5)
CHLORIDE BLDV-SCNC: 102 MMOL/L — SIGNIFICANT CHANGE UP (ref 96–108)
CHLORIDE SERPL-SCNC: 100 MMOL/L — SIGNIFICANT CHANGE UP (ref 98–107)
CO2 BLDV-SCNC: 25.7 MMOL/L — SIGNIFICANT CHANGE UP (ref 22–26)
CO2 SERPL-SCNC: 16 MMOL/L — LOW (ref 22–31)
COLOR SPEC: YELLOW — SIGNIFICANT CHANGE UP
CREAT SERPL-MCNC: 0.47 MG/DL — LOW (ref 0.5–1.3)
DIFF PNL FLD: NEGATIVE — SIGNIFICANT CHANGE UP
EGFR: 103 ML/MIN/1.73M2 — SIGNIFICANT CHANGE UP
EOSINOPHIL # BLD AUTO: 0.14 K/UL — SIGNIFICANT CHANGE UP (ref 0–0.5)
EOSINOPHIL NFR BLD AUTO: 0.8 % — SIGNIFICANT CHANGE UP (ref 0–6)
GAS PNL BLDV: 136 MMOL/L — SIGNIFICANT CHANGE UP (ref 136–145)
GLUCOSE BLDV-MCNC: 165 MG/DL — HIGH (ref 70–99)
GLUCOSE SERPL-MCNC: 171 MG/DL — HIGH (ref 70–99)
GLUCOSE UR QL: NEGATIVE — SIGNIFICANT CHANGE UP
HCO3 BLDV-SCNC: 24 MMOL/L — SIGNIFICANT CHANGE UP (ref 22–29)
HCT VFR BLD CALC: 42.5 % — SIGNIFICANT CHANGE UP (ref 39–50)
HCT VFR BLDA CALC: 36 % — LOW (ref 39–51)
HGB BLD CALC-MCNC: 12.1 G/DL — LOW (ref 13–17)
HGB BLD-MCNC: 12.5 G/DL — LOW (ref 13–17)
IANC: 12.79 K/UL — HIGH (ref 1.8–7.4)
IMM GRANULOCYTES NFR BLD AUTO: 0.5 % — SIGNIFICANT CHANGE UP (ref 0–0.9)
KETONES UR-MCNC: NEGATIVE — SIGNIFICANT CHANGE UP
LACTATE BLDV-MCNC: 4.2 MMOL/L — CRITICAL HIGH (ref 0.5–2)
LEUKOCYTE ESTERASE UR-ACNC: NEGATIVE — SIGNIFICANT CHANGE UP
LYMPHOCYTES # BLD AUTO: 17.8 % — SIGNIFICANT CHANGE UP (ref 13–44)
LYMPHOCYTES # BLD AUTO: 3.08 K/UL — SIGNIFICANT CHANGE UP (ref 1–3.3)
MAGNESIUM SERPL-MCNC: 1.8 MG/DL — SIGNIFICANT CHANGE UP (ref 1.6–2.6)
MCHC RBC-ENTMCNC: 29.4 GM/DL — LOW (ref 32–36)
MCHC RBC-ENTMCNC: 29.6 PG — SIGNIFICANT CHANGE UP (ref 27–34)
MCV RBC AUTO: 100.5 FL — HIGH (ref 80–100)
MONOCYTES # BLD AUTO: 1.06 K/UL — HIGH (ref 0–0.9)
MONOCYTES NFR BLD AUTO: 6.1 % — SIGNIFICANT CHANGE UP (ref 2–14)
NEUTROPHILS # BLD AUTO: 12.79 K/UL — HIGH (ref 1.8–7.4)
NEUTROPHILS NFR BLD AUTO: 74 % — SIGNIFICANT CHANGE UP (ref 43–77)
NITRITE UR-MCNC: NEGATIVE — SIGNIFICANT CHANGE UP
NRBC # BLD: 0 /100 WBCS — SIGNIFICANT CHANGE UP (ref 0–0)
NRBC # FLD: 0 K/UL — SIGNIFICANT CHANGE UP (ref 0–0)
PCO2 BLDV: 45 MMHG — SIGNIFICANT CHANGE UP (ref 42–55)
PH BLDV: 7.34 — SIGNIFICANT CHANGE UP (ref 7.32–7.43)
PH UR: 6.5 — SIGNIFICANT CHANGE UP (ref 5–8)
PLATELET # BLD AUTO: 360 K/UL — SIGNIFICANT CHANGE UP (ref 150–400)
PO2 BLDV: 42 MMHG — SIGNIFICANT CHANGE UP
POTASSIUM BLDV-SCNC: 3.9 MMOL/L — SIGNIFICANT CHANGE UP (ref 3.5–5.1)
POTASSIUM SERPL-MCNC: 4 MMOL/L — SIGNIFICANT CHANGE UP (ref 3.5–5.3)
POTASSIUM SERPL-SCNC: 4 MMOL/L — SIGNIFICANT CHANGE UP (ref 3.5–5.3)
PROT SERPL-MCNC: 7.2 G/DL — SIGNIFICANT CHANGE UP (ref 6–8.3)
PROT UR-MCNC: ABNORMAL
RBC # BLD: 4.23 M/UL — SIGNIFICANT CHANGE UP (ref 4.2–5.8)
RBC # FLD: 13.1 % — SIGNIFICANT CHANGE UP (ref 10.3–14.5)
SAO2 % BLDV: 67.4 % — SIGNIFICANT CHANGE UP
SODIUM SERPL-SCNC: 141 MMOL/L — SIGNIFICANT CHANGE UP (ref 135–145)
SP GR SPEC: 1.02 — SIGNIFICANT CHANGE UP (ref 1.01–1.05)
TSH SERPL-MCNC: 6.58 UIU/ML — HIGH (ref 0.27–4.2)
UROBILINOGEN FLD QL: SIGNIFICANT CHANGE UP
WBC # BLD: 17.28 K/UL — HIGH (ref 3.8–10.5)
WBC # FLD AUTO: 17.28 K/UL — HIGH (ref 3.8–10.5)

## 2022-09-30 PROCEDURE — G1004: CPT

## 2022-09-30 PROCEDURE — 99285 EMERGENCY DEPT VISIT HI MDM: CPT | Mod: 25

## 2022-09-30 PROCEDURE — 71045 X-RAY EXAM CHEST 1 VIEW: CPT | Mod: 26

## 2022-09-30 PROCEDURE — 71275 CT ANGIOGRAPHY CHEST: CPT | Mod: 26,MA

## 2022-09-30 PROCEDURE — 74174 CTA ABD&PLVS W/CONTRAST: CPT | Mod: 26,MA

## 2022-09-30 PROCEDURE — 70450 CT HEAD/BRAIN W/O DYE: CPT | Mod: 26,MG

## 2022-09-30 PROCEDURE — 93010 ELECTROCARDIOGRAM REPORT: CPT

## 2022-09-30 RX ORDER — SODIUM CHLORIDE 9 MG/ML
1000 INJECTION INTRAMUSCULAR; INTRAVENOUS; SUBCUTANEOUS ONCE
Refills: 0 | Status: COMPLETED | OUTPATIENT
Start: 2022-09-30 | End: 2022-09-30

## 2022-09-30 RX ORDER — PIPERACILLIN AND TAZOBACTAM 4; .5 G/20ML; G/20ML
3.38 INJECTION, POWDER, LYOPHILIZED, FOR SOLUTION INTRAVENOUS ONCE
Refills: 0 | Status: COMPLETED | OUTPATIENT
Start: 2022-09-30 | End: 2022-09-30

## 2022-09-30 RX ORDER — CARBIDOPA AND LEVODOPA 25; 100 MG/1; MG/1
2.5 TABLET ORAL ONCE
Refills: 0 | Status: COMPLETED | OUTPATIENT
Start: 2022-09-30 | End: 2022-09-30

## 2022-09-30 RX ORDER — VANCOMYCIN HCL 1 G
1000 VIAL (EA) INTRAVENOUS ONCE
Refills: 0 | Status: COMPLETED | OUTPATIENT
Start: 2022-09-30 | End: 2022-09-30

## 2022-09-30 RX ADMIN — PIPERACILLIN AND TAZOBACTAM 200 GRAM(S): 4; .5 INJECTION, POWDER, LYOPHILIZED, FOR SOLUTION INTRAVENOUS at 21:07

## 2022-09-30 RX ADMIN — Medication 1 MILLIGRAM(S): at 19:32

## 2022-09-30 RX ADMIN — SODIUM CHLORIDE 1000 MILLILITER(S): 9 INJECTION INTRAMUSCULAR; INTRAVENOUS; SUBCUTANEOUS at 23:29

## 2022-09-30 RX ADMIN — SODIUM CHLORIDE 1000 MILLILITER(S): 9 INJECTION INTRAMUSCULAR; INTRAVENOUS; SUBCUTANEOUS at 19:01

## 2022-09-30 RX ADMIN — Medication 250 MILLIGRAM(S): at 21:23

## 2022-09-30 NOTE — ED PROVIDER NOTE - ATTENDING APP SHARED VISIT CONTRIBUTION OF CARE
I personally saw the patient with the PA and performed a substantive portion of the visit including all aspects of the medical decision making.   I have personally seen and examined this patient and I have fully participated in their care.  I have reviewed all pertinent clinical information, including the history, physical exam, plan and student's note, and agree except as noted.

## 2022-09-30 NOTE — ED ADULT NURSE NOTE - INTERVENTIONS DEFINITIONS
[place] to call system/Call bell, personal items and telephone within reach/Instruct patient to call for assistance/Non-slip footwear when patient is off stretcher/Physically safe environment: no spills, clutter or unnecessary equipment/Stretcher in lowest position, wheels locked, appropriate side rails in place/Monitor gait and stability/Monitor for mental status changes and reorient to person, place, and time/Review medications for side effects contributing to fall risk/Reinforce activity limits and safety measures with patient and family

## 2022-09-30 NOTE — ED PROVIDER NOTE - OBJECTIVE STATEMENT
Pt is an 80y/o M PMHx DM, Parkinson's disease p/w hallucinations, altered mental status today.  History entirely obtained from pt's wife who states that pt has been having hallucinations, seeing people who aren't there and delusions that these people have placed parasites in his rectum.  Pt was reporting that his medications are poisoned, with which pt has been refusing to take Klonopin and his medications for parkinson's disease.  Wife reports that pt's speech is garbled at baseline 2/2 Parkinson's disease; unchanged at this time.  No known fevers, coughing, falls, head injuries, recent illness. Pt is an 80y/o M PMHx DM, Parkinson's disease p/w hallucinations, altered mental status today.  History entirely obtained from pt's wife who states that pt has been having hallucinations, seeing people who aren't there and delusions that these people have placed parasites in his rectum.  Pt was reporting that his medications are poisoned, with which pt has been refusing to take Klonopin and his medications for parkinson's disease.  Wife reports that pt's speech is garbled at baseline 2/2 Parkinson's disease; unchanged at this time.  No known fevers, coughing, falls, head injuries, recent illness.  Attending - Agree with above.  I evaluated patient myself. 80 y/o M with extensive PMH as noted.  Wife at bedside providing history.  Reports at baseline the pt has paranoid delusions, but normally communicates with speaking, uses wheelchair.  Has been refusing medicines x few days.  Increased agitation.  In ER is continually yellowing with eyes closed.

## 2022-09-30 NOTE — ED ADULT NURSE NOTE - OBJECTIVE STATEMENT
pt received in rm 18 AAO x 3. pt with garbled speech at baseline per wife. pt brought in by wife for hallucinations and AMS. as per wife pt seeing people who isnt there and stating people are putting parasites in his rectum. pt denies sob, chest pain, n/v/d, fevers, chills. respirations even and unlabored. pt yelling. 20g iv placed to right FA.

## 2022-09-30 NOTE — ED PROVIDER NOTE - PROGRESS NOTE DETAILS
Lactate initially 13, given IVF and empiric zosyn anad vanc. CT angio AP obtained to r/o ischemia, none noted but new 4cm mass noted. Admit to medicine

## 2022-09-30 NOTE — ED PROVIDER NOTE - CLINICAL SUMMARY MEDICAL DECISION MAKING FREE TEXT BOX
Pt is an 80y/o M PMHx DM, Parkinson's disease p/w hallucinations, altered mental status today. -- hallucinations, ams -- benzo, labs, ua, ucx, CT head

## 2022-09-30 NOTE — ED ADULT TRIAGE NOTE - CHIEF COMPLAINT QUOTE
arrives ems- wife reports  pt yelling incoherently- difficult to understand. wife states pt refused to take klonapin.  fs 162 pt with parkinsons d-   with hallucinations,dm cad

## 2022-09-30 NOTE — ED PROVIDER NOTE - CARDIAC, MLM
Normal rate, regular rhythm.  Heart sounds S1, S2.  No murmurs, rubs or gallops.
Guide to Postpartum Care

## 2022-10-01 DIAGNOSIS — E11.9 TYPE 2 DIABETES MELLITUS WITHOUT COMPLICATIONS: ICD-10-CM

## 2022-10-01 DIAGNOSIS — D72.829 ELEVATED WHITE BLOOD CELL COUNT, UNSPECIFIED: ICD-10-CM

## 2022-10-01 DIAGNOSIS — R63.8 OTHER SYMPTOMS AND SIGNS CONCERNING FOOD AND FLUID INTAKE: ICD-10-CM

## 2022-10-01 DIAGNOSIS — I73.9 PERIPHERAL VASCULAR DISEASE, UNSPECIFIED: ICD-10-CM

## 2022-10-01 DIAGNOSIS — G20 PARKINSON'S DISEASE: ICD-10-CM

## 2022-10-01 DIAGNOSIS — K63.89 OTHER SPECIFIED DISEASES OF INTESTINE: ICD-10-CM

## 2022-10-01 DIAGNOSIS — E87.2 ACIDOSIS: ICD-10-CM

## 2022-10-01 DIAGNOSIS — R19.00 INTRA-ABDOMINAL AND PELVIC SWELLING, MASS AND LUMP, UNSPECIFIED SITE: ICD-10-CM

## 2022-10-01 LAB
ALBUMIN SERPL ELPH-MCNC: 3.8 G/DL — SIGNIFICANT CHANGE UP (ref 3.3–5)
ALP SERPL-CCNC: 72 U/L — SIGNIFICANT CHANGE UP (ref 40–120)
ALT FLD-CCNC: <5 U/L — LOW (ref 4–41)
ANION GAP SERPL CALC-SCNC: 9 MMOL/L — SIGNIFICANT CHANGE UP (ref 7–14)
APTT BLD: 29.9 SEC — SIGNIFICANT CHANGE UP (ref 27–36.3)
AST SERPL-CCNC: 44 U/L — HIGH (ref 4–40)
BASOPHILS # BLD AUTO: 0.07 K/UL — SIGNIFICANT CHANGE UP (ref 0–0.2)
BASOPHILS NFR BLD AUTO: 0.7 % — SIGNIFICANT CHANGE UP (ref 0–2)
BILIRUB SERPL-MCNC: 0.4 MG/DL — SIGNIFICANT CHANGE UP (ref 0.2–1.2)
BUN SERPL-MCNC: 14 MG/DL — SIGNIFICANT CHANGE UP (ref 7–23)
CALCIUM SERPL-MCNC: 9 MG/DL — SIGNIFICANT CHANGE UP (ref 8.4–10.5)
CANCER AG19-9 SERPL-ACNC: 8 U/ML — SIGNIFICANT CHANGE UP
CEA SERPL-MCNC: 3.1 NG/ML — SIGNIFICANT CHANGE UP (ref 1–3.8)
CHLORIDE SERPL-SCNC: 106 MMOL/L — SIGNIFICANT CHANGE UP (ref 98–107)
CO2 SERPL-SCNC: 25 MMOL/L — SIGNIFICANT CHANGE UP (ref 22–31)
CREAT SERPL-MCNC: 0.39 MG/DL — LOW (ref 0.5–1.3)
EGFR: 109 ML/MIN/1.73M2 — SIGNIFICANT CHANGE UP
EOSINOPHIL # BLD AUTO: 0.06 K/UL — SIGNIFICANT CHANGE UP (ref 0–0.5)
EOSINOPHIL NFR BLD AUTO: 0.6 % — SIGNIFICANT CHANGE UP (ref 0–6)
FLUAV AG NPH QL: SIGNIFICANT CHANGE UP
FLUBV AG NPH QL: SIGNIFICANT CHANGE UP
GLUCOSE BLDC GLUCOMTR-MCNC: 100 MG/DL — HIGH (ref 70–99)
GLUCOSE BLDC GLUCOMTR-MCNC: 101 MG/DL — HIGH (ref 70–99)
GLUCOSE BLDC GLUCOMTR-MCNC: 126 MG/DL — HIGH (ref 70–99)
GLUCOSE BLDC GLUCOMTR-MCNC: 127 MG/DL — HIGH (ref 70–99)
GLUCOSE BLDC GLUCOMTR-MCNC: 79 MG/DL — SIGNIFICANT CHANGE UP (ref 70–99)
GLUCOSE BLDC GLUCOMTR-MCNC: 91 MG/DL — SIGNIFICANT CHANGE UP (ref 70–99)
GLUCOSE SERPL-MCNC: 88 MG/DL — SIGNIFICANT CHANGE UP (ref 70–99)
HCT VFR BLD CALC: 39.3 % — SIGNIFICANT CHANGE UP (ref 39–50)
HGB BLD-MCNC: 12 G/DL — LOW (ref 13–17)
IANC: 8.02 K/UL — HIGH (ref 1.8–7.4)
IMM GRANULOCYTES NFR BLD AUTO: 0.3 % — SIGNIFICANT CHANGE UP (ref 0–0.9)
INR BLD: 1.15 RATIO — SIGNIFICANT CHANGE UP (ref 0.88–1.16)
LACTATE SERPL-SCNC: 1.2 MMOL/L — SIGNIFICANT CHANGE UP (ref 0.5–2)
LYMPHOCYTES # BLD AUTO: 1.74 K/UL — SIGNIFICANT CHANGE UP (ref 1–3.3)
LYMPHOCYTES # BLD AUTO: 16.6 % — SIGNIFICANT CHANGE UP (ref 13–44)
MAGNESIUM SERPL-MCNC: 1.9 MG/DL — SIGNIFICANT CHANGE UP (ref 1.6–2.6)
MCHC RBC-ENTMCNC: 29.8 PG — SIGNIFICANT CHANGE UP (ref 27–34)
MCHC RBC-ENTMCNC: 30.5 GM/DL — LOW (ref 32–36)
MCV RBC AUTO: 97.5 FL — SIGNIFICANT CHANGE UP (ref 80–100)
MONOCYTES # BLD AUTO: 0.54 K/UL — SIGNIFICANT CHANGE UP (ref 0–0.9)
MONOCYTES NFR BLD AUTO: 5.2 % — SIGNIFICANT CHANGE UP (ref 2–14)
NEUTROPHILS # BLD AUTO: 8.02 K/UL — HIGH (ref 1.8–7.4)
NEUTROPHILS NFR BLD AUTO: 76.6 % — SIGNIFICANT CHANGE UP (ref 43–77)
NRBC # BLD: 0 /100 WBCS — SIGNIFICANT CHANGE UP (ref 0–0)
NRBC # FLD: 0 K/UL — SIGNIFICANT CHANGE UP (ref 0–0)
PHOSPHATE SERPL-MCNC: 3.1 MG/DL — SIGNIFICANT CHANGE UP (ref 2.5–4.5)
PLATELET # BLD AUTO: 272 K/UL — SIGNIFICANT CHANGE UP (ref 150–400)
POTASSIUM SERPL-MCNC: 4.6 MMOL/L — SIGNIFICANT CHANGE UP (ref 3.5–5.3)
POTASSIUM SERPL-SCNC: 4.6 MMOL/L — SIGNIFICANT CHANGE UP (ref 3.5–5.3)
PROCALCITONIN SERPL-MCNC: 0.04 NG/ML — SIGNIFICANT CHANGE UP (ref 0.02–0.1)
PROT SERPL-MCNC: 6.6 G/DL — SIGNIFICANT CHANGE UP (ref 6–8.3)
PROTHROM AB SERPL-ACNC: 13.4 SEC — SIGNIFICANT CHANGE UP (ref 10.5–13.4)
RBC # BLD: 4.03 M/UL — LOW (ref 4.2–5.8)
RBC # FLD: 13.1 % — SIGNIFICANT CHANGE UP (ref 10.3–14.5)
RSV RNA NPH QL NAA+NON-PROBE: SIGNIFICANT CHANGE UP
SARS-COV-2 RNA SPEC QL NAA+PROBE: SIGNIFICANT CHANGE UP
SODIUM SERPL-SCNC: 140 MMOL/L — SIGNIFICANT CHANGE UP (ref 135–145)
TROPONIN T, HIGH SENSITIVITY RESULT: 29 NG/L — SIGNIFICANT CHANGE UP
WBC # BLD: 10.46 K/UL — SIGNIFICANT CHANGE UP (ref 3.8–10.5)
WBC # FLD AUTO: 10.46 K/UL — SIGNIFICANT CHANGE UP (ref 3.8–10.5)

## 2022-10-01 PROCEDURE — 99223 1ST HOSP IP/OBS HIGH 75: CPT

## 2022-10-01 PROCEDURE — 12345: CPT | Mod: NC

## 2022-10-01 PROCEDURE — 99222 1ST HOSP IP/OBS MODERATE 55: CPT

## 2022-10-01 PROCEDURE — 93923 UPR/LXTR ART STDY 3+ LVLS: CPT | Mod: 26

## 2022-10-01 RX ORDER — CARBIDOPA AND LEVODOPA 25; 100 MG/1; MG/1
2.5 TABLET ORAL
Refills: 0 | Status: DISCONTINUED | OUTPATIENT
Start: 2022-10-01 | End: 2022-10-15

## 2022-10-01 RX ORDER — DEXTROSE 50 % IN WATER 50 %
25 SYRINGE (ML) INTRAVENOUS ONCE
Refills: 0 | Status: COMPLETED | OUTPATIENT
Start: 2022-10-01 | End: 2022-10-01

## 2022-10-01 RX ORDER — CLONAZEPAM 1 MG
0.5 TABLET ORAL EVERY 12 HOURS
Refills: 0 | Status: DISCONTINUED | OUTPATIENT
Start: 2022-10-01 | End: 2022-10-01

## 2022-10-01 RX ORDER — PREGABALIN 225 MG/1
1 CAPSULE ORAL
Qty: 0 | Refills: 0 | DISCHARGE

## 2022-10-01 RX ORDER — INFLUENZA VIRUS VACCINE 15; 15; 15; 15 UG/.5ML; UG/.5ML; UG/.5ML; UG/.5ML
0.7 SUSPENSION INTRAMUSCULAR ONCE
Refills: 0 | Status: DISCONTINUED | OUTPATIENT
Start: 2022-10-01 | End: 2022-10-17

## 2022-10-01 RX ORDER — FLUTICASONE PROPIONATE 50 MCG
1 SPRAY, SUSPENSION NASAL
Qty: 0 | Refills: 0 | DISCHARGE

## 2022-10-01 RX ORDER — CHOLECALCIFEROL (VITAMIN D3) 125 MCG
1 CAPSULE ORAL
Qty: 0 | Refills: 0 | DISCHARGE

## 2022-10-01 RX ORDER — CARBIDOPA AND LEVODOPA 25; 100 MG/1; MG/1
2 TABLET ORAL
Qty: 0 | Refills: 0 | DISCHARGE

## 2022-10-01 RX ORDER — ACETAMINOPHEN 500 MG
1000 TABLET ORAL ONCE
Refills: 0 | Status: COMPLETED | OUTPATIENT
Start: 2022-10-01 | End: 2022-10-01

## 2022-10-01 RX ORDER — PLECANATIDE 3 MG/1
1 TABLET ORAL
Qty: 0 | Refills: 0 | DISCHARGE

## 2022-10-01 RX ORDER — DEXTROSE 50 % IN WATER 50 %
25 SYRINGE (ML) INTRAVENOUS ONCE
Refills: 0 | Status: DISCONTINUED | OUTPATIENT
Start: 2022-10-01 | End: 2022-10-17

## 2022-10-01 RX ORDER — OXYCODONE HYDROCHLORIDE 5 MG/1
1 TABLET ORAL
Qty: 0 | Refills: 0 | DISCHARGE

## 2022-10-01 RX ORDER — DEXTROSE 50 % IN WATER 50 %
15 SYRINGE (ML) INTRAVENOUS ONCE
Refills: 0 | Status: DISCONTINUED | OUTPATIENT
Start: 2022-10-01 | End: 2022-10-17

## 2022-10-01 RX ORDER — SODIUM CHLORIDE 9 MG/ML
1000 INJECTION, SOLUTION INTRAVENOUS
Refills: 0 | Status: DISCONTINUED | OUTPATIENT
Start: 2022-10-01 | End: 2022-10-17

## 2022-10-01 RX ORDER — CLONAZEPAM 1 MG
0.75 TABLET ORAL EVERY 8 HOURS
Refills: 0 | Status: DISCONTINUED | OUTPATIENT
Start: 2022-10-01 | End: 2022-10-06

## 2022-10-01 RX ORDER — ACETAMINOPHEN 500 MG
650 TABLET ORAL EVERY 6 HOURS
Refills: 0 | Status: DISCONTINUED | OUTPATIENT
Start: 2022-10-01 | End: 2022-10-17

## 2022-10-01 RX ORDER — INSULIN LISPRO 100/ML
VIAL (ML) SUBCUTANEOUS EVERY 6 HOURS
Refills: 0 | Status: DISCONTINUED | OUTPATIENT
Start: 2022-10-01 | End: 2022-10-02

## 2022-10-01 RX ORDER — SODIUM CHLORIDE 0.65 %
1 AEROSOL, SPRAY (ML) NASAL
Qty: 0 | Refills: 0 | DISCHARGE

## 2022-10-01 RX ORDER — ESCITALOPRAM OXALATE 10 MG/1
1 TABLET, FILM COATED ORAL
Qty: 0 | Refills: 0 | DISCHARGE

## 2022-10-01 RX ORDER — SODIUM CHLORIDE 9 MG/ML
1000 INJECTION, SOLUTION INTRAVENOUS
Refills: 0 | Status: DISCONTINUED | OUTPATIENT
Start: 2022-10-01 | End: 2022-10-01

## 2022-10-01 RX ORDER — CARBIDOPA AND LEVODOPA 25; 100 MG/1; MG/1
1 TABLET ORAL EVERY 8 HOURS
Refills: 0 | Status: DISCONTINUED | OUTPATIENT
Start: 2022-10-01 | End: 2022-10-01

## 2022-10-01 RX ORDER — PANTOPRAZOLE SODIUM 20 MG/1
40 TABLET, DELAYED RELEASE ORAL EVERY 24 HOURS
Refills: 0 | Status: DISCONTINUED | OUTPATIENT
Start: 2022-10-01 | End: 2022-10-17

## 2022-10-01 RX ORDER — PIPERACILLIN AND TAZOBACTAM 4; .5 G/20ML; G/20ML
3.38 INJECTION, POWDER, LYOPHILIZED, FOR SOLUTION INTRAVENOUS EVERY 8 HOURS
Refills: 0 | Status: DISCONTINUED | OUTPATIENT
Start: 2022-10-01 | End: 2022-10-02

## 2022-10-01 RX ORDER — CARBIDOPA AND LEVODOPA 25; 100 MG/1; MG/1
3 TABLET ORAL
Refills: 0 | Status: DISCONTINUED | OUTPATIENT
Start: 2022-10-01 | End: 2022-10-01

## 2022-10-01 RX ORDER — GLUCAGON INJECTION, SOLUTION 0.5 MG/.1ML
1 INJECTION, SOLUTION SUBCUTANEOUS ONCE
Refills: 0 | Status: DISCONTINUED | OUTPATIENT
Start: 2022-10-01 | End: 2022-10-17

## 2022-10-01 RX ORDER — ESOMEPRAZOLE MAGNESIUM 40 MG/1
1 CAPSULE, DELAYED RELEASE ORAL
Qty: 0 | Refills: 0 | DISCHARGE

## 2022-10-01 RX ORDER — POLYETHYLENE GLYCOL 3350 17 G/17G
17 POWDER, FOR SOLUTION ORAL DAILY
Refills: 0 | Status: DISCONTINUED | OUTPATIENT
Start: 2022-10-01 | End: 2022-10-17

## 2022-10-01 RX ORDER — CARBIDOPA AND LEVODOPA 25; 100 MG/1; MG/1
2 TABLET ORAL EVERY 6 HOURS
Refills: 0 | Status: DISCONTINUED | OUTPATIENT
Start: 2022-10-01 | End: 2022-10-01

## 2022-10-01 RX ORDER — INSULIN ASPART 100 [IU]/ML
3 INJECTION, SOLUTION SUBCUTANEOUS
Qty: 0 | Refills: 0 | DISCHARGE

## 2022-10-01 RX ORDER — PIMAVANSERIN TARTRATE 10 MG/1
34 TABLET, COATED ORAL
Refills: 0 | Status: DISCONTINUED | OUTPATIENT
Start: 2022-10-01 | End: 2022-10-09

## 2022-10-01 RX ORDER — IRBESARTAN 75 MG/1
1 TABLET ORAL
Qty: 0 | Refills: 0 | DISCHARGE

## 2022-10-01 RX ORDER — CARBIDOPA AND LEVODOPA 25; 100 MG/1; MG/1
3 TABLET ORAL
Qty: 0 | Refills: 0 | DISCHARGE

## 2022-10-01 RX ORDER — MIRTAZAPINE 45 MG/1
15 TABLET, ORALLY DISINTEGRATING ORAL AT BEDTIME
Refills: 0 | Status: DISCONTINUED | OUTPATIENT
Start: 2022-10-01 | End: 2022-10-17

## 2022-10-01 RX ORDER — UBIDECARENONE 100 MG
1 CAPSULE ORAL
Qty: 0 | Refills: 0 | DISCHARGE

## 2022-10-01 RX ORDER — DEXTROSE 50 % IN WATER 50 %
12.5 SYRINGE (ML) INTRAVENOUS ONCE
Refills: 0 | Status: DISCONTINUED | OUTPATIENT
Start: 2022-10-01 | End: 2022-10-17

## 2022-10-01 RX ORDER — SODIUM CHLORIDE 9 MG/ML
1000 INJECTION, SOLUTION INTRAVENOUS
Refills: 0 | Status: DISCONTINUED | OUTPATIENT
Start: 2022-10-01 | End: 2022-10-09

## 2022-10-01 RX ORDER — ENOXAPARIN SODIUM 100 MG/ML
16 INJECTION SUBCUTANEOUS
Qty: 0 | Refills: 0 | DISCHARGE

## 2022-10-01 RX ORDER — HEPARIN SODIUM 5000 [USP'U]/ML
5000 INJECTION INTRAVENOUS; SUBCUTANEOUS EVERY 12 HOURS
Refills: 0 | Status: DISCONTINUED | OUTPATIENT
Start: 2022-10-01 | End: 2022-10-17

## 2022-10-01 RX ADMIN — CARBIDOPA AND LEVODOPA 2.5 TABLET(S): 25; 100 TABLET ORAL at 22:02

## 2022-10-01 RX ADMIN — PIPERACILLIN AND TAZOBACTAM 25 GRAM(S): 4; .5 INJECTION, POWDER, LYOPHILIZED, FOR SOLUTION INTRAVENOUS at 05:39

## 2022-10-01 RX ADMIN — PIMAVANSERIN TARTRATE 34 MILLIGRAM(S): 10 TABLET, COATED ORAL at 16:05

## 2022-10-01 RX ADMIN — PANTOPRAZOLE SODIUM 40 MILLIGRAM(S): 20 TABLET, DELAYED RELEASE ORAL at 05:40

## 2022-10-01 RX ADMIN — SODIUM CHLORIDE 125 MILLILITER(S): 9 INJECTION, SOLUTION INTRAVENOUS at 18:41

## 2022-10-01 RX ADMIN — PIPERACILLIN AND TAZOBACTAM 25 GRAM(S): 4; .5 INJECTION, POWDER, LYOPHILIZED, FOR SOLUTION INTRAVENOUS at 11:09

## 2022-10-01 RX ADMIN — HEPARIN SODIUM 5000 UNIT(S): 5000 INJECTION INTRAVENOUS; SUBCUTANEOUS at 17:45

## 2022-10-01 RX ADMIN — MIRTAZAPINE 15 MILLIGRAM(S): 45 TABLET, ORALLY DISINTEGRATING ORAL at 22:01

## 2022-10-01 RX ADMIN — Medication 400 MILLIGRAM(S): at 01:03

## 2022-10-01 RX ADMIN — Medication 25 MILLILITER(S): at 03:38

## 2022-10-01 RX ADMIN — SODIUM CHLORIDE 75 MILLILITER(S): 9 INJECTION, SOLUTION INTRAVENOUS at 01:03

## 2022-10-01 RX ADMIN — CARBIDOPA AND LEVODOPA 2 TABLET(S): 25; 100 TABLET ORAL at 05:40

## 2022-10-01 RX ADMIN — SODIUM CHLORIDE 125 MILLILITER(S): 9 INJECTION, SOLUTION INTRAVENOUS at 11:07

## 2022-10-01 RX ADMIN — SODIUM CHLORIDE 125 MILLILITER(S): 9 INJECTION, SOLUTION INTRAVENOUS at 03:21

## 2022-10-01 RX ADMIN — CARBIDOPA AND LEVODOPA 2.5 TABLET(S): 25; 100 TABLET ORAL at 17:45

## 2022-10-01 RX ADMIN — Medication 0.5 MILLIGRAM(S): at 05:48

## 2022-10-01 RX ADMIN — Medication 1 MILLIGRAM(S): at 11:21

## 2022-10-01 RX ADMIN — CARBIDOPA AND LEVODOPA 2.5 TABLET(S): 25; 100 TABLET ORAL at 00:04

## 2022-10-01 RX ADMIN — CARBIDOPA AND LEVODOPA 2.5 TABLET(S): 25; 100 TABLET ORAL at 11:08

## 2022-10-01 RX ADMIN — CARBIDOPA AND LEVODOPA 2.5 TABLET(S): 25; 100 TABLET ORAL at 15:17

## 2022-10-01 RX ADMIN — PIPERACILLIN AND TAZOBACTAM 25 GRAM(S): 4; .5 INJECTION, POWDER, LYOPHILIZED, FOR SOLUTION INTRAVENOUS at 20:00

## 2022-10-01 RX ADMIN — HEPARIN SODIUM 5000 UNIT(S): 5000 INJECTION INTRAVENOUS; SUBCUTANEOUS at 05:40

## 2022-10-01 RX ADMIN — POLYETHYLENE GLYCOL 3350 17 GRAM(S): 17 POWDER, FOR SOLUTION ORAL at 11:09

## 2022-10-01 NOTE — PATIENT PROFILE ADULT - FALL HARM RISK - HARM RISK INTERVENTIONS

## 2022-10-01 NOTE — CONSULT NOTE ADULT - SUBJECTIVE AND OBJECTIVE BOX
VASCULAR SURGERY CONSULT NOTE  --------------------------------------------------------------------------------------------  HPI:  Pt is an 82y/o M PMHx DM, Gastropresis, Parkinson's disease p/w hallucinations, altered mental status today reportedly hallucinations. On ROS patient reports intermittent foot pain. Reports weightloss but denies food fear and postprandial pain. Reportedly bedbound. Incidentally patient was found to have celiac, sma, jesusita and right profunda stenosis.      ROS: 10-system review is otherwise negative except HPI above.      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 1000 milliLiter(s) IV Continuous <Continuous>  dextrose 5%. 1000 milliLiter(s) IV Continuous <Continuous>  dextrose 50% Injectable 25 Gram(s) IV Push once  dextrose 50% Injectable 12.5 Gram(s) IV Push once  dextrose 50% Injectable 25 Gram(s) IV Push once  dextrose 50% Injectable 12.5 milliLiter(s) IV Push once  glucagon  Injectable 1 milliGRAM(s) IntraMuscular once  heparin   Injectable 5000 Unit(s) SubCutaneous every 12 hours  insulin lispro (ADMELOG) corrective regimen sliding scale   SubCutaneous every 6 hours  lactated ringers. 1000 milliLiter(s) IV Continuous <Continuous>  mirtazapine 15 milliGRAM(s) Oral at bedtime  pantoprazole  Injectable 40 milliGRAM(s) IV Push every 24 hours  piperacillin/tazobactam IVPB.. 3.375 Gram(s) IV Intermittent every 8 hours    MEDICATIONS (PRN):acetaminophen     Tablet .. 650 milliGRAM(s) Oral every 6 hours PRN Temp greater or equal to 38C (100.4F), Mild Pain (1 - 3)  clonazePAM  Tablet 0.5 milliGRAM(s) Oral every 12 hours PRN anxiety  dextrose Oral Gel 15 Gram(s) Oral once PRN Blood Glucose LESS THAN 70 milliGRAM(s)/deciliter    --------------------------------------------------------------------------------------------    Vitals:   T(C): 36.7 (10-01-22 @ 01:47), Max: 37.2 (10-01-22 @ 01:05)  HR: 72 (10-01-22 @ 01:47) (72 - 94)  BP: 126/56 (10-01-22 @ 01:47) (110/98 - 142/65)  RR: 16 (10-01-22 @ 01:47) (16 - 16)  SpO2: 100% (10-01-22 @ 01:47) (98% - 100%)  CAPILLARY BLOOD GLUCOSE      POCT Blood Glucose.: 79 mg/dL (01 Oct 2022 03:24)  POCT Blood Glucose.: 151 mg/dL (30 Sep 2022 17:33)      Height (cm): 170.2 ( @ 15:29)    PHYSICAL EXAM:   General: NAD, Lying in bed comfortably  Neuro: Alert and answering questions appropriately   HEENT: Grossly normal, EOMI  Cardio: No peripheral edema  Resp: Good effort, no signs of respiratory distress  GI/Abd: Soft, nondistended right and left lower quadrant mild tenderness, no rebound/guarding, no masses palpated  Vascular: All 4 extremities warm,  b/l Femoral and pop pulse palpable,  Right DP/PT signals, Left PT signals only    --------------------------------------------------------------------------------------------    LABS  CBC (10-01 @ 03:00)                              12.0<L>                         --      )----------------(  272        --    % Neutrophils, --    % Lymphocytes, ANC: --                                  39.3    CBC ( @ 18:50)                              12.5<L>                         17.28<H>  )----------------(  360        74.0  % Neutrophils, 17.8  % Lymphocytes, ANC: 12.79<H>                              42.5      BMP ( @ 18:50)             141     |  100     |  17    		Ca++ --      Ca 9.8                ---------------------------------( 171<H>		Mg 1.80               4.0     |  16<L>   |  0.47<L>			Ph --        LFTs ( @ 18:50)      TPro 7.2 / Alb 3.9 / TBili 0.3 / DBili -- / AST 12 / ALT <5<L> / AlkPhos 79    Coags (10-01 @ 03:00)  aPTT 29.9 / INR 1.15 / PT 13.4        VBG ( @ 21:08)     7.34 / 45 / 42 / 24 / -1.7 / 67.4%     Lactate: 4.2<HH>  VBG ( 18:50)     7.24<L> / 37<L> / 41 / 16<L> / -10.7<L> / 61.1%     Lactate: 13.7<HH>    --------------------------------------------------------------------------------------------    MICROBIOLOGY  Urinalysis ( @ 23:00):     Color: Yellow / Appearance: Clear / S.019 / pH: 6.5 / Gluc: Negative / Ketones: Negative / Bili: Negative / Urobili: <2 mg/dL / Protein :Trace<!> / Nitrites: Negative / Leuk.Est: Negative / RBC:  / WBC:  / Sq Epi:  / Non Sq Epi:  / Bacteria          --------------------------------------------------------------------------------------------    IMAGING       VASCULAR SURGERY CONSULT NOTE  --------------------------------------------------------------------------------------------  HPI:  Pt is an 82y/o M PMHx DM, Gastropresis, Parkinson's disease p/w hallucinations, altered mental status today reportedly hallucinations. On ROS patient reports intermittent foot pain. Reports weightloss but denies food fear and postprandial pain. Reportedly bedbound. Incidentally patient was found to have celiac, sma, jesusita and right profunda stenosis.      ROS: 10-system review is otherwise negative except HPI above.      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 1000 milliLiter(s) IV Continuous <Continuous>  dextrose 5%. 1000 milliLiter(s) IV Continuous <Continuous>  dextrose 50% Injectable 25 Gram(s) IV Push once  dextrose 50% Injectable 12.5 Gram(s) IV Push once  dextrose 50% Injectable 25 Gram(s) IV Push once  dextrose 50% Injectable 12.5 milliLiter(s) IV Push once  glucagon  Injectable 1 milliGRAM(s) IntraMuscular once  heparin   Injectable 5000 Unit(s) SubCutaneous every 12 hours  insulin lispro (ADMELOG) corrective regimen sliding scale   SubCutaneous every 6 hours  lactated ringers. 1000 milliLiter(s) IV Continuous <Continuous>  mirtazapine 15 milliGRAM(s) Oral at bedtime  pantoprazole  Injectable 40 milliGRAM(s) IV Push every 24 hours  piperacillin/tazobactam IVPB.. 3.375 Gram(s) IV Intermittent every 8 hours    MEDICATIONS (PRN):acetaminophen     Tablet .. 650 milliGRAM(s) Oral every 6 hours PRN Temp greater or equal to 38C (100.4F), Mild Pain (1 - 3)  clonazePAM  Tablet 0.5 milliGRAM(s) Oral every 12 hours PRN anxiety  dextrose Oral Gel 15 Gram(s) Oral once PRN Blood Glucose LESS THAN 70 milliGRAM(s)/deciliter    --------------------------------------------------------------------------------------------    Vitals:   T(C): 36.7 (10-01-22 @ 01:47), Max: 37.2 (10-01-22 @ 01:05)  HR: 72 (10-01-22 @ 01:47) (72 - 94)  BP: 126/56 (10-01-22 @ 01:47) (110/98 - 142/65)  RR: 16 (10-01-22 @ 01:47) (16 - 16)  SpO2: 100% (10-01-22 @ 01:47) (98% - 100%)  CAPILLARY BLOOD GLUCOSE      POCT Blood Glucose.: 79 mg/dL (01 Oct 2022 03:24)  POCT Blood Glucose.: 151 mg/dL (30 Sep 2022 17:33)      Height (cm): 170.2 ( @ 15:29)    PHYSICAL EXAM:   General: NAD, Lying in bed comfortably  Neuro: Alert and answering questions appropriately   HEENT: Grossly normal, EOMI  Cardio: No peripheral edema  Resp: Good effort, no signs of respiratory distress  GI/Abd: Soft, nondistended right and left lower quadrant mild tenderness, no rebound/guarding, no masses palpated  Vascular: All 4 extremities warm,  b/l Femoral and pop pulse palpable,  Right DP/PT signals, Left PT signals only    --------------------------------------------------------------------------------------------    LABS  CBC (10-01 @ 03:00)                              12.0<L>                         --      )----------------(  272        --    % Neutrophils, --    % Lymphocytes, ANC: --                                  39.3    CBC ( @ 18:50)                              12.5<L>                         17.28<H>  )----------------(  360        74.0  % Neutrophils, 17.8  % Lymphocytes, ANC: 12.79<H>                              42.5      BMP ( @ 18:50)             141     |  100     |  17    		Ca++ --      Ca 9.8                ---------------------------------( 171<H>		Mg 1.80               4.0     |  16<L>   |  0.47<L>			Ph --        LFTs ( @ 18:50)      TPro 7.2 / Alb 3.9 / TBili 0.3 / DBili -- / AST 12 / ALT <5<L> / AlkPhos 79    Coags (10-01 @ 03:00)  aPTT 29.9 / INR 1.15 / PT 13.4        VBG ( @ 21:08)     7.34 / 45 / 42 / 24 / -1.7 / 67.4%     Lactate: 4.2<HH>  VBG ( 18:50)     7.24<L> / 37<L> / 41 / 16<L> / -10.7<L> / 61.1%     Lactate: 13.7<HH>    --------------------------------------------------------------------------------------------    MICROBIOLOGY  Urinalysis ( @ 23:00):     Color: Yellow / Appearance: Clear / S.019 / pH: 6.5 / Gluc: Negative / Ketones: Negative / Bili: Negative / Urobili: <2 mg/dL / Protein :Trace<!> / Nitrites: Negative / Leuk.Est: Negative / RBC:  / WBC:  / Sq Epi:  / Non Sq Epi:  / Bacteria          --------------------------------------------------------------------------------------------    IMAGING  < from: CT Angio Abdomen and Pelvis w/ IV Cont (22 @ 22:18) >    Aorta Angiogram:  No thoracic or abdominal aortic aneurysm or dissection.   No intramural hematoma in the thoracic aorta.  Extensive coronary artery calcifications  3 vessel branching of theaortic arch. Focal calcification at the origin   of the left vertebral artery, with irregular areas of moderate narrowing   of the proximal course. Severe stenosis of the origin of the right   vertebral artery secondary to irregular calcified plaque.Great arteries   are patent without flow-limiting stenosis.  Irregular atherosclerotic plaque celiac axis, result in areas of up to   mild to moderate stenosis.  Irregular calcified and noncalcified plaque in the superior mesenteric   artery, resulting in areas of up to moderate stenosis.  Extensive atherosclerotic plaque in the inferior mesenteric artery,   results in scattered areas of severe stenosis/near occlusion.  Scattered calcified plaque in the renal arteries resulting in mild   stenosis. Prominent calcifications in the distal renal artery branches in   the renal sinuses.  Prominent iliofemoral atherosclerosis, scattered areas of irregular   occlusion in the obturator branches of the left internal iliac artery.   Severe stenoses in the proximal right deep femoral artery.      < end of copied text >

## 2022-10-01 NOTE — H&P ADULT - HISTORY OF PRESENT ILLNESS
Patient is an 81 year old male hx of insulin dependent DM2, Parkinson's dx, sick sinus syndrome w/ pacemaker, here w/ hallucinations. Per his wife, he has been seeing people that are not there. He has had this happen with his Parkinson's in the past. He is more bedbound, is able to walk a short distance w/ a walker and assistance. Has had right leg pain intermittent w/ ambulation. Per wife, known abdominal arterial disease in the past. Has mild lower abdominal pain, and has not had a bowel movement in 3 days. Denies fevers, chills, aspiration events, sputum production, or dysuria. Denies skin rashes or current chest pain.     ED course: CT angio a/p iv con w/ 4 by 4 cm LLQ colonic mass concerning for malignancy.   mod SMA stenosis, severe ARTURO stenosis. severe r. deep femoral artery stenosis. Patient is an 81 year old male hx of insulin dependent DM2, Parkinson's dx, sick sinus syndrome w/ pacemaker, here w/ hallucinations. Per his wife, he has been seeing people that are not there. He has had this happen with his Parkinson's in the past. He is more bedbound, is able to walk a short distance w/ a walker and assistance. Has had right leg pain intermittent w/ ambulation. Per wife, known abdominal arterial disease in the past. Has mild lower abdominal pain, and has not had a bowel movement in 3 days. Denies fevers, chills, aspiration events, sputum production, or dysuria. Denies skin rashes or current chest pain.     ED course: CT angio a/p iv con w/ 4 by 4 cm intraperitoneal mass  mod SMA stenosis, severe ARTURO stenosis. severe r. deep femoral artery stenosis.

## 2022-10-01 NOTE — H&P ADULT - PROBLEM SELECTOR PLAN 5
-procal wnl, ct chest neg for pna, u/a wnl  -lower concern for bacterial component -hold home insulin for now, can do moderate sliding scale  -NPO for now in case of possible procedures

## 2022-10-01 NOTE — H&P ADULT - PROBLEM SELECTOR PLAN 1
-pt and wife without medication list with them  -will start sinimet 50/200 Q6 hours for now (per surescripts)  -email sent to clinical pharmacy  -hallucinations likely 2/2 medical issues below

## 2022-10-01 NOTE — CONSULT NOTE ADULT - SUBJECTIVE AND OBJECTIVE BOX
Reason for Consult:   History of Present Illness:     Patient is an 81 year old male hx of insulin dependent DM2, Parkinson's dx, sick sinus syndrome w/ pacemaker, here w/ hallucinations. Per his wife, he has been seeing people that are not there. He has had this happen with his Parkinson's in the past. He is more bedbound, is able to walk a short distance w/ a walker and assistance. Has had right leg pain intermittent w/ ambulation. Per wife, known abdominal arterial disease in the past. Has mild lower abdominal pain, and has not had a bowel movement in 3 days. Denies fevers, chills, aspiration events, sputum production, or dysuria. Denies skin rashes or current chest pain.     ED course: CT angio a/p iv con w/ 4 by 4 cm intraperitoneal mass  mod SMA stenosis, severe ARTURO stenosis. severe r. deep femoral artery stenosis. 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  pantoprazole  Injectable  mirtazapine  heparin   Injectable  acetaminophen     Tablet    Vital Signs:  T(C): 36.7 (10-01-22 @ 01:47), Max: 37.2 (10-01-22 @ 01:05)  HR: 72 (10-01-22 @ 01:47) (72 - 94)  BP: 126/56 (10-01-22 @ 01:47) (110/98 - 142/65)  RR: 16 (10-01-22 @ 01:47) (16 - 16)  SpO2: 100% (10-01-22 @ 01:47) (98% - 100%)    Relevant Lab Results:            12.0  10.46)-----(272     (10-01-22 @ 03:00)         39.3     140 | 106 | 14  --------------------< 88     (10-01-22 @ 03:00)  4.6 | 25 | 0.39       PT: 13.4 10-01-22 @ 03:00  aPTT: 29.9 10-01-22 @ 03:00   INR: 1.15 10-01-22 @ 03:00      Imaging: Recent CT demonstrates a 4 x 4 cm mass that is intimately involved with bowel in the right lower quadrant.

## 2022-10-01 NOTE — PROGRESS NOTE ADULT - PROBLEM SELECTOR PLAN 1
-pt and wife without medication list with them  -will start sinimet 50/200 Q6 hours for now (per surescripts)  -email sent to clinical pharmacy  clarified meds Sinemet carbidopa/levodopa  25/100 2.5 Tablet(s) Oral <User Schedule>, Opicapone (Ongentys) 50 milliGRAM(s)   Oral <User Schedule> and pimavanserin Capsule 34 milliGRAM(s) Oral <User Schedule>  -hallucinations debilitating  Neurology consult called

## 2022-10-01 NOTE — CONSULT NOTE ADULT - ATTENDING COMMENTS
Mr. Millan is an 82 yo man with a h/o idiopathic Parkinson disease with worsening hallucinations/delusions.  I agree with management as above.  Thank you Mr. Millan is an 80 yo man with a h/o idiopathic Parkinson disease with worsening hallucinations/delusions.  I agree with management as above.  It is critical that he be continued on all of his home medications at the same doses and timing.   Thank you

## 2022-10-01 NOTE — H&P ADULT - PROBLEM SELECTOR PLAN 2
-lactate elevation to 13.7---->1.2 after fluids and antibiotics  -per wife does not believe pt had seizure like activity, urination, or tongue biting but can order vEEG to r/o underlying seizure  -can continue with zosyn and iv fluids for now  -follow up blood cultures times 2, procalcitonin wnl lower concern for underlying bacterial infection.  -possibly due to PAD (r femoral, SMA, ARTURO), vascular consulted appreciate recommendations

## 2022-10-01 NOTE — H&P ADULT - ASSESSMENT
Patient is an 81 year old male hx of insulin dependent DM2, Parkinson's dx, sick sinus syndrome w/ pacemaker, here w/ hallucinations found to have colonic mass and arterial disease.     4 by 4 cm LLQ colonic mass concerning for malignancy.   mod SMA stenosis, severe ARTURO stenosis. severe r. deep femoral artery stenosis. Patient is an 81 year old male hx of insulin dependent DM2, Parkinson's dx, sick sinus syndrome w/ pacemaker, here w/ hallucinations found to have colonic mass and arterial disease.     4 by 4 cm intraperitoneal mass  mod SMA stenosis, severe ARTURO stenosis. severe r. deep femoral artery stenosis.

## 2022-10-01 NOTE — H&P ADULT - PROBLEM SELECTOR PLAN 4
-4 by 4 cm colonic mass concerning for malignancy  -f/u [facility] blood work  -email sent for GI consult -procal wnl, ct chest neg for pna, u/a wnl  -lower concern for bacterial component      #Intraperitoneal mass  -4 by 4 cm intraperitoneal mass  -can get outpatient evaluation of this and possible biopsy

## 2022-10-01 NOTE — CONSULT NOTE ADULT - ASSESSMENT
ASSESSMENT AND PLAN:  Case and imaging reviewed with Dr. Monzon. Based on recent CT, the intraperitoneal mass is intimately involved with adjacent loops of small bowel. Due to the adjacent bowel, there is no safe window for percutaneous biopsy due to high risk for bowel perforation/injury.     Would recommend evaluation for surgical excisional biopsy due to risk of bowel injury via a percutaneous approach.     Frederick Goff MD  PGY-V, Interventional Radiology    For EMERGENT inquiries/questions:  [facility]-p.[phone]  [facility]-p.[phone] ([phone])    Available on [employer] TEAMS for all non-urgent questions  Non-emergent consults: Please place a sunrise order "Consult-Interventional Radiology" with an appropriate callback number.   ASSESSMENT AND PLAN:  Case and imaging reviewed with Dr. Monzon. Based on recent CT, the intraperitoneal mass is intimately involved with adjacent loops of bowel. Due to the adjacent bowel, there is no safe window for percutaneous biopsy due to high risk for bowel perforation/injury.     Would recommend evaluation for surgical excisional biopsy due to risk of bowel injury via a percutaneous approach.     Frederick Goff MD  PGY-V, Interventional Radiology    For EMERGENT inquiries/questions:  [facility]-p.[phone]  [facility]-p.[phone] ([phone])    Available on [employer] TEAMS for all non-urgent questions  Non-emergent consults: Please place a sunrise order "Consult-Interventional Radiology" with an appropriate callback number.

## 2022-10-01 NOTE — H&P ADULT - NSHPPHYSICALEXAM_GEN_ALL_CORE
PHYSICAL EXAM:  GENERAL: NAD, well-developed  HEAD:  Atraumatic, Normocephalic  EYES: EOMI, PERRLA, conjunctiva and sclera clear  NECK: Supple, No JVD  CHEST/LUNG: Clear to auscultation bilaterally; No wheeze  HEART: Regular rate and rhythm; No murmurs, rubs, or gallops  ABDOMEN: Soft, Nontender, Nondistended; Bowel sounds present  EXTREMITIES:  2+ radial pulses, diminished DP pulses R>L, No clubbing, cyanosis, or edema  PSYCH: AAOx3   NEUROLOGY: rigid UE and LE, w/ 5/5 strength sensation grossly intact.  SKIN: No rashes or lesions PHYSICAL EXAM:  GENERAL: NAD, well-developed  HEAD:  Atraumatic, Normocephalic  EYES: EOMI, PERRLA, conjunctiva and sclera clear  NECK: Supple, No JVD  CHEST/LUNG: Clear to auscultation bilaterally; No wheeze  HEART: Regular rate and rhythm; No murmurs, rubs, or gallops  ABDOMEN: Soft, Nontender, Nondistended; Bowel sounds present  EXTREMITIES:  2+ radial pulses, diminished DP pulses R>L, No clubbing, cyanosis, or edema  PSYCH: AAOx3   NEUROLOGY: rigid UE and LE, 4+/5 UE strength 4/5 LE strength  SKIN: No rashes or lesions

## 2022-10-01 NOTE — H&P ADULT - NSHPLABSRESULTS_GEN_ALL_CORE
.  LABS:                         12.0   10.46 )-----------( 272      ( 01 Oct 2022 03:00 )             39.3     10-01    140  |  106  |  14  ----------------------------<  88  4.6   |  25  |  0.39<L>    Ca    9.0      01 Oct 2022 03:00  Phos  3.1     10-01  Mg     1.90     10-01    TPro  6.6  /  Alb  3.8  /  TBili  0.4  /  DBili  x   /  AST  44<H>  /  ALT  <5<L>  /  AlkPhos  72  10-01    PT/INR - ( 01 Oct 2022 03:00 )   PT: 13.4 sec;   INR: 1.15 ratio         PTT - ( 01 Oct 2022 03:00 )  PTT:29.9 sec  Urinalysis Basic - ( 30 Sep 2022 23:00 )    Color: Yellow / Appearance: Clear / S.019 / pH: x  Gluc: x / Ketone: Negative  / Bili: Negative / Urobili: <2 mg/dL   Blood: x / Protein: Trace / Nitrite: Negative   Leuk Esterase: Negative / RBC: x / WBC x   Sq Epi: x / Non Sq Epi: x / Bacteria: x          Lactate, Blood: 1.2 mmol/L (10-01 @ 03:00)      RADIOLOGY, EKG & ADDITIONAL TESTS: Reviewed. LABS:                         12.0   10.46 )-----------( 272      ( 01 Oct 2022 03:00 )             39.3     10-01    140  |  106  |  14  ----------------------------<  88  4.6   |  25  |  0.39<L>    Ca    9.0      01 Oct 2022 03:00  Phos  3.1     10-01  Mg     1.90     10-01    TPro  6.6  /  Alb  3.8  /  TBili  0.4  /  DBili  x   /  AST  44<H>  /  ALT  <5<L>  /  AlkPhos  72  10-01    PT/INR - ( 01 Oct 2022 03:00 )   PT: 13.4 sec;   INR: 1.15 ratio         PTT - ( 01 Oct 2022 03:00 )  PTT:29.9 sec  Urinalysis Basic - ( 30 Sep 2022 23:00 )    Color: Yellow / Appearance: Clear / S.019 / pH: x  Gluc: x / Ketone: Negative  / Bili: Negative / Urobili: <2 mg/dL   Blood: x / Protein: Trace / Nitrite: Negative   Leuk Esterase: Negative / RBC: x / WBC x   Sq Epi: x / Non Sq Epi: x / Bacteria: x          Lactate, Blood: 1.2 mmol/L (10-01 @ 03:00)      RADIOLOGY, EKG & ADDITIONAL TESTS: Reviewed.

## 2022-10-01 NOTE — CHART NOTE - NSCHARTNOTEFT_GEN_A_CORE
CT scan shows lesion in intraperitoneal 4 by 4 cm lesion. No role for GI, IR consult ordered to see if there is a site for biopsy.  vEEG ordered for concern for underlying seizure per daughter was spitting out tablets  confirmed that patient takes 3 tablets of carbidopa levodopa and this was ordered Q4

## 2022-10-01 NOTE — CONSULT NOTE ADULT - ASSESSMENT
82y/o M PMHx DM, Gastropresis, sciatica, Parkinson's disease p/w hallucinations, altered mental status today reportedly hallucinations and now with PVD. Patient has advanced stage of parkinson's, PVD is chronic and has no acute ischemic symptoms. No symptoms of mesentery ischemia.     PLAN:   - No acute surgical intervention  - recommend best medical management of PVD   - Plan to be discussed with Fellow    Jose Anders MD, PGY 4  C Team Surgery  [id number]   80y/o M PMHx DM, Gastropresis, sciatica, Parkinson's disease p/w hallucinations, altered mental status today reportedly hallucinations and now with PVD. Patient has advanced stage of parkinson's and is functionally bedbound, PVD is chronic and has no acute ischemic symptoms. No symptoms of mesentery ischemia.     PLAN:   - No acute surgical intervention  - recommend best medical management of PVD   - Plan to be discussed with Fellow    Jose Anders MD, PGY 4  C Team Surgery  [id number]

## 2022-10-01 NOTE — H&P ADULT - PROBLEM SELECTOR PLAN 7
F-100 cc/hr LR  E-replete prn  N-NPO in case of procedure, if GI and vasc need no interventions, can give dysphagia diet (because of Parkinsons)  heparin subQ DVT prophylaxis   FULL code

## 2022-10-01 NOTE — H&P ADULT - PROBLEM SELECTOR PLAN 6
-hold home insulin for now, can do moderate sliding scale  -NPO for now in case of possible procedures F-100 cc/hr LR  E-replete prn  N-NPO in case of procedure, if GI and vasc need no interventions, can give dysphagia diet (because of Parkinsons)  heparin subQ DVT prophylaxis   FULL code

## 2022-10-01 NOTE — CONSULT NOTE ADULT - ASSESSMENT
Patient ALEXANDRA ESPOSITO is a 81y (1940) man PD, HLD, HTN, DM, SSS with pacemaker, sciatic presents with hallucinations. WIfe at bedside reports for the last couple of years patient had visual hallucinations. CT head Right chronic PCA infarct     Impression: Hallucinations due to worsening Parkinson's Disease    Recommendation:  [] Increase Klonopin to 0.75 mg Q 8  [] Continue home sinemet, Pimavanserin and ongentys   [] Call Patient's private neurologist Dr. Michael Zamora for further recommendations  [] Can be discharged from neurologic perspective and follow outpatient if wife comfortable with taking him home   [] Consult Behavioral health     Case seen with Dr. Murphy Attending.

## 2022-10-01 NOTE — CONSULT NOTE ADULT - SUBJECTIVE AND OBJECTIVE BOX
Neurology - Consult Note    -INCOMPLETE  Spectra: [phone] ([facility]), [phone] ([facility])  -    HPI: Patient ALEXANDRA ESPOSITO is a 81y (1940) man with a PMHx significant for ***      Review of Systems:  INCOMPLETE   CONSTITUTIONAL: No fevers or chills  EYES AND ENT: No visual changes or no throat pain   NECK: No pain or stiffness  RESPIRATORY: No hemoptysis or shortness of breath  CARDIOVASCULAR: No chest pain or palpitations  GASTROINTESTINAL: No melena or hematochezia  GENITOURINARY: No dysuria or hematuria  NEUROLOGICAL: +As stated in HPI above  SKIN: No itching, burning, rashes, or lesions   All other review of systems is negative unless indicated above.    Allergies:      PMHx/PSHx/Family Hx: As above, otherwise see below   CAD (Coronary Artery Disease)    Cancer of Prostate Seed implant 2004    Diabetes Mellitus    Dyslipidemia    Hypertension    Parkinson&#x27;s Disease    Hyperlipidemia    CAD (coronary artery disease)    Hypertension    Neuropathy    Osteoarthrosis        Social Hx:  No current use of tobacco, alcohol, or illicit drugs  Lives with ***    Medications:  MEDICATIONS  (STANDING):  carbidopa/levodopa  25/100 2.5 Tablet(s) Oral <User Schedule>  dextrose 5%. 1000 milliLiter(s) (100 mL/Hr) IV Continuous <Continuous>  dextrose 5%. 1000 milliLiter(s) (50 mL/Hr) IV Continuous <Continuous>  dextrose 50% Injectable 25 Gram(s) IV Push once  dextrose 50% Injectable 12.5 Gram(s) IV Push once  dextrose 50% Injectable 25 Gram(s) IV Push once  glucagon  Injectable 1 milliGRAM(s) IntraMuscular once  heparin   Injectable 5000 Unit(s) SubCutaneous every 12 hours  insulin lispro (ADMELOG) corrective regimen sliding scale   SubCutaneous every 6 hours  lactated ringers. 1000 milliLiter(s) (125 mL/Hr) IV Continuous <Continuous>  mirtazapine 15 milliGRAM(s) Oral at bedtime  Opicapone (Ongentys) 50 milliGRAM(s)   Oral <User Schedule>  pantoprazole  Injectable 40 milliGRAM(s) IV Push every 24 hours  pimavanserin Capsule 34 milliGRAM(s) Oral <User Schedule>  piperacillin/tazobactam IVPB.. 3.375 Gram(s) IV Intermittent every 8 hours  polyethylene glycol 3350 17 Gram(s) Oral daily    MEDICATIONS  (PRN):  acetaminophen     Tablet .. 650 milliGRAM(s) Oral every 6 hours PRN Temp greater or equal to 38C (100.4F), Mild Pain (1 - 3)  clonazePAM  Tablet 0.5 milliGRAM(s) Oral every 12 hours PRN anxiety  dextrose Oral Gel 15 Gram(s) Oral once PRN Blood Glucose LESS THAN 70 milliGRAM(s)/deciliter      Vitals:  T(C): 36.8 (10-01-22 @ 11:00), Max: 37.2 (10-01-22 @ 01:05)  HR: 76 (10-01-22 @ 11:00) (72 - 92)  BP: 160/74 (10-01-22 @ 11:00) (118/90 - 160/74)  RR: 16 (10-01-22 @ 11:00) (16 - 16)  SpO2: 100% (10-01-22 @ 11:00) (98% - 100%)    Physical Examination: INCOMPLETE  General - NAD  Cardiovascular - Peripheral pulses palpable, no edema  Eyes - Fundoscopy with flat, sharp optic discs and no hemorrhage or exudates; Fundoscopy not well visualized; Fundoscopy not performed due to safety precautions in the setting of the COVID-19 pandemic    Neurologic Exam:  Mental status - Awake, Alert, Oriented to person, place, and time. Speech fluent, repetition and naming intact. Follows simple and complex commands. Attention/concentration, recent and remote memory (including registration and recall), and fund of knowledge intact    Cranial nerves - PERRLA, VFF, EOMI, face sensation (V1-V3) intact b/l, facial strength intact without asymmetry b/l, hearing intact b/l, palate with symmetric elevation, trapezius OR sternocleidomastiod 5/5 strength b/l, tongue midline on protrusion with full lateral movement    Motor - Normal bulk and tone throughout. No pronator drift.  Strength testing            Deltoid      Biceps      Triceps     Wrist Extension    Wrist Flexion     Interossei         R            5                 5               5                     5                              5                        5                 5  L             5                 5               5                     5                              5                        5                 5              Hip Flexion    Hip Extension    Knee Flexion    Knee Extension    Dorsiflexion    Plantar Flexion  R              5                           5                       5                           5                            5                          5  L              5                           5                        5                           5                            5                          5    Sensation - Light touch/temperature OR pain/vibration intact throughout    DTR's -             Biceps      Triceps     Brachioradialis      Patellar    Ankle    Toes/plantar response  R             2+             2+                  2+                       2+            2+                 Down  L              2+             2+                 2+                        2+           2+                 Down    Coordination - Finger to Nose intact b/l. No tremors appreciated    Gait and station - Normal casual gait. Romberg (-)    Labs:                        12.0   10.46 )-----------( 272      ( 01 Oct 2022 03:00 )             39.3     10-01    140  |  106  |  14  ----------------------------<  88  4.6   |  25  |  0.39<L>    Ca    9.0      01 Oct 2022 03:00  Phos  3.1     10-01  Mg     1.90     10-01    TPro  6.6  /  Alb  3.8  /  TBili  0.4  /  DBili  x   /  AST  44<H>  /  ALT  <5<L>  /  AlkPhos  72  10-01    CAPILLARY BLOOD GLUCOSE      POCT Blood Glucose.: 101 mg/dL (01 Oct 2022 12:59)    LIVER FUNCTIONS - ( 01 Oct 2022 03:00 )  Alb: 3.8 g/dL / Pro: 6.6 g/dL / ALK PHOS: 72 U/L / ALT: <5 U/L / AST: 44 U/L / GGT: x             PT/INR - ( 01 Oct 2022 03:00 )   PT: 13.4 sec;   INR: 1.15 ratio         PTT - ( 01 Oct 2022 03:00 )  PTT:29.9 sec  CSF:                  Radiology:  CT Head No Cont:  (30 Sep 2022 19:58)     Neurology - Consult Note    -INCOMPLETE  Spectra: [phone] ([facility]), [phone] ([facility])  -    HPI: Patient ALEXANDRA ESPOSITO is a 81y (1940) man PD, HLD, HTN, DM, SSS with pacemaker, sciatic presents with hallucinations. WIfe at bedside reports for the last couple of years patient had visual hallucinations and was controlled on Klonipine 0.25 mg BID. However the last several days patient has been screaming that someone is injecting him with parasites. He was talking sinemet  mg 3 tablets Q 4 then was started on Ongentys 3 months ago, sinemet was decreased to 2.5 tablets. Pimavanserin 34 mg daily was then added 2.5 weeks ago for hallucinations. She was told by her PCP to increase klonipin to 0.5 mg TID. He also has been having back pain radiating down his leg for several days. He stopped going to PT 1 month ago.     At baseline, patient is oriented x 3, occasional gets confused. He ambulates with walker, but after stopping PT will stand and walk couple of steps.       Review of Systems:   All other review of systems is negative unless indicated above.    Allergies:      PMHx/PSHx/Family Hx: As above, otherwise see below   CAD (Coronary Artery Disease)    Cancer of Prostate Seed implant 2004    Diabetes Mellitus    Dyslipidemia    Hypertension    Parkinson&#x27;s Disease    Hyperlipidemia    CAD (coronary artery disease)    Hypertension    Neuropathy    Osteoarthrosis        Social Hx:  No current use of tobacco, alcohol, or illicit drugs  Lives with ***    Medications:  MEDICATIONS  (STANDING):  carbidopa/levodopa  25/100 2.5 Tablet(s) Oral <User Schedule>  dextrose 5%. 1000 milliLiter(s) (100 mL/Hr) IV Continuous <Continuous>  dextrose 5%. 1000 milliLiter(s) (50 mL/Hr) IV Continuous <Continuous>  dextrose 50% Injectable 25 Gram(s) IV Push once  dextrose 50% Injectable 12.5 Gram(s) IV Push once  dextrose 50% Injectable 25 Gram(s) IV Push once  glucagon  Injectable 1 milliGRAM(s) IntraMuscular once  heparin   Injectable 5000 Unit(s) SubCutaneous every 12 hours  insulin lispro (ADMELOG) corrective regimen sliding scale   SubCutaneous every 6 hours  lactated ringers. 1000 milliLiter(s) (125 mL/Hr) IV Continuous <Continuous>  mirtazapine 15 milliGRAM(s) Oral at bedtime  Opicapone (Ongentys) 50 milliGRAM(s)   Oral <User Schedule>  pantoprazole  Injectable 40 milliGRAM(s) IV Push every 24 hours  pimavanserin Capsule 34 milliGRAM(s) Oral <User Schedule>  piperacillin/tazobactam IVPB.. 3.375 Gram(s) IV Intermittent every 8 hours  polyethylene glycol 3350 17 Gram(s) Oral daily    MEDICATIONS  (PRN):  acetaminophen     Tablet .. 650 milliGRAM(s) Oral every 6 hours PRN Temp greater or equal to 38C (100.4F), Mild Pain (1 - 3)  clonazePAM  Tablet 0.5 milliGRAM(s) Oral every 12 hours PRN anxiety  dextrose Oral Gel 15 Gram(s) Oral once PRN Blood Glucose LESS THAN 70 milliGRAM(s)/deciliter      Vitals:  T(C): 36.8 (10-01-22 @ 11:00), Max: 37.2 (10-01-22 @ 01:05)  HR: 76 (10-01-22 @ 11:00) (72 - 92)  BP: 160/74 (10-01-22 @ 11:00) (118/90 - 160/74)  RR: 16 (10-01-22 @ 11:00) (16 - 16)  SpO2: 100% (10-01-22 @ 11:00) (98% - 100%)    Physical Examination: INCOMPLETE  General - NAD  Cardiovascular - Peripheral pulses palpable, no edema  Eyes - Fundoscopy with flat, sharp optic discs and no hemorrhage or exudates; Fundoscopy not well visualized; Fundoscopy not performed due to safety precautions in the setting of the COVID-19 pandemic    Neurologic Exam:  Mental status - Awake, Alert, Oriented to person, place, and time. Speech fluent, repetition and naming intact. Follows simple and complex commands. Attention/concentration, recent and remote memory (including registration and recall), and fund of knowledge intact    Cranial nerves - PERRLA, VFF, EOMI, face sensation (V1-V3) intact b/l, facial strength intact without asymmetry b/l, hearing intact b/l, palate with symmetric elevation, trapezius OR sternocleidomastiod 5/5 strength b/l, tongue midline on protrusion with full lateral movement    Motor - Normal bulk and tone throughout. No pronator drift.  Strength testing            Deltoid      Biceps      Triceps     Wrist Extension    Wrist Flexion     Interossei         R            5                 5               5                     5                              5                        5                 5  L             5                 5               5                     5                              5                        5                 5              Hip Flexion    Hip Extension    Knee Flexion    Knee Extension    Dorsiflexion    Plantar Flexion  R              5                           5                       5                           5                            5                          5  L              5                           5                        5                           5                            5                          5    Sensation - Light touch/temperature OR pain/vibration intact throughout    DTR's -             Biceps      Triceps     Brachioradialis      Patellar    Ankle    Toes/plantar response  R             2+             2+                  2+                       2+            2+                 Down  L              2+             2+                 2+                        2+           2+                 Down    Coordination - Finger to Nose intact b/l. No tremors appreciated    Gait and station - Normal casual gait. Romberg (-)    Labs:                        12.0   10.46 )-----------( 272      ( 01 Oct 2022 03:00 )             39.3     10-01    140  |  106  |  14  ----------------------------<  88  4.6   |  25  |  0.39<L>    Ca    9.0      01 Oct 2022 03:00  Phos  3.1     10-01  Mg     1.90     10-01    TPro  6.6  /  Alb  3.8  /  TBili  0.4  /  DBili  x   /  AST  44<H>  /  ALT  <5<L>  /  AlkPhos  72  10-01    CAPILLARY BLOOD GLUCOSE      POCT Blood Glucose.: 101 mg/dL (01 Oct 2022 12:59)    LIVER FUNCTIONS - ( 01 Oct 2022 03:00 )  Alb: 3.8 g/dL / Pro: 6.6 g/dL / ALK PHOS: 72 U/L / ALT: <5 U/L / AST: 44 U/L / GGT: x             PT/INR - ( 01 Oct 2022 03:00 )   PT: 13.4 sec;   INR: 1.15 ratio         PTT - ( 01 Oct 2022 03:00 )  PTT:29.9 sec  CSF:                  Radiology:  CT Head No Cont:  (30 Sep 2022 19:58)     Neurology - Consult Note    -INCOMPLETE  Spectra: [phone] ([facility]), [phone] ([facility])  -    HPI: Patient ALEXANDRA ESPOSITO is a 81y (1940) man PD, HLD, HTN, DM, SSS with pacemaker, sciatic presents with hallucinations. WIfe at bedside reports for the last couple of years patient had visual hallucinations and was controlled on Klonipine 0.25 mg BID. However the last several days patient has been screaming that someone is injecting him with parasites. He was talking sinemet  mg 3 tablets Q 4 then was started on Ongentys 3 months ago, sinemet was decreased to 2.5 tablets. Pimavanserin 34 mg daily was then added 2.5 weeks ago for hallucinations. She was told by her PCP to increase klonipin to 0.5 mg TID. He also has been having back pain radiating down his leg for several days. He stopped going to PT 1 month ago.     At baseline, patient is oriented x 3, occasional gets confused. Speech at baseline is dysarthric and disfluent. Wife also noted he has decreased vision in left eye. He ambulates with walker, but after stopping PT will stand and walk couple of steps.       Review of Systems:   All other review of systems is negative unless indicated above.    Allergies:      PMHx/PSHx/Family Hx: As above, otherwise see below   CAD (Coronary Artery Disease)    Cancer of Prostate Seed implant 2004    Diabetes Mellitus    Dyslipidemia    Hypertension    Parkinson&#x27;s Disease    Hyperlipidemia    CAD (coronary artery disease)    Hypertension    Neuropathy    Osteoarthrosis      Medications:  MEDICATIONS  (STANDING):  carbidopa/levodopa  25/100 2.5 Tablet(s) Oral <User Schedule>  dextrose 5%. 1000 milliLiter(s) (100 mL/Hr) IV Continuous <Continuous>  dextrose 5%. 1000 milliLiter(s) (50 mL/Hr) IV Continuous <Continuous>  dextrose 50% Injectable 25 Gram(s) IV Push once  dextrose 50% Injectable 12.5 Gram(s) IV Push once  dextrose 50% Injectable 25 Gram(s) IV Push once  glucagon  Injectable 1 milliGRAM(s) IntraMuscular once  heparin   Injectable 5000 Unit(s) SubCutaneous every 12 hours  insulin lispro (ADMELOG) corrective regimen sliding scale   SubCutaneous every 6 hours  lactated ringers. 1000 milliLiter(s) (125 mL/Hr) IV Continuous <Continuous>  mirtazapine 15 milliGRAM(s) Oral at bedtime  Opicapone (Ongentys) 50 milliGRAM(s)   Oral <User Schedule>  pantoprazole  Injectable 40 milliGRAM(s) IV Push every 24 hours  pimavanserin Capsule 34 milliGRAM(s) Oral <User Schedule>  piperacillin/tazobactam IVPB.. 3.375 Gram(s) IV Intermittent every 8 hours  polyethylene glycol 3350 17 Gram(s) Oral daily    MEDICATIONS  (PRN):  acetaminophen     Tablet .. 650 milliGRAM(s) Oral every 6 hours PRN Temp greater or equal to 38C (100.4F), Mild Pain (1 - 3)  clonazePAM  Tablet 0.5 milliGRAM(s) Oral every 12 hours PRN anxiety  dextrose Oral Gel 15 Gram(s) Oral once PRN Blood Glucose LESS THAN 70 milliGRAM(s)/deciliter      Vitals:  T(C): 36.8 (10-01-22 @ 11:00), Max: 37.2 (10-01-22 @ 01:05)  HR: 76 (10-01-22 @ 11:00) (72 - 92)  BP: 160/74 (10-01-22 @ 11:00) (118/90 - 160/74)  RR: 16 (10-01-22 @ 11:00) (16 - 16)  SpO2: 100% (10-01-22 @ 11:00) (98% - 100%)    Physical Examination: INCOMPLETE  General - NAD    Neurologic Exam:  Mental status - Awake, Alert, Oriented to person, place (Hospital), and year). Speech disfluent, hypophonic, dysarthric. Follows simple commands.     Cranial nerves - PERRL, BTT b/l, EOMI, face sensation (V1-V3) intact b/l, facial strength intact without asymmetry b/l, palate with symmetric elevation, trapezius OR sternocleidomastiod 5/5 strength b/l, tongue midline on protrusion with full lateral movement    Motor - Normal bulk and tone throughout.    Strength testing            Deltoid      Biceps        Triceps               Wrist Extension    Wrist Flexion     Interossei         R            5                 5               5                     5                              5                        5                 5  L             5                 5               5                     5                              5                        5                 5              Hip Flexion    Hip Extension    Knee Flexion                     Knee Extension    Dorsiflexion    Plantar Flexion  R              5                           5                       5                           5                            5                          5  L              5                           5                        5                           5                            5                          5    Sensation - Light touch/temperature OR pain/vibration intact throughout    DTR's -             Biceps      Triceps     Brachioradialis      Patellar    Ankle    Toes/plantar response  R             2+             2+                  2+                       2+            2+                 Down  L              2+             2+                 2+                        2+           2+                 Down    Coordination - Finger to Nose intact b/l. No tremors appreciated    Gait and station - Normal casual gait. Romberg (-)    Labs:                        12.0   10.46 )-----------( 272      ( 01 Oct 2022 03:00 )             39.3     10-01    140  |  106  |  14  ----------------------------<  88  4.6   |  25  |  0.39<L>    Ca    9.0      01 Oct 2022 03:00  Phos  3.1     10-01  Mg     1.90     10-01    TPro  6.6  /  Alb  3.8  /  TBili  0.4  /  DBili  x   /  AST  44<H>  /  ALT  <5<L>  /  AlkPhos  72  10-01    CAPILLARY BLOOD GLUCOSE      POCT Blood Glucose.: 101 mg/dL (01 Oct 2022 12:59)    LIVER FUNCTIONS - ( 01 Oct 2022 03:00 )  Alb: 3.8 g/dL / Pro: 6.6 g/dL / ALK PHOS: 72 U/L / ALT: <5 U/L / AST: 44 U/L / GGT: x             PT/INR - ( 01 Oct 2022 03:00 )   PT: 13.4 sec;   INR: 1.15 ratio         PTT - ( 01 Oct 2022 03:00 )  PTT:29.9 sec  CSF:                  Radiology:  CT Head No Cont:  (30 Sep 2022 19:58)     Neurology - Consult Note      Spectra: [phone] ([facility]), [phone] ([facility])  -    HPI: Patient ALEXANDRA ESPOSITO is a 81y (1940) man PD, HLD, HTN, DM, SSS with pacemaker, sciatic presents with hallucinations. WIfe at bedside reports for the last couple of years patient had visual hallucinations and was controlled on Klonipine 0.25 mg BID. However the last several days patient has been screaming that someone is injecting him with parasites. He was talking sinemet  mg 3 tablets Q 4 then was started on Ongentys 3 months ago, sinemet was decreased to 2.5 tablets. Patient and wife note improvement with ongentys.  Pimavanserin 34 mg daily was then added 2.5 weeks ago for hallucinations. She was told by her PCP to increase klonipin to 0.5 mg TID. He also has been having back pain radiating down his leg for several days. He stopped going to PT 1 month ago. At baseline, patient is oriented x 3, occasional gets confused. Speech at baseline is dysarthric and disfluent. Wife also noted he has decreased vision in left eye. He ambulates with walker, but after stopping PT will stand and walk couple of steps.       Review of Systems:   All other review of systems is negative unless indicated above.    Allergies:      PMHx/PSHx/Family Hx: As above, otherwise see below   CAD (Coronary Artery Disease)    Cancer of Prostate Seed implant 2004    Diabetes Mellitus    Dyslipidemia    Hypertension    Parkinson&#x27;s Disease    Hyperlipidemia    CAD (coronary artery disease)    Hypertension    Neuropathy    Osteoarthrosis      Medications:  MEDICATIONS  (STANDING):  carbidopa/levodopa  25/100 2.5 Tablet(s) Oral <User Schedule>  dextrose 5%. 1000 milliLiter(s) (100 mL/Hr) IV Continuous <Continuous>  dextrose 5%. 1000 milliLiter(s) (50 mL/Hr) IV Continuous <Continuous>  dextrose 50% Injectable 25 Gram(s) IV Push once  dextrose 50% Injectable 12.5 Gram(s) IV Push once  dextrose 50% Injectable 25 Gram(s) IV Push once  glucagon  Injectable 1 milliGRAM(s) IntraMuscular once  heparin   Injectable 5000 Unit(s) SubCutaneous every 12 hours  insulin lispro (ADMELOG) corrective regimen sliding scale   SubCutaneous every 6 hours  lactated ringers. 1000 milliLiter(s) (125 mL/Hr) IV Continuous <Continuous>  mirtazapine 15 milliGRAM(s) Oral at bedtime  Opicapone (Ongentys) 50 milliGRAM(s)   Oral <User Schedule>  pantoprazole  Injectable 40 milliGRAM(s) IV Push every 24 hours  pimavanserin Capsule 34 milliGRAM(s) Oral <User Schedule>  piperacillin/tazobactam IVPB.. 3.375 Gram(s) IV Intermittent every 8 hours  polyethylene glycol 3350 17 Gram(s) Oral daily    MEDICATIONS  (PRN):  acetaminophen     Tablet .. 650 milliGRAM(s) Oral every 6 hours PRN Temp greater or equal to 38C (100.4F), Mild Pain (1 - 3)  clonazePAM  Tablet 0.5 milliGRAM(s) Oral every 12 hours PRN anxiety  dextrose Oral Gel 15 Gram(s) Oral once PRN Blood Glucose LESS THAN 70 milliGRAM(s)/deciliter      Vitals:  T(C): 36.8 (10-01-22 @ 11:00), Max: 37.2 (10-01-22 @ 01:05)  HR: 76 (10-01-22 @ 11:00) (72 - 92)  BP: 160/74 (10-01-22 @ 11:00) (118/90 - 160/74)  RR: 16 (10-01-22 @ 11:00) (16 - 16)  SpO2: 100% (10-01-22 @ 11:00) (98% - 100%)    Physical Examination:   General - NAD    Neurologic Exam:  Mental status - Awake, Alert, Oriented to person, place (Hospital), and year). Speech disfluent, hypophonic, tachyphonic. Follows simple commands.     Cranial nerves - PERRL, BTT b/l, EOMI, face sensation (V1-V3) intact b/l, facial strength intact without asymmetry b/l, palate with symmetric elevation, trapezius OR sternocleidomastiod 5/5 strength b/l, tongue midline on protrusion with full lateral movement    Motor - Cogwheel regidity b/l upper extremity     Able to move UE b/l against gravity 4+  Able to move left leg against resistance 4+, Right leg limited exam due to pain    Sensation - Light touch intact throughout      Coordination - Finger to Nose intact b/l. Resting right tremor    Gait and station - Deffered    Labs:                        12.0   10.46 )-----------( 272      ( 01 Oct 2022 03:00 )             39.3     10-01    140  |  106  |  14  ----------------------------<  88  4.6   |  25  |  0.39<L>    Ca    9.0      01 Oct 2022 03:00  Phos  3.1     10-01  Mg     1.90     10-01    TPro  6.6  /  Alb  3.8  /  TBili  0.4  /  DBili  x   /  AST  44<H>  /  ALT  <5<L>  /  AlkPhos  72  10-01    CAPILLARY BLOOD GLUCOSE      POCT Blood Glucose.: 101 mg/dL (01 Oct 2022 12:59)    LIVER FUNCTIONS - ( 01 Oct 2022 03:00 )  Alb: 3.8 g/dL / Pro: 6.6 g/dL / ALK PHOS: 72 U/L / ALT: <5 U/L / AST: 44 U/L / GGT: x             PT/INR - ( 01 Oct 2022 03:00 )   PT: 13.4 sec;   INR: 1.15 ratio         PTT - ( 01 Oct 2022 03:00 )  PTT:29.9 sec  CSF:                  Radiology:  CT Head No Cont:  (30 Sep 2022 19:58)  < from: CT Head No Cont (09.30.22 @ 19:58) >  1.  No CT evidence for acute intracranial hemorrhage, territorial   infarction or mass effect. If the patient has persistent symptoms and/or   new focal neurologic deficits, consider further evaluation with MRI.    2.  Prior chronic right PCA territory infarct. Nonspecific findings   suggestive of chronic small vessel ischemia.    < end of copied text >     Neurology - Consult Note      Spectra: [phone] ([facility]), [phone] ([facility])  -    HPI: Patient ALEXANDRA ESPOSITO is a 81y (1940) man PD, HLD, HTN, DM, SSS with pacemaker, sciatic presents with hallucinations. WIfe at bedside reports for the last couple of years patient had visual hallucinations and was controlled on Klonipine 0.25 mg BID. However the last several days patient has been screaming that someone is injecting him with parasites. He was talking sinemet  mg 3 tablets Q 4 then was started on Ongentys 3 months ago, sinemet was decreased to 2.5 tablets. Patient and wife note improvement with ongentys.  Pimavanserin 34 mg daily was then added 2.5 weeks ago for hallucinations. She was told by her PCP to increase klonipin to 0.5 mg TID. He also has been having back pain radiating down his leg for several days. He stopped going to PT 1 month ago. At baseline, patient is oriented x 3, occasional gets confused. Speech at baseline is dysarthric and disfluent. Wife also noted he has decreased vision in left eye. He ambulates with walker, but after stopping PT will stand and walk couple of steps.       Review of Systems:   All other review of systems is negative unless indicated above.    Allergies:      PMHx/PSHx/Family Hx: As above, otherwise see below   CAD (Coronary Artery Disease)    Cancer of Prostate Seed implant 2004    Diabetes Mellitus    Dyslipidemia    Hypertension    Parkinson&#x27;s Disease    Hyperlipidemia    CAD (coronary artery disease)    Hypertension    Neuropathy    Osteoarthrosis      Medications:  MEDICATIONS  (STANDING):  carbidopa/levodopa  25/100 2.5 Tablet(s) Oral <User Schedule>  dextrose 5%. 1000 milliLiter(s) (100 mL/Hr) IV Continuous <Continuous>  dextrose 5%. 1000 milliLiter(s) (50 mL/Hr) IV Continuous <Continuous>  dextrose 50% Injectable 25 Gram(s) IV Push once  dextrose 50% Injectable 12.5 Gram(s) IV Push once  dextrose 50% Injectable 25 Gram(s) IV Push once  glucagon  Injectable 1 milliGRAM(s) IntraMuscular once  heparin   Injectable 5000 Unit(s) SubCutaneous every 12 hours  insulin lispro (ADMELOG) corrective regimen sliding scale   SubCutaneous every 6 hours  lactated ringers. 1000 milliLiter(s) (125 mL/Hr) IV Continuous <Continuous>  mirtazapine 15 milliGRAM(s) Oral at bedtime  Opicapone (Ongentys) 50 milliGRAM(s)   Oral <User Schedule>  pantoprazole  Injectable 40 milliGRAM(s) IV Push every 24 hours  pimavanserin Capsule 34 milliGRAM(s) Oral <User Schedule>  piperacillin/tazobactam IVPB.. 3.375 Gram(s) IV Intermittent every 8 hours  polyethylene glycol 3350 17 Gram(s) Oral daily    MEDICATIONS  (PRN):  acetaminophen     Tablet .. 650 milliGRAM(s) Oral every 6 hours PRN Temp greater or equal to 38C (100.4F), Mild Pain (1 - 3)  clonazePAM  Tablet 0.5 milliGRAM(s) Oral every 12 hours PRN anxiety  dextrose Oral Gel 15 Gram(s) Oral once PRN Blood Glucose LESS THAN 70 milliGRAM(s)/deciliter      Vitals:  T(C): 36.8 (10-01-22 @ 11:00), Max: 37.2 (10-01-22 @ 01:05)  HR: 76 (10-01-22 @ 11:00) (72 - 92)  BP: 160/74 (10-01-22 @ 11:00) (118/90 - 160/74)  RR: 16 (10-01-22 @ 11:00) (16 - 16)  SpO2: 100% (10-01-22 @ 11:00) (98% - 100%)    Physical Examination:   General - NAD    Neurologic Exam:  Mental status - Awake, Alert, Oriented to person, place (Hospital), and year). Speech disfluent, hypophonic, tachyphemia. Follows simple commands.     Cranial nerves - PERRL, BTT b/l, EOMI, face sensation (V1-V3) intact b/l, facial strength intact without asymmetry b/l, palate with symmetric elevation, trapezius OR sternocleidomastiod 5/5 strength b/l, tongue midline on protrusion with full lateral movement    Motor - Cogwheel regidity b/l upper extremity   Mod-severe bradykinesia.  Masked facies.     Able to move UE b/l against gravity 4+  Able to move left leg against resistance 4+, Right leg limited exam due to pain    Sensation - Light touch intact throughout      Coordination - Finger to Nose intact b/l. Resting right tremor    Gait and station - Deffered    Labs:                        12.0   10.46 )-----------( 272      ( 01 Oct 2022 03:00 )             39.3     10-01    140  |  106  |  14  ----------------------------<  88  4.6   |  25  |  0.39<L>    Ca    9.0      01 Oct 2022 03:00  Phos  3.1     10-01  Mg     1.90     10-01    TPro  6.6  /  Alb  3.8  /  TBili  0.4  /  DBili  x   /  AST  44<H>  /  ALT  <5<L>  /  AlkPhos  72  10-01    CAPILLARY BLOOD GLUCOSE      POCT Blood Glucose.: 101 mg/dL (01 Oct 2022 12:59)    LIVER FUNCTIONS - ( 01 Oct 2022 03:00 )  Alb: 3.8 g/dL / Pro: 6.6 g/dL / ALK PHOS: 72 U/L / ALT: <5 U/L / AST: 44 U/L / GGT: x             PT/INR - ( 01 Oct 2022 03:00 )   PT: 13.4 sec;   INR: 1.15 ratio         PTT - ( 01 Oct 2022 03:00 )  PTT:29.9 sec  CSF:                  Radiology:  CT Head No Cont:  (30 Sep 2022 19:58)  < from: CT Head No Cont (09.30.22 @ 19:58) >  1.  No CT evidence for acute intracranial hemorrhage, territorial   infarction or mass effect. If the patient has persistent symptoms and/or   new focal neurologic deficits, consider further evaluation with MRI.    2.  Prior chronic right PCA territory infarct. Nonspecific findings   suggestive of chronic small vessel ischemia.    < end of copied text >

## 2022-10-02 LAB
ANION GAP SERPL CALC-SCNC: 12 MMOL/L — SIGNIFICANT CHANGE UP (ref 7–14)
BUN SERPL-MCNC: 9 MG/DL — SIGNIFICANT CHANGE UP (ref 7–23)
CALCIUM SERPL-MCNC: 8.5 MG/DL — SIGNIFICANT CHANGE UP (ref 8.4–10.5)
CHLORIDE SERPL-SCNC: 107 MMOL/L — SIGNIFICANT CHANGE UP (ref 98–107)
CO2 SERPL-SCNC: 23 MMOL/L — SIGNIFICANT CHANGE UP (ref 22–31)
CREAT SERPL-MCNC: 0.34 MG/DL — LOW (ref 0.5–1.3)
CULTURE RESULTS: SIGNIFICANT CHANGE UP
EGFR: 114 ML/MIN/1.73M2 — SIGNIFICANT CHANGE UP
GLUCOSE BLDC GLUCOMTR-MCNC: 102 MG/DL — HIGH (ref 70–99)
GLUCOSE BLDC GLUCOMTR-MCNC: 103 MG/DL — HIGH (ref 70–99)
GLUCOSE BLDC GLUCOMTR-MCNC: 89 MG/DL — SIGNIFICANT CHANGE UP (ref 70–99)
GLUCOSE BLDC GLUCOMTR-MCNC: 98 MG/DL — SIGNIFICANT CHANGE UP (ref 70–99)
GLUCOSE SERPL-MCNC: 102 MG/DL — HIGH (ref 70–99)
HCT VFR BLD CALC: 34.5 % — LOW (ref 39–50)
HGB BLD-MCNC: 10.4 G/DL — LOW (ref 13–17)
MAGNESIUM SERPL-MCNC: 1.5 MG/DL — LOW (ref 1.6–2.6)
MCHC RBC-ENTMCNC: 29.3 PG — SIGNIFICANT CHANGE UP (ref 27–34)
MCHC RBC-ENTMCNC: 30.1 GM/DL — LOW (ref 32–36)
MCV RBC AUTO: 97.2 FL — SIGNIFICANT CHANGE UP (ref 80–100)
NRBC # BLD: 0 /100 WBCS — SIGNIFICANT CHANGE UP (ref 0–0)
NRBC # FLD: 0 K/UL — SIGNIFICANT CHANGE UP (ref 0–0)
PHOSPHATE SERPL-MCNC: 2.6 MG/DL — SIGNIFICANT CHANGE UP (ref 2.5–4.5)
PLATELET # BLD AUTO: 238 K/UL — SIGNIFICANT CHANGE UP (ref 150–400)
POTASSIUM SERPL-MCNC: 3.9 MMOL/L — SIGNIFICANT CHANGE UP (ref 3.5–5.3)
POTASSIUM SERPL-SCNC: 3.9 MMOL/L — SIGNIFICANT CHANGE UP (ref 3.5–5.3)
RBC # BLD: 3.55 M/UL — LOW (ref 4.2–5.8)
RBC # FLD: 13 % — SIGNIFICANT CHANGE UP (ref 10.3–14.5)
SODIUM SERPL-SCNC: 142 MMOL/L — SIGNIFICANT CHANGE UP (ref 135–145)
SPECIMEN SOURCE: SIGNIFICANT CHANGE UP
WBC # BLD: 7.35 K/UL — SIGNIFICANT CHANGE UP (ref 3.8–10.5)
WBC # FLD AUTO: 7.35 K/UL — SIGNIFICANT CHANGE UP (ref 3.8–10.5)

## 2022-10-02 PROCEDURE — 99232 SBSQ HOSP IP/OBS MODERATE 35: CPT

## 2022-10-02 PROCEDURE — 93010 ELECTROCARDIOGRAM REPORT: CPT

## 2022-10-02 RX ORDER — MAGNESIUM SULFATE 500 MG/ML
2 VIAL (ML) INJECTION ONCE
Refills: 0 | Status: COMPLETED | OUTPATIENT
Start: 2022-10-02 | End: 2022-10-02

## 2022-10-02 RX ORDER — INSULIN LISPRO 100/ML
VIAL (ML) SUBCUTANEOUS
Refills: 0 | Status: DISCONTINUED | OUTPATIENT
Start: 2022-10-02 | End: 2022-10-17

## 2022-10-02 RX ORDER — INSULIN LISPRO 100/ML
VIAL (ML) SUBCUTANEOUS AT BEDTIME
Refills: 0 | Status: DISCONTINUED | OUTPATIENT
Start: 2022-10-02 | End: 2022-10-17

## 2022-10-02 RX ADMIN — CARBIDOPA AND LEVODOPA 2.5 TABLET(S): 25; 100 TABLET ORAL at 21:29

## 2022-10-02 RX ADMIN — PIPERACILLIN AND TAZOBACTAM 25 GRAM(S): 4; .5 INJECTION, POWDER, LYOPHILIZED, FOR SOLUTION INTRAVENOUS at 11:52

## 2022-10-02 RX ADMIN — HEPARIN SODIUM 5000 UNIT(S): 5000 INJECTION INTRAVENOUS; SUBCUTANEOUS at 19:33

## 2022-10-02 RX ADMIN — Medication 1 MILLIGRAM(S): at 13:04

## 2022-10-02 RX ADMIN — CARBIDOPA AND LEVODOPA 2.5 TABLET(S): 25; 100 TABLET ORAL at 09:25

## 2022-10-02 RX ADMIN — Medication 1 MILLIGRAM(S): at 07:19

## 2022-10-02 RX ADMIN — Medication 0.75 MILLIGRAM(S): at 15:21

## 2022-10-02 RX ADMIN — PIPERACILLIN AND TAZOBACTAM 25 GRAM(S): 4; .5 INJECTION, POWDER, LYOPHILIZED, FOR SOLUTION INTRAVENOUS at 04:52

## 2022-10-02 RX ADMIN — SODIUM CHLORIDE 125 MILLILITER(S): 9 INJECTION, SOLUTION INTRAVENOUS at 21:36

## 2022-10-02 RX ADMIN — Medication 0.5 MILLIGRAM(S): at 17:15

## 2022-10-02 RX ADMIN — PIMAVANSERIN TARTRATE 34 MILLIGRAM(S): 10 TABLET, COATED ORAL at 19:33

## 2022-10-02 RX ADMIN — SODIUM CHLORIDE 125 MILLILITER(S): 9 INJECTION, SOLUTION INTRAVENOUS at 09:27

## 2022-10-02 RX ADMIN — MIRTAZAPINE 15 MILLIGRAM(S): 45 TABLET, ORALLY DISINTEGRATING ORAL at 21:29

## 2022-10-02 RX ADMIN — Medication 25 GRAM(S): at 11:31

## 2022-10-02 RX ADMIN — Medication 0.75 MILLIGRAM(S): at 04:52

## 2022-10-02 RX ADMIN — PANTOPRAZOLE SODIUM 40 MILLIGRAM(S): 20 TABLET, DELAYED RELEASE ORAL at 04:52

## 2022-10-02 RX ADMIN — CARBIDOPA AND LEVODOPA 2.5 TABLET(S): 25; 100 TABLET ORAL at 02:38

## 2022-10-02 RX ADMIN — CARBIDOPA AND LEVODOPA 2.5 TABLET(S): 25; 100 TABLET ORAL at 19:32

## 2022-10-02 NOTE — CHART NOTE - NSCHARTNOTEFT_GEN_A_CORE
informed pt has been screaming  since early this am.  received klonopin with no improvement.  ativan 1mg ordered.  pt is actively hallucinating thinking he is being sodomized, beaten with blood on the floor.  per granddaughter who was on the phone and wife at bedside the hallucinations have gotten worse over the last week, no known hx of abuse, from [place] and father was a boxer.  discussed with psych on call who recommended ativan prn, both sinemet and parkinson's will cause hallucinations neuro will need to adjust meds, official  consult pending.  spoke with neuro who would like assistance from  for the hallucinations and will attempt to reach out to private neurologist today. will obtain ekg when possible.  per granddaughter hand pressure on chest as well as deep breathing can help a little with calming patient, Mr Negron calls out for granddaughter Marielle who can calm him down to the point of occasional screaming episodes. informed pt has been screaming  since early this am.  received klonopin with no improvement.  ativan 1mg ordered.  pt is actively hallucinating thinking he is being sodomized, beaten with blood on the floor.  per granddaughter who was on the phone and wife at bedside the hallucinations have gotten worse over the last week, no known hx of abuse, from [place] and father was a boxer.  discussed with psych on call who recommended ativan prn, both sinemet and parkinson's will cause hallucinations neuro will need to adjust meds, official  consult pending.  spoke with neuro who would like assistance from  for the hallucinations and will attempt to reach out to private neurologist today. will obtain ekg when possible.  per granddaughter hand pressure on chest as well as deep breathing can help a little with calming patient, Mr Negron calls out for granddaughter Marielle who can calm him down to the point of occasional screaming episodes.    addendum to above 8:40am   pt with good response to ativan, currently laying in bed smiling, calm and saying hello/good morning   will obtain ekg informed pt has been screaming  since early this am.  received klonopin with no improvement.  ativan 1mg ordered.  pt is actively hallucinating thinking he is being sodomized, beaten with blood on the floor.  per granddaughter who was on the phone and wife at bedside the hallucinations have gotten worse over the last week, no known hx of abuse, from [place] and father was a boxer.  discussed with psych on call who recommended ativan prn, both sinemet and parkinson's will cause hallucinations neuro will need to adjust meds, official  consult pending.  spoke with neuro who would like assistance from  for the hallucinations and will attempt to reach out to private neurologist today. will obtain ekg when possible.  per granddaughter hand pressure on chest as well as deep breathing can help a little with calming patient, Mr Negron calls out for granddaughter Marielle who can calm him down to the point of occasional screaming episodes.    addendum to above 8:40am   pt with good response to ativan, currently laying in bed smiling, calm and saying hello/good morning   will obtain ekg  will order ativan 1mg q6h prn for severe hallucinations informed pt has been screaming  since early this am.  received klonopin with no improvement.  ativan 1mg ordered.  pt is actively hallucinating thinking he is being sodomized, beaten with blood on the floor.  per granddaughter who was on the phone and wife at bedside the hallucinations have gotten worse over the last week, no known hx of abuse, from [place] and father was a boxer.  discussed with psych on call who recommended ativan prn, both sinemet and parkinson's will cause hallucinations neuro will need to adjust meds, official  consult pending.  spoke with neuro who would like assistance from  for the hallucinations and will attempt to reach out to private neurologist today. will obtain ekg when possible.  per granddaughter hand pressure on chest as well as deep breathing can help a little with calming patient, Mr Negron calls out for granddaughter Marielle who can calm him down to the point of occasional screaming episodes. pharmacy does not have ogentys - home medication receives at 3am     addendum to above 8:40am   pt with good response to ativan, currently laying in bed smiling, calm and saying hello/good morning   will obtain ekg  will order ativan 1mg q6h prn for severe hallucinations

## 2022-10-02 NOTE — PROGRESS NOTE ADULT - PROBLEM SELECTOR PLAN 1
-pt and wife without medication list with them  clarified meds Sinemet carbidopa/levodopa  25/100 2.5 Tablet(s) Oral <User Schedule>, Opicapone (Ongentys) 50 milliGRAM(s)   Oral <User Schedule> and pimavanserin Capsule 34 milliGRAM(s) Oral <User Schedule>  -hallucinations debilitating  Neurology consult called, follow up recs   increase   Klonopin , Ativan PRN  F/w EKG

## 2022-10-03 DIAGNOSIS — R19.00 INTRA-ABDOMINAL AND PELVIC SWELLING, MASS AND LUMP, UNSPECIFIED SITE: ICD-10-CM

## 2022-10-03 LAB
A1C WITH ESTIMATED AVERAGE GLUCOSE RESULT: 5.7 % — HIGH (ref 4–5.6)
ANION GAP SERPL CALC-SCNC: 12 MMOL/L — SIGNIFICANT CHANGE UP (ref 7–14)
BUN SERPL-MCNC: 8 MG/DL — SIGNIFICANT CHANGE UP (ref 7–23)
CALCIUM SERPL-MCNC: 8.5 MG/DL — SIGNIFICANT CHANGE UP (ref 8.4–10.5)
CHLORIDE SERPL-SCNC: 100 MMOL/L — SIGNIFICANT CHANGE UP (ref 98–107)
CO2 SERPL-SCNC: 23 MMOL/L — SIGNIFICANT CHANGE UP (ref 22–31)
CREAT SERPL-MCNC: 0.34 MG/DL — LOW (ref 0.5–1.3)
EGFR: 114 ML/MIN/1.73M2 — SIGNIFICANT CHANGE UP
ESTIMATED AVERAGE GLUCOSE: 117 — SIGNIFICANT CHANGE UP
GLUCOSE BLDC GLUCOMTR-MCNC: 116 MG/DL — HIGH (ref 70–99)
GLUCOSE BLDC GLUCOMTR-MCNC: 129 MG/DL — HIGH (ref 70–99)
GLUCOSE BLDC GLUCOMTR-MCNC: 74 MG/DL — SIGNIFICANT CHANGE UP (ref 70–99)
GLUCOSE BLDC GLUCOMTR-MCNC: 96 MG/DL — SIGNIFICANT CHANGE UP (ref 70–99)
GLUCOSE SERPL-MCNC: 89 MG/DL — SIGNIFICANT CHANGE UP (ref 70–99)
HAV IGM SER-ACNC: SIGNIFICANT CHANGE UP
HBV CORE IGM SER-ACNC: SIGNIFICANT CHANGE UP
HBV SURFACE AG SER-ACNC: SIGNIFICANT CHANGE UP
HCT VFR BLD CALC: 33.7 % — LOW (ref 39–50)
HCV AB S/CO SERPL IA: 0.1 S/CO — SIGNIFICANT CHANGE UP (ref 0–0.99)
HCV AB SERPL-IMP: SIGNIFICANT CHANGE UP
HGB BLD-MCNC: 10.5 G/DL — LOW (ref 13–17)
HIV 1+2 AB+HIV1 P24 AG SERPL QL IA: SIGNIFICANT CHANGE UP
MAGNESIUM SERPL-MCNC: 1.8 MG/DL — SIGNIFICANT CHANGE UP (ref 1.6–2.6)
MCHC RBC-ENTMCNC: 29.2 PG — SIGNIFICANT CHANGE UP (ref 27–34)
MCHC RBC-ENTMCNC: 31.2 GM/DL — LOW (ref 32–36)
MCV RBC AUTO: 93.9 FL — SIGNIFICANT CHANGE UP (ref 80–100)
NRBC # BLD: 0 /100 WBCS — SIGNIFICANT CHANGE UP (ref 0–0)
NRBC # FLD: 0 K/UL — SIGNIFICANT CHANGE UP (ref 0–0)
PHOSPHATE SERPL-MCNC: 3.2 MG/DL — SIGNIFICANT CHANGE UP (ref 2.5–4.5)
PLATELET # BLD AUTO: 224 K/UL — SIGNIFICANT CHANGE UP (ref 150–400)
POTASSIUM SERPL-MCNC: 3.3 MMOL/L — LOW (ref 3.5–5.3)
POTASSIUM SERPL-SCNC: 3.3 MMOL/L — LOW (ref 3.5–5.3)
RBC # BLD: 3.59 M/UL — LOW (ref 4.2–5.8)
RBC # FLD: 13 % — SIGNIFICANT CHANGE UP (ref 10.3–14.5)
SODIUM SERPL-SCNC: 135 MMOL/L — SIGNIFICANT CHANGE UP (ref 135–145)
WBC # BLD: 6.6 K/UL — SIGNIFICANT CHANGE UP (ref 3.8–10.5)
WBC # FLD AUTO: 6.6 K/UL — SIGNIFICANT CHANGE UP (ref 3.8–10.5)

## 2022-10-03 PROCEDURE — 99232 SBSQ HOSP IP/OBS MODERATE 35: CPT | Mod: FS

## 2022-10-03 RX ORDER — POTASSIUM CHLORIDE 20 MEQ
40 PACKET (EA) ORAL ONCE
Refills: 0 | Status: COMPLETED | OUTPATIENT
Start: 2022-10-03 | End: 2022-10-03

## 2022-10-03 RX ORDER — CLOPIDOGREL BISULFATE 75 MG/1
75 TABLET, FILM COATED ORAL DAILY
Refills: 0 | Status: DISCONTINUED | OUTPATIENT
Start: 2022-10-03 | End: 2022-10-17

## 2022-10-03 RX ORDER — ATORVASTATIN CALCIUM 80 MG/1
20 TABLET, FILM COATED ORAL AT BEDTIME
Refills: 0 | Status: DISCONTINUED | OUTPATIENT
Start: 2022-10-03 | End: 2022-10-17

## 2022-10-03 RX ADMIN — CARBIDOPA AND LEVODOPA 2.5 TABLET(S): 25; 100 TABLET ORAL at 11:21

## 2022-10-03 RX ADMIN — PANTOPRAZOLE SODIUM 40 MILLIGRAM(S): 20 TABLET, DELAYED RELEASE ORAL at 05:31

## 2022-10-03 RX ADMIN — ATORVASTATIN CALCIUM 20 MILLIGRAM(S): 80 TABLET, FILM COATED ORAL at 21:21

## 2022-10-03 RX ADMIN — Medication 40 MILLIEQUIVALENT(S): at 19:26

## 2022-10-03 RX ADMIN — PIMAVANSERIN TARTRATE 34 MILLIGRAM(S): 10 TABLET, COATED ORAL at 13:40

## 2022-10-03 RX ADMIN — CARBIDOPA AND LEVODOPA 2.5 TABLET(S): 25; 100 TABLET ORAL at 21:22

## 2022-10-03 RX ADMIN — Medication 1 MILLIGRAM(S): at 17:20

## 2022-10-03 RX ADMIN — Medication 1 MILLIGRAM(S): at 01:04

## 2022-10-03 RX ADMIN — CARBIDOPA AND LEVODOPA 2.5 TABLET(S): 25; 100 TABLET ORAL at 17:49

## 2022-10-03 RX ADMIN — HEPARIN SODIUM 5000 UNIT(S): 5000 INJECTION INTRAVENOUS; SUBCUTANEOUS at 05:31

## 2022-10-03 RX ADMIN — HEPARIN SODIUM 5000 UNIT(S): 5000 INJECTION INTRAVENOUS; SUBCUTANEOUS at 17:50

## 2022-10-03 RX ADMIN — SODIUM CHLORIDE 125 MILLILITER(S): 9 INJECTION, SOLUTION INTRAVENOUS at 12:29

## 2022-10-03 RX ADMIN — CARBIDOPA AND LEVODOPA 2.5 TABLET(S): 25; 100 TABLET ORAL at 05:31

## 2022-10-03 RX ADMIN — MIRTAZAPINE 15 MILLIGRAM(S): 45 TABLET, ORALLY DISINTEGRATING ORAL at 21:21

## 2022-10-03 RX ADMIN — POLYETHYLENE GLYCOL 3350 17 GRAM(S): 17 POWDER, FOR SOLUTION ORAL at 17:50

## 2022-10-03 RX ADMIN — CARBIDOPA AND LEVODOPA 2.5 TABLET(S): 25; 100 TABLET ORAL at 13:39

## 2022-10-03 RX ADMIN — SODIUM CHLORIDE 125 MILLILITER(S): 9 INJECTION, SOLUTION INTRAVENOUS at 19:44

## 2022-10-03 RX ADMIN — CARBIDOPA AND LEVODOPA 2.5 TABLET(S): 25; 100 TABLET ORAL at 01:48

## 2022-10-03 NOTE — SWALLOW BEDSIDE ASSESSMENT ADULT - ORAL PHASE
Decreased anterior-posterior movement of the bolus/Delayed oral transit time Within functional limits slowed mastication with adequate ability to break down solids/Decreased anterior-posterior movement of the bolus/Delayed oral transit time

## 2022-10-03 NOTE — SWALLOW BEDSIDE ASSESSMENT ADULT - COMMENTS
Per Hospitalist Note 10/2/22: "Patient is an 81 year old male hx of insulin dependent DM2, Parkinson's dx, sick sinus syndrome w/ pacemaker, here w/ hallucinations found to have colonic mass and arterial disease. 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 severe r. deep femoral artery stenosis"     Per Neurology Note 10/3/22: "Mr. Millan is an 82 yo man with a h/o idiopathic Parkinson disease with worsening hallucinations/delusions."     Patient received upright in bed, awake and alert with family member present at bedside who served as a reliable information. Per family member patient consumes regular solids with thin liquids at home however reports occasional stuck sensation and coughing during meals. Patient with ability to follow multi-step commands, answer yes/no questions and participate in conversational speech, however speech noted to be dysarthric likely consistent with Parkinson's Diagnosis. Per Caregiver, patient previously received home care speech/language services targeting voice and speech. Per Hospitalist Note 10/2/22: "Patient is an 81 year old male hx of insulin dependent DM2, Parkinson's dx, sick sinus syndrome w/ pacemaker, here w/ hallucinations found to have colonic mass and arterial disease. 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 severe r. deep femoral artery stenosis"    Per Neurology Note 10/3/22: "Mr. Millan is an 80 yo man with a h/o idiopathic Parkinson disease with worsening hallucinations/delusions."     Patient received upright in bed, awake and alert with family member present at bedside who served as a reliable information. Per family member patient consumes regular solids with thin liquids at home however reports occasional stuck sensation and coughing during meals. Patient with ability to follow multi-step commands, answer yes/no questions and participate in conversational speech, however speech noted to be dysarthric likely consistent with Parkinson's Diagnosis. Per Caregiver, patient previously received home care speech/language services targeting voice and speech. Per Hospitalist Note 10/2/22: "Patient is an 81 year old male hx of insulin dependent DM2, Parkinson's dx, sick sinus syndrome w/ pacemaker, here w/ hallucinations found to have colonic mass and arterial disease. 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 severe r. deep femoral artery stenosis"    Per Neurology Note 10/3/22: "Mr. Millan is an 80 yo man with a h/o idiopathic Parkinson disease with worsening hallucinations/delusions."     Patient received upright in bed, awake and alert with family member present at bedside who served as a reliable information. Per patient's wife, patient consumes regular solids with thin liquids at home however reports occasional stuck sensation and coughing during meals. Patient with ability to follow multi-step commands, answer yes/no questions and participate in conversational speech, however speech noted to be dysarthric likely consistent with Parkinson's Diagnosis. Per patient's wife, patient previously received home care speech/language services targeting voice and speech. Per Hospitalist Note 10/2/22: "Patient is an 81 year old male hx of insulin dependent DM2, Parkinson's dx, sick sinus syndrome w/ pacemaker, here w/ hallucinations found to have colonic mass and arterial disease. 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 severe r. deep femoral artery stenosis"    Per Neurology Note 10/3/22: "Mr. Millan is an 82 yo man with a h/o idiopathic Parkinson disease with worsening hallucinations/delusions."     Patient received upright in bed, awake and alert with wife present at bedside who served as a reliable information. Per patient's wife, patient consumes regular solids with thin liquids at home however reports occasional stuck sensation and coughing during meals. Patient with ability to follow multi-step commands, answer yes/no questions and participate in conversational speech, however speech noted to be dysarthric likely consistent with Parkinson's Diagnosis. Per patient's wife, patient previously received home care speech/language services targeting voice and speech.

## 2022-10-03 NOTE — SWALLOW BEDSIDE ASSESSMENT ADULT - CONSISTENCIES ADMINISTERED
1/2 rachelle cracker/regular solid 4 oz/pureed 3 oz/thin liquid 1 rachelle cracker/soft & bite-sized

## 2022-10-03 NOTE — PROGRESS NOTE ADULT - PROBLEM SELECTOR PLAN 1
-c/w home Sinemet carbidopa/levodopa  25/100 2.5 Tablet(s) q4H  -C/w home Opicapone (Ongentys) 50 mg QD (family to bring in medication tomorrow as not available on formulary)  -C/w home pimavanserin Capsule 34 mg QD  -c/w Klonopin at increased dose 0.75mg q8H  -c/w prn ativan  -Appreciate neuro recs  -Outpatient neurologist (Michael Zamora called 10/3)- awaiting call back  -Psych consulted

## 2022-10-03 NOTE — SWALLOW BEDSIDE ASSESSMENT ADULT - SWALLOW EVAL: DIAGNOSIS
Patient presents with oropharyngeal Patient presents with oropharyngeal dysphagia given thin liquids, puree, soft & bite sized solids and regular solids marked by adequate bolus containment, slowed bolus manipulation, slowed mastication with adequate ability to break down soft & bite sized solids and regular solids and slowed anterior to posterior transport. adequate oral clearance noted post primary swallow. Pharyngeal stage marked by observed initiation of the pharyngeal swallow trigger judged via laryngeal palpation. Patient with reports of stuck sensation following regular solids, requiring multiple liquid washes to clear, possibly indicative of pharyngeal clearance deficit. Patient denies stuck sensation with thin liquids, puree and soft & bite sized solids. No overt s/sx of impaired airway protection observed for all trials.

## 2022-10-04 LAB
ANION GAP SERPL CALC-SCNC: 11 MMOL/L — SIGNIFICANT CHANGE UP (ref 7–14)
BUN SERPL-MCNC: 6 MG/DL — LOW (ref 7–23)
CALCIUM SERPL-MCNC: 8.6 MG/DL — SIGNIFICANT CHANGE UP (ref 8.4–10.5)
CHLORIDE SERPL-SCNC: 104 MMOL/L — SIGNIFICANT CHANGE UP (ref 98–107)
CHOLEST SERPL-MCNC: 168 MG/DL — SIGNIFICANT CHANGE UP
CO2 SERPL-SCNC: 25 MMOL/L — SIGNIFICANT CHANGE UP (ref 22–31)
CREAT SERPL-MCNC: 0.27 MG/DL — LOW (ref 0.5–1.3)
EGFR: 122 ML/MIN/1.73M2 — SIGNIFICANT CHANGE UP
GLUCOSE BLDC GLUCOMTR-MCNC: 118 MG/DL — HIGH (ref 70–99)
GLUCOSE BLDC GLUCOMTR-MCNC: 120 MG/DL — HIGH (ref 70–99)
GLUCOSE BLDC GLUCOMTR-MCNC: 141 MG/DL — HIGH (ref 70–99)
GLUCOSE SERPL-MCNC: 119 MG/DL — HIGH (ref 70–99)
HCT VFR BLD CALC: 37.9 % — LOW (ref 39–50)
HDLC SERPL-MCNC: 41 MG/DL — SIGNIFICANT CHANGE UP
HGB BLD-MCNC: 11.9 G/DL — LOW (ref 13–17)
LIPID PNL WITH DIRECT LDL SERPL: 110 MG/DL — HIGH
MAGNESIUM SERPL-MCNC: 1.6 MG/DL — SIGNIFICANT CHANGE UP (ref 1.6–2.6)
MCHC RBC-ENTMCNC: 29.4 PG — SIGNIFICANT CHANGE UP (ref 27–34)
MCHC RBC-ENTMCNC: 31.4 GM/DL — LOW (ref 32–36)
MCV RBC AUTO: 93.6 FL — SIGNIFICANT CHANGE UP (ref 80–100)
NON HDL CHOLESTEROL: 127 MG/DL — SIGNIFICANT CHANGE UP
NRBC # BLD: 0 /100 WBCS — SIGNIFICANT CHANGE UP (ref 0–0)
NRBC # FLD: 0 K/UL — SIGNIFICANT CHANGE UP (ref 0–0)
PHOSPHATE SERPL-MCNC: 2.2 MG/DL — LOW (ref 2.5–4.5)
PLATELET # BLD AUTO: 256 K/UL — SIGNIFICANT CHANGE UP (ref 150–400)
POTASSIUM SERPL-MCNC: 3.7 MMOL/L — SIGNIFICANT CHANGE UP (ref 3.5–5.3)
POTASSIUM SERPL-SCNC: 3.7 MMOL/L — SIGNIFICANT CHANGE UP (ref 3.5–5.3)
RBC # BLD: 4.05 M/UL — LOW (ref 4.2–5.8)
RBC # FLD: 13 % — SIGNIFICANT CHANGE UP (ref 10.3–14.5)
SODIUM SERPL-SCNC: 140 MMOL/L — SIGNIFICANT CHANGE UP (ref 135–145)
TRIGL SERPL-MCNC: 83 MG/DL — SIGNIFICANT CHANGE UP
WBC # BLD: 7.51 K/UL — SIGNIFICANT CHANGE UP (ref 3.8–10.5)
WBC # FLD AUTO: 7.51 K/UL — SIGNIFICANT CHANGE UP (ref 3.8–10.5)

## 2022-10-04 PROCEDURE — 95720 EEG PHY/QHP EA INCR W/VEEG: CPT

## 2022-10-04 PROCEDURE — 99232 SBSQ HOSP IP/OBS MODERATE 35: CPT | Mod: FS

## 2022-10-04 PROCEDURE — 74230 X-RAY XM SWLNG FUNCJ C+: CPT | Mod: 26

## 2022-10-04 RX ADMIN — SODIUM CHLORIDE 125 MILLILITER(S): 9 INJECTION, SOLUTION INTRAVENOUS at 03:34

## 2022-10-04 RX ADMIN — CARBIDOPA AND LEVODOPA 2.5 TABLET(S): 25; 100 TABLET ORAL at 12:02

## 2022-10-04 RX ADMIN — CLOPIDOGREL BISULFATE 75 MILLIGRAM(S): 75 TABLET, FILM COATED ORAL at 12:01

## 2022-10-04 RX ADMIN — Medication 1 MILLIGRAM(S): at 21:46

## 2022-10-04 RX ADMIN — PANTOPRAZOLE SODIUM 40 MILLIGRAM(S): 20 TABLET, DELAYED RELEASE ORAL at 06:15

## 2022-10-04 RX ADMIN — MIRTAZAPINE 15 MILLIGRAM(S): 45 TABLET, ORALLY DISINTEGRATING ORAL at 21:11

## 2022-10-04 RX ADMIN — CARBIDOPA AND LEVODOPA 2.5 TABLET(S): 25; 100 TABLET ORAL at 02:01

## 2022-10-04 RX ADMIN — PIMAVANSERIN TARTRATE 34 MILLIGRAM(S): 10 TABLET, COATED ORAL at 13:24

## 2022-10-04 RX ADMIN — CARBIDOPA AND LEVODOPA 2.5 TABLET(S): 25; 100 TABLET ORAL at 18:21

## 2022-10-04 RX ADMIN — HEPARIN SODIUM 5000 UNIT(S): 5000 INJECTION INTRAVENOUS; SUBCUTANEOUS at 18:22

## 2022-10-04 RX ADMIN — Medication 1 MILLIGRAM(S): at 13:33

## 2022-10-04 RX ADMIN — CARBIDOPA AND LEVODOPA 2.5 TABLET(S): 25; 100 TABLET ORAL at 21:11

## 2022-10-04 RX ADMIN — ATORVASTATIN CALCIUM 20 MILLIGRAM(S): 80 TABLET, FILM COATED ORAL at 21:10

## 2022-10-04 RX ADMIN — CARBIDOPA AND LEVODOPA 2.5 TABLET(S): 25; 100 TABLET ORAL at 06:16

## 2022-10-04 RX ADMIN — SODIUM CHLORIDE 125 MILLILITER(S): 9 INJECTION, SOLUTION INTRAVENOUS at 12:00

## 2022-10-04 RX ADMIN — HEPARIN SODIUM 5000 UNIT(S): 5000 INJECTION INTRAVENOUS; SUBCUTANEOUS at 06:15

## 2022-10-04 RX ADMIN — CARBIDOPA AND LEVODOPA 2.5 TABLET(S): 25; 100 TABLET ORAL at 14:05

## 2022-10-04 NOTE — SWALLOW VFSS/MBS ASSESSMENT ADULT - COMMENTS
Internal Medicine note 10/3/22 "81 year old male hx of insulin dependent DM2, Parkinson's dx, sick sinus syndrome w/ pacemaker p/w hallucinations found to have colonic mass and arterial disease. 4 by 4 cm intraperitoneal mass  mod SMA stenosis, severe ARTURO stenosis. severe r. deep femoral artery stenosis"    Neurology Note 10/3/22: "Mr. Millan is an 82 yo man with a h/o idiopathic Parkinson disease with worsening hallucinations/delusions."     Patient seen on 10/3/22 for swallow evaluation with recommendations for Soft and bite sized solids with thin liquids and Cinesophagram (see report for details).    Patient seen seated upright, awake/alert, at Radiology suite for Cinesophagram this AM. Patient able to follow simple directions and answer yes/no questions. Patient denied pain.

## 2022-10-04 NOTE — SWALLOW VFSS/MBS ASSESSMENT ADULT - ADDITIONAL RECOMMENDATIONS
1) Monitor for PO tolerance and intake 2) Reconsult this service as needed 3) This service to follow up as schedule permits for dysphagia management 4) Patient will benefit from swallow therapy pending discharge plans (i.e., homecare vs rehab vs [facility] [phone]).

## 2022-10-04 NOTE — SWALLOW VFSS/MBS ASSESSMENT ADULT - DIAGNOSTIC IMPRESSIONS
1) Mild oral dysphagia for soft and bite sized solids, puree, moderately thick, mildly thick, and thin liquids marked by adequate bolus containment, prolonged manipulation, and slow mastication for soft/bite sized solids. Delayed anterior to posterior bolus transport/transit. Premature spillage into the level of the hypopharynx (pyriforms) for thin liquids due to reduced tongue to palate seal noted. Adequate oral clearance noted post secondary re-swallow.   2) Mild to moderate pharyngeal dysphagia for soft and bite sized solids, puree, moderately thick liquids, mildly thick liquids, and thin liquids marked by delayed initiation of the swallow trigger, reduced tongue base propulsion, reduced epiglottic deflection, reduced hyolaryngeal elevation, and adequate pharyngeal constriction noted. Patient with adequate pharyngeal clearance post secondary swallow. There is incomplete closure of the laryngeal vestibule resulting in Deep Laryngeal Penetration during the swallow for thin liquids, with contrast remaining above the vocal folds. Patient is not sensitive to the penetrated material marked by absent cough response. No penetration or aspiration is viewed for soft and bite sized solids, puree, moderately thick, and mildly thick liquids.

## 2022-10-04 NOTE — PROGRESS NOTE ADULT - PROBLEM SELECTOR PLAN 1
-Discussed w/ pt's neurologist Dr. Michael Zamora 10/4, stated Pimavanserin likely contributing to increased hallucinations. Pt per MD has been tried on pimavanserin in the past. Improved parkinson's but worsened hallucinations, pt taken off. Restarted again on medication recently.  -Recommended slow d/c of pimavanserin; can give 34mg every other day for 4-5 days  -c/w home Sinemet carbidopa/levodopa  25/100 2.5 Tablet(s) q4H  -C/w home Opicapone (Ongentys) 50 mg QD   -c/w Klonopin at increased dose 0.75mg q8H  -c/w prn ativan  -Appreciate neuro | psych recs

## 2022-10-04 NOTE — SWALLOW VFSS/MBS ASSESSMENT ADULT - RECOMMENDED FEEDING/EATING TECHNIQUES
Small bites/sips; slow pace; allow for swallow between intakes; alternate food with liquid; position upright (90 degrees); oral hygiene; crush medication unless contraindicated

## 2022-10-04 NOTE — SWALLOW VFSS/MBS ASSESSMENT ADULT - ORAL PHASE
Delayed oral transit time/Reduced anterior - posterior transport Delayed oral transit time/Reduced anterior - posterior transport/Uncontrolled bolus / spillover in hypopharynx

## 2022-10-05 LAB
ANION GAP SERPL CALC-SCNC: 9 MMOL/L — SIGNIFICANT CHANGE UP (ref 7–14)
BUN SERPL-MCNC: 6 MG/DL — LOW (ref 7–23)
CALCIUM SERPL-MCNC: 8.4 MG/DL — SIGNIFICANT CHANGE UP (ref 8.4–10.5)
CHLORIDE SERPL-SCNC: 105 MMOL/L — SIGNIFICANT CHANGE UP (ref 98–107)
CO2 SERPL-SCNC: 27 MMOL/L — SIGNIFICANT CHANGE UP (ref 22–31)
CREAT SERPL-MCNC: 0.31 MG/DL — LOW (ref 0.5–1.3)
EGFR: 117 ML/MIN/1.73M2 — SIGNIFICANT CHANGE UP
GLUCOSE BLDC GLUCOMTR-MCNC: 104 MG/DL — HIGH (ref 70–99)
GLUCOSE BLDC GLUCOMTR-MCNC: 104 MG/DL — HIGH (ref 70–99)
GLUCOSE BLDC GLUCOMTR-MCNC: 151 MG/DL — HIGH (ref 70–99)
GLUCOSE BLDC GLUCOMTR-MCNC: 91 MG/DL — SIGNIFICANT CHANGE UP (ref 70–99)
GLUCOSE SERPL-MCNC: 109 MG/DL — HIGH (ref 70–99)
HCT VFR BLD CALC: 35.1 % — LOW (ref 39–50)
HGB BLD-MCNC: 10.9 G/DL — LOW (ref 13–17)
MAGNESIUM SERPL-MCNC: 1.6 MG/DL — SIGNIFICANT CHANGE UP (ref 1.6–2.6)
MCHC RBC-ENTMCNC: 29.4 PG — SIGNIFICANT CHANGE UP (ref 27–34)
MCHC RBC-ENTMCNC: 31.1 GM/DL — LOW (ref 32–36)
MCV RBC AUTO: 94.6 FL — SIGNIFICANT CHANGE UP (ref 80–100)
NRBC # BLD: 0 /100 WBCS — SIGNIFICANT CHANGE UP (ref 0–0)
NRBC # FLD: 0 K/UL — SIGNIFICANT CHANGE UP (ref 0–0)
PHOSPHATE SERPL-MCNC: 2.5 MG/DL — SIGNIFICANT CHANGE UP (ref 2.5–4.5)
PLATELET # BLD AUTO: 255 K/UL — SIGNIFICANT CHANGE UP (ref 150–400)
POTASSIUM SERPL-MCNC: 3.6 MMOL/L — SIGNIFICANT CHANGE UP (ref 3.5–5.3)
POTASSIUM SERPL-SCNC: 3.6 MMOL/L — SIGNIFICANT CHANGE UP (ref 3.5–5.3)
RBC # BLD: 3.71 M/UL — LOW (ref 4.2–5.8)
RBC # FLD: 13.2 % — SIGNIFICANT CHANGE UP (ref 10.3–14.5)
SODIUM SERPL-SCNC: 141 MMOL/L — SIGNIFICANT CHANGE UP (ref 135–145)
WBC # BLD: 6.56 K/UL — SIGNIFICANT CHANGE UP (ref 3.8–10.5)
WBC # FLD AUTO: 6.56 K/UL — SIGNIFICANT CHANGE UP (ref 3.8–10.5)

## 2022-10-05 PROCEDURE — 99232 SBSQ HOSP IP/OBS MODERATE 35: CPT | Mod: FS

## 2022-10-05 RX ORDER — QUETIAPINE FUMARATE 200 MG/1
12.5 TABLET, FILM COATED ORAL EVERY 6 HOURS
Refills: 0 | Status: DISCONTINUED | OUTPATIENT
Start: 2022-10-05 | End: 2022-10-12

## 2022-10-05 RX ADMIN — PANTOPRAZOLE SODIUM 40 MILLIGRAM(S): 20 TABLET, DELAYED RELEASE ORAL at 05:07

## 2022-10-05 RX ADMIN — CARBIDOPA AND LEVODOPA 2.5 TABLET(S): 25; 100 TABLET ORAL at 10:51

## 2022-10-05 RX ADMIN — Medication 1 MILLIGRAM(S): at 14:12

## 2022-10-05 RX ADMIN — CLOPIDOGREL BISULFATE 75 MILLIGRAM(S): 75 TABLET, FILM COATED ORAL at 13:08

## 2022-10-05 RX ADMIN — HEPARIN SODIUM 5000 UNIT(S): 5000 INJECTION INTRAVENOUS; SUBCUTANEOUS at 18:15

## 2022-10-05 RX ADMIN — Medication 1: at 13:08

## 2022-10-05 RX ADMIN — CARBIDOPA AND LEVODOPA 2.5 TABLET(S): 25; 100 TABLET ORAL at 18:13

## 2022-10-05 RX ADMIN — SODIUM CHLORIDE 125 MILLILITER(S): 9 INJECTION, SOLUTION INTRAVENOUS at 18:17

## 2022-10-05 RX ADMIN — HEPARIN SODIUM 5000 UNIT(S): 5000 INJECTION INTRAVENOUS; SUBCUTANEOUS at 05:08

## 2022-10-05 RX ADMIN — POLYETHYLENE GLYCOL 3350 17 GRAM(S): 17 POWDER, FOR SOLUTION ORAL at 13:08

## 2022-10-05 RX ADMIN — CARBIDOPA AND LEVODOPA 2.5 TABLET(S): 25; 100 TABLET ORAL at 05:08

## 2022-10-05 NOTE — PROGRESS NOTE ADULT - PROBLEM SELECTOR PLAN 1
-Discussed w/ pt's neurologist Dr. Michael Zamora 10/4, stated Pimavanserin likely contributing to increased hallucinations. Pt per MD has been tried on pimavanserin in the past. Improved parkinson's but worsened hallucinations, pt taken off. Restarted again on medication recently.  -Recommended slow d/c of pimavanserin; will give 34mg every other day for 4-5 days per neuro recs  -c/w home Sinemet carbidopa/levodopa  25/100 2.5 Tablet(s) q4H  -C/w home Opicapone (Ongentys) 50 mg QD   -c/w Klonopin at increased dose 0.75mg q8H PRN  -Discussed w/ psych, will start seroquel 12.5mg q6 PRN for hallucinations  -D/c ativan  -Appreciate neuro | psych recs

## 2022-10-05 NOTE — EEG REPORT - NS EEG TEXT BOX
ALEXANDRA ESPOSITO [id number]     Study Date: 		10-04-22 14:40 to 10-5-22 08:00  Duration x Hours: 17:20 hrs  --------------------------------------------------------------------------------------------------  History:  CC/ HPI Patient is a 81y old  Male who presents with a chief complaint of hallucinations (04 Oct 2022 18:05)    MEDICATIONS  (STANDING):  atorvastatin 20 milliGRAM(s) Oral at bedtime  carbidopa/levodopa  25/100 2.5 Tablet(s) Oral <User Schedule>  clopidogrel Tablet 75 milliGRAM(s) Oral daily  dextrose 5%. 1000 milliLiter(s) (100 mL/Hr) IV Continuous <Continuous>  dextrose 5%. 1000 milliLiter(s) (50 mL/Hr) IV Continuous <Continuous>  dextrose 50% Injectable 25 Gram(s) IV Push once  dextrose 50% Injectable 12.5 Gram(s) IV Push once  dextrose 50% Injectable 25 Gram(s) IV Push once  glucagon  Injectable 1 milliGRAM(s) IntraMuscular once  heparin   Injectable 5000 Unit(s) SubCutaneous every 12 hours  influenza  Vaccine (HIGH DOSE) 0.7 milliLiter(s) IntraMuscular once  insulin lispro (ADMELOG) corrective regimen sliding scale   SubCutaneous three times a day before meals  insulin lispro (ADMELOG) corrective regimen sliding scale   SubCutaneous at bedtime  lactated ringers. 1000 milliLiter(s) (125 mL/Hr) IV Continuous <Continuous>  mirtazapine 15 milliGRAM(s) Oral at bedtime  Opicapone (Ongentys) 50 milliGRAM(s) 50 milliGRAM(s) Oral at bedtime  pantoprazole  Injectable 40 milliGRAM(s) IV Push every 24 hours  pimavanserin Capsule 34 milliGRAM(s) Oral <User Schedule>  polyethylene glycol 3350 17 Gram(s) Oral daily    --------------------------------------------------------------------------------------------------  Study Interpretation:    [[[Abbreviation Key:  PDR=alpha rhythm/posterior dominant rhythm. A-P=anterior posterior.  Amplitude: ‘very low’:<20; ‘low’:20-49; ‘medium’:; ‘high’:>150uV.  Persistence for periodic/rhythmic patterns (% of epoch) ‘rare’:<1%; ‘occasional’:1-10%; ‘frequent’:10-50%; ‘abundant’:50-90%; ‘continuous’:>90%.  Persistence for sporadic discharges: ‘rare’:<1/hr; ‘occasional’:1/min-1/hr; ‘frequent’:>1/min; ‘abundant’:>1/10 sec.  RPP=rhythmic and periodic patterns; GRDA=generalized rhythmic delta activity; FIRDA=frontal intermittent GRDA; LRDA=lateralized rhythmic delta activity; TIRDA=temporal intermittent rhythmic delta activity;  LPD=PLED=lateralized periodic discharges; GPD=generalized periodic discharges; BIPDs =bilateral independent periodic discharges; Mf=multifocal; SIRPDs=stimulus induced rhythmic, periodic, or ictal appearing discharges; BIRDs=brief potentially ictal rhythmic discharges >4 Hz, lasting .5-10s; PFA (paroxysmal bursts >13 Hz or =8 Hz <10s).  Modifiers: +F=with fast component; +S=with spike component; +R=with rhythmic component.  S-B=burst suppression pattern.  Max=maximal. N1-drowsy; N2-stage II sleep; N3-slow wave sleep. SSS/BETS=small sharp spikes/benign epileptiform transients of sleep. HV=hyperventilation; PS=photic stimulation]]]    Daily EEG Visual Analysis    FINDINGS:      Background:  Continuous: continuous  Symmetry: symmetric  PDR: 9 Hz activity, with amplitude to 40 uV, that attenuated to eye opening.  Low amplitude frontal beta noted in wakefulness.  Reactivity: present  Voltage: normal, mostly 20-150uV  Anterior Posterior Gradient: present  Other background findings: none  Breach: absent    Background Slowing:  Generalized slowing: none was present.  Focal slowing: right hemispheric delta and theta, max in parietal/occipital, attenuation of fast frequency compared to left    State Changes:   -Drowsiness noted with increased slowing, attenuation of fast activity, vertex transients.  -Present with N2 sleep transients with symmetric spindles and K-complexes.    Sporadic Epileptiform Discharges:    None    Rhythmic and Periodic Patterns (RPPs):  None     Electrographic and Electroclinical seizures:  None    Other Clinical Events:  None    Activation Procedures:   -Hyperventilation was not performed.    -Photic stimulation was not performed.    Artifacts:  Intermittent myogenic and movement artifacts were noted.    ECG:  The heart rate on single channel ECG was predominantly between 60-70 BPM.    EEG Classification / Summary:  Abnormal  EEG in the awake / drowsy / asleep state(s).    1. Polymorphic slowing with loss of fast frequencies, focal, maximal right parietal/occipital    Clinical Impression:  Evidence for right hemispheric cortical/subcortical structural lesion  There were no epileptiform abnormalities recorded.      This is a prelim report only, pending review with attending prior to finalization.  -------------------------------------------------------------------------------------------------------  [facility] EEG Reading Room Ph#: [phone]  Epilepsy Answering Service after 5PM and before 8:30AM: Ph#: [phone]    Jone Vides M.D.   Epilepsy fellow ALEXANDRA ESPOSITO [id number]     Study Date: 		10-04-22 14:40 to 10-5-22 08:00  Duration x Hours: 17:20 hrs  --------------------------------------------------------------------------------------------------  History:  CC/ HPI Patient is a 81y old  Male who presents with a chief complaint of hallucinations (04 Oct 2022 18:05)    MEDICATIONS  (STANDING):  atorvastatin 20 milliGRAM(s) Oral at bedtime  carbidopa/levodopa  25/100 2.5 Tablet(s) Oral <User Schedule>  clopidogrel Tablet 75 milliGRAM(s) Oral daily  heparin   Injectable 5000 Unit(s) SubCutaneous every 12 hours  influenza  Vaccine (HIGH DOSE) 0.7 milliLiter(s) IntraMuscular once  insulin lispro (ADMELOG) corrective regimen sliding scale   SubCutaneous three times a day before meals  insulin lispro (ADMELOG) corrective regimen sliding scale   SubCutaneous at bedtime  lactated ringers. 1000 milliLiter(s) (125 mL/Hr) IV Continuous <Continuous>  mirtazapine 15 milliGRAM(s) Oral at bedtime  Opicapone (Ongentys) 50 milliGRAM(s) 50 milliGRAM(s) Oral at bedtime  pantoprazole  Injectable 40 milliGRAM(s) IV Push every 24 hours  pimavanserin Capsule 34 milliGRAM(s) Oral <User Schedule>  polyethylene glycol 3350 17 Gram(s) Oral daily    --------------------------------------------------------------------------------------------------  Study Interpretation:    [[[Abbreviation Key:  PDR=alpha rhythm/posterior dominant rhythm. A-P=anterior posterior.  Amplitude: ‘very low’:<20; ‘low’:20-49; ‘medium’:; ‘high’:>150uV.  Persistence for periodic/rhythmic patterns (% of epoch) ‘rare’:<1%; ‘occasional’:1-10%; ‘frequent’:10-50%; ‘abundant’:50-90%; ‘continuous’:>90%.  Persistence for sporadic discharges: ‘rare’:<1/hr; ‘occasional’:1/min-1/hr; ‘frequent’:>1/min; ‘abundant’:>1/10 sec.  RPP=rhythmic and periodic patterns; GRDA=generalized rhythmic delta activity; FIRDA=frontal intermittent GRDA; LRDA=lateralized rhythmic delta activity; TIRDA=temporal intermittent rhythmic delta activity;  LPD=PLED=lateralized periodic discharges; GPD=generalized periodic discharges; BIPDs =bilateral independent periodic discharges; Mf=multifocal; SIRPDs=stimulus induced rhythmic, periodic, or ictal appearing discharges; BIRDs=brief potentially ictal rhythmic discharges >4 Hz, lasting .5-10s; PFA (paroxysmal bursts >13 Hz or =8 Hz <10s).  Modifiers: +F=with fast component; +S=with spike component; +R=with rhythmic component.  S-B=burst suppression pattern.  Max=maximal. N1-drowsy; N2-stage II sleep; N3-slow wave sleep. SSS/BETS=small sharp spikes/benign epileptiform transients of sleep. HV=hyperventilation; PS=photic stimulation]]]    Daily EEG Visual Analysis    FINDINGS:      Background:  Continuous: continuous  Symmetry: symmetric  PDR: 8.5-9 Hz activity, with amplitude to 40 uV, that attenuated to eye opening.  Low amplitude frontal beta noted in wakefulness.  Reactivity: present  Voltage: normal, mostly 20-150uV  Anterior Posterior Gradient: present  Other background findings: none  Breach: absent    Background Slowing:  Generalized slowing: none was present.  Focal slowing: right hemispheric delta and theta, max in parietal/occipital, attenuation of fast frequency compared to left    State Changes:   -Drowsiness noted with increased slowing, attenuation of fast activity, vertex transients.  -Present with N2 sleep transients with symmetric spindles and K-complexes.    Sporadic Epileptiform Discharges:    None    Rhythmic and Periodic Patterns (RPPs):  None     Electrographic and Electroclinical seizures:  None    Other Clinical Events:  None    Activation Procedures:   -Hyperventilation was not performed.    -Photic stimulation was not performed.    Artifacts:  Intermittent myogenic and movement artifacts were noted.    ECG:  The heart rate on single channel ECG was predominantly between 60-70 BPM.    EEG Classification / Summary:  Abnormal  EEG in the awake / drowsy / asleep state(s).    1. Polymorphic slowing with loss of fast frequencies, focal, maximal right parietal/occipital    Clinical Impression:  Evidence for right hemispheric cortical/subcortical structural lesion  There were no epileptiform abnormalities recorded.      This is a prelim report only, pending review with attending prior to finalization.  -------------------------------------------------------------------------------------------------------  [facility] EEG Reading Room Ph#: [phone]  Epilepsy Answering Service after 5PM and before 8:30AM: Ph#: [phone]    Jone Vides M.D.   Epilepsy fellow ALEXANDRA ESPOSITO [id number]     Study Date: 		10-04-22 14:40 to 10-5-22 08:00  Duration x Hours: 17:20 hrs  --------------------------------------------------------------------------------------------------  History:  CC/ HPI Patient is a 81y old  Male who presents with a chief complaint of hallucinations (04 Oct 2022 18:05)    MEDICATIONS  (STANDING):  atorvastatin 20 milliGRAM(s) Oral at bedtime  carbidopa/levodopa  25/100 2.5 Tablet(s) Oral <User Schedule>  clopidogrel Tablet 75 milliGRAM(s) Oral daily  heparin   Injectable 5000 Unit(s) SubCutaneous every 12 hours  influenza  Vaccine (HIGH DOSE) 0.7 milliLiter(s) IntraMuscular once  insulin lispro (ADMELOG) corrective regimen sliding scale   SubCutaneous three times a day before meals  insulin lispro (ADMELOG) corrective regimen sliding scale   SubCutaneous at bedtime  lactated ringers. 1000 milliLiter(s) (125 mL/Hr) IV Continuous <Continuous>  mirtazapine 15 milliGRAM(s) Oral at bedtime  Opicapone (Ongentys) 50 milliGRAM(s) 50 milliGRAM(s) Oral at bedtime  pantoprazole  Injectable 40 milliGRAM(s) IV Push every 24 hours  pimavanserin Capsule 34 milliGRAM(s) Oral <User Schedule>  polyethylene glycol 3350 17 Gram(s) Oral daily    --------------------------------------------------------------------------------------------------  Study Interpretation:    [[[Abbreviation Key:  PDR=alpha rhythm/posterior dominant rhythm. A-P=anterior posterior.  Amplitude: ‘very low’:<20; ‘low’:20-49; ‘medium’:; ‘high’:>150uV.  Persistence for periodic/rhythmic patterns (% of epoch) ‘rare’:<1%; ‘occasional’:1-10%; ‘frequent’:10-50%; ‘abundant’:50-90%; ‘continuous’:>90%.  Persistence for sporadic discharges: ‘rare’:<1/hr; ‘occasional’:1/min-1/hr; ‘frequent’:>1/min; ‘abundant’:>1/10 sec.  RPP=rhythmic and periodic patterns; GRDA=generalized rhythmic delta activity; FIRDA=frontal intermittent GRDA; LRDA=lateralized rhythmic delta activity; TIRDA=temporal intermittent rhythmic delta activity;  LPD=PLED=lateralized periodic discharges; GPD=generalized periodic discharges; BIPDs =bilateral independent periodic discharges; Mf=multifocal; SIRPDs=stimulus induced rhythmic, periodic, or ictal appearing discharges; BIRDs=brief potentially ictal rhythmic discharges >4 Hz, lasting .5-10s; PFA (paroxysmal bursts >13 Hz or =8 Hz <10s).  Modifiers: +F=with fast component; +S=with spike component; +R=with rhythmic component.  S-B=burst suppression pattern.  Max=maximal. N1-drowsy; N2-stage II sleep; N3-slow wave sleep. SSS/BETS=small sharp spikes/benign epileptiform transients of sleep. HV=hyperventilation; PS=photic stimulation]]]    Daily EEG Visual Analysis    FINDINGS:      Background:  Continuous: continuous  Symmetry: symmetric  PDR: 8.5 Hz activity, with amplitude to 40 uV, that attenuated to eye opening.  Low amplitude frontal beta noted in wakefulness.  Reactivity: present  Voltage: normal, mostly 20-150uV  Anterior Posterior Gradient: present  Other background findings: none  Breach: absent    Background Slowing:  Generalized slowing: excessive theta  Focal slowing: temporal-occipital, attenuation over the right vs the left    State Changes:   -Drowsiness noted with increased slowing, attenuation of fast activity, vertex transients.  -Present with N2 sleep transients with symmetric spindles and K-complexes.    Sporadic Epileptiform Discharges:    None    Rhythmic and Periodic Patterns (RPPs):  None     Electrographic and Electroclinical seizures:  None    Other Clinical Events:  None    Activation Procedures:   -Hyperventilation was not performed.    -Photic stimulation was not performed.    Artifacts:  Intermittent myogenic and movement artifacts were noted.    ECG:  The heart rate on single channel ECG was predominantly between 60-70 BPM.    EEG Classification / Summary:  Abnormal  EEG in the awake / drowsy / asleep state(s).  Mild polymorphic slowing   Lower amplitude, focal, maximal right temporooccipital    Clinical Impression:  Evidence for right posterior quadrant cortical structural lesion  Mild cerebral dysfunction  There were no epileptiform abnormalities recorded.      -------------------------------------------------------------------------------------------------------  [facility] EEG Reading Room Ph#: [phone]  Epilepsy Answering Service after 5PM and before 8:30AM: Ph#: [phone]    Jone Vides M.D.   Epilepsy fellow ALEXANDRA ESPOSITO [id number]     Study Date: 		10-04-22 14:40 to 10-5-22 08:00  Duration x Hours: 17:20 hrs  --------------------------------------------------------------------------------------------------  History:  CC/ HPI Patient is a 81y old  Male who presents with a chief complaint of hallucinations (04 Oct 2022 18:05)    MEDICATIONS  (STANDING):  atorvastatin 20 milliGRAM(s) Oral at bedtime  carbidopa/levodopa  25/100 2.5 Tablet(s) Oral <User Schedule>  clopidogrel Tablet 75 milliGRAM(s) Oral daily  heparin   Injectable 5000 Unit(s) SubCutaneous every 12 hours  influenza  Vaccine (HIGH DOSE) 0.7 milliLiter(s) IntraMuscular once  insulin lispro (ADMELOG) corrective regimen sliding scale   SubCutaneous three times a day before meals  insulin lispro (ADMELOG) corrective regimen sliding scale   SubCutaneous at bedtime  lactated ringers. 1000 milliLiter(s) (125 mL/Hr) IV Continuous <Continuous>  mirtazapine 15 milliGRAM(s) Oral at bedtime  Opicapone (Ongentys) 50 milliGRAM(s) 50 milliGRAM(s) Oral at bedtime  pantoprazole  Injectable 40 milliGRAM(s) IV Push every 24 hours  pimavanserin Capsule 34 milliGRAM(s) Oral <User Schedule>  polyethylene glycol 3350 17 Gram(s) Oral daily    --------------------------------------------------------------------------------------------------  Study Interpretation:    [[[Abbreviation Key:  PDR=alpha rhythm/posterior dominant rhythm. A-P=anterior posterior.  Amplitude: ‘very low’:<20; ‘low’:20-49; ‘medium’:; ‘high’:>150uV.  Persistence for periodic/rhythmic patterns (% of epoch) ‘rare’:<1%; ‘occasional’:1-10%; ‘frequent’:10-50%; ‘abundant’:50-90%; ‘continuous’:>90%.  Persistence for sporadic discharges: ‘rare’:<1/hr; ‘occasional’:1/min-1/hr; ‘frequent’:>1/min; ‘abundant’:>1/10 sec.  RPP=rhythmic and periodic patterns; GRDA=generalized rhythmic delta activity; FIRDA=frontal intermittent GRDA; LRDA=lateralized rhythmic delta activity; TIRDA=temporal intermittent rhythmic delta activity;  LPD=PLED=lateralized periodic discharges; GPD=generalized periodic discharges; BIPDs =bilateral independent periodic discharges; Mf=multifocal; SIRPDs=stimulus induced rhythmic, periodic, or ictal appearing discharges; BIRDs=brief potentially ictal rhythmic discharges >4 Hz, lasting .5-10s; PFA (paroxysmal bursts >13 Hz or =8 Hz <10s).  Modifiers: +F=with fast component; +S=with spike component; +R=with rhythmic component.  S-B=burst suppression pattern.  Max=maximal. N1-drowsy; N2-stage II sleep; N3-slow wave sleep. SSS/BETS=small sharp spikes/benign epileptiform transients of sleep. HV=hyperventilation; PS=photic stimulation]]]    Daily EEG Visual Analysis    FINDINGS:      Background:  Continuous: continuous  Symmetry: symmetric  PDR: 8.5 Hz activity, with amplitude to 40 uV, that attenuated to eye opening.  Low amplitude frontal beta noted in wakefulness.  Reactivity: present  Voltage: normal, mostly 20-150uV  Anterior Posterior Gradient: present  Other background findings: none  Breach: absent    Background Slowing:  Generalized slowing: excessive theta  Focal slowing: temporal-occipital, attenuation over the right vs the left    State Changes:   -Drowsiness noted with increased slowing, attenuation of fast activity, vertex transients.  -Present with N2 sleep transients with symmetric spindles and K-complexes.    Sporadic Epileptiform Discharges:    None    Rhythmic and Periodic Patterns (RPPs):  None     Electrographic and Electroclinical seizures:  None    Other Clinical Events:  None    Activation Procedures:   -Hyperventilation was not performed.    -Photic stimulation was not performed.    Artifacts:  Intermittent myogenic and movement artifacts were noted.    ECG:  The heart rate on single channel ECG was predominantly between 60-70 BPM frequent ectopy.    EEG Classification / Summary:  Abnormal  EEG in the awake / drowsy / asleep state(s).  Mild polymorphic slowing   Lower amplitude, focal, maximal right temporooccipital    Clinical Impression:  Evidence for right posterior quadrant cortical structural lesion  Mild cerebral dysfunction  There were no epileptiform abnormalities recorded.    ECG frequent ectopy    -------------------------------------------------------------------------------------------------------  [facility] EEG Reading Room Ph#: [phone]  Epilepsy Answering Service after 5PM and before 8:30AM: Ph#: [phone]    Jone Vides M.D.   Epilepsy fellow ALEXANDRA ESPOSITO [id number]     Study Date: 		10-04-22 14:40 to 10-5-22 10:47  Duration x Hours: 20:06 hrs  --------------------------------------------------------------------------------------------------  History:  CC/ HPI Patient is a 81y old  Male who presents with a chief complaint of hallucinations (04 Oct 2022 18:05)    MEDICATIONS  (STANDING):  atorvastatin 20 milliGRAM(s) Oral at bedtime  carbidopa/levodopa  25/100 2.5 Tablet(s) Oral <User Schedule>  clopidogrel Tablet 75 milliGRAM(s) Oral daily  heparin   Injectable 5000 Unit(s) SubCutaneous every 12 hours  influenza  Vaccine (HIGH DOSE) 0.7 milliLiter(s) IntraMuscular once  insulin lispro (ADMELOG) corrective regimen sliding scale   SubCutaneous three times a day before meals  insulin lispro (ADMELOG) corrective regimen sliding scale   SubCutaneous at bedtime  lactated ringers. 1000 milliLiter(s) (125 mL/Hr) IV Continuous <Continuous>  mirtazapine 15 milliGRAM(s) Oral at bedtime  Opicapone (Ongentys) 50 milliGRAM(s) 50 milliGRAM(s) Oral at bedtime  pantoprazole  Injectable 40 milliGRAM(s) IV Push every 24 hours  pimavanserin Capsule 34 milliGRAM(s) Oral <User Schedule>  polyethylene glycol 3350 17 Gram(s) Oral daily    --------------------------------------------------------------------------------------------------  Study Interpretation:    [[[Abbreviation Key:  PDR=alpha rhythm/posterior dominant rhythm. A-P=anterior posterior.  Amplitude: ‘very low’:<20; ‘low’:20-49; ‘medium’:; ‘high’:>150uV.  Persistence for periodic/rhythmic patterns (% of epoch) ‘rare’:<1%; ‘occasional’:1-10%; ‘frequent’:10-50%; ‘abundant’:50-90%; ‘continuous’:>90%.  Persistence for sporadic discharges: ‘rare’:<1/hr; ‘occasional’:1/min-1/hr; ‘frequent’:>1/min; ‘abundant’:>1/10 sec.  RPP=rhythmic and periodic patterns; GRDA=generalized rhythmic delta activity; FIRDA=frontal intermittent GRDA; LRDA=lateralized rhythmic delta activity; TIRDA=temporal intermittent rhythmic delta activity;  LPD=PLED=lateralized periodic discharges; GPD=generalized periodic discharges; BIPDs =bilateral independent periodic discharges; Mf=multifocal; SIRPDs=stimulus induced rhythmic, periodic, or ictal appearing discharges; BIRDs=brief potentially ictal rhythmic discharges >4 Hz, lasting .5-10s; PFA (paroxysmal bursts >13 Hz or =8 Hz <10s).  Modifiers: +F=with fast component; +S=with spike component; +R=with rhythmic component.  S-B=burst suppression pattern.  Max=maximal. N1-drowsy; N2-stage II sleep; N3-slow wave sleep. SSS/BETS=small sharp spikes/benign epileptiform transients of sleep. HV=hyperventilation; PS=photic stimulation]]]    Daily EEG Visual Analysis    FINDINGS:      Background:  Continuous: continuous  Symmetry: symmetric  PDR: 8.5 Hz activity, with amplitude to 40 uV, that attenuated to eye opening.  Low amplitude frontal beta noted in wakefulness.  Reactivity: present  Voltage: normal, mostly 20-150uV  Anterior Posterior Gradient: present  Other background findings: none  Breach: absent    Background Slowing:  Generalized slowing: excessive theta  Focal slowing: temporal-occipital, attenuation over the right vs the left    State Changes:   -Drowsiness noted with increased slowing, attenuation of fast activity, vertex transients.  -Present with N2 sleep transients with symmetric spindles and K-complexes.    Sporadic Epileptiform Discharges:    None    Rhythmic and Periodic Patterns (RPPs):  None     Electrographic and Electroclinical seizures:  None    Other Clinical Events:  None    Activation Procedures:   -Hyperventilation was not performed.    -Photic stimulation was not performed.    Artifacts:  Intermittent myogenic and movement artifacts were noted.    ECG:  The heart rate on single channel ECG was predominantly between 60-70 BPM frequent ectopy.    EEG Classification / Summary:  Abnormal  EEG in the awake / drowsy / asleep state(s).  Mild polymorphic slowing   Lower amplitude, focal, maximal right temporooccipital    Clinical Impression:  Evidence for right posterior quadrant cortical structural lesion  Mild cerebral dysfunction  There were no epileptiform abnormalities recorded.    ECG frequent ectopy    -------------------------------------------------------------------------------------------------------  [facility] EEG Reading Room Ph#: [phone]  Epilepsy Answering Service after 5PM and before 8:30AM: Ph#: [phone]    Jone Vides M.D.   Epilepsy fellow ALEXANDRA ESPOSITO [id number]     Study Date: 		10-04-22 14:40 to 10-5-22 10:47  Duration x Hours: 20:06 hrs  --------------------------------------------------------------------------------------------------  History:  CC/ HPI Patient is a 81y old  Male who presents with a chief complaint of hallucinations (04 Oct 2022 18:05)    MEDICATIONS  (STANDING):  atorvastatin 20 milliGRAM(s) Oral at bedtime  carbidopa/levodopa  25/100 2.5 Tablet(s) Oral <User Schedule>  clopidogrel Tablet 75 milliGRAM(s) Oral daily  heparin   Injectable 5000 Unit(s) SubCutaneous every 12 hours  influenza  Vaccine (HIGH DOSE) 0.7 milliLiter(s) IntraMuscular once  insulin lispro (ADMELOG) corrective regimen sliding scale   SubCutaneous three times a day before meals  insulin lispro (ADMELOG) corrective regimen sliding scale   SubCutaneous at bedtime  lactated ringers. 1000 milliLiter(s) (125 mL/Hr) IV Continuous <Continuous>  mirtazapine 15 milliGRAM(s) Oral at bedtime  Opicapone (Ongentys) 50 milliGRAM(s) 50 milliGRAM(s) Oral at bedtime  pantoprazole  Injectable 40 milliGRAM(s) IV Push every 24 hours  pimavanserin Capsule 34 milliGRAM(s) Oral <User Schedule>  polyethylene glycol 3350 17 Gram(s) Oral daily    --------------------------------------------------------------------------------------------------  Study Interpretation:    [[[Abbreviation Key:  PDR=alpha rhythm/posterior dominant rhythm. A-P=anterior posterior.  Amplitude: ‘very low’:<20; ‘low’:20-49; ‘medium’:; ‘high’:>150uV.  Persistence for periodic/rhythmic patterns (% of epoch) ‘rare’:<1%; ‘occasional’:1-10%; ‘frequent’:10-50%; ‘abundant’:50-90%; ‘continuous’:>90%.  Persistence for sporadic discharges: ‘rare’:<1/hr; ‘occasional’:1/min-1/hr; ‘frequent’:>1/min; ‘abundant’:>1/10 sec.  RPP=rhythmic and periodic patterns; GRDA=generalized rhythmic delta activity; FIRDA=frontal intermittent GRDA; LRDA=lateralized rhythmic delta activity; TIRDA=temporal intermittent rhythmic delta activity;  LPD=PLED=lateralized periodic discharges; GPD=generalized periodic discharges; BIPDs =bilateral independent periodic discharges; Mf=multifocal; SIRPDs=stimulus induced rhythmic, periodic, or ictal appearing discharges; BIRDs=brief potentially ictal rhythmic discharges >4 Hz, lasting .5-10s; PFA (paroxysmal bursts >13 Hz or =8 Hz <10s).  Modifiers: +F=with fast component; +S=with spike component; +R=with rhythmic component.  S-B=burst suppression pattern.  Max=maximal. N1-drowsy; N2-stage II sleep; N3-slow wave sleep. SSS/BETS=small sharp spikes/benign epileptiform transients of sleep. HV=hyperventilation; PS=photic stimulation]]]    Daily EEG Visual Analysis    FINDINGS:      Background:  Continuous: continuous  Symmetry: symmetric  PDR: 8.5 Hz activity, with amplitude to 40 uV, that attenuated to eye opening.  Low amplitude frontal beta noted in wakefulness.  Reactivity: present  Voltage: normal, mostly 20-150uV  Anterior Posterior Gradient: present  Other background findings: none  Breach: absent    Background Slowing:  Generalized slowing: excessive theta  Focal slowing: temporal-occipital, attenuation over the right vs the left    State Changes:   -Drowsiness noted with increased slowing, attenuation of fast activity, vertex transients.  -Present with N2 sleep transients with symmetric spindles and K-complexes.    Sporadic Epileptiform Discharges:    None    Rhythmic and Periodic Patterns (RPPs):  None     Electrographic and Electroclinical seizures:  None    Other Clinical Events:  None    Activation Procedures:   -Hyperventilation was not performed.    -Photic stimulation was not performed.    Artifacts:  Intermittent myogenic and movement artifacts were noted.    ECG:  The heart rate on single channel ECG was predominantly between 60-70 BPM frequent ectopy.    EEG Classification / Summary:  Abnormal  EEG in the awake / drowsy / asleep state(s).  Mild polymorphic slowing   Lower amplitude, focal, maximal right temporooccipital    Clinical Impression:  Evidence for right posterior quadrant cortical structural lesion  Mild cerebral dysfunction  There were no epileptiform abnormalities recorded.    ECG frequent ectopy    -------------------------------------------------------------------------------------------------------  [facility] EEG Reading Room Ph#: [phone]  Epilepsy Answering Service after 5PM and before 8:30AM: Ph#: [phone]      Jone Vides M.D.   Epilepsy fellow

## 2022-10-06 LAB
ANION GAP SERPL CALC-SCNC: 13 MMOL/L — SIGNIFICANT CHANGE UP (ref 7–14)
BUN SERPL-MCNC: 5 MG/DL — LOW (ref 7–23)
CALCIUM SERPL-MCNC: 8.4 MG/DL — SIGNIFICANT CHANGE UP (ref 8.4–10.5)
CHLORIDE SERPL-SCNC: 104 MMOL/L — SIGNIFICANT CHANGE UP (ref 98–107)
CO2 SERPL-SCNC: 24 MMOL/L — SIGNIFICANT CHANGE UP (ref 22–31)
CREAT SERPL-MCNC: 0.35 MG/DL — LOW (ref 0.5–1.3)
CULTURE RESULTS: SIGNIFICANT CHANGE UP
CULTURE RESULTS: SIGNIFICANT CHANGE UP
EGFR: 112 ML/MIN/1.73M2 — SIGNIFICANT CHANGE UP
GLUCOSE BLDC GLUCOMTR-MCNC: 111 MG/DL — HIGH (ref 70–99)
GLUCOSE BLDC GLUCOMTR-MCNC: 119 MG/DL — HIGH (ref 70–99)
GLUCOSE BLDC GLUCOMTR-MCNC: 138 MG/DL — HIGH (ref 70–99)
GLUCOSE BLDC GLUCOMTR-MCNC: 154 MG/DL — HIGH (ref 70–99)
GLUCOSE BLDC GLUCOMTR-MCNC: 169 MG/DL — HIGH (ref 70–99)
GLUCOSE SERPL-MCNC: 134 MG/DL — HIGH (ref 70–99)
HCT VFR BLD CALC: 35.5 % — LOW (ref 39–50)
HGB BLD-MCNC: 11 G/DL — LOW (ref 13–17)
MAGNESIUM SERPL-MCNC: 1.5 MG/DL — LOW (ref 1.6–2.6)
MCHC RBC-ENTMCNC: 29.3 PG — SIGNIFICANT CHANGE UP (ref 27–34)
MCHC RBC-ENTMCNC: 31 GM/DL — LOW (ref 32–36)
MCV RBC AUTO: 94.4 FL — SIGNIFICANT CHANGE UP (ref 80–100)
NRBC # BLD: 0 /100 WBCS — SIGNIFICANT CHANGE UP (ref 0–0)
NRBC # FLD: 0 K/UL — SIGNIFICANT CHANGE UP (ref 0–0)
PHOSPHATE SERPL-MCNC: 2.5 MG/DL — SIGNIFICANT CHANGE UP (ref 2.5–4.5)
PLATELET # BLD AUTO: 258 K/UL — SIGNIFICANT CHANGE UP (ref 150–400)
POTASSIUM SERPL-MCNC: 3.6 MMOL/L — SIGNIFICANT CHANGE UP (ref 3.5–5.3)
POTASSIUM SERPL-SCNC: 3.6 MMOL/L — SIGNIFICANT CHANGE UP (ref 3.5–5.3)
RBC # BLD: 3.76 M/UL — LOW (ref 4.2–5.8)
RBC # FLD: 13.3 % — SIGNIFICANT CHANGE UP (ref 10.3–14.5)
SODIUM SERPL-SCNC: 141 MMOL/L — SIGNIFICANT CHANGE UP (ref 135–145)
SPECIMEN SOURCE: SIGNIFICANT CHANGE UP
SPECIMEN SOURCE: SIGNIFICANT CHANGE UP
WBC # BLD: 6.75 K/UL — SIGNIFICANT CHANGE UP (ref 3.8–10.5)
WBC # FLD AUTO: 6.75 K/UL — SIGNIFICANT CHANGE UP (ref 3.8–10.5)

## 2022-10-06 PROCEDURE — 99232 SBSQ HOSP IP/OBS MODERATE 35: CPT

## 2022-10-06 RX ORDER — CLONAZEPAM 1 MG
0.75 TABLET ORAL EVERY 8 HOURS
Refills: 0 | Status: DISCONTINUED | OUTPATIENT
Start: 2022-10-06 | End: 2022-10-07

## 2022-10-06 RX ORDER — MAGNESIUM SULFATE 500 MG/ML
1 VIAL (ML) INJECTION ONCE
Refills: 0 | Status: COMPLETED | OUTPATIENT
Start: 2022-10-06 | End: 2022-10-06

## 2022-10-06 RX ADMIN — ATORVASTATIN CALCIUM 20 MILLIGRAM(S): 80 TABLET, FILM COATED ORAL at 22:59

## 2022-10-06 RX ADMIN — PANTOPRAZOLE SODIUM 40 MILLIGRAM(S): 20 TABLET, DELAYED RELEASE ORAL at 06:23

## 2022-10-06 RX ADMIN — PIMAVANSERIN TARTRATE 34 MILLIGRAM(S): 10 TABLET, COATED ORAL at 13:05

## 2022-10-06 RX ADMIN — SODIUM CHLORIDE 125 MILLILITER(S): 9 INJECTION, SOLUTION INTRAVENOUS at 22:24

## 2022-10-06 RX ADMIN — MIRTAZAPINE 15 MILLIGRAM(S): 45 TABLET, ORALLY DISINTEGRATING ORAL at 22:59

## 2022-10-06 RX ADMIN — ATORVASTATIN CALCIUM 20 MILLIGRAM(S): 80 TABLET, FILM COATED ORAL at 00:12

## 2022-10-06 RX ADMIN — CARBIDOPA AND LEVODOPA 2.5 TABLET(S): 25; 100 TABLET ORAL at 00:14

## 2022-10-06 RX ADMIN — MIRTAZAPINE 15 MILLIGRAM(S): 45 TABLET, ORALLY DISINTEGRATING ORAL at 00:12

## 2022-10-06 RX ADMIN — HEPARIN SODIUM 5000 UNIT(S): 5000 INJECTION INTRAVENOUS; SUBCUTANEOUS at 06:23

## 2022-10-06 RX ADMIN — CARBIDOPA AND LEVODOPA 2.5 TABLET(S): 25; 100 TABLET ORAL at 11:00

## 2022-10-06 RX ADMIN — CARBIDOPA AND LEVODOPA 2.5 TABLET(S): 25; 100 TABLET ORAL at 19:00

## 2022-10-06 RX ADMIN — CARBIDOPA AND LEVODOPA 2.5 TABLET(S): 25; 100 TABLET ORAL at 23:53

## 2022-10-06 RX ADMIN — CARBIDOPA AND LEVODOPA 2.5 TABLET(S): 25; 100 TABLET ORAL at 15:50

## 2022-10-06 RX ADMIN — CARBIDOPA AND LEVODOPA 2.5 TABLET(S): 25; 100 TABLET ORAL at 07:12

## 2022-10-06 RX ADMIN — Medication 100 GRAM(S): at 08:11

## 2022-10-06 RX ADMIN — SODIUM CHLORIDE 125 MILLILITER(S): 9 INJECTION, SOLUTION INTRAVENOUS at 00:15

## 2022-10-06 RX ADMIN — Medication 1: at 15:50

## 2022-10-06 RX ADMIN — CLOPIDOGREL BISULFATE 75 MILLIGRAM(S): 75 TABLET, FILM COATED ORAL at 08:12

## 2022-10-06 RX ADMIN — Medication 0.75 MILLIGRAM(S): at 17:45

## 2022-10-06 RX ADMIN — POLYETHYLENE GLYCOL 3350 17 GRAM(S): 17 POWDER, FOR SOLUTION ORAL at 08:12

## 2022-10-06 RX ADMIN — CARBIDOPA AND LEVODOPA 2.5 TABLET(S): 25; 100 TABLET ORAL at 03:50

## 2022-10-06 RX ADMIN — HEPARIN SODIUM 5000 UNIT(S): 5000 INJECTION INTRAVENOUS; SUBCUTANEOUS at 15:52

## 2022-10-06 NOTE — PROGRESS NOTE ADULT - PROBLEM SELECTOR PLAN 1
-Discussed w/ pt's neurologist Dr. Michael Zamora 10/4, stated Pimavanserin likely contributing to increased hallucinations. Pt per MD has been tried on pimavanserin in the past. Improved parkinson's but worsened hallucinations, pt taken off. Restarted again on medication recently.  -Recommended slow d/c of pimavanserin; will give 34mg every other day for 4-5 days per neuro recs  -c/w home Sinemet carbidopa/levodopa  25/100 2.5 Tablet(s) q4H  -C/w home Opicapone (Ongentys) 50 mg QD   -c/w Klonopin at increased dose 0.75mg q8H PRN  -Discussed w/ psych, started on seroquel 12.5mg q6 PRN for hallucinations  -D/c ativan  -Appreciate neuro | psych recs

## 2022-10-06 NOTE — PHYSICAL THERAPY INITIAL EVALUATION ADULT - GENERAL OBSERVATIONS, REHAB EVAL
Consult received, chart reviewed. Patient received in bed, no apparent distress, spouse present. Christopher FOSTER RN present.

## 2022-10-06 NOTE — PHYSICAL THERAPY INITIAL EVALUATION ADULT - ADDITIONAL COMMENTS
Pt. has been nonambulatory for months. However at home pt. has been able to stand and take a few steps with assistance. Pt. has been mostly staying in a recliner at home.     Pt. was left in bed post PT Evaluation, no apparent distress.

## 2022-10-06 NOTE — PHYSICAL THERAPY INITIAL EVALUATION ADULT - LEVEL OF INDEPENDENCE: SIT/STAND, REHAB EVAL
To be assessed. Deferred secondary to pt. reporting dizziness while seated at edge of bed. RN present.

## 2022-10-07 LAB
GLUCOSE BLDC GLUCOMTR-MCNC: 115 MG/DL — HIGH (ref 70–99)
GLUCOSE BLDC GLUCOMTR-MCNC: 117 MG/DL — HIGH (ref 70–99)
GLUCOSE BLDC GLUCOMTR-MCNC: 119 MG/DL — HIGH (ref 70–99)
GLUCOSE BLDC GLUCOMTR-MCNC: 153 MG/DL — HIGH (ref 70–99)

## 2022-10-07 PROCEDURE — 99232 SBSQ HOSP IP/OBS MODERATE 35: CPT

## 2022-10-07 RX ORDER — CLONAZEPAM 1 MG
0.75 TABLET ORAL EVERY 8 HOURS
Refills: 0 | Status: DISCONTINUED | OUTPATIENT
Start: 2022-10-07 | End: 2022-10-12

## 2022-10-07 RX ADMIN — PANTOPRAZOLE SODIUM 40 MILLIGRAM(S): 20 TABLET, DELAYED RELEASE ORAL at 05:19

## 2022-10-07 RX ADMIN — ATORVASTATIN CALCIUM 20 MILLIGRAM(S): 80 TABLET, FILM COATED ORAL at 21:09

## 2022-10-07 RX ADMIN — SODIUM CHLORIDE 125 MILLILITER(S): 9 INJECTION, SOLUTION INTRAVENOUS at 05:13

## 2022-10-07 RX ADMIN — Medication 650 MILLIGRAM(S): at 18:12

## 2022-10-07 RX ADMIN — CARBIDOPA AND LEVODOPA 2.5 TABLET(S): 25; 100 TABLET ORAL at 10:18

## 2022-10-07 RX ADMIN — Medication 1: at 13:10

## 2022-10-07 RX ADMIN — CARBIDOPA AND LEVODOPA 2.5 TABLET(S): 25; 100 TABLET ORAL at 18:13

## 2022-10-07 RX ADMIN — CLOPIDOGREL BISULFATE 75 MILLIGRAM(S): 75 TABLET, FILM COATED ORAL at 13:09

## 2022-10-07 RX ADMIN — CARBIDOPA AND LEVODOPA 2.5 TABLET(S): 25; 100 TABLET ORAL at 02:36

## 2022-10-07 RX ADMIN — SODIUM CHLORIDE 125 MILLILITER(S): 9 INJECTION, SOLUTION INTRAVENOUS at 13:10

## 2022-10-07 RX ADMIN — CARBIDOPA AND LEVODOPA 2.5 TABLET(S): 25; 100 TABLET ORAL at 21:14

## 2022-10-07 RX ADMIN — Medication 0.75 MILLIGRAM(S): at 21:08

## 2022-10-07 RX ADMIN — HEPARIN SODIUM 5000 UNIT(S): 5000 INJECTION INTRAVENOUS; SUBCUTANEOUS at 05:21

## 2022-10-07 RX ADMIN — HEPARIN SODIUM 5000 UNIT(S): 5000 INJECTION INTRAVENOUS; SUBCUTANEOUS at 18:13

## 2022-10-07 RX ADMIN — MIRTAZAPINE 15 MILLIGRAM(S): 45 TABLET, ORALLY DISINTEGRATING ORAL at 21:09

## 2022-10-07 RX ADMIN — QUETIAPINE FUMARATE 12.5 MILLIGRAM(S): 200 TABLET, FILM COATED ORAL at 18:12

## 2022-10-07 RX ADMIN — CARBIDOPA AND LEVODOPA 2.5 TABLET(S): 25; 100 TABLET ORAL at 05:21

## 2022-10-07 RX ADMIN — CARBIDOPA AND LEVODOPA 2.5 TABLET(S): 25; 100 TABLET ORAL at 13:10

## 2022-10-07 NOTE — PROGRESS NOTE ADULT - PROBLEM SELECTOR PLAN 1
-Discussed w/ pt's neurologist Dr. Michael Zamora 10/4, stated Pimavanserin likely contributing to increased hallucinations. Pt per MD has been tried on pimavanserin in the past. Improved parkinson's but worsened hallucinations, pt taken off. Restarted again on medication recently.  -Recommended slow d/c of pimavanserin; will give 34mg every other day for 4-5 days per neuro recs  -c/w home Sinemet carbidopa/levodopa  25/100 2.5 Tablet(s) q4H  -C/w home Opicapone (Ongentys) 50 mg QD   -c/w Klonopin at increased dose 0.75mg q8H PRN  -Discussed w/ psych, started on Seroquel 12.5mg q6 PRN for hallucinations  -D/c ativan  -Appreciate neuro | psych recs -Discussed w/ pt's neurologist Dr. Michael Zamora 10/4, stated Pimavanserin likely contributing to increased hallucinations. Pt per MD has been tried on pimavanserin in the past. Improved parkinson's but worsened hallucinations, pt taken off. Restarted again on medication recently.  -Recommended slow d/c of pimavanserin; will give 34mg every other day for 4-5 days per neuro recs  -c/w home Sinemet carbidopa/levodopa  25/100 2.5 Tablet(s) q4H  -C/w home Opicapone (Ongentys) 50 mg QD   -c/w Klonopin at increased dose 0.75mg q8H, per wife not on PRNs at home, on standing  -Discussed w/ psych, started on Seroquel 12.5mg q6 PRN for hallucinations  -D/c ativan  -Appreciate neuro | psych recs

## 2022-10-08 ENCOUNTER — TRANSCRIPTION ENCOUNTER (OUTPATIENT)
Age: 82
End: 2022-10-08

## 2022-10-08 LAB
GLUCOSE BLDC GLUCOMTR-MCNC: 106 MG/DL — HIGH (ref 70–99)
GLUCOSE BLDC GLUCOMTR-MCNC: 138 MG/DL — HIGH (ref 70–99)
GLUCOSE BLDC GLUCOMTR-MCNC: 74 MG/DL — SIGNIFICANT CHANGE UP (ref 70–99)
GLUCOSE BLDC GLUCOMTR-MCNC: 84 MG/DL — SIGNIFICANT CHANGE UP (ref 70–99)
SARS-COV-2 RNA SPEC QL NAA+PROBE: SIGNIFICANT CHANGE UP

## 2022-10-08 PROCEDURE — 99232 SBSQ HOSP IP/OBS MODERATE 35: CPT

## 2022-10-08 RX ADMIN — ATORVASTATIN CALCIUM 20 MILLIGRAM(S): 80 TABLET, FILM COATED ORAL at 22:43

## 2022-10-08 RX ADMIN — CARBIDOPA AND LEVODOPA 2.5 TABLET(S): 25; 100 TABLET ORAL at 22:45

## 2022-10-08 RX ADMIN — HEPARIN SODIUM 5000 UNIT(S): 5000 INJECTION INTRAVENOUS; SUBCUTANEOUS at 04:32

## 2022-10-08 RX ADMIN — PANTOPRAZOLE SODIUM 40 MILLIGRAM(S): 20 TABLET, DELAYED RELEASE ORAL at 04:33

## 2022-10-08 RX ADMIN — PIMAVANSERIN TARTRATE 34 MILLIGRAM(S): 10 TABLET, COATED ORAL at 13:36

## 2022-10-08 RX ADMIN — CARBIDOPA AND LEVODOPA 2.5 TABLET(S): 25; 100 TABLET ORAL at 04:32

## 2022-10-08 RX ADMIN — CARBIDOPA AND LEVODOPA 2.5 TABLET(S): 25; 100 TABLET ORAL at 17:41

## 2022-10-08 RX ADMIN — MIRTAZAPINE 15 MILLIGRAM(S): 45 TABLET, ORALLY DISINTEGRATING ORAL at 22:43

## 2022-10-08 RX ADMIN — CARBIDOPA AND LEVODOPA 2.5 TABLET(S): 25; 100 TABLET ORAL at 02:00

## 2022-10-08 RX ADMIN — Medication 0.75 MILLIGRAM(S): at 22:51

## 2022-10-08 RX ADMIN — HEPARIN SODIUM 5000 UNIT(S): 5000 INJECTION INTRAVENOUS; SUBCUTANEOUS at 17:41

## 2022-10-08 RX ADMIN — Medication 0.75 MILLIGRAM(S): at 13:36

## 2022-10-08 RX ADMIN — CARBIDOPA AND LEVODOPA 2.5 TABLET(S): 25; 100 TABLET ORAL at 11:37

## 2022-10-08 RX ADMIN — Medication 0.75 MILLIGRAM(S): at 04:32

## 2022-10-08 RX ADMIN — CARBIDOPA AND LEVODOPA 2.5 TABLET(S): 25; 100 TABLET ORAL at 13:36

## 2022-10-08 RX ADMIN — CLOPIDOGREL BISULFATE 75 MILLIGRAM(S): 75 TABLET, FILM COATED ORAL at 13:37

## 2022-10-08 NOTE — DISCHARGE NOTE PROVIDER - HOSPITAL COURSE
81 year old male hx of insulin dependent DM2, Parkinson's dx, sick sinus syndrome w/ pacemaker p/w hallucinations found to have colonic mass and arterial disease.     Parkinsons.    -Discussed w/ pt's neurologist Dr. Michael Zamora 10/4, stated Pimavanserin likely contributing to increased hallucinations. Pt per MD has been tried on pimavanserin in the past. Improved parkinson's but worsened hallucinations, pt taken off. Restarted again on medication recently.  -Recommended slow d/c of pimavanserin; give 34mg every other day for 4-5 days per neuro recs  -c/w home Sinemet carbidopa/levodopa  25/100 2.5 Tablet(s) q4H  -C/w home Opicapone (Ongentys) 50 mg QD   -c/w Klonopin at increased dose 0.75mg q8H, per wife not on PRNs at home, on standing  -Discussed w/ psych, started on Seroquel 12.5mg q6 PRN for hallucinations  -D/c'd ativan    Lactic acidosis.    -Lactate elevated on admission, now resolved  -Suspect 2/2 atherosclerotic disease. CTA A/P w/ extensive atherosclerotic change w/ multifocal   areas of significant stenosis in the superior and inferior mesenteric arteries. Also w/ Severe stenoses in the proximal right deep femoral artery.  -Pt w/o sx of mesenteric ischemia  -Vasc surg consulted, recs. no acute surgical interventions  -Blood /urine Cx NGTD, Procal wnl     Peripheral artery disease.   -Pt w/ chronic PAD, functionally bedbound and w/o acute symptoms  -Vasc surg consulted, recs no acute surgical interventions  -c/w Plavix / statin  -Outpatient vasc surg follow up.     Abdominal mass.   -Work up during hospitalization sig for 4 x 4 cm intraperitoneal mass  -Family in favor of outpatient evaluation w/ possible biopsy  -Rediscussed w/ wife 10/7, wife expresses concern about what mass could possibly be. Informed it remains unclear at this time but neoplasm cannot be ruled out. Wife would like pt to follow up with outpatient gastroenterologist regarding mass.    DM2 (diabetes mellitus, type 2).    -continue ISS  -Monitor fingersticks.     81 year old male hx of insulin dependent DM2, Parkinson's dx, sick sinus syndrome w/ pacemaker p/w hallucinations found to have colonic mass and arterial disease.     Parkinsons.    -Discussed w/ pt's neurologist Dr. Michael Zamora 10/4, stated Pimavanserin likely contributing to increased hallucinations. Pt per MD has been tried on pimavanserin in the past. Improved parkinson's but worsened hallucinations, pt taken off. Restarted again on medication recently.  -Recommended slow d/c of pimavanserin; give 34mg every other day for 4-5 days per neuro recs  -c/w home Sinemet carbidopa/levodopa  25/100 2.5 Tablet(s) q4H  -C/w home Opicapone (Ongentys) 50 mg QD   -c/w Klonopin at increased dose 0.75mg q8H, per wife not on PRNs at home, on standing  -Discussed w/ psych, started on Seroquel 12.5mg q6 PRN for hallucinations  -D/c'd ativan    Lactic acidosis.    -Lactate elevated on admission, now resolved  -Suspect 2/2 atherosclerotic disease. CTA A/P w/ extensive atherosclerotic change w/ multifocal   areas of significant stenosis in the superior and inferior mesenteric arteries. Also w/ Severe stenoses in the proximal right deep femoral artery.  -Pt w/o sx of mesenteric ischemia  -Vasc surg consulted, recs. no acute surgical interventions  -Blood /urine Cx NGTD, Procal wnl     Peripheral artery disease.   -Pt w/ chronic PAD, functionally bedbound and w/o acute symptoms  -Vasc surg consulted, recs no acute surgical interventions  -c/w Plavix / statin  -Outpatient vasc surg follow up.     Abdominal mass.   -Work up during hospitalization sig for 4 x 4 cm intraperitoneal mass  -Family in favor of outpatient evaluation w/ possible biopsy  -Rediscussed w/ wife 10/7, wife expresses concern about what mass could possibly be. Informed it remains unclear at this time but neoplasm cannot be ruled out. Wife would like pt to follow up with outpatient gastroenterologist regarding mass.    DM2 (diabetes mellitus, type 2).    -continue ISS  -Monitor fingersticks.    On ___ this case was reviewed with  ____, the patient is medically stable and optimized for discharge. All medications were reviewed and prescriptions were sent to mutually agreed upon pharmacy. The patient agrees to follow up with providers as recommended.     81 year old male hx of insulin dependent DM2, Parkinson's dx, sick sinus syndrome w/ pacemaker p/w hallucinations found to have colonic mass and arterial disease.     Parkinsons.    -Discussed w/ pt's neurologist Dr. Michael Zamora 10/4, stated Pimavanserin likely contributing to increased hallucinations. Pt per MD has been tried on pimavanserin in the past. Improved parkinson's but worsened hallucinations, pt taken off. Restarted again on medication recently.  -Recommended slow d/c of pimavanserin; give 34mg every other day for 4-5 days per neuro recs  -c/w home Sinemet carbidopa/levodopa  25/100 2.5 Tablet(s) q4H  -C/w home Opicapone (Ongentys) 50 mg QD   -c/w Klonopin at increased dose 0.75mg q8H, per wife not on PRNs at home, on standing  -Discussed w/ psych, started on Seroquel 12.5mg q6 PRN for hallucinations  -D/c'd ativan    Lactic acidosis.    -Lactate elevated on admission, now resolved  -Suspect 2/2 atherosclerotic disease. CTA A/P w/ extensive atherosclerotic change w/ multifocal   areas of significant stenosis in the superior and inferior mesenteric arteries. Also w/ Severe stenoses in the proximal right deep femoral artery.  -Pt w/o sx of mesenteric ischemia  -Vasc surg consulted, recs. no acute surgical interventions  -Blood /urine Cx NGTD, Procal wnl     Peripheral artery disease.   -Pt w/ chronic PAD, functionally bedbound and w/o acute symptoms  -Vasc surg consulted, recs no acute surgical interventions  -c/w Plavix / statin  -Outpatient vasc surg follow up.     Abdominal mass.   -Work up during hospitalization sig for 4 x 4 cm intraperitoneal mass  -Family in favor of outpatient evaluation w/ possible biopsy  -Rediscussed w/ wife 10/7, wife expresses concern about what mass could possibly be. Informed it remains unclear at this time but neoplasm cannot be ruled out. Wife would like pt to follow up with outpatient gastroenterologist regarding mass.    DM2 (diabetes mellitus, type 2).    -continue ISS  -Monitor fingersticks.    On 10/14 this case was reviewed with Dr. Gr, the patient is medically stable and optimized for discharge. All medications were reviewed and prescriptions were sent to mutually agreed upon pharmacy. The patient agrees to follow up with providers as recommended.     81 year old male hx of insulin dependent DM2, Parkinson's dx, sick sinus syndrome w/ pacemaker p/w hallucinations found to have colonic mass and arterial disease.     Parkinson's - Discussed w/ pt's neurologist Dr. Michael Zamora 10/4, stated Pimavanserin likely contributing to increased hallucinations. Pt per MD has been tried on pimavanserin in the past. Improved parkinson's but worsened hallucinations, pt taken off. Restarted again on medication recently.  -Recommended slow d/c of pimavanserin; give 34mg every other day for 4-5 days per neuro recs  -c/w home Sinemet carbidopa/levodopa  25/100 2.5 Tablet(s) q4H  -C/w home Opicapone (Ongentys) 50 mg QD   -c/w Klonopin at increased dose 0.75mg q8H, per wife not on PRNs at home, on standing  -Discussed w/ psych, started on Seroquel 12.5mg po in am & 25mg in pm for hallucinations    Lactic acidosis- Lactate elevated on admission, now resolved  - Suspect 2/2 atherosclerotic disease. CTA A/P w/ extensive atherosclerotic change w/ multifocal   areas of significant stenosis in the superior and inferior mesenteric arteries. Also w/ Severe stenoses in the proximal right deep femoral artery.  - Pt w/o sx of mesenteric ischemia  - Vasc surg consulted, recs. no acute surgical interventions  - Blood /urine Cx NGTD, Procal wnl     Peripheral artery disease- Pt w/ chronic PAD, functionally bedbound and w/o acute symptoms  - Vasc surg consulted, recs no acute surgical interventions, c/w Plavix / statin  - Outpatient vasc surg follow up.    Abdominal mass- Work up during hospitalization sig for 4 x 4 cm intraperitoneal mass  - Family in favor of outpatient evaluation w/ possible biopsy  - Rediscussed w/ wife 10/7, wife expresses concern about what mass could possibly be. Informed it remains unclear at this time but neoplasm cannot be ruled out. Wife would like pt to follow up with outpatient gastroenterologist regarding mass.    DM2 (diabetes mellitus, type 2)- continue ISS. Monitor fingersticks    On 10/17 this case was reviewed with Attending, the patient is medically stable and optimized for discharge. All medications were reviewed and prescriptions were sent to mutually agreed upon pharmacy. The patient agrees to follow up with providers as recommended.

## 2022-10-08 NOTE — DISCHARGE NOTE PROVIDER - NSDCFUADDAPPT_GEN_ALL_CORE_FT
Please follow up with your neurologist Dr. Michael Zamora outpatient     Please follow up with vascular surgery outpatient for management of your peripheral artery disease    Please follow up with your outpatient gastroenterologist upon discharge

## 2022-10-08 NOTE — DISCHARGE NOTE PROVIDER - NSDCCPCAREPLAN_GEN_ALL_CORE_FT
PRINCIPAL DISCHARGE DIAGNOSIS  Diagnosis: Parkinsons  Assessment and Plan of Treatment: You were admitted to the hospital for further evaluation and management of your increased hallucinations that were likely caused by your Pimavanserin that was prescribed to improve your parkinson's. To combat your hallucinations, the psychiatrist started you on Seroquel as needed for when you get hallucinations. Continue to take your medications as prescribed. Please follow up with Dr. Michael Zamora within 1-2 weeks.      SECONDARY DISCHARGE DIAGNOSES  Diagnosis: Abdominal mass  Assessment and Plan of Treatment: You were found to have an 4 x 4 cm intraperitoneal (abdominal) mass. Please follow up outpatient with the gastroenterologist for further work up and possible biopsy. Continue to take your medications as prescribed. Please follow up with your primary care provider.    Diagnosis: Peripheral artery disease  Assessment and Plan of Treatment: Follow up with outpatient vascular surgeon for further evaluation and management. You were seen by the vascular surgery team while in the hospital and they determined that there are no acute surgical interventions to offer at this time.    Diagnosis: DM2 (diabetes mellitus, type 2)  Assessment and Plan of Treatment: Your Hemoglobin A1C is 5.7 . Target goal for hemoglobin A1C is <7. Monitor blood glucose levels throughout the day before meals and at bedtime. Record blood sugars and bring to outpatient providers appointment in order to be reviewed by your doctor for management modifications. If your sugars are more than 400 or less than 70 you should contact your PCP immediately. Monitor for signs/symptoms of low blood glucose, such as, dizziness, altered mental status, or cool/clammy skin. In addition, monitor for signs/symptoms of high blood glucose, such as, feeling hot, dry, fatigued, or with increased thirst/urination. Make regular podiatry appointments in order to have feet checked for wounds and uncontrolled toe nail growth to prevent infections, as well as, appointments with an ophthalmologist to monitor your vision.      Diagnosis: Lactic acidosis  Assessment and Plan of Treatment: Your lactate was elevated on admission, this is now resolved. Continue to take your medications as prescribed. Please follow up with your primary care provider.     PRINCIPAL DISCHARGE DIAGNOSIS  Diagnosis: Parkinsons  Assessment and Plan of Treatment: You were admitted to the hospital for further evaluation and management of your increased hallucinations that were likely caused by your Pimavanserin that was prescribed to improve your parkinson's. To combat your hallucinations, the psychiatrist started you on Seroquel as needed for when you get hallucinations. Continue to take your medications as prescribed. Please follow up with Dr. Michael Zamora within 1-2 weeks.      SECONDARY DISCHARGE DIAGNOSES  Diagnosis: Lactic acidosis  Assessment and Plan of Treatment: Your lactate was elevated on admission, this is now resolved. Continue to take your medications as prescribed. Please follow up with your primary care provider.    Diagnosis: DM2 (diabetes mellitus, type 2)  Assessment and Plan of Treatment: Your Hemoglobin A1C is 5.7 . Target goal for hemoglobin A1C is <7. Monitor blood glucose levels throughout the day before meals and at bedtime. Record blood sugars and bring to outpatient providers appointment in order to be reviewed by your doctor for management modifications. If your sugars are more than 400 or less than 70 you should contact your PCP immediately. Monitor for signs/symptoms of low blood glucose, such as, dizziness, altered mental status, or cool/clammy skin. In addition, monitor for signs/symptoms of high blood glucose, such as, feeling hot, dry, fatigued, or with increased thirst/urination. Make regular podiatry appointments in order to have feet checked for wounds and uncontrolled toe nail growth to prevent infections, as well as, appointments with an ophthalmologist to monitor your vision.      Diagnosis: Peripheral artery disease  Assessment and Plan of Treatment: Follow up with outpatient vascular surgeon for further evaluation and management. You were seen by the vascular surgery team while in the hospital and they determined that there are no acute surgical interventions to offer at this time.    Diagnosis: Abdominal mass  Assessment and Plan of Treatment: You were found to have an 4 x 4 cm intraperitoneal (abdominal) mass. Please follow up outpatient with the gastroenterologist for further work up and possible biopsy. Continue to take your medications as prescribed. Please follow up with your primary care provider.     PRINCIPAL DISCHARGE DIAGNOSIS  Diagnosis: Parkinsons  Assessment and Plan of Treatment: You were admitted to the hospital for further evaluation and management of your increased hallucinations that were likely caused by your Pimavanserin that was prescribed to improve your parkinson's. To combat your hallucinations, the psychiatrist started you on Seroquel 12.5mg in am and 25mg in pm for hallucinations. Continue to take your medications as prescribed. Please follow up with Dr Michael Marks within 1-2 weeks after discharge.      SECONDARY DISCHARGE DIAGNOSES  Diagnosis: Lactic acidosis  Assessment and Plan of Treatment: Your lactate was elevated on admission, this is now resolved. Continue to take your medications as prescribed. Please follow up with your primary care provider.    Diagnosis: Hallucination  Assessment and Plan of Treatment: You were seen by Psych started Seroquel 12.5mg oral in AM and 25mg oral at night. Continue taking Sinemet 25/100 dose 2 TABS per current schedule (2am, 6am, 10am, 2pm, 6pm, 10pm) (decreased from 2.5 tabs) to decrease psychotic features.  Please try avoiding anticholinergics, and antihistamines (Can cause worsening confusion/delirium). Additionally, continue reorientation, supportive care    Diagnosis: DM2 (diabetes mellitus, type 2)  Assessment and Plan of Treatment: Your Hemoglobin A1C is 5.7% . Target goal for hemoglobin A1C is <7. Monitor blood glucose levels throughout the day before meals and at bedtime. Record blood sugars and bring to outpatient providers appointment in order to be reviewed by your doctor for management modifications. If your sugars are more than 400 or less than 70 you should contact your PCP immediately. Monitor for signs/symptoms of low blood glucose, such as, dizziness, altered mental status, or cool/clammy skin. In addition, monitor for signs/symptoms of high blood glucose, such as, feeling hot, dry, fatigued, or with increased thirst/urination. Make regular podiatry appointments in order to have feet checked for wounds and uncontrolled toe nail growth to prevent infections, as well as, appointments with an ophthalmologist to monitor your vision.      Diagnosis: Peripheral artery disease  Assessment and Plan of Treatment: Follow up with outpatient vascular surgeon for further evaluation and management. You were seen by the Vascular surgery team while in the hospital and they determined that there are no acute surgical interventions to offer at this time.    Diagnosis: Abdominal mass  Assessment and Plan of Treatment: You were found to have an 4 x 4 cm intraperitoneal (abdominal) mass. Please follow up outpatient with the gastroenterologist for further work up and possible biopsy. Continue to take your medications as prescribed. Please follow up with your primary care provider.

## 2022-10-08 NOTE — DISCHARGE NOTE PROVIDER - NSDCMRMEDTOKEN_GEN_ALL_CORE_FT
carbidopa-levodopa 25 mg-100 mg oral tablet: 2.5 tab(s) orally every 4 hours  KlonoPIN 1 mg oral tablet: 1/4 to 1/2 tablet orally 1-2 times a day as needed  Lantus Solostar Pen 100 units/mL subcutaneous solution: 12-15 unit(s) subcutaneous once a day   mirtazapine 15 mg oral tablet: 1 tab(s) orally once a day (at bedtime)  NovoLOG FlexPen 100 units/mL injectable solution: 5-9 units in the morning as needed  Nuplazid 34 mg oral capsule: 1 cap(s) orally once a day  Ongentys 50 mg oral capsule: 1 cap(s) orally once a day (at bedtime)  Protonix 40 mg oral delayed release tablet: 1 tab(s) orally once a day   atorvastatin 20 mg oral tablet: 1 tab(s) orally once a day (at bedtime)  carbidopa-levodopa 25 mg-100 mg oral tablet: 2.5 tab(s) orally every 4 hours  clonazePAM 0.25 mg oral tablet, disintegratin tab(s) orally once a day, As needed, anxiety  clopidogrel 75 mg oral tablet: 1 tab(s) orally once a day  Lantus Solostar Pen 100 units/mL subcutaneous solution: 12-15 unit(s) subcutaneous once a day   mirtazapine 15 mg oral tablet: 1 tab(s) orally once a day (at bedtime)  NovoLOG FlexPen 100 units/mL injectable solution: 5-9 units in the morning as needed  Ongentys 50 mg oral capsule: 1 cap(s) orally once a day (at bedtime)  Protonix 40 mg oral delayed release tablet: 1 tab(s) orally once a day  QUEtiapine: 12.5 milligram(s) orally every 6 hours, As Needed  QUEtiapine 25 mg oral tablet: 0.5 tab(s) orally 2 times a day    atorvastatin 20 mg oral tablet: 1 tab(s) orally once a day (at bedtime)  carbidopa-levodopa 25 mg-100 mg oral tablet: Sinemet 25/100 dose 2 TABS per current schedule (2am, 6am, 10am, 2pm, 6pm, 10pm) (decreased from 2.5 tabs)   clonazePAM 0.25 mg oral tablet, disintegratin tab(s) orally once a day, As needed, anxiety  clopidogrel 75 mg oral tablet: 1 tab(s) orally once a day  Lantus Solostar Pen 100 units/mL subcutaneous solution: 12-15 unit(s) subcutaneous once a day   mirtazapine 15 mg oral tablet: 1 tab(s) orally once a day (at bedtime)  NovoLOG FlexPen 100 units/mL injectable solution: 5-9 units in the morning as needed  Ongentys 50 mg oral capsule: 1 cap(s) orally once a day (at bedtime)  Protonix 40 mg oral delayed release tablet: 1 tab(s) orally once a day  QUEtiapine 25 mg oral tablet: 0.5 tab(s) orally once a day   SEROquel 25 mg oral tablet: 1 tab(s) orally once a day (at bedtime)

## 2022-10-08 NOTE — DISCHARGE NOTE PROVIDER - NSFOLLOWUPCLINICS_GEN_ALL_ED_FT
[facility]  Gastroenterology  [address] [place] [address]  Phone: [phone]  Fax:

## 2022-10-08 NOTE — PROGRESS NOTE ADULT - PROBLEM SELECTOR PLAN 1
-Discussed w/ pt's neurologist Dr. Michael Zamora 10/4, stated Pimavanserin likely contributing to increased hallucinations. Pt per MD has been tried on pimavanserin in the past. Improved parkinson's but worsened hallucinations, pt taken off. Restarted again on medication recently.  -Recommended slow d/c of pimavanserin; will give 34mg every other day for 4-5 days per neuro recs. Last day 10/9  -c/w home Sinemet carbidopa/levodopa  25/100 2.5 Tablet(s) q4H  -C/w home Opicapone (Ongentys) 50 mg QD   -c/w Klonopin at increased dose 0.75mg q8H  -c/w Seroquel 12.5mg q6 PRN for hallucinations   -D/c ativan  -Appreciate neuro | psych recs

## 2022-10-08 NOTE — DISCHARGE NOTE PROVIDER - DISCHARGE DIET
Soft and Bite-Sized Diet/Consistent Carbohydrate Diabetic Diets/Mildly Thick Liquids Soft and Bite-Sized Diet/Consistent Carbohydrate Diabetic Diets/Other Diet Instructions/Mildly Thick Liquids

## 2022-10-08 NOTE — DISCHARGE NOTE PROVIDER - DETAILS OF MALNUTRITION DIAGNOSIS/DIAGNOSES
This patient has been assessed with a concern for Malnutrition and was treated during this hospitalization for the following Nutrition diagnosis/diagnoses:     -  10/10/2022: Severe protein-calorie malnutrition

## 2022-10-09 LAB
GLUCOSE BLDC GLUCOMTR-MCNC: 143 MG/DL — HIGH (ref 70–99)
GLUCOSE BLDC GLUCOMTR-MCNC: 173 MG/DL — HIGH (ref 70–99)
GLUCOSE BLDC GLUCOMTR-MCNC: 177 MG/DL — HIGH (ref 70–99)
GLUCOSE BLDC GLUCOMTR-MCNC: 198 MG/DL — HIGH (ref 70–99)

## 2022-10-09 PROCEDURE — 99232 SBSQ HOSP IP/OBS MODERATE 35: CPT

## 2022-10-09 RX ADMIN — CARBIDOPA AND LEVODOPA 2.5 TABLET(S): 25; 100 TABLET ORAL at 13:53

## 2022-10-09 RX ADMIN — Medication 1: at 17:38

## 2022-10-09 RX ADMIN — CARBIDOPA AND LEVODOPA 2.5 TABLET(S): 25; 100 TABLET ORAL at 02:10

## 2022-10-09 RX ADMIN — CARBIDOPA AND LEVODOPA 2.5 TABLET(S): 25; 100 TABLET ORAL at 05:58

## 2022-10-09 RX ADMIN — Medication 0.75 MILLIGRAM(S): at 22:19

## 2022-10-09 RX ADMIN — HEPARIN SODIUM 5000 UNIT(S): 5000 INJECTION INTRAVENOUS; SUBCUTANEOUS at 05:59

## 2022-10-09 RX ADMIN — Medication 0.75 MILLIGRAM(S): at 13:53

## 2022-10-09 RX ADMIN — CARBIDOPA AND LEVODOPA 2.5 TABLET(S): 25; 100 TABLET ORAL at 22:20

## 2022-10-09 RX ADMIN — Medication 0.75 MILLIGRAM(S): at 06:11

## 2022-10-09 RX ADMIN — CLOPIDOGREL BISULFATE 75 MILLIGRAM(S): 75 TABLET, FILM COATED ORAL at 13:53

## 2022-10-09 RX ADMIN — MIRTAZAPINE 15 MILLIGRAM(S): 45 TABLET, ORALLY DISINTEGRATING ORAL at 22:19

## 2022-10-09 RX ADMIN — HEPARIN SODIUM 5000 UNIT(S): 5000 INJECTION INTRAVENOUS; SUBCUTANEOUS at 17:40

## 2022-10-09 RX ADMIN — PANTOPRAZOLE SODIUM 40 MILLIGRAM(S): 20 TABLET, DELAYED RELEASE ORAL at 06:00

## 2022-10-09 RX ADMIN — CARBIDOPA AND LEVODOPA 2.5 TABLET(S): 25; 100 TABLET ORAL at 17:41

## 2022-10-09 RX ADMIN — ATORVASTATIN CALCIUM 20 MILLIGRAM(S): 80 TABLET, FILM COATED ORAL at 22:19

## 2022-10-09 NOTE — PROGRESS NOTE ADULT - PROBLEM SELECTOR PLAN 1
-Discussed w/ pt's neurologist Dr. Michael Zamora 10/4, stated Pimavanserin likely contributing to increased hallucinations. Pt per MD has been tried on pimavanserin in the past. Improved parkinson's but worsened hallucinations, pt taken off. Restarted again on medication recently.  -c/w home Sinemet carbidopa/levodopa  25/100 2.5 Tablet(s) q4H  -C/w home Opicapone (Ongentys) 50 mg QD   -c/w Klonopin at increased dose 0.75mg q8H  -c/w Seroquel 12.5mg q6 PRN for hallucinations   -D/c ativan  -D/c pimavanserin  -Appreciate neuro | psych recs

## 2022-10-10 LAB
ANION GAP SERPL CALC-SCNC: 11 MMOL/L — SIGNIFICANT CHANGE UP (ref 7–14)
BUN SERPL-MCNC: 24 MG/DL — HIGH (ref 7–23)
CALCIUM SERPL-MCNC: 8 MG/DL — LOW (ref 8.4–10.5)
CHLORIDE SERPL-SCNC: 102 MMOL/L — SIGNIFICANT CHANGE UP (ref 98–107)
CO2 SERPL-SCNC: 23 MMOL/L — SIGNIFICANT CHANGE UP (ref 22–31)
CREAT SERPL-MCNC: 0.36 MG/DL — LOW (ref 0.5–1.3)
EGFR: 111 ML/MIN/1.73M2 — SIGNIFICANT CHANGE UP
GLUCOSE BLDC GLUCOMTR-MCNC: 162 MG/DL — HIGH (ref 70–99)
GLUCOSE BLDC GLUCOMTR-MCNC: 166 MG/DL — HIGH (ref 70–99)
GLUCOSE BLDC GLUCOMTR-MCNC: 174 MG/DL — HIGH (ref 70–99)
GLUCOSE BLDC GLUCOMTR-MCNC: 247 MG/DL — HIGH (ref 70–99)
GLUCOSE SERPL-MCNC: 166 MG/DL — HIGH (ref 70–99)
HCT VFR BLD CALC: 29.8 % — LOW (ref 39–50)
HGB BLD-MCNC: 9.4 G/DL — LOW (ref 13–17)
MAGNESIUM SERPL-MCNC: 1.6 MG/DL — SIGNIFICANT CHANGE UP (ref 1.6–2.6)
MCHC RBC-ENTMCNC: 29.6 PG — SIGNIFICANT CHANGE UP (ref 27–34)
MCHC RBC-ENTMCNC: 31.5 GM/DL — LOW (ref 32–36)
MCV RBC AUTO: 93.7 FL — SIGNIFICANT CHANGE UP (ref 80–100)
NRBC # BLD: 0 /100 WBCS — SIGNIFICANT CHANGE UP (ref 0–0)
NRBC # FLD: 0 K/UL — SIGNIFICANT CHANGE UP (ref 0–0)
PHOSPHATE SERPL-MCNC: 2.3 MG/DL — LOW (ref 2.5–4.5)
PLATELET # BLD AUTO: 213 K/UL — SIGNIFICANT CHANGE UP (ref 150–400)
POTASSIUM SERPL-MCNC: 3.3 MMOL/L — LOW (ref 3.5–5.3)
POTASSIUM SERPL-SCNC: 3.3 MMOL/L — LOW (ref 3.5–5.3)
RBC # BLD: 3.18 M/UL — LOW (ref 4.2–5.8)
RBC # FLD: 13.8 % — SIGNIFICANT CHANGE UP (ref 10.3–14.5)
SODIUM SERPL-SCNC: 136 MMOL/L — SIGNIFICANT CHANGE UP (ref 135–145)
WBC # BLD: 8.09 K/UL — SIGNIFICANT CHANGE UP (ref 3.8–10.5)
WBC # FLD AUTO: 8.09 K/UL — SIGNIFICANT CHANGE UP (ref 3.8–10.5)

## 2022-10-10 PROCEDURE — 99232 SBSQ HOSP IP/OBS MODERATE 35: CPT

## 2022-10-10 RX ORDER — MAGNESIUM SULFATE 500 MG/ML
2 VIAL (ML) INJECTION ONCE
Refills: 0 | Status: COMPLETED | OUTPATIENT
Start: 2022-10-10 | End: 2022-10-10

## 2022-10-10 RX ORDER — SODIUM CHLORIDE 9 MG/ML
1000 INJECTION, SOLUTION INTRAVENOUS
Refills: 0 | Status: DISCONTINUED | OUTPATIENT
Start: 2022-10-10 | End: 2022-10-10

## 2022-10-10 RX ORDER — POTASSIUM PHOSPHATE, MONOBASIC POTASSIUM PHOSPHATE, DIBASIC 236; 224 MG/ML; MG/ML
15 INJECTION, SOLUTION INTRAVENOUS ONCE
Refills: 0 | Status: COMPLETED | OUTPATIENT
Start: 2022-10-10 | End: 2022-10-10

## 2022-10-10 RX ORDER — POTASSIUM CHLORIDE 20 MEQ
40 PACKET (EA) ORAL ONCE
Refills: 0 | Status: COMPLETED | OUTPATIENT
Start: 2022-10-10 | End: 2022-10-10

## 2022-10-10 RX ADMIN — Medication 25 GRAM(S): at 12:59

## 2022-10-10 RX ADMIN — CARBIDOPA AND LEVODOPA 2.5 TABLET(S): 25; 100 TABLET ORAL at 21:55

## 2022-10-10 RX ADMIN — ATORVASTATIN CALCIUM 20 MILLIGRAM(S): 80 TABLET, FILM COATED ORAL at 21:56

## 2022-10-10 RX ADMIN — CARBIDOPA AND LEVODOPA 2.5 TABLET(S): 25; 100 TABLET ORAL at 09:48

## 2022-10-10 RX ADMIN — Medication 0.75 MILLIGRAM(S): at 14:05

## 2022-10-10 RX ADMIN — CARBIDOPA AND LEVODOPA 2.5 TABLET(S): 25; 100 TABLET ORAL at 18:15

## 2022-10-10 RX ADMIN — SODIUM CHLORIDE 75 MILLILITER(S): 9 INJECTION, SOLUTION INTRAVENOUS at 12:13

## 2022-10-10 RX ADMIN — HEPARIN SODIUM 5000 UNIT(S): 5000 INJECTION INTRAVENOUS; SUBCUTANEOUS at 06:24

## 2022-10-10 RX ADMIN — Medication 40 MILLIEQUIVALENT(S): at 12:58

## 2022-10-10 RX ADMIN — Medication 1: at 09:15

## 2022-10-10 RX ADMIN — CARBIDOPA AND LEVODOPA 2.5 TABLET(S): 25; 100 TABLET ORAL at 01:40

## 2022-10-10 RX ADMIN — CLOPIDOGREL BISULFATE 75 MILLIGRAM(S): 75 TABLET, FILM COATED ORAL at 12:14

## 2022-10-10 RX ADMIN — Medication 1: at 13:01

## 2022-10-10 RX ADMIN — HEPARIN SODIUM 5000 UNIT(S): 5000 INJECTION INTRAVENOUS; SUBCUTANEOUS at 17:38

## 2022-10-10 RX ADMIN — Medication 0.75 MILLIGRAM(S): at 21:56

## 2022-10-10 RX ADMIN — PANTOPRAZOLE SODIUM 40 MILLIGRAM(S): 20 TABLET, DELAYED RELEASE ORAL at 06:25

## 2022-10-10 RX ADMIN — Medication 1: at 17:38

## 2022-10-10 RX ADMIN — POTASSIUM PHOSPHATE, MONOBASIC POTASSIUM PHOSPHATE, DIBASIC 62.5 MILLIMOLE(S): 236; 224 INJECTION, SOLUTION INTRAVENOUS at 16:41

## 2022-10-10 RX ADMIN — MIRTAZAPINE 15 MILLIGRAM(S): 45 TABLET, ORALLY DISINTEGRATING ORAL at 21:56

## 2022-10-10 RX ADMIN — POLYETHYLENE GLYCOL 3350 17 GRAM(S): 17 POWDER, FOR SOLUTION ORAL at 12:14

## 2022-10-10 RX ADMIN — Medication 0.75 MILLIGRAM(S): at 06:24

## 2022-10-10 RX ADMIN — CARBIDOPA AND LEVODOPA 2.5 TABLET(S): 25; 100 TABLET ORAL at 06:25

## 2022-10-10 RX ADMIN — CARBIDOPA AND LEVODOPA 2.5 TABLET(S): 25; 100 TABLET ORAL at 14:04

## 2022-10-10 NOTE — DIETITIAN NUTRITION RISK NOTIFICATION - TREATMENT: THE FOLLOWING DIET HAS BEEN RECOMMENDED
Diet, Soft and Bite Sized:   Consistent Carbohydrate {Evening Snack} (CSTCHOSN)  Mildly Thick Liquids (MILDTHICKLIQS)  Supplement Feeding Modality:  Oral  Glucerna Shake Cans or Servings Per Day:  1       Frequency:  Two Times a day (10-08-22 @ 12:41) [Active]

## 2022-10-10 NOTE — DIETITIAN INITIAL EVALUATION ADULT - OTHER INFO
A&Ox3. Pt with word-finding difficulty and unclear, garbled speech. ACP was setting his tray up for breakfast. He states he likes drinking the Glucerna shakes. Per RN documentation, eating <50% of his meals. His diet was upgraded from pureed to soft and bite sized with mildly thick liquids on 10/3 per SLP recommendations. No reported GI issues (nausea/vomiting/diarrhea/constipation.) NKFA.

## 2022-10-10 NOTE — DIETITIAN INITIAL EVALUATION ADULT - PERTINENT MEDS FT
MEDICATIONS  (STANDING):  atorvastatin 20 milliGRAM(s) Oral at bedtime  carbidopa/levodopa  25/100 2.5 Tablet(s) Oral <User Schedule>  clonazePAM Oral Disintegrating Tablet 0.75 milliGRAM(s) Oral every 8 hours  clopidogrel Tablet 75 milliGRAM(s) Oral daily  dextrose 5%. 1000 milliLiter(s) (100 mL/Hr) IV Continuous <Continuous>  dextrose 5%. 1000 milliLiter(s) (50 mL/Hr) IV Continuous <Continuous>  dextrose 50% Injectable 25 Gram(s) IV Push once  dextrose 50% Injectable 12.5 Gram(s) IV Push once  dextrose 50% Injectable 25 Gram(s) IV Push once  glucagon  Injectable 1 milliGRAM(s) IntraMuscular once  heparin   Injectable 5000 Unit(s) SubCutaneous every 12 hours  influenza  Vaccine (HIGH DOSE) 0.7 milliLiter(s) IntraMuscular once  insulin lispro (ADMELOG) corrective regimen sliding scale   SubCutaneous three times a day before meals  insulin lispro (ADMELOG) corrective regimen sliding scale   SubCutaneous at bedtime  lactated ringers. 1000 milliLiter(s) (75 mL/Hr) IV Continuous <Continuous>  mirtazapine 15 milliGRAM(s) Oral at bedtime  Opicapone (Ongentys) 50 milliGRAM(s) 50 milliGRAM(s) Oral at bedtime  pantoprazole  Injectable 40 milliGRAM(s) IV Push every 24 hours  polyethylene glycol 3350 17 Gram(s) Oral daily    MEDICATIONS  (PRN):  acetaminophen     Tablet .. 650 milliGRAM(s) Oral every 6 hours PRN Temp greater or equal to 38C (100.4F), Mild Pain (1 - 3)  dextrose Oral Gel 15 Gram(s) Oral once PRN Blood Glucose LESS THAN 70 milliGRAM(s)/deciliter  QUEtiapine 12.5 milliGRAM(s) Oral every 6 hours PRN Hallucinations

## 2022-10-10 NOTE — DIETITIAN INITIAL EVALUATION ADULT - REASON FOR ADMISSION
Intra-abdominal and pelvic swelling of mass or lump  81 year old male hx of insulin dependent DM2, Parkinson's dx, sick sinus syndrome w/ pacemaker p/w hallucinations now improving, found to have colonic mass and arterial disease

## 2022-10-10 NOTE — DIETITIAN INITIAL EVALUATION ADULT - ADD RECOMMEND
1. Encourage PO intake and honor food preferences as able.   2. Continue to document PO in RN flow sheets and monitor weekly weights.   3. Consider supplemental TF if patient is unable to meet needs PO as consistent with GOC.

## 2022-10-10 NOTE — DIETITIAN INITIAL EVALUATION ADULT - PERTINENT LABORATORY DATA
10-10    136  |  102  |  24<H>  ----------------------------<  166<H>  3.3<L>   |  23  |  0.36<L>    Ca    8.0<L>      10 Oct 2022 07:44  Phos  2.3     10-10  Mg     1.60     10-10    POCT Blood Glucose.: 162 mg/dL (10-10-22 @ 08:47)  A1C with Estimated Average Glucose Result: 5.7 % (10-03-22 @ 05:44)

## 2022-10-11 LAB
ANION GAP SERPL CALC-SCNC: 9 MMOL/L — SIGNIFICANT CHANGE UP (ref 7–14)
BUN SERPL-MCNC: 25 MG/DL — HIGH (ref 7–23)
CALCIUM SERPL-MCNC: 8.3 MG/DL — LOW (ref 8.4–10.5)
CHLORIDE SERPL-SCNC: 102 MMOL/L — SIGNIFICANT CHANGE UP (ref 98–107)
CO2 SERPL-SCNC: 24 MMOL/L — SIGNIFICANT CHANGE UP (ref 22–31)
CREAT SERPL-MCNC: 0.38 MG/DL — LOW (ref 0.5–1.3)
EGFR: 109 ML/MIN/1.73M2 — SIGNIFICANT CHANGE UP
GLUCOSE BLDC GLUCOMTR-MCNC: 172 MG/DL — HIGH (ref 70–99)
GLUCOSE BLDC GLUCOMTR-MCNC: 175 MG/DL — HIGH (ref 70–99)
GLUCOSE BLDC GLUCOMTR-MCNC: 183 MG/DL — HIGH (ref 70–99)
GLUCOSE BLDC GLUCOMTR-MCNC: 321 MG/DL — HIGH (ref 70–99)
GLUCOSE SERPL-MCNC: 186 MG/DL — HIGH (ref 70–99)
HCT VFR BLD CALC: 33.3 % — LOW (ref 39–50)
HGB BLD-MCNC: 10.4 G/DL — LOW (ref 13–17)
MAGNESIUM SERPL-MCNC: 2 MG/DL — SIGNIFICANT CHANGE UP (ref 1.6–2.6)
MCHC RBC-ENTMCNC: 29.4 PG — SIGNIFICANT CHANGE UP (ref 27–34)
MCHC RBC-ENTMCNC: 31.2 GM/DL — LOW (ref 32–36)
MCV RBC AUTO: 94.1 FL — SIGNIFICANT CHANGE UP (ref 80–100)
NRBC # BLD: 0 /100 WBCS — SIGNIFICANT CHANGE UP (ref 0–0)
NRBC # FLD: 0 K/UL — SIGNIFICANT CHANGE UP (ref 0–0)
PHOSPHATE SERPL-MCNC: 3 MG/DL — SIGNIFICANT CHANGE UP (ref 2.5–4.5)
PLATELET # BLD AUTO: 201 K/UL — SIGNIFICANT CHANGE UP (ref 150–400)
POTASSIUM SERPL-MCNC: 5 MMOL/L — SIGNIFICANT CHANGE UP (ref 3.5–5.3)
POTASSIUM SERPL-SCNC: 5 MMOL/L — SIGNIFICANT CHANGE UP (ref 3.5–5.3)
RBC # BLD: 3.54 M/UL — LOW (ref 4.2–5.8)
RBC # FLD: 13.8 % — SIGNIFICANT CHANGE UP (ref 10.3–14.5)
SODIUM SERPL-SCNC: 135 MMOL/L — SIGNIFICANT CHANGE UP (ref 135–145)
WBC # BLD: 10.63 K/UL — HIGH (ref 3.8–10.5)
WBC # FLD AUTO: 10.63 K/UL — HIGH (ref 3.8–10.5)

## 2022-10-11 PROCEDURE — 99232 SBSQ HOSP IP/OBS MODERATE 35: CPT

## 2022-10-11 RX ADMIN — QUETIAPINE FUMARATE 12.5 MILLIGRAM(S): 200 TABLET, FILM COATED ORAL at 13:48

## 2022-10-11 RX ADMIN — Medication 2: at 23:03

## 2022-10-11 RX ADMIN — HEPARIN SODIUM 5000 UNIT(S): 5000 INJECTION INTRAVENOUS; SUBCUTANEOUS at 18:20

## 2022-10-11 RX ADMIN — Medication 0.75 MILLIGRAM(S): at 21:20

## 2022-10-11 RX ADMIN — ATORVASTATIN CALCIUM 20 MILLIGRAM(S): 80 TABLET, FILM COATED ORAL at 21:16

## 2022-10-11 RX ADMIN — CARBIDOPA AND LEVODOPA 2.5 TABLET(S): 25; 100 TABLET ORAL at 21:16

## 2022-10-11 RX ADMIN — PANTOPRAZOLE SODIUM 40 MILLIGRAM(S): 20 TABLET, DELAYED RELEASE ORAL at 05:15

## 2022-10-11 RX ADMIN — HEPARIN SODIUM 5000 UNIT(S): 5000 INJECTION INTRAVENOUS; SUBCUTANEOUS at 05:15

## 2022-10-11 RX ADMIN — CARBIDOPA AND LEVODOPA 2.5 TABLET(S): 25; 100 TABLET ORAL at 10:32

## 2022-10-11 RX ADMIN — Medication 1: at 08:42

## 2022-10-11 RX ADMIN — CLOPIDOGREL BISULFATE 75 MILLIGRAM(S): 75 TABLET, FILM COATED ORAL at 12:32

## 2022-10-11 RX ADMIN — CARBIDOPA AND LEVODOPA 2.5 TABLET(S): 25; 100 TABLET ORAL at 18:20

## 2022-10-11 RX ADMIN — MIRTAZAPINE 15 MILLIGRAM(S): 45 TABLET, ORALLY DISINTEGRATING ORAL at 21:17

## 2022-10-11 RX ADMIN — CARBIDOPA AND LEVODOPA 2.5 TABLET(S): 25; 100 TABLET ORAL at 14:05

## 2022-10-11 RX ADMIN — Medication 1: at 18:19

## 2022-10-11 RX ADMIN — Medication 0.75 MILLIGRAM(S): at 05:15

## 2022-10-11 RX ADMIN — CARBIDOPA AND LEVODOPA 2.5 TABLET(S): 25; 100 TABLET ORAL at 05:15

## 2022-10-11 RX ADMIN — Medication 1: at 12:32

## 2022-10-11 RX ADMIN — CARBIDOPA AND LEVODOPA 2.5 TABLET(S): 25; 100 TABLET ORAL at 02:24

## 2022-10-11 NOTE — SWALLOW BEDSIDE ASSESSMENT ADULT - SWALLOW EVAL: DIAGNOSIS
Unable to assess oral and pharyngeal stage of swallow as patient noted with emotional lability and difficulty being directed to task/participate in clinical swallow evaluation.

## 2022-10-12 DIAGNOSIS — R44.3 HALLUCINATIONS, UNSPECIFIED: ICD-10-CM

## 2022-10-12 DIAGNOSIS — F29 UNSPECIFIED PSYCHOSIS NOT DUE TO A SUBSTANCE OR KNOWN PHYSIOLOGICAL CONDITION: ICD-10-CM

## 2022-10-12 LAB
ANION GAP SERPL CALC-SCNC: 8 MMOL/L — SIGNIFICANT CHANGE UP (ref 7–14)
BUN SERPL-MCNC: 22 MG/DL — SIGNIFICANT CHANGE UP (ref 7–23)
CALCIUM SERPL-MCNC: 8.1 MG/DL — LOW (ref 8.4–10.5)
CHLORIDE SERPL-SCNC: 106 MMOL/L — SIGNIFICANT CHANGE UP (ref 98–107)
CO2 SERPL-SCNC: 25 MMOL/L — SIGNIFICANT CHANGE UP (ref 22–31)
CREAT SERPL-MCNC: 0.34 MG/DL — LOW (ref 0.5–1.3)
EGFR: 113 ML/MIN/1.73M2 — SIGNIFICANT CHANGE UP
GLUCOSE BLDC GLUCOMTR-MCNC: 106 MG/DL — HIGH (ref 70–99)
GLUCOSE BLDC GLUCOMTR-MCNC: 115 MG/DL — HIGH (ref 70–99)
GLUCOSE BLDC GLUCOMTR-MCNC: 129 MG/DL — HIGH (ref 70–99)
GLUCOSE BLDC GLUCOMTR-MCNC: 163 MG/DL — HIGH (ref 70–99)
GLUCOSE SERPL-MCNC: 126 MG/DL — HIGH (ref 70–99)
HCT VFR BLD CALC: 32.1 % — LOW (ref 39–50)
HGB BLD-MCNC: 9.8 G/DL — LOW (ref 13–17)
MAGNESIUM SERPL-MCNC: 1.8 MG/DL — SIGNIFICANT CHANGE UP (ref 1.6–2.6)
MCHC RBC-ENTMCNC: 29.3 PG — SIGNIFICANT CHANGE UP (ref 27–34)
MCHC RBC-ENTMCNC: 30.5 GM/DL — LOW (ref 32–36)
MCV RBC AUTO: 96.1 FL — SIGNIFICANT CHANGE UP (ref 80–100)
NRBC # BLD: 0 /100 WBCS — SIGNIFICANT CHANGE UP (ref 0–0)
NRBC # FLD: 0 K/UL — SIGNIFICANT CHANGE UP (ref 0–0)
PHOSPHATE SERPL-MCNC: 2.8 MG/DL — SIGNIFICANT CHANGE UP (ref 2.5–4.5)
PLATELET # BLD AUTO: 216 K/UL — SIGNIFICANT CHANGE UP (ref 150–400)
POTASSIUM SERPL-MCNC: 4.4 MMOL/L — SIGNIFICANT CHANGE UP (ref 3.5–5.3)
POTASSIUM SERPL-SCNC: 4.4 MMOL/L — SIGNIFICANT CHANGE UP (ref 3.5–5.3)
RBC # BLD: 3.34 M/UL — LOW (ref 4.2–5.8)
RBC # FLD: 13.9 % — SIGNIFICANT CHANGE UP (ref 10.3–14.5)
SODIUM SERPL-SCNC: 139 MMOL/L — SIGNIFICANT CHANGE UP (ref 135–145)
WBC # BLD: 7.49 K/UL — SIGNIFICANT CHANGE UP (ref 3.8–10.5)
WBC # FLD AUTO: 7.49 K/UL — SIGNIFICANT CHANGE UP (ref 3.8–10.5)

## 2022-10-12 PROCEDURE — 99233 SBSQ HOSP IP/OBS HIGH 50: CPT

## 2022-10-12 PROCEDURE — 99223 1ST HOSP IP/OBS HIGH 75: CPT

## 2022-10-12 RX ORDER — CLONAZEPAM 1 MG
0.25 TABLET ORAL DAILY
Refills: 0 | Status: DISCONTINUED | OUTPATIENT
Start: 2022-10-12 | End: 2022-10-17

## 2022-10-12 RX ORDER — QUETIAPINE FUMARATE 200 MG/1
12.5 TABLET, FILM COATED ORAL EVERY 6 HOURS
Refills: 0 | Status: DISCONTINUED | OUTPATIENT
Start: 2022-10-12 | End: 2022-10-17

## 2022-10-12 RX ORDER — QUETIAPINE FUMARATE 200 MG/1
12.5 TABLET, FILM COATED ORAL
Refills: 0 | Status: DISCONTINUED | OUTPATIENT
Start: 2022-10-12 | End: 2022-10-15

## 2022-10-12 RX ADMIN — QUETIAPINE FUMARATE 12.5 MILLIGRAM(S): 200 TABLET, FILM COATED ORAL at 22:31

## 2022-10-12 RX ADMIN — Medication 0.75 MILLIGRAM(S): at 14:50

## 2022-10-12 RX ADMIN — CLOPIDOGREL BISULFATE 75 MILLIGRAM(S): 75 TABLET, FILM COATED ORAL at 10:03

## 2022-10-12 RX ADMIN — HEPARIN SODIUM 5000 UNIT(S): 5000 INJECTION INTRAVENOUS; SUBCUTANEOUS at 18:45

## 2022-10-12 RX ADMIN — Medication 1: at 13:12

## 2022-10-12 RX ADMIN — Medication 650 MILLIGRAM(S): at 12:25

## 2022-10-12 RX ADMIN — PANTOPRAZOLE SODIUM 40 MILLIGRAM(S): 20 TABLET, DELAYED RELEASE ORAL at 05:26

## 2022-10-12 RX ADMIN — CARBIDOPA AND LEVODOPA 2.5 TABLET(S): 25; 100 TABLET ORAL at 02:16

## 2022-10-12 RX ADMIN — CARBIDOPA AND LEVODOPA 2.5 TABLET(S): 25; 100 TABLET ORAL at 14:50

## 2022-10-12 RX ADMIN — Medication 0.75 MILLIGRAM(S): at 05:24

## 2022-10-12 RX ADMIN — CARBIDOPA AND LEVODOPA 2.5 TABLET(S): 25; 100 TABLET ORAL at 18:44

## 2022-10-12 RX ADMIN — QUETIAPINE FUMARATE 12.5 MILLIGRAM(S): 200 TABLET, FILM COATED ORAL at 09:51

## 2022-10-12 RX ADMIN — MIRTAZAPINE 15 MILLIGRAM(S): 45 TABLET, ORALLY DISINTEGRATING ORAL at 22:25

## 2022-10-12 RX ADMIN — ATORVASTATIN CALCIUM 20 MILLIGRAM(S): 80 TABLET, FILM COATED ORAL at 22:24

## 2022-10-12 RX ADMIN — HEPARIN SODIUM 5000 UNIT(S): 5000 INJECTION INTRAVENOUS; SUBCUTANEOUS at 05:26

## 2022-10-12 RX ADMIN — CARBIDOPA AND LEVODOPA 2.5 TABLET(S): 25; 100 TABLET ORAL at 10:03

## 2022-10-12 RX ADMIN — CARBIDOPA AND LEVODOPA 2.5 TABLET(S): 25; 100 TABLET ORAL at 05:25

## 2022-10-12 RX ADMIN — CARBIDOPA AND LEVODOPA 2.5 TABLET(S): 25; 100 TABLET ORAL at 22:25

## 2022-10-12 RX ADMIN — Medication 650 MILLIGRAM(S): at 11:25

## 2022-10-12 NOTE — BH CONSULTATION LIAISON ASSESSMENT NOTE - CURRENT MEDICATION
MEDICATIONS  (STANDING):  atorvastatin 20 milliGRAM(s) Oral at bedtime  carbidopa/levodopa  25/100 2.5 Tablet(s) Oral <User Schedule>  clonazePAM Oral Disintegrating Tablet 0.75 milliGRAM(s) Oral every 8 hours  clopidogrel Tablet 75 milliGRAM(s) Oral daily  dextrose 5%. 1000 milliLiter(s) (50 mL/Hr) IV Continuous <Continuous>  dextrose 5%. 1000 milliLiter(s) (100 mL/Hr) IV Continuous <Continuous>  dextrose 50% Injectable 25 Gram(s) IV Push once  dextrose 50% Injectable 12.5 Gram(s) IV Push once  dextrose 50% Injectable 25 Gram(s) IV Push once  glucagon  Injectable 1 milliGRAM(s) IntraMuscular once  heparin   Injectable 5000 Unit(s) SubCutaneous every 12 hours  influenza  Vaccine (HIGH DOSE) 0.7 milliLiter(s) IntraMuscular once  insulin lispro (ADMELOG) corrective regimen sliding scale   SubCutaneous three times a day before meals  insulin lispro (ADMELOG) corrective regimen sliding scale   SubCutaneous at bedtime  mirtazapine 15 milliGRAM(s) Oral at bedtime  Opicapone (Ongentys) 50 milliGRAM(s) 50 milliGRAM(s) Oral at bedtime  pantoprazole  Injectable 40 milliGRAM(s) IV Push every 24 hours  polyethylene glycol 3350 17 Gram(s) Oral daily  QUEtiapine 12.5 milliGRAM(s) Oral two times a day    MEDICATIONS  (PRN):  acetaminophen     Tablet .. 650 milliGRAM(s) Oral every 6 hours PRN Temp greater or equal to 38C (100.4F), Mild Pain (1 - 3)  dextrose Oral Gel 15 Gram(s) Oral once PRN Blood Glucose LESS THAN 70 milliGRAM(s)/deciliter

## 2022-10-12 NOTE — BH CONSULTATION LIAISON ASSESSMENT NOTE - NSBHATTESTAPPAMEND_PSY_A_CORE
I have personally seen and examined this patient. I fully participated in the care of this patient. I have made amendments to the documentation where appropriate and otherwise agree with the history, physical exam, and plan as documented by the ADA

## 2022-10-12 NOTE — BH CONSULTATION LIAISON ASSESSMENT NOTE - NSBHCHARTREVIEWLAB_PSY_A_CORE FT
CBC Full  -  ( 12 Oct 2022 07:39 )  WBC Count : 7.49 K/uL  RBC Count : 3.34 M/uL  Hemoglobin : 9.8 g/dL  Hematocrit : 32.1 %  Platelet Count - Automated : 216 K/uL  Mean Cell Volume : 96.1 fL  Mean Cell Hemoglobin : 29.3 pg  Mean Cell Hemoglobin Concentration : 30.5 gm/dL  Auto Neutrophil # : x  Auto Lymphocyte # : x  Auto Monocyte # : x  Auto Eosinophil # : x  Auto Basophil # : x  Auto Neutrophil % : x  Auto Lymphocyte % : x  Auto Monocyte % : x  Auto Eosinophil % : x  Auto Basophil % : x  10-12    139  |  106  |  22  ----------------------------<  126<H>  4.4   |  25  |  0.34<L>    Ca    8.1<L>      12 Oct 2022 07:39  Phos  2.8     10-12  Mg     1.80     10-12

## 2022-10-12 NOTE — BH CONSULTATION LIAISON ASSESSMENT NOTE - RISK ASSESSMENT
Acute/chronic medical conditions, h/o depression and anxiety, new onset psychosis, intermittent changes in functioning     Residential stability, relationship stability, denies SI, HI, no hx of psychiatric hospitalizations, help-seeking, no access to firearms

## 2022-10-12 NOTE — BH CONSULTATION LIAISON ASSESSMENT NOTE - NSBHCHARTREVIEWVS_PSY_A_CORE FT
Vital Signs Last 24 Hrs  T(C): 36.5 (12 Oct 2022 11:30), Max: 36.8 (11 Oct 2022 21:00)  T(F): 97.7 (12 Oct 2022 11:30), Max: 98.2 (11 Oct 2022 21:00)  HR: 86 (12 Oct 2022 11:30) (71 - 86)  BP: 138/74 (12 Oct 2022 11:30) (107/55 - 138/74)  BP(mean): --  RR: 17 (12 Oct 2022 11:30) (17 - 18)  SpO2: 99% (12 Oct 2022 11:30) (99% - 100%)    Parameters below as of 12 Oct 2022 11:30  Patient On (Oxygen Delivery Method): room air

## 2022-10-12 NOTE — CHART NOTE - NSCHARTNOTEFT_GEN_A_CORE
actively hallucinating - thinks his family was murdered and he is seeing them dead.  will give Seroquel, bh consulted, neurology has adjusted meds as best as possible, team requesting additional assistance in making patient more comfortable.  will continue to reassure pt that his family is ok

## 2022-10-12 NOTE — BH CONSULTATION LIAISON ASSESSMENT NOTE - HPI (INCLUDE ILLNESS QUALITY, SEVERITY, DURATION, TIMING, CONTEXT, MODIFYING FACTORS, ASSOCIATED SIGNS AND SYMPTOMS)
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 Past psychiatric history of anxiety & depression diagnosed by neurologist. Past Medical hx insulin dependent DM2, Parkinson's dx, sick sinus syndrome w/ pacemaker.  No prior psychiatric hospitalization, does not see psychiatrist as outpatient.  BIBEMS w/ family, p/w hallucinations now improving, found to have colonic mass and arterial disease. Psychiatry consulted for hallucinations.     Per d/w collateral hallucinations ongoing over past few months but have worsened since neuplastin started 3 weeks ago. Hallucinations including people around his bed, bugs crawling on him.  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 Past psychiatric history of anxiety and depression diagnosed by neurologist and PCP. No prior psychiatric hospitalization, does not see an outpatient psychiatrist. Past Medical hx insulin dependent DM2, Parkinson's dx, sick sinus syndrome w/ pacemaker. BIBEMS w/ family, p/w hallucinations now improving, found to have colonic mass and arterial disease. Psychiatry consulted for hallucinations. Per chart note: "actively hallucinating - thinks his family was murdered and he is seeing them dead".    Pt seen and assessed at bedside. Somnolent, arousable to verbal stimuli and able to participate in limited interview. A&O. Denies SI/HI/AH/VH at this time. Eyes closed, speaking slowly, and not answering many questions. Rigidity and cogwheeling noted on physical examination.     Interview deferred to collateral, wife Tamar at bedside. Pt was diagnosed with PD 10-12 years ago. States hallucinations ongoing over past few months but have worsened since neuplastin started 3 weeks ago. Hallucinations include people standing around his bed, bugs crawling on him, being sodomized. States pt is normally high functioning, they run a business together. When asked, wife states patient was diagnosed with depression by PCP and was on Lexapro "years ago". Medication was effective but pt took himself off of it. Prior to admission, pt prescribed Klonopin 0.25 mg daily PRN for anxiety by outpt neurologist. Wife states pt infrequently took this medication at home. Wife notes pt has been sleepy since transitioning to Klonopin 0.75 mg TID this admission. Wife denies prior psychiatric hospitalizations, denies access to firearms.

## 2022-10-12 NOTE — BH CONSULTATION LIAISON ASSESSMENT NOTE - SUMMARY
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 Past psychiatric history of anxiety and depression diagnosed by neurologist and PCP. Past Medical hx insulin dependent DM2, Parkinson's dx, sick sinus syndrome w/ pacemaker.  No prior psychiatric hospitalization, does not see an outpatient psychiatrist. BIBEMS w/ family, p/w hallucinations now improving, found to have colonic mass and arterial disease. Psychiatry consulted for hallucinations. Per chart note: "actively hallucinating - thinks his family was murdered and he is seeing them dead".    Pt seen and assessed at bedside. Somnolent, but able to participate in limited interview. A&Ox4. Denies SI/HI/AH/VH at this time. Per spouse, pt began experiencing increased hallucinations, paranoia, depression, after starting neuplastin 3 weeks ago. Per spouse, plan for subacute rehab. Spouse undecided at this time, expresses concern for somnolence and current immobility. Spouse amenable to medication management.     Plan:  --Defer obs to primary team  --Discontinue standing Klonopin  --Start Klonopin 0.25 mg PO once daily PRN for anxiety  --C/w Seroquel 12.5 mg PO BID  --Anxiety/agitation: Seroquel 12.5 mg PO q 6 hr PRN  --Monitor EKG  -- Antipsychotics can only be given if qtc < 500   -- In order to enhance patient's overall well-being and clinical course, please try avoiding anticholinergics, and antihistamines (Can cause worsening confusion/delirium). Additionally, continue reorientation, supportive care, maintaining regular sleep/wake cycle, and optimizing nutritional/medical factors.   --CL psychiatry to follow

## 2022-10-13 LAB
ANION GAP SERPL CALC-SCNC: 10 MMOL/L — SIGNIFICANT CHANGE UP (ref 7–14)
BUN SERPL-MCNC: 20 MG/DL — SIGNIFICANT CHANGE UP (ref 7–23)
CALCIUM SERPL-MCNC: 8.6 MG/DL — SIGNIFICANT CHANGE UP (ref 8.4–10.5)
CHLORIDE SERPL-SCNC: 104 MMOL/L — SIGNIFICANT CHANGE UP (ref 98–107)
CO2 SERPL-SCNC: 24 MMOL/L — SIGNIFICANT CHANGE UP (ref 22–31)
CREAT SERPL-MCNC: 0.36 MG/DL — LOW (ref 0.5–1.3)
EGFR: 111 ML/MIN/1.73M2 — SIGNIFICANT CHANGE UP
GLUCOSE BLDC GLUCOMTR-MCNC: 117 MG/DL — HIGH (ref 70–99)
GLUCOSE BLDC GLUCOMTR-MCNC: 145 MG/DL — HIGH (ref 70–99)
GLUCOSE BLDC GLUCOMTR-MCNC: 150 MG/DL — HIGH (ref 70–99)
GLUCOSE BLDC GLUCOMTR-MCNC: 170 MG/DL — HIGH (ref 70–99)
GLUCOSE BLDC GLUCOMTR-MCNC: 175 MG/DL — HIGH (ref 70–99)
GLUCOSE SERPL-MCNC: 101 MG/DL — HIGH (ref 70–99)
HCT VFR BLD CALC: 31.8 % — LOW (ref 39–50)
HGB BLD-MCNC: 9.8 G/DL — LOW (ref 13–17)
MAGNESIUM SERPL-MCNC: 1.8 MG/DL — SIGNIFICANT CHANGE UP (ref 1.6–2.6)
MCHC RBC-ENTMCNC: 29.5 PG — SIGNIFICANT CHANGE UP (ref 27–34)
MCHC RBC-ENTMCNC: 30.8 GM/DL — LOW (ref 32–36)
MCV RBC AUTO: 95.8 FL — SIGNIFICANT CHANGE UP (ref 80–100)
NRBC # BLD: 0 /100 WBCS — SIGNIFICANT CHANGE UP (ref 0–0)
NRBC # FLD: 0 K/UL — SIGNIFICANT CHANGE UP (ref 0–0)
PHOSPHATE SERPL-MCNC: 3.1 MG/DL — SIGNIFICANT CHANGE UP (ref 2.5–4.5)
PLATELET # BLD AUTO: 216 K/UL — SIGNIFICANT CHANGE UP (ref 150–400)
POTASSIUM SERPL-MCNC: 4.3 MMOL/L — SIGNIFICANT CHANGE UP (ref 3.5–5.3)
POTASSIUM SERPL-SCNC: 4.3 MMOL/L — SIGNIFICANT CHANGE UP (ref 3.5–5.3)
RBC # BLD: 3.32 M/UL — LOW (ref 4.2–5.8)
RBC # FLD: 13.9 % — SIGNIFICANT CHANGE UP (ref 10.3–14.5)
SARS-COV-2 RNA SPEC QL NAA+PROBE: SIGNIFICANT CHANGE UP
SODIUM SERPL-SCNC: 138 MMOL/L — SIGNIFICANT CHANGE UP (ref 135–145)
WBC # BLD: 6.96 K/UL — SIGNIFICANT CHANGE UP (ref 3.8–10.5)
WBC # FLD AUTO: 6.96 K/UL — SIGNIFICANT CHANGE UP (ref 3.8–10.5)

## 2022-10-13 PROCEDURE — 99233 SBSQ HOSP IP/OBS HIGH 50: CPT

## 2022-10-13 RX ADMIN — CARBIDOPA AND LEVODOPA 2.5 TABLET(S): 25; 100 TABLET ORAL at 21:45

## 2022-10-13 RX ADMIN — PANTOPRAZOLE SODIUM 40 MILLIGRAM(S): 20 TABLET, DELAYED RELEASE ORAL at 05:45

## 2022-10-13 RX ADMIN — Medication 0.25 MILLIGRAM(S): at 15:11

## 2022-10-13 RX ADMIN — Medication 1: at 18:03

## 2022-10-13 RX ADMIN — CARBIDOPA AND LEVODOPA 2.5 TABLET(S): 25; 100 TABLET ORAL at 02:05

## 2022-10-13 RX ADMIN — MIRTAZAPINE 15 MILLIGRAM(S): 45 TABLET, ORALLY DISINTEGRATING ORAL at 21:45

## 2022-10-13 RX ADMIN — CARBIDOPA AND LEVODOPA 2.5 TABLET(S): 25; 100 TABLET ORAL at 15:07

## 2022-10-13 RX ADMIN — HEPARIN SODIUM 5000 UNIT(S): 5000 INJECTION INTRAVENOUS; SUBCUTANEOUS at 18:03

## 2022-10-13 RX ADMIN — QUETIAPINE FUMARATE 12.5 MILLIGRAM(S): 200 TABLET, FILM COATED ORAL at 05:45

## 2022-10-13 RX ADMIN — CARBIDOPA AND LEVODOPA 2.5 TABLET(S): 25; 100 TABLET ORAL at 18:04

## 2022-10-13 RX ADMIN — CARBIDOPA AND LEVODOPA 2.5 TABLET(S): 25; 100 TABLET ORAL at 05:44

## 2022-10-13 RX ADMIN — POLYETHYLENE GLYCOL 3350 17 GRAM(S): 17 POWDER, FOR SOLUTION ORAL at 12:33

## 2022-10-13 RX ADMIN — HEPARIN SODIUM 5000 UNIT(S): 5000 INJECTION INTRAVENOUS; SUBCUTANEOUS at 05:45

## 2022-10-13 RX ADMIN — ATORVASTATIN CALCIUM 20 MILLIGRAM(S): 80 TABLET, FILM COATED ORAL at 21:44

## 2022-10-13 RX ADMIN — QUETIAPINE FUMARATE 12.5 MILLIGRAM(S): 200 TABLET, FILM COATED ORAL at 18:04

## 2022-10-13 RX ADMIN — CLOPIDOGREL BISULFATE 75 MILLIGRAM(S): 75 TABLET, FILM COATED ORAL at 12:33

## 2022-10-13 RX ADMIN — CARBIDOPA AND LEVODOPA 2.5 TABLET(S): 25; 100 TABLET ORAL at 12:33

## 2022-10-13 NOTE — CHART NOTE - NSCHARTNOTEFT_GEN_A_CORE
Rn call ACP to bed side sts pt is not verbal.  Pt seen and examined, pt did not originally respond to voice, but once I opened his eyelids, her began to track and progression to verbal.  Pt denied pain, wife she he had episode like this once before. Rn sts patient was recently rec'd Klonopin PRN.  Pt quickly reaccessed as back to baseline, wife agreed, she also express concern for outpt vs current Klonopin doses and recently dose changes.  Reviewed with attg, Rec to follow  reccs for seroqul as first line PRN meds and to limit use of Klonopin.  relayed info to RN that Seroquel is recommended first line PRN  RN will CTM.    Jose France ACNP-C  Pager # [phone]

## 2022-10-13 NOTE — SWALLOW BEDSIDE ASSESSMENT ADULT - SWALLOW EVAL: FEEDING ASSISTANCE
Virtual Brief Visit    Assessment/Plan:    Problem List Items Addressed This Visit        Other    Immunity status testing - Primary    Relevant Orders    Novel Coronavirus (COVID-19), PCR [facility] - Collected at [facility] or [facility]        Follow up as needed        Reason for visit is   Chief Complaint   Patient presents with    Virtual Regular Visit     needs COVID test for travel    Virtual Brief Visit        Encounter provider Chata Zabala MD    Provider located at [place] [address]  [address] [place] [id number]    Recent Visits  No visits were found meeting these conditions  Showing recent visits within past 7 days and meeting all other requirements     Today's Visits  Date Type Provider Dept   09/22/20 Telemedicine Chata Zabala MD [address] today's visits and meeting all other requirements     Future Appointments  No visits were found meeting these conditions  Showing future appointments within next 150 days and meeting all other requirements        After connecting through telephone, the patient was identified by name and date of birth  Katalina Jones was informed that this is a telemedicine visit and that the visit is being conducted through telephone  My office door was closed  No one else was in the room  He acknowledged consent and understanding of privacy and security of the platform  The patient has agreed to participate and understands he can discontinue the visit at any time  Patient is aware this is a billable service  Subjective    Katalina Jones is a 59 y o  male Needs Covid testing for traveling  Patient was evaluated via Telephone and needs Covid testing because he is traveling to [place] [facility]  He deneis any SOB, fever or cough symptoms  No exposure to covid-19         Past Medical History:   Diagnosis Date    GERD (gastroesophageal reflux disease)     Hyperlipidemia     Seasonal allergies        Past Surgical History:   Procedure Laterality Date    CHOLECYSTECTOMY      COLONOSCOPY N/A 10/4/2017    Procedure: COLONOSCOPY;  Surgeon: Fito Orellana MD;  Location: [facility]; Service: Gastroenterology    EAR SURGERY      titanium in ear (stapes bone replaced)    WI ESOPHAGOGASTRODUODENOSCOPY TRANSORAL DIAGNOSTIC N/A 5/31/2018    Procedure: ESOPHAGOGASTRODUODENOSCOPY (EGD); Surgeon: Fito Orellana MD;  Location: [facility]; Service: Gastroenterology    SINUS SURGERY         Current Outpatient Medications   Medication Sig Dispense Refill    ASPIRIN LOW DOSE 81 MG EC tablet TAKE 1 TABLET DAILY 90 tablet 3    atorvastatin (LIPITOR) 40 mg tablet TAKE 1 TABLET DAILY 90 tablet 3    dexlansoprazole (DEXILANT) 60 MG capsule Take 1 capsule (60 mg total) by mouth daily 90 capsule 3    fluticasone (FLONASE) 50 mcg/act nasal spray 1 spray into each nostril daily (Patient taking differently: 1 spray into each nostril daily as needed for rhinitis ) 1 Bottle 1    loratadine (CLARITIN) 10 mg tablet Take 1 tablet (10 mg total) by mouth daily for 20 days (Patient taking differently: Take 10 mg by mouth daily as needed for allergies ) 20 tablet 1    LORazepam (ATIVAN) 0 5 mg tablet Take 1 tablet (0 5 mg total) by mouth daily as needed for anxiety 30 tablet 0    pantoprazole (PROTONIX) 20 mg tablet Please specify directions, refills and quantity 90 tablet 3     No current facility-administered medications for this visit  No Known Allergies    Review of Systems   Constitutional: Negative for activity change, appetite change, fatigue and fever  HENT: Negative for congestion and ear discharge  Respiratory: Negative for cough and shortness of breath  Cardiovascular: Negative for chest pain and palpitations  Gastrointestinal: Negative for diarrhea and nausea  Musculoskeletal: Negative for arthralgias and back pain  Skin: Negative for color change and rash     Neurological: Negative for dizziness and headaches  Psychiatric/Behavioral: Negative for agitation and behavioral problems  Vitals:         I spent 5 minutes directly with the patient during this visit    VIRTUAL VISIT DISCLAIMER    Blayne Olmos acknowledges that he has consented to an online visit or consultation  He understands that the online visit is based solely on information provided by him, and that, in the absence of a face-to-face physical evaluation by the physician, the diagnosis he receives is both limited and provisional in terms of accuracy and completeness  This is not intended to replace a full medical face-to-face evaluation by the physician  Blayne Olmos understands and accepts these terms  dependent

## 2022-10-13 NOTE — SWALLOW BEDSIDE ASSESSMENT ADULT - SWALLOW EVAL: RECOMMENDED DIET
1. Defer diet to MD as patient unable to participate in clinical swallow evaluation given emotional lability and difficulty redirecting to task.
1) Consider short term non-oral means of nutrition as patient with poor PO intake and at risk for malnutrition/dehydration at MD's discretion 2) Suggest discussion for nutritional goals of care (Cinesophagram completed on 10/4 with recommendations for soft/bite size solids and mildly thick liquids)
Soft & Bite sized with Thin Liquids

## 2022-10-13 NOTE — SWALLOW BEDSIDE ASSESSMENT ADULT - SWALLOW EVAL: CURRENT DIET
puree with thin liquids
Soft & Bite Sized with Mildly Thick Liquids
Soft and bite sized solids and thin liquids as per MD order

## 2022-10-13 NOTE — SWALLOW BEDSIDE ASSESSMENT ADULT - ASR SWALLOW RECOMMEND DIAG
objective testing is NOT indicted given inability to participate in clinical swallow evaluation.
Objective testing NOT warranted given patient with refusal and recent cinesophagram completed on 10/4
cinesophagram to objectively assess swallow mechanism given patient reported stuck sensation for solids and coughing during meals.

## 2022-10-13 NOTE — BH CONSULTATION LIAISON PROGRESS NOTE - NSBHCHARTREVIEWLAB_PSY_A_CORE FT
CBC Full  -  ( 13 Oct 2022 06:33 )  WBC Count : 6.96 K/uL  RBC Count : 3.32 M/uL  Hemoglobin : 9.8 g/dL  Hematocrit : 31.8 %  Platelet Count - Automated : 216 K/uL  Mean Cell Volume : 95.8 fL  Mean Cell Hemoglobin : 29.5 pg  Mean Cell Hemoglobin Concentration : 30.8 gm/dL  Auto Neutrophil # : x  Auto Lymphocyte # : x  Auto Monocyte # : x  Auto Eosinophil # : x  Auto Basophil # : x  Auto Neutrophil % : x  Auto Lymphocyte % : x  Auto Monocyte % : x  Auto Eosinophil % : x  Auto Basophil % : x  10-13    138  |  104  |  20  ----------------------------<  101<H>  4.3   |  24  |  0.36<L>    Ca    8.6      13 Oct 2022 06:33  Phos  3.1     10-13  Mg     1.80     10-13

## 2022-10-13 NOTE — SWALLOW BEDSIDE ASSESSMENT ADULT - ADDITIONAL RECOMMENDATIONS
1. Reconsult this department as patient's condition becomes medically optimized/patient with ability to participate in clinical swallow evaluation. 2. This department to follow up as schedule permits for diet tolerance/diet advancement.
This department to follow up as schedule permits to assess for PO candidacy. Medical team further advised to reconsult this service if there is a change in medical status.

## 2022-10-13 NOTE — SWALLOW BEDSIDE ASSESSMENT ADULT - COMMENTS
Hospitalist 10/13 "81 year old male hx of insulin dependent DM2, Parkinson's dx, sick sinus syndrome w/ pacemaker p/w hallucinations now improving, found to have colonic mass and arterial disease".     Patient is familiar to this department as the patient was seen for swallow evaluations on 10/3 and 10/11 and a cinesophagram on 10/4 with recommendations for soft and bite sized solids and mildly thick liquids. See consults/reports for details.     Patient seen at bedside, awake/alert, during clinical swallow evaluation this PM. Patient's wife present during the assessment and served as a reliable informant. Per wife, patient with reduced appetite and reduced PO intake. Wife reported she has been bringing food from home including "cheese doodles, coconut water, rice and beans". SLP provided caregiver education regarding safe diet consistencies, thickened liquids, and recommendations/results of Cinesophagram completed on 10/4/22. Wife verbalized understanding.

## 2022-10-13 NOTE — SWALLOW BEDSIDE ASSESSMENT ADULT - SWALLOW EVAL: DIAGNOSIS
Patient reported "I'm having a dizzy spell" and declined PO trials at this time despite SLP and wife verbal encouragement. SLP notified RN. RN took patient vitals and O2 which were WFL. PO trials not assessed at this time given patient refusal.

## 2022-10-14 ENCOUNTER — TRANSCRIPTION ENCOUNTER (OUTPATIENT)
Age: 82
End: 2022-10-14

## 2022-10-14 LAB
ANION GAP SERPL CALC-SCNC: 11 MMOL/L — SIGNIFICANT CHANGE UP (ref 7–14)
BUN SERPL-MCNC: 13 MG/DL — SIGNIFICANT CHANGE UP (ref 7–23)
CALCIUM SERPL-MCNC: 8.9 MG/DL — SIGNIFICANT CHANGE UP (ref 8.4–10.5)
CHLORIDE SERPL-SCNC: 101 MMOL/L — SIGNIFICANT CHANGE UP (ref 98–107)
CO2 SERPL-SCNC: 26 MMOL/L — SIGNIFICANT CHANGE UP (ref 22–31)
CREAT SERPL-MCNC: 0.4 MG/DL — LOW (ref 0.5–1.3)
EGFR: 108 ML/MIN/1.73M2 — SIGNIFICANT CHANGE UP
GLUCOSE BLDC GLUCOMTR-MCNC: 147 MG/DL — HIGH (ref 70–99)
GLUCOSE BLDC GLUCOMTR-MCNC: 160 MG/DL — HIGH (ref 70–99)
GLUCOSE BLDC GLUCOMTR-MCNC: 160 MG/DL — HIGH (ref 70–99)
GLUCOSE BLDC GLUCOMTR-MCNC: 165 MG/DL — HIGH (ref 70–99)
GLUCOSE SERPL-MCNC: 134 MG/DL — HIGH (ref 70–99)
HCT VFR BLD CALC: 35.9 % — LOW (ref 39–50)
HGB BLD-MCNC: 11.2 G/DL — LOW (ref 13–17)
MAGNESIUM SERPL-MCNC: 1.8 MG/DL — SIGNIFICANT CHANGE UP (ref 1.6–2.6)
MCHC RBC-ENTMCNC: 29.5 PG — SIGNIFICANT CHANGE UP (ref 27–34)
MCHC RBC-ENTMCNC: 31.2 GM/DL — LOW (ref 32–36)
MCV RBC AUTO: 94.5 FL — SIGNIFICANT CHANGE UP (ref 80–100)
NRBC # BLD: 0 /100 WBCS — SIGNIFICANT CHANGE UP (ref 0–0)
NRBC # FLD: 0 K/UL — SIGNIFICANT CHANGE UP (ref 0–0)
PHOSPHATE SERPL-MCNC: 2.8 MG/DL — SIGNIFICANT CHANGE UP (ref 2.5–4.5)
PLATELET # BLD AUTO: 234 K/UL — SIGNIFICANT CHANGE UP (ref 150–400)
POTASSIUM SERPL-MCNC: 4.2 MMOL/L — SIGNIFICANT CHANGE UP (ref 3.5–5.3)
POTASSIUM SERPL-SCNC: 4.2 MMOL/L — SIGNIFICANT CHANGE UP (ref 3.5–5.3)
RBC # BLD: 3.8 M/UL — LOW (ref 4.2–5.8)
RBC # FLD: 13.3 % — SIGNIFICANT CHANGE UP (ref 10.3–14.5)
SARS-COV-2 RNA SPEC QL NAA+PROBE: SIGNIFICANT CHANGE UP
SODIUM SERPL-SCNC: 138 MMOL/L — SIGNIFICANT CHANGE UP (ref 135–145)
WBC # BLD: 7.89 K/UL — SIGNIFICANT CHANGE UP (ref 3.8–10.5)
WBC # FLD AUTO: 7.89 K/UL — SIGNIFICANT CHANGE UP (ref 3.8–10.5)

## 2022-10-14 PROCEDURE — 99233 SBSQ HOSP IP/OBS HIGH 50: CPT

## 2022-10-14 RX ORDER — QUETIAPINE FUMARATE 200 MG/1
0.5 TABLET, FILM COATED ORAL
Qty: 30 | Refills: 0
Start: 2022-10-14 | End: 2022-11-12

## 2022-10-14 RX ORDER — QUETIAPINE FUMARATE 200 MG/1
12.5 TABLET, FILM COATED ORAL
Qty: 0 | Refills: 0 | DISCHARGE
Start: 2022-10-14

## 2022-10-14 RX ORDER — CLONAZEPAM 1 MG
1 TABLET ORAL
Qty: 0 | Refills: 0 | DISCHARGE
Start: 2022-10-14

## 2022-10-14 RX ORDER — CLOPIDOGREL BISULFATE 75 MG/1
1 TABLET, FILM COATED ORAL
Qty: 30 | Refills: 0
Start: 2022-10-14 | End: 2022-11-12

## 2022-10-14 RX ORDER — CLONAZEPAM 1 MG
0 TABLET ORAL
Qty: 0 | Refills: 0 | DISCHARGE

## 2022-10-14 RX ORDER — PIMAVANSERIN TARTRATE 10 MG/1
1 TABLET, COATED ORAL
Qty: 0 | Refills: 0 | DISCHARGE

## 2022-10-14 RX ORDER — ATORVASTATIN CALCIUM 80 MG/1
1 TABLET, FILM COATED ORAL
Qty: 0 | Refills: 0 | DISCHARGE
Start: 2022-10-14

## 2022-10-14 RX ADMIN — CARBIDOPA AND LEVODOPA 2.5 TABLET(S): 25; 100 TABLET ORAL at 10:45

## 2022-10-14 RX ADMIN — HEPARIN SODIUM 5000 UNIT(S): 5000 INJECTION INTRAVENOUS; SUBCUTANEOUS at 05:38

## 2022-10-14 RX ADMIN — QUETIAPINE FUMARATE 12.5 MILLIGRAM(S): 200 TABLET, FILM COATED ORAL at 20:57

## 2022-10-14 RX ADMIN — POLYETHYLENE GLYCOL 3350 17 GRAM(S): 17 POWDER, FOR SOLUTION ORAL at 12:54

## 2022-10-14 RX ADMIN — ATORVASTATIN CALCIUM 20 MILLIGRAM(S): 80 TABLET, FILM COATED ORAL at 20:58

## 2022-10-14 RX ADMIN — CARBIDOPA AND LEVODOPA 2.5 TABLET(S): 25; 100 TABLET ORAL at 20:58

## 2022-10-14 RX ADMIN — CARBIDOPA AND LEVODOPA 2.5 TABLET(S): 25; 100 TABLET ORAL at 05:38

## 2022-10-14 RX ADMIN — CARBIDOPA AND LEVODOPA 2.5 TABLET(S): 25; 100 TABLET ORAL at 17:45

## 2022-10-14 RX ADMIN — MIRTAZAPINE 15 MILLIGRAM(S): 45 TABLET, ORALLY DISINTEGRATING ORAL at 20:57

## 2022-10-14 RX ADMIN — HEPARIN SODIUM 5000 UNIT(S): 5000 INJECTION INTRAVENOUS; SUBCUTANEOUS at 17:45

## 2022-10-14 RX ADMIN — CLOPIDOGREL BISULFATE 75 MILLIGRAM(S): 75 TABLET, FILM COATED ORAL at 12:55

## 2022-10-14 RX ADMIN — Medication 1: at 12:55

## 2022-10-14 RX ADMIN — Medication 1: at 17:41

## 2022-10-14 RX ADMIN — QUETIAPINE FUMARATE 12.5 MILLIGRAM(S): 200 TABLET, FILM COATED ORAL at 17:45

## 2022-10-14 RX ADMIN — CARBIDOPA AND LEVODOPA 2.5 TABLET(S): 25; 100 TABLET ORAL at 01:08

## 2022-10-14 RX ADMIN — QUETIAPINE FUMARATE 12.5 MILLIGRAM(S): 200 TABLET, FILM COATED ORAL at 05:38

## 2022-10-14 RX ADMIN — PANTOPRAZOLE SODIUM 40 MILLIGRAM(S): 20 TABLET, DELAYED RELEASE ORAL at 05:39

## 2022-10-14 NOTE — CHART NOTE - NSCHARTNOTESELECT_GEN_ALL_CORE
Event Note
Follow Up/Nutrition Services
EEG prelim
Event Note

## 2022-10-14 NOTE — CHART NOTE - NSCHARTNOTEFT_GEN_A_CORE
Pt D/c via [facility] ambulance was cancelled a second time.  Pt with in agitation and struck himself in the head with cellphone. Leaving redness to forehead.  Pt was awake and fearful, but difficult to understand reason for fear, but orginal reason for admission  Pt received PRN Seroquel   C/w  and Attg. Pt maybe d/c at a later date  Wife informed of cancel D/C  Jose France ACNP-C  Pager # [phone] .

## 2022-10-14 NOTE — BH CONSULTATION LIAISON PROGRESS NOTE - NSBHCHARTREVIEWLAB_PSY_A_CORE FT
11.2   7.89  )-----------( 234      ( 14 Oct 2022 05:29 )             35.9   10-14    138  |  101  |  13  ----------------------------<  134<H>  4.2   |  26  |  0.40<L>    Ca    8.9      14 Oct 2022 05:29  Phos  2.8     10-14  Mg     1.80     10-14

## 2022-10-14 NOTE — PROVIDER CONTACT NOTE (OTHER) - BACKGROUND
83 y/o male admitted for hallucinations, PMHx DM, dementia
80 y/o male admitted for intra-abdominal swelling of mass, PMHx Parkinson's disease, DM, Prostate Ca, HTN

## 2022-10-14 NOTE — PROVIDER CONTACT NOTE (OTHER) - SITUATION
Pt having episode of anxiety and change in mental status
Pt found hitting cellphone against forehead
R arm IV infiltrated

## 2022-10-14 NOTE — PROVIDER CONTACT NOTE (OTHER) - ACTION/TREATMENT ORDERED:
TAMARA Winston made aware, new IV in other arm placed.
Give standing Seroquel ASAP
Likely episode of anxiety, per  recs give Seroquel prn as needed for anxiety and continue with standing dose of Seroquel

## 2022-10-14 NOTE — DISCHARGE NOTE NURSING/CASE MANAGEMENT/SOCIAL WORK - DATE OF LAST VACCINATION
06-Jun-2021 Pt continues to decline and overnight had worsening tachypnea and hypoxemia with new large effusion on right and vascular congestion. Unclear if effusion is malignant or not. On high flow NC and and 40%FiO2. Cr increasing and renal U/S ordered to r/o obstruction. In addition HIV meds to be adjusted by Dr. Hermosillo with concerns for nephrotoxicity. Lasix 80 mg IVP given and pt moved to ICU for possible need for intubation. I spoke with Onc Attg and agree with Chemo administration as this could be lifesaving. without the Chemo there is no chance for survival.   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   I spoke at length with pts mother who understands he is dying and that he may require intubation and not survive. She understands the potential benefit of giving Chemo although there are life threatening risks as well.

## 2022-10-14 NOTE — PROGRESS NOTE ADULT - PROBLEM SELECTOR PLAN 1
-Discussed w/ pt's neurologist Dr. Michael Zamora 10/4, stated Pimavanserin likely contributing to increased hallucinations. Pt per MD has been tried on pimavanserin in the past. Improved parkinson's but worsened hallucinations, pt taken off. Restarted again on medication recently.  -c/w home Sinemet carbidopa/levodopa  25/100 2.5 Tablet(s) q4H  -C/w home Opicapone (Ongentys) 50 mg QD   -c/w Seroquel 12.5mg bid, and seroquel 12.5mg prn for hallucination.   - klonopin 0.25mg prn.    -D/c ativan  -D/c pimavanserin  -Appreciate neuro | psych recs

## 2022-10-14 NOTE — PROVIDER CONTACT NOTE (OTHER) - ASSESSMENT
PCA entered room to find pt hitting cellphone against forehead. RN witnessed. VSS, pt A&Ox1
Pt having episode of anxiety, prayed with grandson over phone, once grandson completed prayer pt did not respond to verbal or physical stimuli. VSS, /78, HR 87, O2Sat 100% on RA. Pt became responsive after RN opened pt's eyes for him.
R arm IV infiltrated; arm elevated with a pillow, site cleansed with NS

## 2022-10-14 NOTE — PROVIDER CONTACT NOTE (OTHER) - REASON
R arm IV infiltrated
Pt found hitting cellphone against forehead
Pt having episode of anxiety and change in mental status

## 2022-10-14 NOTE — CHART NOTE - NSCHARTNOTEFT_GEN_A_CORE
Source: Patient A&Ox [2], Chart review    Medical Course: 81 year old male hx of insulin dependent DM2, Parkinson's dx, sick sinus syndrome w/ pacemaker p/w hallucinations now improving, found to have colonic mass and arterial disease     Nutrition Course: Patient seen at bedside, AxOx2 with slurred speech. Patient's breakfast at bedside visibly seen untouched. Patient's PO intake continue remains poor. Per RN flowsheet, patient consumes 0-25% of his meals. Patient has been ordered Glucerna increased from 2x to 3x daily with poor intake. Of note, pt s/p Swallow Bedside Assessment 10/13, SLP recommended temporary non-oral means of nutrition. Patient previously weighted 10/2-58.3kg, most currently weight obtained via bedscale- 55.3kg,              GI: WDL. Last BM     PO intake:  < 50% [ ] 50-75% [ ]   % [ ]  other :    Enteral /Parenteral Nutrition:     Anthropometrics: Height (cm): 170.2 (09-30), 170.2 (09-11)  Weight (kg): 58.3 (10-02)  BMI (kg/m2): 20.1 (10-02)    Edema:  Pressure Injuries:    __________________ Pertinent Medications__________________   MEDICATIONS  (STANDING):  atorvastatin 20 milliGRAM(s) Oral at bedtime  carbidopa/levodopa  25/100 2.5 Tablet(s) Oral <User Schedule>  clopidogrel Tablet 75 milliGRAM(s) Oral daily  dextrose 5%. 1000 milliLiter(s) (50 mL/Hr) IV Continuous <Continuous>  dextrose 5%. 1000 milliLiter(s) (100 mL/Hr) IV Continuous <Continuous>  dextrose 50% Injectable 25 Gram(s) IV Push once  dextrose 50% Injectable 12.5 Gram(s) IV Push once  dextrose 50% Injectable 25 Gram(s) IV Push once  glucagon  Injectable 1 milliGRAM(s) IntraMuscular once  heparin   Injectable 5000 Unit(s) SubCutaneous every 12 hours  influenza  Vaccine (HIGH DOSE) 0.7 milliLiter(s) IntraMuscular once  insulin lispro (ADMELOG) corrective regimen sliding scale   SubCutaneous three times a day before meals  insulin lispro (ADMELOG) corrective regimen sliding scale   SubCutaneous at bedtime  mirtazapine 15 milliGRAM(s) Oral at bedtime  Opicapone (Ongentys) 50 milliGRAM(s) 50 milliGRAM(s) Oral at bedtime  pantoprazole  Injectable 40 milliGRAM(s) IV Push every 24 hours  polyethylene glycol 3350 17 Gram(s) Oral daily  QUEtiapine 12.5 milliGRAM(s) Oral two times a day    MEDICATIONS  (PRN):  acetaminophen     Tablet .. 650 milliGRAM(s) Oral every 6 hours PRN Temp greater or equal to 38C (100.4F), Mild Pain (1 - 3)  clonazePAM Oral Disintegrating Tablet 0.25 milliGRAM(s) Oral daily PRN anxiety  dextrose Oral Gel 15 Gram(s) Oral once PRN Blood Glucose LESS THAN 70 milliGRAM(s)/deciliter  QUEtiapine 12.5 milliGRAM(s) Oral every 6 hours PRN anxiety and agitation      __________________ Pertinent Labs__________________   10-14 Na138 mmol/L Glu 134 mg/dL<H> K+ 4.2 mmol/L Cr  0.40 mg/dL<L> BUN 13 mg/dL 10-14 Phos 2.8 mg/dL 10-04 Chol 168 mg/dL LDL --    HDL 41 mg/dL Trig 83 mg/dL        POCT Blood Glucose.: 160 mg/dL (10-14-22 @ 12:32)  POCT Blood Glucose.: 147 mg/dL (10-14-22 @ 08:33)  POCT Blood Glucose.: 145 mg/dL (10-13-22 @ 22:52)  POCT Blood Glucose.: 170 mg/dL (10-13-22 @ 17:54)  POCT Blood Glucose.: 175 mg/dL (10-13-22 @ 15:00)  POCT Blood Glucose.: 150 mg/dL (10-13-22 @ 12:02)  POCT Blood Glucose.: 117 mg/dL (10-13-22 @ 08:28)  POCT Blood Glucose.: 106 mg/dL (10-12-22 @ 22:17)  POCT Blood Glucose.: 129 mg/dL (10-12-22 @ 17:20)  POCT Blood Glucose.: 163 mg/dL (10-12-22 @ 12:16)  POCT Blood Glucose.: 115 mg/dL (10-12-22 @ 08:38)  POCT Blood Glucose.: 321 mg/dL (10-11-22 @ 22:54)  POCT Blood Glucose.: 183 mg/dL (10-11-22 @ 17:27)          Estimated Needs:   cyn/d  gm pro/d    [ ] no change since previous assessment  [ ] recalculated:       Previous Nutrition Diagnosis:     Nutrition Diagnosis is [ ] ongoing  [ ] resolved [ ] not applicable     New Nutrition Diagnosis:      Recommendations:        Monitoring and Evaluation:      [ x] Tolerance to diet prescription [x ] weights [x ] follow up per protocol  [ ] other: Source: Patient A&Ox [2], Chart review    Medical Course: 81 year old male hx of insulin dependent DM2, Parkinson's dx, sick sinus syndrome w/ pacemaker p/w hallucinations now improving, found to have colonic mass and arterial disease     Nutrition Course: Patient seen at bedside, AxOx2 with slurred speech. Patient's breakfast at bedside visibly seen untouched. Patient's PO intake continue remains poor. Per RN flowsheet, patient consumes 0-25% of his meals. Patient has been ordered Glucerna increased from 2x to 3x daily with poor intake. Of note, pt s/p Swallow Bedside Assessment 10/13, SLP recommended temporary non-oral means of nutrition. Patient previously weighted 10/2-58.3kg, most currently weight obtained via bedscale today (10/14)- 55.3kg. Weight trend indicates 3lbs of wt loss/2.3% x 2 weeks. Patient noted with 2+ edema to B/L hands which is masking his weight loss. Patient's current intake is not meeting his nutritional needs. Recommend consider alternative nutrition therapy if w/in plan of care.            GI: WDL.     PO intake:  < 50% [x ]    Anthropometrics: Height (cm): 170.2 (09-30), 170.2 (09-11)  Weight (kg): 58.3 (10-02)  BMI (kg/m2): 20.1 (10-02)    Edema: 2+ b/l hands   Pressure Injuries: None reported at present.     __________________ Pertinent Medications__________________   MEDICATIONS  (STANDING):  atorvastatin 20 milliGRAM(s) Oral at bedtime  carbidopa/levodopa  25/100 2.5 Tablet(s) Oral <User Schedule>  clopidogrel Tablet 75 milliGRAM(s) Oral daily  dextrose 5%. 1000 milliLiter(s) (50 mL/Hr) IV Continuous <Continuous>  dextrose 5%. 1000 milliLiter(s) (100 mL/Hr) IV Continuous <Continuous>  dextrose 50% Injectable 25 Gram(s) IV Push once  dextrose 50% Injectable 12.5 Gram(s) IV Push once  dextrose 50% Injectable 25 Gram(s) IV Push once  glucagon  Injectable 1 milliGRAM(s) IntraMuscular once  heparin   Injectable 5000 Unit(s) SubCutaneous every 12 hours  influenza  Vaccine (HIGH DOSE) 0.7 milliLiter(s) IntraMuscular once  insulin lispro (ADMELOG) corrective regimen sliding scale   SubCutaneous three times a day before meals  insulin lispro (ADMELOG) corrective regimen sliding scale   SubCutaneous at bedtime  mirtazapine 15 milliGRAM(s) Oral at bedtime  Opicapone (Ongentys) 50 milliGRAM(s) 50 milliGRAM(s) Oral at bedtime  pantoprazole  Injectable 40 milliGRAM(s) IV Push every 24 hours  polyethylene glycol 3350 17 Gram(s) Oral daily  QUEtiapine 12.5 milliGRAM(s) Oral two times a day    MEDICATIONS  (PRN):  acetaminophen     Tablet .. 650 milliGRAM(s) Oral every 6 hours PRN Temp greater or equal to 38C (100.4F), Mild Pain (1 - 3)  clonazePAM Oral Disintegrating Tablet 0.25 milliGRAM(s) Oral daily PRN anxiety  dextrose Oral Gel 15 Gram(s) Oral once PRN Blood Glucose LESS THAN 70 milliGRAM(s)/deciliter  QUEtiapine 12.5 milliGRAM(s) Oral every 6 hours PRN anxiety and agitation      __________________ Pertinent Labs__________________   10-14 Na138 mmol/L Glu 134 mg/dL<H> K+ 4.2 mmol/L Cr  0.40 mg/dL<L> BUN 13 mg/dL 10-14 Phos 2.8 mg/dL 10-04 Chol 168 mg/dL LDL --    HDL 41 mg/dL Trig 83 mg/dL        POCT Blood Glucose.: 160 mg/dL (10-14-22 @ 12:32)  POCT Blood Glucose.: 147 mg/dL (10-14-22 @ 08:33)  POCT Blood Glucose.: 145 mg/dL (10-13-22 @ 22:52)  POCT Blood Glucose.: 170 mg/dL (10-13-22 @ 17:54)  POCT Blood Glucose.: 175 mg/dL (10-13-22 @ 15:00)  POCT Blood Glucose.: 150 mg/dL (10-13-22 @ 12:02)  POCT Blood Glucose.: 117 mg/dL (10-13-22 @ 08:28)  POCT Blood Glucose.: 106 mg/dL (10-12-22 @ 22:17)  POCT Blood Glucose.: 129 mg/dL (10-12-22 @ 17:20)  POCT Blood Glucose.: 163 mg/dL (10-12-22 @ 12:16)  POCT Blood Glucose.: 115 mg/dL (10-12-22 @ 08:38)  POCT Blood Glucose.: 321 mg/dL (10-11-22 @ 22:54)  POCT Blood Glucose.: 183 mg/dL (10-11-22 @ 17:27)          Estimated Needs:   [x ] no change since previous assessment      Previous Nutrition Diagnosis: Malnutrition    Nutrition Diagnosis is [x ] ongoing     Recommendations:  1) Recommend consider alternative nutrition therapy if w/in plan of care.   2) If plan to initiate short term of alternative nutrition, consider Glucerna 1.5 initiate at 10ml/hr and slowly increase (5ml q 8hr) to meeting the goal rate 55ml/hr, total volume 1320ml. This regimen will provide 1980cal, 108gm pro, 1001.8ml free water. Recommend closely monitor electrolytes (Na/K) as pt is at risk for refeeding syndrome.   3) Recommend Consider appetite stimulant if plan to continue with PO diet.   4) Continue provide assistance at meal time and encourage supplement intake.   5) Monitor PO intake, Labs, weights, BMs, and skin integrity.    6) RD to remain available for further nutritional interventions as indicated.       Monitoring and Evaluation:      [ x] Tolerance to diet prescription [x ] weights [x ] follow up per protocol  [ ] other:

## 2022-10-14 NOTE — BH CONSULTATION LIAISON PROGRESS NOTE - NSBHATTESTCOMMENTATTENDFT_PSY_A_CORE
Chart reviewed. Pt seen with trainee. Presents as rather limited by PD. Tradeoff between PD symptoms and psychosis needs to be considered by pt/family and neurology. Agree with above assessment/recs. Will continue to follow but there are no psychiatric contraindications to discharge when medically cleared. 
Chart reviewed. Pt seen in AM with resident. Agree with above assessment. Unfortunately received report that later in afternoon pt became agitated, hitting self with phone, etc. Dispo to rehab is being held up due to this. Will have psych weekend team see him and determine when psych cleared for dispo planning.

## 2022-10-14 NOTE — DISCHARGE NOTE NURSING/CASE MANAGEMENT/SOCIAL WORK - PATIENT PORTAL LINK FT
You can access the FollowMyHealth Patient Portal offered by [facility] by registering at the following website: [email or web address]. By joining [facility]’s FollowMyHealth portal, you will also be able to view your health information using other applications (apps) compatible with our system.

## 2022-10-15 LAB
GLUCOSE BLDC GLUCOMTR-MCNC: 137 MG/DL — HIGH (ref 70–99)
GLUCOSE BLDC GLUCOMTR-MCNC: 148 MG/DL — HIGH (ref 70–99)
GLUCOSE BLDC GLUCOMTR-MCNC: 148 MG/DL — HIGH (ref 70–99)
GLUCOSE BLDC GLUCOMTR-MCNC: 178 MG/DL — HIGH (ref 70–99)

## 2022-10-15 PROCEDURE — 99233 SBSQ HOSP IP/OBS HIGH 50: CPT

## 2022-10-15 RX ORDER — QUETIAPINE FUMARATE 200 MG/1
12.5 TABLET, FILM COATED ORAL AT BEDTIME
Refills: 0 | Status: DISCONTINUED | OUTPATIENT
Start: 2022-10-15 | End: 2022-10-16

## 2022-10-15 RX ORDER — QUETIAPINE FUMARATE 200 MG/1
25 TABLET, FILM COATED ORAL DAILY
Refills: 0 | Status: DISCONTINUED | OUTPATIENT
Start: 2022-10-15 | End: 2022-10-16

## 2022-10-15 RX ORDER — CARBIDOPA AND LEVODOPA 25; 100 MG/1; MG/1
2 TABLET ORAL
Refills: 0 | Status: DISCONTINUED | OUTPATIENT
Start: 2022-10-15 | End: 2022-10-17

## 2022-10-15 RX ADMIN — CARBIDOPA AND LEVODOPA 2 TABLET(S): 25; 100 TABLET ORAL at 21:18

## 2022-10-15 RX ADMIN — QUETIAPINE FUMARATE 12.5 MILLIGRAM(S): 200 TABLET, FILM COATED ORAL at 21:18

## 2022-10-15 RX ADMIN — QUETIAPINE FUMARATE 12.5 MILLIGRAM(S): 200 TABLET, FILM COATED ORAL at 05:47

## 2022-10-15 RX ADMIN — Medication 0.25 MILLIGRAM(S): at 09:38

## 2022-10-15 RX ADMIN — CARBIDOPA AND LEVODOPA 2.5 TABLET(S): 25; 100 TABLET ORAL at 01:28

## 2022-10-15 RX ADMIN — Medication 1: at 12:52

## 2022-10-15 RX ADMIN — CARBIDOPA AND LEVODOPA 2.5 TABLET(S): 25; 100 TABLET ORAL at 05:47

## 2022-10-15 RX ADMIN — CARBIDOPA AND LEVODOPA 2.5 TABLET(S): 25; 100 TABLET ORAL at 09:18

## 2022-10-15 RX ADMIN — PANTOPRAZOLE SODIUM 40 MILLIGRAM(S): 20 TABLET, DELAYED RELEASE ORAL at 05:47

## 2022-10-15 RX ADMIN — CARBIDOPA AND LEVODOPA 2 TABLET(S): 25; 100 TABLET ORAL at 14:44

## 2022-10-15 RX ADMIN — CARBIDOPA AND LEVODOPA 2 TABLET(S): 25; 100 TABLET ORAL at 17:37

## 2022-10-15 RX ADMIN — CLOPIDOGREL BISULFATE 75 MILLIGRAM(S): 75 TABLET, FILM COATED ORAL at 12:53

## 2022-10-15 RX ADMIN — MIRTAZAPINE 15 MILLIGRAM(S): 45 TABLET, ORALLY DISINTEGRATING ORAL at 21:19

## 2022-10-15 RX ADMIN — QUETIAPINE FUMARATE 12.5 MILLIGRAM(S): 200 TABLET, FILM COATED ORAL at 09:19

## 2022-10-15 RX ADMIN — HEPARIN SODIUM 5000 UNIT(S): 5000 INJECTION INTRAVENOUS; SUBCUTANEOUS at 05:47

## 2022-10-15 RX ADMIN — ATORVASTATIN CALCIUM 20 MILLIGRAM(S): 80 TABLET, FILM COATED ORAL at 21:18

## 2022-10-15 RX ADMIN — POLYETHYLENE GLYCOL 3350 17 GRAM(S): 17 POWDER, FOR SOLUTION ORAL at 12:53

## 2022-10-15 RX ADMIN — HEPARIN SODIUM 5000 UNIT(S): 5000 INJECTION INTRAVENOUS; SUBCUTANEOUS at 17:38

## 2022-10-15 RX ADMIN — QUETIAPINE FUMARATE 25 MILLIGRAM(S): 200 TABLET, FILM COATED ORAL at 12:53

## 2022-10-15 NOTE — PROGRESS NOTE ADULT - PROBLEM SELECTOR PLAN 1
-Discussed w/ pt's neurologist Dr. Michael Zamora 10/4, stated Pimavanserin likely contributing to increased hallucinations. Pt per MD has been tried on pimavanserin in the past. Improved parkinson's but worsened hallucinations, pt taken off. Restarted again on medication recently.  -decrease home Sinemet carbidopa/levodopa  25/100 2Tablet(s) q4H per psych rec.   -C/w home Opicapone (Ongentys) 50 mg QD   - on standing Seroquel, as well as Klonopin and Seroquel prn.   -D/c ativan  -D/c pimavanserin  - f/u further psych rec.

## 2022-10-16 LAB
ANION GAP SERPL CALC-SCNC: 12 MMOL/L — SIGNIFICANT CHANGE UP (ref 7–14)
BUN SERPL-MCNC: 19 MG/DL — SIGNIFICANT CHANGE UP (ref 7–23)
CALCIUM SERPL-MCNC: 8.5 MG/DL — SIGNIFICANT CHANGE UP (ref 8.4–10.5)
CHLORIDE SERPL-SCNC: 103 MMOL/L — SIGNIFICANT CHANGE UP (ref 98–107)
CO2 SERPL-SCNC: 24 MMOL/L — SIGNIFICANT CHANGE UP (ref 22–31)
CREAT SERPL-MCNC: 0.43 MG/DL — LOW (ref 0.5–1.3)
EGFR: 105 ML/MIN/1.73M2 — SIGNIFICANT CHANGE UP
GLUCOSE BLDC GLUCOMTR-MCNC: 135 MG/DL — HIGH (ref 70–99)
GLUCOSE BLDC GLUCOMTR-MCNC: 152 MG/DL — HIGH (ref 70–99)
GLUCOSE BLDC GLUCOMTR-MCNC: 154 MG/DL — HIGH (ref 70–99)
GLUCOSE BLDC GLUCOMTR-MCNC: 172 MG/DL — HIGH (ref 70–99)
GLUCOSE SERPL-MCNC: 130 MG/DL — HIGH (ref 70–99)
HCT VFR BLD CALC: 30.4 % — LOW (ref 39–50)
HGB BLD-MCNC: 9.5 G/DL — LOW (ref 13–17)
MAGNESIUM SERPL-MCNC: 1.6 MG/DL — SIGNIFICANT CHANGE UP (ref 1.6–2.6)
MCHC RBC-ENTMCNC: 29.3 PG — SIGNIFICANT CHANGE UP (ref 27–34)
MCHC RBC-ENTMCNC: 31.3 GM/DL — LOW (ref 32–36)
MCV RBC AUTO: 93.8 FL — SIGNIFICANT CHANGE UP (ref 80–100)
NRBC # BLD: 0 /100 WBCS — SIGNIFICANT CHANGE UP (ref 0–0)
NRBC # FLD: 0 K/UL — SIGNIFICANT CHANGE UP (ref 0–0)
PHOSPHATE SERPL-MCNC: 2.4 MG/DL — LOW (ref 2.5–4.5)
PLATELET # BLD AUTO: 152 K/UL — SIGNIFICANT CHANGE UP (ref 150–400)
POTASSIUM SERPL-MCNC: 4 MMOL/L — SIGNIFICANT CHANGE UP (ref 3.5–5.3)
POTASSIUM SERPL-SCNC: 4 MMOL/L — SIGNIFICANT CHANGE UP (ref 3.5–5.3)
RBC # BLD: 3.24 M/UL — LOW (ref 4.2–5.8)
RBC # FLD: 13.8 % — SIGNIFICANT CHANGE UP (ref 10.3–14.5)
SODIUM SERPL-SCNC: 139 MMOL/L — SIGNIFICANT CHANGE UP (ref 135–145)
WBC # BLD: 9.39 K/UL — SIGNIFICANT CHANGE UP (ref 3.8–10.5)
WBC # FLD AUTO: 9.39 K/UL — SIGNIFICANT CHANGE UP (ref 3.8–10.5)

## 2022-10-16 PROCEDURE — 99233 SBSQ HOSP IP/OBS HIGH 50: CPT

## 2022-10-16 RX ORDER — QUETIAPINE FUMARATE 200 MG/1
12.5 TABLET, FILM COATED ORAL
Refills: 0 | Status: DISCONTINUED | OUTPATIENT
Start: 2022-10-16 | End: 2022-10-17

## 2022-10-16 RX ORDER — QUETIAPINE FUMARATE 200 MG/1
12.5 TABLET, FILM COATED ORAL ONCE
Refills: 0 | Status: COMPLETED | OUTPATIENT
Start: 2022-10-16 | End: 2022-10-16

## 2022-10-16 RX ORDER — QUETIAPINE FUMARATE 200 MG/1
25 TABLET, FILM COATED ORAL AT BEDTIME
Refills: 0 | Status: DISCONTINUED | OUTPATIENT
Start: 2022-10-16 | End: 2022-10-17

## 2022-10-16 RX ADMIN — CARBIDOPA AND LEVODOPA 2 TABLET(S): 25; 100 TABLET ORAL at 05:57

## 2022-10-16 RX ADMIN — CLOPIDOGREL BISULFATE 75 MILLIGRAM(S): 75 TABLET, FILM COATED ORAL at 12:23

## 2022-10-16 RX ADMIN — CARBIDOPA AND LEVODOPA 2 TABLET(S): 25; 100 TABLET ORAL at 10:27

## 2022-10-16 RX ADMIN — QUETIAPINE FUMARATE 25 MILLIGRAM(S): 200 TABLET, FILM COATED ORAL at 22:08

## 2022-10-16 RX ADMIN — CARBIDOPA AND LEVODOPA 2 TABLET(S): 25; 100 TABLET ORAL at 22:08

## 2022-10-16 RX ADMIN — HEPARIN SODIUM 5000 UNIT(S): 5000 INJECTION INTRAVENOUS; SUBCUTANEOUS at 19:02

## 2022-10-16 RX ADMIN — QUETIAPINE FUMARATE 12.5 MILLIGRAM(S): 200 TABLET, FILM COATED ORAL at 14:30

## 2022-10-16 RX ADMIN — Medication 1: at 12:22

## 2022-10-16 RX ADMIN — Medication 1: at 18:20

## 2022-10-16 RX ADMIN — ATORVASTATIN CALCIUM 20 MILLIGRAM(S): 80 TABLET, FILM COATED ORAL at 22:08

## 2022-10-16 RX ADMIN — CARBIDOPA AND LEVODOPA 2 TABLET(S): 25; 100 TABLET ORAL at 02:02

## 2022-10-16 RX ADMIN — Medication 650 MILLIGRAM(S): at 18:19

## 2022-10-16 RX ADMIN — PANTOPRAZOLE SODIUM 40 MILLIGRAM(S): 20 TABLET, DELAYED RELEASE ORAL at 05:57

## 2022-10-16 RX ADMIN — HEPARIN SODIUM 5000 UNIT(S): 5000 INJECTION INTRAVENOUS; SUBCUTANEOUS at 05:56

## 2022-10-16 RX ADMIN — POLYETHYLENE GLYCOL 3350 17 GRAM(S): 17 POWDER, FOR SOLUTION ORAL at 12:22

## 2022-10-16 RX ADMIN — CARBIDOPA AND LEVODOPA 2 TABLET(S): 25; 100 TABLET ORAL at 14:31

## 2022-10-16 RX ADMIN — MIRTAZAPINE 15 MILLIGRAM(S): 45 TABLET, ORALLY DISINTEGRATING ORAL at 22:08

## 2022-10-16 RX ADMIN — CARBIDOPA AND LEVODOPA 2 TABLET(S): 25; 100 TABLET ORAL at 18:21

## 2022-10-16 NOTE — BH CONSULTATION LIAISON PROGRESS NOTE - NSBHMSEMUSCLE_PSY_A_CORE
Abnormal muscle tone/strength
Normal muscle tone/strength
Abnormal muscle tone/strength
Normal muscle tone/strength

## 2022-10-16 NOTE — BH CONSULTATION LIAISON PROGRESS NOTE - NSBHMSESPABN_PSY_A_CORE
Decreased productivity/Impaired articulation/Other

## 2022-10-16 NOTE — BH CONSULTATION LIAISON PROGRESS NOTE - ATTENDING COMMENTS
Chart reviewed. While I did not examine this patient in person, I spoke with Dr. Clarke and agree with Dr. Clarke's history, MSE, A/P.
Chart reviewed. While I did not examine this patient in person, I spoke with Dr. Renee and agree with Dr. Renee's history, MSE, A/P.

## 2022-10-16 NOTE — BH CONSULTATION LIAISON PROGRESS NOTE - OTHER
Pt has speech impairment at baseline due to PD

## 2022-10-16 NOTE — BH CONSULTATION LIAISON PROGRESS NOTE - NSBHPSYCHOLCOGORIENT_PSY_A_CORE
Oriented to time, place, person, situation
Oriented to time, place, person, situation
Unable to assess
Oriented to time, place, person, situation

## 2022-10-17 VITALS
HEART RATE: 88 BPM | RESPIRATION RATE: 17 BRPM | OXYGEN SATURATION: 98 % | TEMPERATURE: 98 F | DIASTOLIC BLOOD PRESSURE: 65 MMHG | SYSTOLIC BLOOD PRESSURE: 136 MMHG

## 2022-10-17 LAB
ANION GAP SERPL CALC-SCNC: 10 MMOL/L — SIGNIFICANT CHANGE UP (ref 7–14)
BUN SERPL-MCNC: 24 MG/DL — HIGH (ref 7–23)
CALCIUM SERPL-MCNC: 8.8 MG/DL — SIGNIFICANT CHANGE UP (ref 8.4–10.5)
CHLORIDE SERPL-SCNC: 102 MMOL/L — SIGNIFICANT CHANGE UP (ref 98–107)
CO2 SERPL-SCNC: 25 MMOL/L — SIGNIFICANT CHANGE UP (ref 22–31)
CREAT SERPL-MCNC: 0.41 MG/DL — LOW (ref 0.5–1.3)
EGFR: 107 ML/MIN/1.73M2 — SIGNIFICANT CHANGE UP
GLUCOSE BLDC GLUCOMTR-MCNC: 135 MG/DL — HIGH (ref 70–99)
GLUCOSE BLDC GLUCOMTR-MCNC: 145 MG/DL — HIGH (ref 70–99)
GLUCOSE BLDC GLUCOMTR-MCNC: 176 MG/DL — HIGH (ref 70–99)
GLUCOSE SERPL-MCNC: 120 MG/DL — HIGH (ref 70–99)
HCT VFR BLD CALC: 33.2 % — LOW (ref 39–50)
HGB BLD-MCNC: 10.2 G/DL — LOW (ref 13–17)
MAGNESIUM SERPL-MCNC: 1.7 MG/DL — SIGNIFICANT CHANGE UP (ref 1.6–2.6)
MCHC RBC-ENTMCNC: 29.4 PG — SIGNIFICANT CHANGE UP (ref 27–34)
MCHC RBC-ENTMCNC: 30.7 GM/DL — LOW (ref 32–36)
MCV RBC AUTO: 95.7 FL — SIGNIFICANT CHANGE UP (ref 80–100)
NRBC # BLD: 0 /100 WBCS — SIGNIFICANT CHANGE UP (ref 0–0)
NRBC # FLD: 0 K/UL — SIGNIFICANT CHANGE UP (ref 0–0)
PHOSPHATE SERPL-MCNC: 2.6 MG/DL — SIGNIFICANT CHANGE UP (ref 2.5–4.5)
PLATELET # BLD AUTO: 156 K/UL — SIGNIFICANT CHANGE UP (ref 150–400)
POTASSIUM SERPL-MCNC: 3.9 MMOL/L — SIGNIFICANT CHANGE UP (ref 3.5–5.3)
POTASSIUM SERPL-SCNC: 3.9 MMOL/L — SIGNIFICANT CHANGE UP (ref 3.5–5.3)
RBC # BLD: 3.47 M/UL — LOW (ref 4.2–5.8)
RBC # FLD: 14.1 % — SIGNIFICANT CHANGE UP (ref 10.3–14.5)
SODIUM SERPL-SCNC: 137 MMOL/L — SIGNIFICANT CHANGE UP (ref 135–145)
WBC # BLD: 10.22 K/UL — SIGNIFICANT CHANGE UP (ref 3.8–10.5)
WBC # FLD AUTO: 10.22 K/UL — SIGNIFICANT CHANGE UP (ref 3.8–10.5)

## 2022-10-17 PROCEDURE — 99232 SBSQ HOSP IP/OBS MODERATE 35: CPT

## 2022-10-17 PROCEDURE — 99233 SBSQ HOSP IP/OBS HIGH 50: CPT

## 2022-10-17 RX ORDER — CARBIDOPA AND LEVODOPA 25; 100 MG/1; MG/1
2.5 TABLET ORAL
Qty: 0 | Refills: 0 | DISCHARGE

## 2022-10-17 RX ORDER — CARBIDOPA AND LEVODOPA 25; 100 MG/1; MG/1
2 TABLET ORAL
Qty: 0 | Refills: 0 | DISCHARGE
Start: 2022-10-17

## 2022-10-17 RX ORDER — QUETIAPINE FUMARATE 200 MG/1
1 TABLET, FILM COATED ORAL
Qty: 30 | Refills: 0
Start: 2022-10-17 | End: 2022-11-15

## 2022-10-17 RX ORDER — QUETIAPINE FUMARATE 200 MG/1
0.5 TABLET, FILM COATED ORAL
Qty: 15 | Refills: 0
Start: 2022-10-17 | End: 2022-11-15

## 2022-10-17 RX ADMIN — QUETIAPINE FUMARATE 12.5 MILLIGRAM(S): 200 TABLET, FILM COATED ORAL at 12:58

## 2022-10-17 RX ADMIN — CLOPIDOGREL BISULFATE 75 MILLIGRAM(S): 75 TABLET, FILM COATED ORAL at 11:46

## 2022-10-17 RX ADMIN — HEPARIN SODIUM 5000 UNIT(S): 5000 INJECTION INTRAVENOUS; SUBCUTANEOUS at 06:34

## 2022-10-17 RX ADMIN — CARBIDOPA AND LEVODOPA 2 TABLET(S): 25; 100 TABLET ORAL at 18:46

## 2022-10-17 RX ADMIN — PANTOPRAZOLE SODIUM 40 MILLIGRAM(S): 20 TABLET, DELAYED RELEASE ORAL at 06:34

## 2022-10-17 RX ADMIN — CARBIDOPA AND LEVODOPA 2 TABLET(S): 25; 100 TABLET ORAL at 15:16

## 2022-10-17 RX ADMIN — CARBIDOPA AND LEVODOPA 2 TABLET(S): 25; 100 TABLET ORAL at 10:46

## 2022-10-17 RX ADMIN — CARBIDOPA AND LEVODOPA 2 TABLET(S): 25; 100 TABLET ORAL at 03:07

## 2022-10-17 RX ADMIN — QUETIAPINE FUMARATE 12.5 MILLIGRAM(S): 200 TABLET, FILM COATED ORAL at 06:34

## 2022-10-17 RX ADMIN — POLYETHYLENE GLYCOL 3350 17 GRAM(S): 17 POWDER, FOR SOLUTION ORAL at 11:46

## 2022-10-17 RX ADMIN — CARBIDOPA AND LEVODOPA 2 TABLET(S): 25; 100 TABLET ORAL at 06:34

## 2022-10-17 RX ADMIN — Medication 1: at 12:44

## 2022-10-17 NOTE — PROGRESS NOTE ADULT - PROVIDER SPECIALTY LIST ADULT
Hospitalist
Vascular Surgery
Hospitalist
Neurology
Internal Medicine
Internal Medicine
Hospitalist
Internal Medicine
Hospitalist
Internal Medicine
Hospitalist
Internal Medicine
Hospitalist
Hospitalist
Internal Medicine
obgyn

## 2022-10-17 NOTE — PROGRESS NOTE ADULT - PROBLEM SELECTOR PLAN 5
-continue ISS  -Monitor fingersticks
-Work up during hospitalization sig for 4 x 4 cm intraperitoneal mass  -Family in favor of outpatient evaluation w/ possible biopsy    -Rediscussed w/ wife 10/7, wife expressed concern about what mass could possibly be. Informed it remains unclear at this time but neoplasm cannot be ruled out. Wife would like pt to follow up with outpatient gastroenterologist regarding mass.
-Work up during hospitalization sig for 4 x 4 cm intraperitoneal mass  -Family in favor of outpatient evaluation w/ possible biopsy    -Rediscussed w/ wife 10/7, wife expressed concern about what mass could possibly be. Informed it remains unclear at this time but neoplasm cannot be ruled out. Wife would like pt to follow up with outpatient gastroenterologist regarding mass.
-continue ISS  -Monitor fingersticks
-hold home insulin for now, can do moderate sliding scale  -NPO for now in case of possible procedures
-Work up during hospitalization sig for 4 x 4 cm intraperitoneal mass  -Family in favor of outpatient evaluation w/ possible biopsy    -Rediscussed w/ wife 10/7, wife expressed concern about what mass could possibly be. Informed it remains unclear at this time but neoplasm cannot be ruled out. Wife would like pt to follow up with outpatient gastroenterologist regarding mass.
-continue ISS  -Monitor fingersticks
-continue ISS  -Monitor fingersticks
-Work up during hospitalization sig for 4 x 4 cm intraperitoneal mass  -Family in favor of outpatient evaluation w/ possible biopsy    -Rediscussed w/ wife 10/7, wife expressed concern about what mass could possibly be. Informed it remains unclear at this time but neoplasm cannot be ruled out. Wife would like pt to follow up with outpatient gastroenterologist regarding mass.
-continue ISS  -Monitor fingersticks
-hold home insulin for now, can do moderate sliding scale  -NPO for now in case of possible procedures
-Work up during hospitalization sig for 4 x 4 cm intraperitoneal mass  -Family in favor of outpatient evaluation w/ possible biopsy    -Rediscussed w/ wife 10/7, wife expressed concern about what mass could possibly be. Informed it remains unclear at this time but neoplasm cannot be ruled out. Wife would like pt to follow up with outpatient gastroenterologist regarding mass.
-Work up during hospitalization sig for 4 x 4 cm intraperitoneal mass  -Family in favor of outpatient evaluation w/ possible biopsy    -Rediscussed w/ wife 10/7, wife expressed concern about what mass could possibly be. Informed it remains unclear at this time but neoplasm cannot be ruled out. Wife would like pt to follow up with outpatient gastroenterologist regarding mass.

## 2022-10-17 NOTE — BH CONSULTATION LIAISON PROGRESS NOTE - NSBHFUPINTERVALHXFT_PSY_A_CORE
Chart reviewed. No recent PRNs. Tolerating Seroquel change. Also tolerating reduction in Sinemet. Seen today, alert, awake, cooperative, pleasant, with no observed distress. Denied anxiety, safety concerns, paranoia, or hallucinations. Had broader affect and seemed appropriate/improved.
Patient found in bed, oriented x2, attentive, states he is having some pain of his left hip, also reports poor sleep, denies SI.
Chart reviewed.  No PRNs given overnight. Pt seen and evaluated at bedside. Sitting up, alert, eating breakfast. Pt denying AH/VH at this time, however seems paranoid throughout interview. States he is waiting for his wife, Tamar, to visit. Says he feels safe in the hospital. Denies SI/HI. No rigidity or cogwheeling noted on assessment today. 
Patient found in bed, awake, responsive verbally but difficult to understand slurred/garbled speech. States he is "tired." Tremor, which was noted yesterday, is not observed. Cogwheel rigidity present in bilateral arms, more pronounced on R arm.
Chart reviewed. Patient remains on Sinemet for Parkinsonism with some alleviation of motor symptoms. Has also been compliant with standing psychiatric medications including Seroquel for psychosis. No PRN medications required.     On psychiatric evaluation, patient is A&Ox3 (name, place, time), has low speech that is often difficult to understand likely due to poor dentition and Parkinsonism, has poor eye contact and is often scanning the room, has facial masking likely secondary to Parkinsonism, is at times thought blocked, and is oddly related. Patient continues to exhibit symptoms of psychosis, specifically disorganized thoughts with paranoid ideations. When asked if he feels safe in the hospital, he answers "no" although cannot state why. He is often distractible, alternating between looking at writer and then the tv or other members on the team. He answers questions inappropriately at times; for instance, when asked about VH, he proceeds to tell team about reading his Bible. When asked about AH, he states "everyone hears things" although cannot elaborate. Motor symptoms are mildly improved with less rigidity appreciated in bilateral arms. Has still not ambulated per nurse at bedside.    Wife (Tamar) contacted at [phone]. Discussed the difficulties in controlling Parkinsonism and Psychosis simultaneously as both affect dopamine in contradictory ways. Wife reports that the psychosis has been the more bothersome of the two. States that the patient has been more agitated, including yesterday evening during which he was yelling, saying that people are "sticking things up his butt," and was very difficult to redirect. Today morning, patient also called family on phone with significant paranoia, stating that family has hired people to try to kill him, and later calling to apologize as he was "not in the right mind." Wife believes that the hallucinations are especially bothersome to the patient as he has expressed to her that he cannot control his thoughts and is typically extremely apologetic after his "episodes" of agitation. Family confirms that these behavioral changes have been new, with no previous psych hx, since starting Neuplazid and believes that psychiatric symptoms need to be controlled. When explained that this may worsen Parkinsonism, family expresses understanding and report that they are willing to manage it as patient has been rigid and unable to walk for at least 2 years now.

## 2022-10-17 NOTE — PROGRESS NOTE ADULT - PROBLEM SELECTOR PROBLEM 2
Lactic acidosis
Hallucination
Hallucination
Lactic acidosis
Hallucination
Lactic acidosis
Lactic acidosis
Hallucination
Hallucination
Lactic acidosis
Hallucination

## 2022-10-17 NOTE — PROGRESS NOTE ADULT - PROBLEM SELECTOR PROBLEM 6
Nutrition, metabolism, and development symptoms
Nutrition, metabolism, and development symptoms
DM2 (diabetes mellitus, type 2)
Nutrition, metabolism, and development symptoms
DM2 (diabetes mellitus, type 2)
DM2 (diabetes mellitus, type 2)
Nutrition, metabolism, and development symptoms
DM2 (diabetes mellitus, type 2)
Nutrition, metabolism, and development symptoms
Nutrition, metabolism, and development symptoms
DM2 (diabetes mellitus, type 2)
Nutrition, metabolism, and development symptoms
DM2 (diabetes mellitus, type 2)

## 2022-10-17 NOTE — PROGRESS NOTE ADULT - PROBLEM SELECTOR PROBLEM 5
DM2 (diabetes mellitus, type 2)
DM2 (diabetes mellitus, type 2)
Abdominal mass
DM2 (diabetes mellitus, type 2)
DM2 (diabetes mellitus, type 2)
Abdominal mass
DM2 (diabetes mellitus, type 2)
DM2 (diabetes mellitus, type 2)
Abdominal mass
Abdominal mass
DM2 (diabetes mellitus, type 2)
DM2 (diabetes mellitus, type 2)
Abdominal mass
DM2 (diabetes mellitus, type 2)
Abdominal mass

## 2022-10-17 NOTE — PROGRESS NOTE ADULT - PROBLEM SELECTOR PROBLEM 4
Abdominal mass
Leukocytosis
Peripheral artery disease
Peripheral artery disease
Abdominal mass
Peripheral artery disease
Abdominal mass
Abdominal mass
Peripheral artery disease
Peripheral artery disease
Abdominal mass
Leukocytosis
Abdominal mass
Abdominal mass
Peripheral artery disease

## 2022-10-17 NOTE — BH CONSULTATION LIAISON PROGRESS NOTE - NSICDXBHPRIMARYDX_PSY_ALL_CORE
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  

## 2022-10-17 NOTE — PROGRESS NOTE ADULT - SUBJECTIVE AND OBJECTIVE BOX
Agitated in the early morning hours.  Improved with lorazepam.   No other new c/o.    T 97.8  HR 79  BP  154/64  RR 22  O2sat 100%    Neurologic Exam:  Mental status - Awake, Alert, hypophonic, tachyphemia. Follows simple commands.     Motor - Cogwheel regidity b/l upper extremity   Mod-severe bradykinesia.  Masked facies.   
Laurie Corrigan    Pager [phone]    Patient is a 82y old  Male who presents with a chief complaint of hallucinations (06 Oct 2022 16:52)      SUBJECTIVE / OVERNIGHT EVENTS: No acute overnight events.     MEDICATIONS  (STANDING):  atorvastatin 20 milliGRAM(s) Oral at bedtime  carbidopa/levodopa  25/100 2.5 Tablet(s) Oral <User Schedule>  clopidogrel Tablet 75 milliGRAM(s) Oral daily  dextrose 5%. 1000 milliLiter(s) (100 mL/Hr) IV Continuous <Continuous>  dextrose 5%. 1000 milliLiter(s) (50 mL/Hr) IV Continuous <Continuous>  dextrose 50% Injectable 25 Gram(s) IV Push once  dextrose 50% Injectable 12.5 Gram(s) IV Push once  dextrose 50% Injectable 25 Gram(s) IV Push once  glucagon  Injectable 1 milliGRAM(s) IntraMuscular once  heparin   Injectable 5000 Unit(s) SubCutaneous every 12 hours  influenza  Vaccine (HIGH DOSE) 0.7 milliLiter(s) IntraMuscular once  insulin lispro (ADMELOG) corrective regimen sliding scale   SubCutaneous three times a day before meals  insulin lispro (ADMELOG) corrective regimen sliding scale   SubCutaneous at bedtime  lactated ringers. 1000 milliLiter(s) (125 mL/Hr) IV Continuous <Continuous>  mirtazapine 15 milliGRAM(s) Oral at bedtime  Opicapone (Ongentys) 50 milliGRAM(s) 50 milliGRAM(s) Oral at bedtime  pantoprazole  Injectable 40 milliGRAM(s) IV Push every 24 hours  pimavanserin Capsule 34 milliGRAM(s) Oral <User Schedule>  polyethylene glycol 3350 17 Gram(s) Oral daily    MEDICATIONS  (PRN):  acetaminophen     Tablet .. 650 milliGRAM(s) Oral every 6 hours PRN Temp greater or equal to 38C (100.4F), Mild Pain (1 - 3)  clonazePAM Oral Disintegrating Tablet 0.75 milliGRAM(s) Oral every 8 hours PRN Anxiety  dextrose Oral Gel 15 Gram(s) Oral once PRN Blood Glucose LESS THAN 70 milliGRAM(s)/deciliter  QUEtiapine 12.5 milliGRAM(s) Oral every 6 hours PRN Hallucinations    Allergies    No Known Allergies    Intolerances        Vital Signs Last 24 Hrs  T(C): 37.3 (07 Oct 2022 10:15), Max: 37.3 (07 Oct 2022 10:15)  T(F): 99.1 (07 Oct 2022 10:15), Max: 99.1 (07 Oct 2022 10:15)  HR: 78 (07 Oct 2022 10:15) (78 - 80)  BP: 117/59 (07 Oct 2022 10:15) (117/59 - 140/66)  BP(mean): --  RR: 17 (07 Oct 2022 10:15) (17 - 18)  SpO2: 96% (07 Oct 2022 10:15) (96% - 99%)    Parameters below as of 07 Oct 2022 10:15  Patient On (Oxygen Delivery Method): room air      Daily     Daily   CAPILLARY BLOOD GLUCOSE      POCT Blood Glucose.: 153 mg/dL (07 Oct 2022 12:17)  POCT Blood Glucose.: 115 mg/dL (07 Oct 2022 08:11)  POCT Blood Glucose.: 119 mg/dL (06 Oct 2022 22:36)  POCT Blood Glucose.: 169 mg/dL (06 Oct 2022 17:21)    I&O's Summary    06 Oct 2022 07:01  -  07 Oct 2022 07:00  --------------------------------------------------------  IN: 375 mL / OUT: 0 mL / NET: 375 mL        PHYSICAL EXAM:  CONSTITUTIONAL: NAD,   EYES: EOMI; conjunctiva and sclera clear  NECK: Supple,   RESPIRATORY: Normal respiratory effort; lungs are clear to auscultation bilaterally  CARDIOVASCULAR: RRR, normal S1 and S2  ABDOMEN: Nontender to palpation, normoactive bowel sounds, no rebound/guarding;   MUSCULOSKELETAL no joint swelling or tenderness to palpation  PSYCH: Awake and alert, AAO x 3  NEUROLOGY: bradykinesia   SKIN: warm and dry    LABS:                        11.0   6.75  )-----------( 258      ( 06 Oct 2022 03:25 )             35.5     Hgb Trend: 11.0<--, 10.9<--, 11.9<--, 10.5<--, 10.4<--  10-06    141  |  104  |  5<L>  ----------------------------<  134<H>  3.6   |  24  |  0.35<L>    Ca    8.4      06 Oct 2022 03:25  Phos  2.5     10-06  Mg     1.50     10-06      Creatinine Trend: 0.35<--, 0.31<--, 0.27<--, 0.34<--, 0.34<--, 0.39<--              RADIOLOGY & ADDITIONAL TESTS:    Imaging Personally Reviewed.    Consultant(s) Notes Reviewed.    Care Discussed with Consultants/Other Providers.      
Laurie Corrigan    Pager [phone]    Patient is a 81y old  Male who presents with a chief complaint of hallucinations (04 Oct 2022 18:05)      SUBJECTIVE / OVERNIGHT EVENTS: No acute overnight events. This morning pt states he is "okay for now." No complaints.    MEDICATIONS  (STANDING):  atorvastatin 20 milliGRAM(s) Oral at bedtime  carbidopa/levodopa  25/100 2.5 Tablet(s) Oral <User Schedule>  clopidogrel Tablet 75 milliGRAM(s) Oral daily  dextrose 5%. 1000 milliLiter(s) (100 mL/Hr) IV Continuous <Continuous>  dextrose 5%. 1000 milliLiter(s) (50 mL/Hr) IV Continuous <Continuous>  dextrose 50% Injectable 25 Gram(s) IV Push once  dextrose 50% Injectable 12.5 Gram(s) IV Push once  dextrose 50% Injectable 25 Gram(s) IV Push once  glucagon  Injectable 1 milliGRAM(s) IntraMuscular once  heparin   Injectable 5000 Unit(s) SubCutaneous every 12 hours  influenza  Vaccine (HIGH DOSE) 0.7 milliLiter(s) IntraMuscular once  insulin lispro (ADMELOG) corrective regimen sliding scale   SubCutaneous three times a day before meals  insulin lispro (ADMELOG) corrective regimen sliding scale   SubCutaneous at bedtime  lactated ringers. 1000 milliLiter(s) (125 mL/Hr) IV Continuous <Continuous>  mirtazapine 15 milliGRAM(s) Oral at bedtime  Opicapone (Ongentys) 50 milliGRAM(s) 50 milliGRAM(s) Oral at bedtime  pantoprazole  Injectable 40 milliGRAM(s) IV Push every 24 hours  pimavanserin Capsule 34 milliGRAM(s) Oral <User Schedule>  polyethylene glycol 3350 17 Gram(s) Oral daily    MEDICATIONS  (PRN):  acetaminophen     Tablet .. 650 milliGRAM(s) Oral every 6 hours PRN Temp greater or equal to 38C (100.4F), Mild Pain (1 - 3)  clonazePAM Oral Disintegrating Tablet 0.75 milliGRAM(s) Oral every 8 hours PRN Anxiety  dextrose Oral Gel 15 Gram(s) Oral once PRN Blood Glucose LESS THAN 70 milliGRAM(s)/deciliter  LORazepam   Injectable 1 milliGRAM(s) IV Push every 6 hours PRN severed hallucinations  QUEtiapine 12.5 milliGRAM(s) Oral every 6 hours PRN Hallucinations    Allergies    No Known Allergies    Intolerances        Vital Signs Last 24 Hrs  T(C): 36.8 (05 Oct 2022 12:00), Max: 36.8 (05 Oct 2022 01:00)  T(F): 98.3 (05 Oct 2022 12:00), Max: 98.3 (05 Oct 2022 12:00)  HR: 77 (05 Oct 2022 12:00) (63 - 77)  BP: 156/68 (05 Oct 2022 12:00) (136/84 - 156/68)  BP(mean): --  RR: 18 (05 Oct 2022 12:00) (17 - 18)  SpO2: 100% (05 Oct 2022 12:00) (100% - 100%)    Parameters below as of 05 Oct 2022 12:00  Patient On (Oxygen Delivery Method): room air      Daily     Daily   CAPILLARY BLOOD GLUCOSE      POCT Blood Glucose.: 91 mg/dL (05 Oct 2022 17:24)  POCT Blood Glucose.: 151 mg/dL (05 Oct 2022 12:18)  POCT Blood Glucose.: 104 mg/dL (05 Oct 2022 08:25)  POCT Blood Glucose.: 120 mg/dL (04 Oct 2022 22:12)  POCT Blood Glucose.: 141 mg/dL (04 Oct 2022 17:39)    I&O's Summary      PHYSICAL EXAM:  CONSTITUTIONAL: NAD,   EYES: EOMI; conjunctiva and sclera clear  NECK: Supple,   RESPIRATORY: Normal respiratory effort; lungs are clear to auscultation bilaterally  CARDIOVASCULAR: RRR, normal S1 and S2  ABDOMEN: Nontender to palpation, normoactive bowel sounds, no rebound/guarding;   MUSCULOSKELETAL no joint swelling or tenderness to palpation  PSYCH: Awake and alert, AAO x 3  NEUROLOGY: bradykinesia   SKIN: warm and dry    LABS:                        10.9   6.56  )-----------( 255      ( 05 Oct 2022 07:57 )             35.1     Hgb Trend: 10.9<--, 11.9<--, 10.5<--, 10.4<--, 12.0<--  10-05    141  |  105  |  6<L>  ----------------------------<  109<H>  3.6   |  27  |  0.31<L>    Ca    8.4      05 Oct 2022 07:57  Phos  2.5     10-05  Mg     1.60     10-05      Creatinine Trend: 0.31<--, 0.27<--, 0.34<--, 0.34<--, 0.39<--, 0.47<--              RADIOLOGY & ADDITIONAL TESTS:    Imaging Personally Reviewed.    Consultant(s) Notes Reviewed.    Care Discussed with Consultants/Other Providers.      
Laurie Corrigan    Pager [phone]    Patient is a 82y old  Male who presents with a chief complaint of hallucinations (05 Oct 2022 17:25)      SUBJECTIVE / OVERNIGHT EVENTS: No acute overnight events. This morning pt appears comfortable. No complaints.     MEDICATIONS  (STANDING):  atorvastatin 20 milliGRAM(s) Oral at bedtime  carbidopa/levodopa  25/100 2.5 Tablet(s) Oral <User Schedule>  clopidogrel Tablet 75 milliGRAM(s) Oral daily  dextrose 5%. 1000 milliLiter(s) (100 mL/Hr) IV Continuous <Continuous>  dextrose 5%. 1000 milliLiter(s) (50 mL/Hr) IV Continuous <Continuous>  dextrose 50% Injectable 25 Gram(s) IV Push once  dextrose 50% Injectable 12.5 Gram(s) IV Push once  dextrose 50% Injectable 25 Gram(s) IV Push once  glucagon  Injectable 1 milliGRAM(s) IntraMuscular once  heparin   Injectable 5000 Unit(s) SubCutaneous every 12 hours  influenza  Vaccine (HIGH DOSE) 0.7 milliLiter(s) IntraMuscular once  insulin lispro (ADMELOG) corrective regimen sliding scale   SubCutaneous three times a day before meals  insulin lispro (ADMELOG) corrective regimen sliding scale   SubCutaneous at bedtime  lactated ringers. 1000 milliLiter(s) (125 mL/Hr) IV Continuous <Continuous>  mirtazapine 15 milliGRAM(s) Oral at bedtime  Opicapone (Ongentys) 50 milliGRAM(s) 50 milliGRAM(s) Oral at bedtime  pantoprazole  Injectable 40 milliGRAM(s) IV Push every 24 hours  pimavanserin Capsule 34 milliGRAM(s) Oral <User Schedule>  polyethylene glycol 3350 17 Gram(s) Oral daily    MEDICATIONS  (PRN):  acetaminophen     Tablet .. 650 milliGRAM(s) Oral every 6 hours PRN Temp greater or equal to 38C (100.4F), Mild Pain (1 - 3)  clonazePAM Oral Disintegrating Tablet 0.75 milliGRAM(s) Oral every 8 hours PRN Anxiety  dextrose Oral Gel 15 Gram(s) Oral once PRN Blood Glucose LESS THAN 70 milliGRAM(s)/deciliter  QUEtiapine 12.5 milliGRAM(s) Oral every 6 hours PRN Hallucinations    Allergies    No Known Allergies    Intolerances        Vital Signs Last 24 Hrs  T(C): 36.7 (06 Oct 2022 16:00), Max: 37 (05 Oct 2022 20:54)  T(F): 98 (06 Oct 2022 16:00), Max: 98.6 (05 Oct 2022 20:54)  HR: 80 (06 Oct 2022 16:00) (73 - 89)  BP: 140/70 (06 Oct 2022 16:00) (135/45 - 156/70)  BP(mean): --  RR: 16 (06 Oct 2022 16:00) (16 - 18)  SpO2: 100% (06 Oct 2022 16:00) (98% - 100%)    Parameters below as of 06 Oct 2022 16:00  Patient On (Oxygen Delivery Method): room air      Daily     Daily   CAPILLARY BLOOD GLUCOSE      POCT Blood Glucose.: 154 mg/dL (06 Oct 2022 15:48)  POCT Blood Glucose.: 138 mg/dL (06 Oct 2022 12:34)  POCT Blood Glucose.: 111 mg/dL (06 Oct 2022 08:37)  POCT Blood Glucose.: 104 mg/dL (05 Oct 2022 22:27)  POCT Blood Glucose.: 91 mg/dL (05 Oct 2022 17:24)    I&O's Summary    05 Oct 2022 07:01  -  06 Oct 2022 07:00  --------------------------------------------------------  IN: 1500 mL / OUT: 0 mL / NET: 1500 mL        PHYSICAL EXAM:  CONSTITUTIONAL: NAD,   EYES: EOMI; conjunctiva and sclera clear  NECK: Supple,   RESPIRATORY: Normal respiratory effort; lungs are clear to auscultation bilaterally  CARDIOVASCULAR: RRR, normal S1 and S2  ABDOMEN: Nontender to palpation, normoactive bowel sounds, no rebound/guarding;   MUSCULOSKELETAL no joint swelling or tenderness to palpation  PSYCH: Awake and alert, AAO x 3  NEUROLOGY: bradykinesia   SKIN: warm and dry        LABS:                        11.0   6.75  )-----------( 258      ( 06 Oct 2022 03:25 )             35.5     Hgb Trend: 11.0<--, 10.9<--, 11.9<--, 10.5<--, 10.4<--  10-06    141  |  104  |  5<L>  ----------------------------<  134<H>  3.6   |  24  |  0.35<L>    Ca    8.4      06 Oct 2022 03:25  Phos  2.5     10-06  Mg     1.50     10-06      Creatinine Trend: 0.35<--, 0.31<--, 0.27<--, 0.34<--, 0.34<--, 0.39<--              RADIOLOGY & ADDITIONAL TESTS:    Imaging Personally Reviewed.    Consultant(s) Notes Reviewed.    Care Discussed with Consultants/Other Providers.      
VASCULAR SURGERY DAILY PROGRESS NOTE:     SUBJECTIVE/ROS:   Chart reviewed       OBJECTIVE:  Vital Signs Last 24 Hrs  T(C): 36.6 (02 Oct 2022 06:00), Max: 36.7 (01 Oct 2022 16:45)  T(F): 97.8 (02 Oct 2022 06:00), Max: 98.1 (01 Oct 2022 16:45)  HR: 79 (02 Oct 2022 06:00) (63 - 79)  BP: 154/64 (02 Oct 2022 06:00) (108/51 - 154/64)  BP(mean): --  RR: 22 (02 Oct 2022 06:00) (16 - 22)  SpO2: 100% (02 Oct 2022 06:00) (97% - 100%)    Parameters below as of 02 Oct 2022 06:00  Patient On (Oxygen Delivery Method): room air      I&O's Detail    01 Oct 2022 07:01  -  02 Oct 2022 07:00  --------------------------------------------------------  IN:    Lactated Ringers: 1500 mL    Oral Fluid: 100 mL  Total IN: 1600 mL    OUT:    Voided (mL): 300 mL  Total OUT: 300 mL    Total NET: 1300 mL        Daily     Daily   MEDICATIONS  (STANDING):  carbidopa/levodopa  25/100 2.5 Tablet(s) Oral <User Schedule>  dextrose 5%. 1000 milliLiter(s) (100 mL/Hr) IV Continuous <Continuous>  dextrose 5%. 1000 milliLiter(s) (50 mL/Hr) IV Continuous <Continuous>  dextrose 50% Injectable 25 Gram(s) IV Push once  dextrose 50% Injectable 12.5 Gram(s) IV Push once  dextrose 50% Injectable 25 Gram(s) IV Push once  glucagon  Injectable 1 milliGRAM(s) IntraMuscular once  heparin   Injectable 5000 Unit(s) SubCutaneous every 12 hours  influenza  Vaccine (HIGH DOSE) 0.7 milliLiter(s) IntraMuscular once  insulin lispro (ADMELOG) corrective regimen sliding scale   SubCutaneous every 6 hours  lactated ringers. 1000 milliLiter(s) (125 mL/Hr) IV Continuous <Continuous>  mirtazapine 15 milliGRAM(s) Oral at bedtime  Opicapone (Ongentys) 50 milliGRAM(s)   Oral <User Schedule>  pantoprazole  Injectable 40 milliGRAM(s) IV Push every 24 hours  pimavanserin Capsule 34 milliGRAM(s) Oral <User Schedule>  piperacillin/tazobactam IVPB.. 3.375 Gram(s) IV Intermittent every 8 hours  polyethylene glycol 3350 17 Gram(s) Oral daily    MEDICATIONS  (PRN):  acetaminophen     Tablet .. 650 milliGRAM(s) Oral every 6 hours PRN Temp greater or equal to 38C (100.4F), Mild Pain (1 - 3)  clonazePAM Oral Disintegrating Tablet 0.75 milliGRAM(s) Oral every 8 hours PRN Anxiety  dextrose Oral Gel 15 Gram(s) Oral once PRN Blood Glucose LESS THAN 70 milliGRAM(s)/deciliter  LORazepam   Injectable 1 milliGRAM(s) IV Push every 6 hours PRN severed hallucinations      Labs:                        10.4   7.35  )-----------( 238      ( 02 Oct 2022 06:42 )             34.5     10    142  |  107  |  9   ----------------------------<  102<H>  3.9   |  23  |  0.34<L>    Ca    8.5      02 Oct 2022 06:42  Phos  2.6     10  Mg     1.50     10    TPro  6.6  /  Alb  3.8  /  TBili  0.4  /  DBili  x   /  AST  44<H>  /  ALT  <5<L>  /  AlkPhos  72  10    PT/INR - ( 01 Oct 2022 03:00 )   PT: 13.4 sec;   INR: 1.15 ratio         PTT - ( 01 Oct 2022 03:00 )  PTT:29.9 sec  Urinalysis Basic - ( 30 Sep 2022 23:00 )    Color: Yellow / Appearance: Clear / S.019 / pH: x  Gluc: x / Ketone: Negative  / Bili: Negative / Urobili: <2 mg/dL   Blood: x / Protein: Trace / Nitrite: Negative   Leuk Esterase: Negative / RBC: x / WBC x   Sq Epi: x / Non Sq Epi: x / Bacteria: x          
[facility] Division of Hospital Medicine  Kasia Chavez MD  Pager (M-F, 0T-5P): [phone]      Patient is a 81y old  Male who presents with a chief complaint of hallucinations (01 Oct 2022 10:22)      SUBJECTIVE / OVERNIGHT EVENTS: Patient has worsening hallucinations visual and auditory worsening for the past 2 weeks , initially occasionally now on regular basis   He used to take Klonopin  to control hallucinations , lately not working     MEDICATIONS  (STANDING):  carbidopa/levodopa  25/100 2.5 Tablet(s) Oral <User Schedule>  dextrose 5%. 1000 milliLiter(s) (100 mL/Hr) IV Continuous <Continuous>  dextrose 5%. 1000 milliLiter(s) (50 mL/Hr) IV Continuous <Continuous>  dextrose 50% Injectable 25 Gram(s) IV Push once  dextrose 50% Injectable 12.5 Gram(s) IV Push once  dextrose 50% Injectable 25 Gram(s) IV Push once  glucagon  Injectable 1 milliGRAM(s) IntraMuscular once  heparin   Injectable 5000 Unit(s) SubCutaneous every 12 hours  insulin lispro (ADMELOG) corrective regimen sliding scale   SubCutaneous every 6 hours  lactated ringers. 1000 milliLiter(s) (125 mL/Hr) IV Continuous <Continuous>  mirtazapine 15 milliGRAM(s) Oral at bedtime  Opicapone (Ongentys) 50 milliGRAM(s)   Oral <User Schedule>  pantoprazole  Injectable 40 milliGRAM(s) IV Push every 24 hours  pimavanserin Capsule 34 milliGRAM(s) Oral <User Schedule>  piperacillin/tazobactam IVPB.. 3.375 Gram(s) IV Intermittent every 8 hours  polyethylene glycol 3350 17 Gram(s) Oral daily    MEDICATIONS  (PRN):  acetaminophen     Tablet .. 650 milliGRAM(s) Oral every 6 hours PRN Temp greater or equal to 38C (100.4F), Mild Pain (1 - 3)  clonazePAM  Tablet 0.5 milliGRAM(s) Oral every 12 hours PRN anxiety  dextrose Oral Gel 15 Gram(s) Oral once PRN Blood Glucose LESS THAN 70 milliGRAM(s)/deciliter      CAPILLARY BLOOD GLUCOSE      POCT Blood Glucose.: 101 mg/dL (01 Oct 2022 12:59)  POCT Blood Glucose.: 91 mg/dL (01 Oct 2022 09:10)  POCT Blood Glucose.: 100 mg/dL (01 Oct 2022 05:56)  POCT Blood Glucose.: 79 mg/dL (01 Oct 2022 03:24)  POCT Blood Glucose.: 151 mg/dL (30 Sep 2022 17:33)    I&O's Summary      PHYSICAL EXAM:  Vital Signs Last 24 Hrs  T(C): 36.8 (01 Oct 2022 11:00), Max: 37.2 (01 Oct 2022 01:05)  T(F): 98.3 (01 Oct 2022 11:00), Max: 98.9 (01 Oct 2022 01:05)  HR: 76 (01 Oct 2022 11:00) (72 - 94)  BP: 160/74 (01 Oct 2022 11:00) (110/98 - 160/74)  BP(mean): 82 (01 Oct 2022 01:05) (82 - 82)  RR: 16 (01 Oct 2022 11:00) (16 - 16)  SpO2: 100% (01 Oct 2022 11:00) (98% - 100%)    Parameters below as of 01 Oct 2022 11:00  Patient On (Oxygen Delivery Method): room air      CONSTITUTIONAL: NAD,   EYES: EOMI; conjunctiva and sclera clear  NECK: Supple, no palpable masses; no thyromegaly  RESPIRATORY: Normal respiratory effort; lungs are clear to auscultation bilaterally  CARDIOVASCULAR: Regular rate and rhythm, normal S1 and S2, ; No lower extremity edema;   ABDOMEN: Nontender to palpation, normoactive bowel sounds, no rebound/guarding;   MUSKULOSKELETAL:  no clubbing or cyanosis of digits; no joint swelling or tenderness to palpation  PSYCH: A+O to person, place, and time; affect appropriate  NEUROLOGY:not cooperating   SKIN: No rashes; no palpable lesions    LABS:                        12.0   10.46 )-----------( 272      ( 01 Oct 2022 03:00 )             39.3     10-    140  |  106  |  14  ----------------------------<  88  4.6   |  25  |  0.39<L>    Ca    9.0      01 Oct 2022 03:00  Phos  3.1     10-  Mg     1.90     10-    TPro  6.6  /  Alb  3.8  /  TBili  0.4  /  DBili  x   /  AST  44<H>  /  ALT  <5<L>  /  AlkPhos  72  10-    PT/INR - ( 01 Oct 2022 03:00 )   PT: 13.4 sec;   INR: 1.15 ratio         PTT - ( 01 Oct 2022 03:00 )  PTT:29.9 sec      Urinalysis Basic - ( 30 Sep 2022 23:00 )    Color: Yellow / Appearance: Clear / S.019 / pH: x  Gluc: x / Ketone: Negative  / Bili: Negative / Urobili: <2 mg/dL   Blood: x / Protein: Trace / Nitrite: Negative   Leuk Esterase: Negative / RBC: x / WBC x   Sq Epi: x / Non Sq Epi: x / Bacteria: x          RADIOLOGY & ADDITIONAL TESTS:  Results Reviewed:   Imaging Personally Reviewed:  Electrocardiogram Personally Reviewed:    COORDINATION OF CARE:  Care Discussed with Consultants/Other Providers [Y/N]:  Prior or Outpatient Records Reviewed [Y/N]:  
Laurie Corrigan    Pager [phone]    Patient is a 81y old  Male who presents with a chief complaint of hallucinations (02 Oct 2022 15:50)      SUBJECTIVE / OVERNIGHT EVENTS: No acute overnight events. This morning pt appears calm/ non-combative.       MEDICATIONS  (STANDING):  carbidopa/levodopa  25/100 2.5 Tablet(s) Oral <User Schedule>  dextrose 5%. 1000 milliLiter(s) (100 mL/Hr) IV Continuous <Continuous>  dextrose 5%. 1000 milliLiter(s) (50 mL/Hr) IV Continuous <Continuous>  dextrose 50% Injectable 25 Gram(s) IV Push once  dextrose 50% Injectable 12.5 Gram(s) IV Push once  dextrose 50% Injectable 25 Gram(s) IV Push once  glucagon  Injectable 1 milliGRAM(s) IntraMuscular once  heparin   Injectable 5000 Unit(s) SubCutaneous every 12 hours  influenza  Vaccine (HIGH DOSE) 0.7 milliLiter(s) IntraMuscular once  insulin lispro (ADMELOG) corrective regimen sliding scale   SubCutaneous three times a day before meals  insulin lispro (ADMELOG) corrective regimen sliding scale   SubCutaneous at bedtime  lactated ringers. 1000 milliLiter(s) (125 mL/Hr) IV Continuous <Continuous>  mirtazapine 15 milliGRAM(s) Oral at bedtime  Opicapone (Ongentys) 50 milliGRAM(s)   Oral <User Schedule>  pantoprazole  Injectable 40 milliGRAM(s) IV Push every 24 hours  pimavanserin Capsule 34 milliGRAM(s) Oral <User Schedule>  polyethylene glycol 3350 17 Gram(s) Oral daily  potassium chloride   Powder 40 milliEquivalent(s) Oral once    MEDICATIONS  (PRN):  acetaminophen     Tablet .. 650 milliGRAM(s) Oral every 6 hours PRN Temp greater or equal to 38C (100.4F), Mild Pain (1 - 3)  clonazePAM Oral Disintegrating Tablet 0.75 milliGRAM(s) Oral every 8 hours PRN Anxiety  dextrose Oral Gel 15 Gram(s) Oral once PRN Blood Glucose LESS THAN 70 milliGRAM(s)/deciliter  LORazepam   Injectable 1 milliGRAM(s) IV Push every 6 hours PRN severed hallucinations    Allergies    No Known Allergies    Intolerances        Vital Signs Last 24 Hrs  T(C): 36.5 (03 Oct 2022 11:53), Max: 37.2 (02 Oct 2022 15:40)  T(F): 97.7 (03 Oct 2022 11:53), Max: 98.9 (02 Oct 2022 15:40)  HR: 64 (03 Oct 2022 11:53) (60 - 99)  BP: 148/72 (03 Oct 2022 11:53) (110/62 - 165/67)  BP(mean): --  RR: 18 (03 Oct 2022 11:53) (18 - 20)  SpO2: 100% (03 Oct 2022 11:53) (98% - 100%)    Parameters below as of 03 Oct 2022 11:53  Patient On (Oxygen Delivery Method): room air      Daily     Daily   CAPILLARY BLOOD GLUCOSE      POCT Blood Glucose.: 96 mg/dL (03 Oct 2022 12:14)  POCT Blood Glucose.: 74 mg/dL (03 Oct 2022 08:38)  POCT Blood Glucose.: 103 mg/dL (02 Oct 2022 21:23)  POCT Blood Glucose.: 89 mg/dL (02 Oct 2022 17:26)  POCT Blood Glucose.: 102 mg/dL (02 Oct 2022 13:12)    I&O's Summary    02 Oct 2022 07:01  -  03 Oct 2022 07:00  --------------------------------------------------------  IN: 120 mL / OUT: 0 mL / NET: 120 mL        PHYSICAL EXAM:  CONSTITUTIONAL: NAD,   EYES: EOMI; conjunctiva and sclera clear  NECK: Supple,   RESPIRATORY: Normal respiratory effort; lungs are clear to auscultation bilaterally  CARDIOVASCULAR: RRR, normal S1 and S2  ABDOMEN: Nontender to palpation, normoactive bowel sounds, no rebound/guarding;   MUSCULOSKELETAL no joint swelling or tenderness to palpation  PSYCH: Awake and alert  NEUROLOGY: bradykinesia   SKIN: warm and dry    LABS:                        10.5   6.60  )-----------( 224      ( 03 Oct 2022 05:44 )             33.7     Hgb Trend: 10.5<--, 10.4<--, 12.0<--, 12.5<--  10-03    135  |  100  |  8   ----------------------------<  89  3.3<L>   |  23  |  0.34<L>    Ca    8.5      03 Oct 2022 05:44  Phos  3.2     10-03  Mg     1.80     10-03      Creatinine Trend: 0.34<--, 0.34<--, 0.39<--, 0.47<--, 0.32<--              RADIOLOGY & ADDITIONAL TESTS:    Imaging Personally Reviewed.    Consultant(s) Notes Reviewed.    Care Discussed with Consultants/Other Providers.      
[facility] Division of Hospital Medicine  Christopher Gr MD  In House Pager [phone]    Patient is a 82y old  Male who presents with a chief complaint of hallucinations (12 Oct 2022 13:06)      SUBJECTIVE / OVERNIGHT EVENTS:  NAEO, Patient seen and examined at bedside, no acute complaints today. more alert today. denied hallucination at this time.   No fever, no chills, no SOB, no CP, no n/v/d, no abd pain, no dysuria      MEDICATIONS  (STANDING):  atorvastatin 20 milliGRAM(s) Oral at bedtime  carbidopa/levodopa  25/100 2.5 Tablet(s) Oral <User Schedule>  clopidogrel Tablet 75 milliGRAM(s) Oral daily  dextrose 5%. 1000 milliLiter(s) (100 mL/Hr) IV Continuous <Continuous>  dextrose 5%. 1000 milliLiter(s) (50 mL/Hr) IV Continuous <Continuous>  dextrose 50% Injectable 25 Gram(s) IV Push once  dextrose 50% Injectable 12.5 Gram(s) IV Push once  dextrose 50% Injectable 25 Gram(s) IV Push once  glucagon  Injectable 1 milliGRAM(s) IntraMuscular once  heparin   Injectable 5000 Unit(s) SubCutaneous every 12 hours  influenza  Vaccine (HIGH DOSE) 0.7 milliLiter(s) IntraMuscular once  insulin lispro (ADMELOG) corrective regimen sliding scale   SubCutaneous three times a day before meals  insulin lispro (ADMELOG) corrective regimen sliding scale   SubCutaneous at bedtime  mirtazapine 15 milliGRAM(s) Oral at bedtime  Opicapone (Ongentys) 50 milliGRAM(s) 50 milliGRAM(s) Oral at bedtime  pantoprazole  Injectable 40 milliGRAM(s) IV Push every 24 hours  polyethylene glycol 3350 17 Gram(s) Oral daily  QUEtiapine 12.5 milliGRAM(s) Oral two times a day    MEDICATIONS  (PRN):  acetaminophen     Tablet .. 650 milliGRAM(s) Oral every 6 hours PRN Temp greater or equal to 38C (100.4F), Mild Pain (1 - 3)  clonazePAM Oral Disintegrating Tablet 0.25 milliGRAM(s) Oral daily PRN anxiety  dextrose Oral Gel 15 Gram(s) Oral once PRN Blood Glucose LESS THAN 70 milliGRAM(s)/deciliter  QUEtiapine 12.5 milliGRAM(s) Oral every 6 hours PRN anxiety and agitation    CAPILLARY BLOOD GLUCOSE      POCT Blood Glucose.: 150 mg/dL (13 Oct 2022 12:02)  POCT Blood Glucose.: 117 mg/dL (13 Oct 2022 08:28)  POCT Blood Glucose.: 106 mg/dL (12 Oct 2022 22:17)  POCT Blood Glucose.: 129 mg/dL (12 Oct 2022 17:20)    I&O's Summary    12 Oct 2022 07:01  -  13 Oct 2022 07:00  --------------------------------------------------------  IN: 0 mL / OUT: 200 mL / NET: -200 mL        PHYSICAL EXAM:  Vital Signs Last 24 Hrs  T(C): 36.7 (13 Oct 2022 05:54), Max: 36.8 (12 Oct 2022 22:33)  T(F): 98.1 (13 Oct 2022 05:54), Max: 98.3 (12 Oct 2022 22:33)  HR: 71 (13 Oct 2022 05:54) (69 - 71)  BP: 135/64 (13 Oct 2022 05:54) (131/60 - 135/64)  BP(mean): --  RR: 17 (13 Oct 2022 05:54) (17 - 18)  SpO2: 100% (13 Oct 2022 05:54) (100% - 100%)    Parameters below as of 13 Oct 2022 05:54  Patient On (Oxygen Delivery Method): room air        Gen: NAD; sitting in bed comfortably   Pulm: no accessory muscle use; lungs clear on auscultation bilaterally; no wheezing or crackles.   Cards: RRR, nl S1/S2; no LE edema; no JVD  Abd: non-distended; soft and NT on exam; +bs  Ext: MATEUSZ; no joint effusion or tenderness in upper and lower extremities; no cyanosis  Neuro: Awake and Alert; non-focal; moving all extremities.   Skin: no new rashes; warm to touch;     LABS:                        9.8    6.96  )-----------( 216      ( 13 Oct 2022 06:33 )             31.8     10-13    138  |  104  |  20  ----------------------------<  101<H>  4.3   |  24  |  0.36<L>    Ca    8.6      13 Oct 2022 06:33  Phos  3.1     10-13  Mg     1.80     10-13                  RADIOLOGY & ADDITIONAL TESTS:  Results Reviewed: Y  Imaging Personally Reviewed: Y  Electrocardiogram Personally Reviewed: Y    COORDINATION OF CARE:  Care Discussed with Consultants/Other Providers [Y/N]: Y  Prior or Outpatient Records Reviewed [Y/N]: Y  
Laurie Corrigan    Pager [phone]    Patient is a 82y old  Male who presents with a chief complaint of hallucinations (08 Oct 2022 12:52)      SUBJECTIVE / OVERNIGHT EVENTS: No acute overnight events. This morning pt doing "okay" and just wants to go home. No complaints this morning. Denies fevers, chills, CP, SOB.     MEDICATIONS  (STANDING):  atorvastatin 20 milliGRAM(s) Oral at bedtime  carbidopa/levodopa  25/100 2.5 Tablet(s) Oral <User Schedule>  clonazePAM Oral Disintegrating Tablet 0.75 milliGRAM(s) Oral every 8 hours  clopidogrel Tablet 75 milliGRAM(s) Oral daily  dextrose 5%. 1000 milliLiter(s) (50 mL/Hr) IV Continuous <Continuous>  dextrose 5%. 1000 milliLiter(s) (100 mL/Hr) IV Continuous <Continuous>  dextrose 50% Injectable 25 Gram(s) IV Push once  dextrose 50% Injectable 12.5 Gram(s) IV Push once  dextrose 50% Injectable 25 Gram(s) IV Push once  glucagon  Injectable 1 milliGRAM(s) IntraMuscular once  heparin   Injectable 5000 Unit(s) SubCutaneous every 12 hours  influenza  Vaccine (HIGH DOSE) 0.7 milliLiter(s) IntraMuscular once  insulin lispro (ADMELOG) corrective regimen sliding scale   SubCutaneous three times a day before meals  insulin lispro (ADMELOG) corrective regimen sliding scale   SubCutaneous at bedtime  mirtazapine 15 milliGRAM(s) Oral at bedtime  Opicapone (Ongentys) 50 milliGRAM(s) 50 milliGRAM(s) Oral at bedtime  pantoprazole  Injectable 40 milliGRAM(s) IV Push every 24 hours  polyethylene glycol 3350 17 Gram(s) Oral daily    MEDICATIONS  (PRN):  acetaminophen     Tablet .. 650 milliGRAM(s) Oral every 6 hours PRN Temp greater or equal to 38C (100.4F), Mild Pain (1 - 3)  dextrose Oral Gel 15 Gram(s) Oral once PRN Blood Glucose LESS THAN 70 milliGRAM(s)/deciliter  QUEtiapine 12.5 milliGRAM(s) Oral every 6 hours PRN Hallucinations    Allergies    No Known Allergies    Intolerances        Vital Signs Last 24 Hrs  T(C): 36.7 (09 Oct 2022 11:26), Max: 36.7 (08 Oct 2022 20:37)  T(F): 98.1 (09 Oct 2022 11:26), Max: 98.1 (08 Oct 2022 20:37)  HR: 81 (09 Oct 2022 11:26) (74 - 81)  BP: 139/55 (09 Oct 2022 11:26) (122/53 - 139/55)  BP(mean): --  RR: 16 (09 Oct 2022 11:26) (16 - 16)  SpO2: 98% (09 Oct 2022 11:26) (98% - 100%)    Parameters below as of 09 Oct 2022 11:26  Patient On (Oxygen Delivery Method): room air      Daily     Daily   CAPILLARY BLOOD GLUCOSE      POCT Blood Glucose.: 177 mg/dL (09 Oct 2022 12:20)  POCT Blood Glucose.: 143 mg/dL (09 Oct 2022 08:51)  POCT Blood Glucose.: 138 mg/dL (08 Oct 2022 22:44)  POCT Blood Glucose.: 106 mg/dL (08 Oct 2022 17:23)    I&O's Summary      PHYSICAL EXAM:  CONSTITUTIONAL: NAD,   EYES: EOMI; conjunctiva and sclera clear  NECK: Supple,   RESPIRATORY: Normal respiratory effort; lungs are clear to auscultation bilaterally  CARDIOVASCULAR: RRR, normal S1 and S2  ABDOMEN: Nontender to palpation, normoactive bowel sounds, no rebound/guarding;   MUSCULOSKELETAL no joint swelling or tenderness to palpation  PSYCH: Awake and alert, AAO x 3  NEUROLOGY: bradykinesia   SKIN: warm and dry    LABS:    Hgb Trend: 11.0<--, 10.9<--, 11.9<--, 10.5<--        Creatinine Trend: 0.35<--, 0.31<--, 0.27<--, 0.34<--, 0.34<--, 0.39<--              RADIOLOGY & ADDITIONAL TESTS:    Imaging Personally Reviewed.    Consultant(s) Notes Reviewed.    Care Discussed with Consultants/Other Providers.      
[facility] Division of Hospital Medicine  Christopher Gr MD  In House Pager [phone]    Patient is a 82y old  Male who presents with a chief complaint of hallucinations (13 Oct 2022 14:07)      SUBJECTIVE / OVERNIGHT EVENTS:  NAEO, Patient seen and examined at bedside, no acute complaints today. reports no hallucination.   No fever, no chills, no SOB, no CP, no n/v/d, no abd pain, no dysuria      MEDICATIONS  (STANDING):  atorvastatin 20 milliGRAM(s) Oral at bedtime  carbidopa/levodopa  25/100 2.5 Tablet(s) Oral <User Schedule>  clopidogrel Tablet 75 milliGRAM(s) Oral daily  dextrose 5%. 1000 milliLiter(s) (100 mL/Hr) IV Continuous <Continuous>  dextrose 5%. 1000 milliLiter(s) (50 mL/Hr) IV Continuous <Continuous>  dextrose 50% Injectable 25 Gram(s) IV Push once  dextrose 50% Injectable 12.5 Gram(s) IV Push once  dextrose 50% Injectable 25 Gram(s) IV Push once  glucagon  Injectable 1 milliGRAM(s) IntraMuscular once  heparin   Injectable 5000 Unit(s) SubCutaneous every 12 hours  influenza  Vaccine (HIGH DOSE) 0.7 milliLiter(s) IntraMuscular once  insulin lispro (ADMELOG) corrective regimen sliding scale   SubCutaneous three times a day before meals  insulin lispro (ADMELOG) corrective regimen sliding scale   SubCutaneous at bedtime  mirtazapine 15 milliGRAM(s) Oral at bedtime  Opicapone (Ongentys) 50 milliGRAM(s) 50 milliGRAM(s) Oral at bedtime  pantoprazole  Injectable 40 milliGRAM(s) IV Push every 24 hours  polyethylene glycol 3350 17 Gram(s) Oral daily  QUEtiapine 12.5 milliGRAM(s) Oral two times a day    MEDICATIONS  (PRN):  acetaminophen     Tablet .. 650 milliGRAM(s) Oral every 6 hours PRN Temp greater or equal to 38C (100.4F), Mild Pain (1 - 3)  clonazePAM Oral Disintegrating Tablet 0.25 milliGRAM(s) Oral daily PRN anxiety  dextrose Oral Gel 15 Gram(s) Oral once PRN Blood Glucose LESS THAN 70 milliGRAM(s)/deciliter  QUEtiapine 12.5 milliGRAM(s) Oral every 6 hours PRN anxiety and agitation    CAPILLARY BLOOD GLUCOSE      POCT Blood Glucose.: 160 mg/dL (14 Oct 2022 12:32)  POCT Blood Glucose.: 147 mg/dL (14 Oct 2022 08:33)  POCT Blood Glucose.: 145 mg/dL (13 Oct 2022 22:52)  POCT Blood Glucose.: 170 mg/dL (13 Oct 2022 17:54)    I&O's Summary      PHYSICAL EXAM:  Vital Signs Last 24 Hrs  T(C): 36.4 (14 Oct 2022 05:34), Max: 36.4 (14 Oct 2022 05:34)  T(F): 97.5 (14 Oct 2022 05:34), Max: 97.5 (14 Oct 2022 05:34)  HR: 81 (14 Oct 2022 05:34) (73 - 81)  BP: 146/59 (14 Oct 2022 05:34) (146/59 - 150/73)  BP(mean): --  RR: 18 (14 Oct 2022 05:34) (18 - 18)  SpO2: 100% (14 Oct 2022 05:34) (100% - 100%)    Parameters below as of 14 Oct 2022 05:34  Patient On (Oxygen Delivery Method): room air        Gen: NAD; sitting in bed comfortably   Pulm: no accessory muscle use; lungs clear on auscultation bilaterally; no wheezing or crackles.   Cards: RRR, nl S1/S2; no LE edema; no JVD  Abd: non-distended; soft and NT on exam; +bs  Ext: MATEUSZ; no joint effusion or tenderness in upper and lower extremities; no cyanosis  Neuro: Awake and Alert; non-focal; moving all extremities.   Skin: no new rashes; warm to touch;     LABS:                        11.2   7.89  )-----------( 234      ( 14 Oct 2022 05:29 )             35.9     10-14    138  |  101  |  13  ----------------------------<  134<H>  4.2   |  26  |  0.40<L>    Ca    8.9      14 Oct 2022 05:29  Phos  2.8     10-14  Mg     1.80     10-14                  RADIOLOGY & ADDITIONAL TESTS:  Results Reviewed: Y  Imaging Personally Reviewed: Y  Electrocardiogram Personally Reviewed: Y    COORDINATION OF CARE:  Care Discussed with Consultants/Other Providers [Y/N]: Y  Prior or Outpatient Records Reviewed [Y/N]: Y  
Laurie Corrigan    Pager [phone]    Patient is a 81y old  Male who presents with a chief complaint of hallucinations (03 Oct 2022 12:57)      SUBJECTIVE / OVERNIGHT EVENTS: No acute overnight events. This morning pt appears calm. Reports seeing "bugs like worms" on his body , unable to tell state if he currently sees them or saw them in the past. Per discussion with daughter bedside, pt overall appears to be improving from admission. Family aware pt might still be w/ hallucinations but note they don't appear as debilitating.        MEDICATIONS  (STANDING):  atorvastatin 20 milliGRAM(s) Oral at bedtime  carbidopa/levodopa  25/100 2.5 Tablet(s) Oral <User Schedule>  clopidogrel Tablet 75 milliGRAM(s) Oral daily  dextrose 5%. 1000 milliLiter(s) (100 mL/Hr) IV Continuous <Continuous>  dextrose 5%. 1000 milliLiter(s) (50 mL/Hr) IV Continuous <Continuous>  dextrose 50% Injectable 25 Gram(s) IV Push once  dextrose 50% Injectable 12.5 Gram(s) IV Push once  dextrose 50% Injectable 25 Gram(s) IV Push once  glucagon  Injectable 1 milliGRAM(s) IntraMuscular once  heparin   Injectable 5000 Unit(s) SubCutaneous every 12 hours  influenza  Vaccine (HIGH DOSE) 0.7 milliLiter(s) IntraMuscular once  insulin lispro (ADMELOG) corrective regimen sliding scale   SubCutaneous three times a day before meals  insulin lispro (ADMELOG) corrective regimen sliding scale   SubCutaneous at bedtime  lactated ringers. 1000 milliLiter(s) (125 mL/Hr) IV Continuous <Continuous>  mirtazapine 15 milliGRAM(s) Oral at bedtime  Opicapone (Ongentys) 50 milliGRAM(s) 50 milliGRAM(s) Oral at bedtime  pantoprazole  Injectable 40 milliGRAM(s) IV Push every 24 hours  pimavanserin Capsule 34 milliGRAM(s) Oral <User Schedule>  polyethylene glycol 3350 17 Gram(s) Oral daily    MEDICATIONS  (PRN):  acetaminophen     Tablet .. 650 milliGRAM(s) Oral every 6 hours PRN Temp greater or equal to 38C (100.4F), Mild Pain (1 - 3)  clonazePAM Oral Disintegrating Tablet 0.75 milliGRAM(s) Oral every 8 hours PRN Anxiety  dextrose Oral Gel 15 Gram(s) Oral once PRN Blood Glucose LESS THAN 70 milliGRAM(s)/deciliter  LORazepam   Injectable 1 milliGRAM(s) IV Push every 6 hours PRN severed hallucinations    Allergies    No Known Allergies    Intolerances        Vital Signs Last 24 Hrs  T(C): 36.8 (04 Oct 2022 06:00), Max: 37.1 (03 Oct 2022 21:00)  T(F): 98.3 (04 Oct 2022 06:00), Max: 98.7 (03 Oct 2022 21:00)  HR: 66 (04 Oct 2022 06:00) (66 - 75)  BP: 160/84 (04 Oct 2022 06:00) (143/75 - 160/84)  BP(mean): --  RR: 18 (04 Oct 2022 06:00) (18 - 18)  SpO2: 100% (04 Oct 2022 06:00) (99% - 100%)    Parameters below as of 04 Oct 2022 06:00  Patient On (Oxygen Delivery Method): room air      Daily     Daily   CAPILLARY BLOOD GLUCOSE      POCT Blood Glucose.: 141 mg/dL (04 Oct 2022 17:39)  POCT Blood Glucose.: 118 mg/dL (04 Oct 2022 12:26)  POCT Blood Glucose.: 116 mg/dL (03 Oct 2022 22:23)    I&O's Summary      PHYSICAL EXAM:  CONSTITUTIONAL: NAD,   EYES: EOMI; conjunctiva and sclera clear  NECK: Supple,   RESPIRATORY: Normal respiratory effort; lungs are clear to auscultation bilaterally  CARDIOVASCULAR: RRR, normal S1 and S2  ABDOMEN: Nontender to palpation, normoactive bowel sounds, no rebound/guarding;   MUSCULOSKELETAL no joint swelling or tenderness to palpation  PSYCH: Awake and alert  NEUROLOGY: bradykinesia   SKIN: warm and dry      LABS:                        11.9   7.51  )-----------( 256      ( 04 Oct 2022 07:17 )             37.9     Hgb Trend: 11.9<--, 10.5<--, 10.4<--, 12.0<--, 12.5<--  10-04    140  |  104  |  6<L>  ----------------------------<  119<H>  3.7   |  25  |  0.27<L>    Ca    8.6      04 Oct 2022 07:17  Phos  2.2     10-04  Mg     1.60     10-04      Creatinine Trend: 0.27<--, 0.34<--, 0.34<--, 0.39<--, 0.47<--, 0.32<--              RADIOLOGY & ADDITIONAL TESTS:    Imaging Personally Reviewed.    Consultant(s) Notes Reviewed.    Care Discussed with Consultants/Other Providers.      
[facility] Division of Hospital Medicine  Christopher Gr MD  In House Pager [phone]    Patient is a 82y old  Male who presents with a chief complaint of Intra-abdominal and pelvic swelling of mass or lump  81 year old male hx of insulin dependent DM2, Parkinson's dx, sick sinus syndrome w/ pacemaker p/w hallucinations now improving, found to have colonic mass and arterial disease      (10 Oct 2022 11:35)      SUBJECTIVE / OVERNIGHT EVENTS:  NAEO, Patient seen and examined at bedside, no acute complaints today.   No fever, no chills, no SOB, no CP, no n/v/d, no abd pain, no dysuria      MEDICATIONS  (STANDING):  atorvastatin 20 milliGRAM(s) Oral at bedtime  carbidopa/levodopa  25/100 2.5 Tablet(s) Oral <User Schedule>  clonazePAM Oral Disintegrating Tablet 0.75 milliGRAM(s) Oral every 8 hours  clopidogrel Tablet 75 milliGRAM(s) Oral daily  dextrose 5%. 1000 milliLiter(s) (100 mL/Hr) IV Continuous <Continuous>  dextrose 5%. 1000 milliLiter(s) (50 mL/Hr) IV Continuous <Continuous>  dextrose 50% Injectable 25 Gram(s) IV Push once  dextrose 50% Injectable 12.5 Gram(s) IV Push once  dextrose 50% Injectable 25 Gram(s) IV Push once  glucagon  Injectable 1 milliGRAM(s) IntraMuscular once  heparin   Injectable 5000 Unit(s) SubCutaneous every 12 hours  influenza  Vaccine (HIGH DOSE) 0.7 milliLiter(s) IntraMuscular once  insulin lispro (ADMELOG) corrective regimen sliding scale   SubCutaneous three times a day before meals  insulin lispro (ADMELOG) corrective regimen sliding scale   SubCutaneous at bedtime  lactated ringers. 1000 milliLiter(s) (75 mL/Hr) IV Continuous <Continuous>  mirtazapine 15 milliGRAM(s) Oral at bedtime  Opicapone (Ongentys) 50 milliGRAM(s) 50 milliGRAM(s) Oral at bedtime  pantoprazole  Injectable 40 milliGRAM(s) IV Push every 24 hours  polyethylene glycol 3350 17 Gram(s) Oral daily  potassium phosphate IVPB 15 milliMole(s) IV Intermittent once    MEDICATIONS  (PRN):  acetaminophen     Tablet .. 650 milliGRAM(s) Oral every 6 hours PRN Temp greater or equal to 38C (100.4F), Mild Pain (1 - 3)  dextrose Oral Gel 15 Gram(s) Oral once PRN Blood Glucose LESS THAN 70 milliGRAM(s)/deciliter  QUEtiapine 12.5 milliGRAM(s) Oral every 6 hours PRN Hallucinations    CAPILLARY BLOOD GLUCOSE      POCT Blood Glucose.: 166 mg/dL (10 Oct 2022 12:19)  POCT Blood Glucose.: 162 mg/dL (10 Oct 2022 08:47)  POCT Blood Glucose.: 173 mg/dL (09 Oct 2022 21:51)  POCT Blood Glucose.: 198 mg/dL (09 Oct 2022 17:23)    I&O's Summary      PHYSICAL EXAM:  Vital Signs Last 24 Hrs  T(C): 36.3 (10 Oct 2022 12:28), Max: 36.9 (09 Oct 2022 22:16)  T(F): 97.3 (10 Oct 2022 12:28), Max: 98.5 (09 Oct 2022 22:16)  HR: 72 (10 Oct 2022 12:28) (72 - 83)  BP: 126/52 (10 Oct 2022 12:28) (126/52 - 141/65)  BP(mean): --  RR: 17 (10 Oct 2022 12:28) (16 - 17)  SpO2: 100% (10 Oct 2022 12:28) (98% - 100%)    Parameters below as of 10 Oct 2022 12:28  Patient On (Oxygen Delivery Method): room air        Gen: NAD; sitting in bed comfortably   Pulm: no accessory muscle use; lungs clear on auscultation bilaterally; no wheezing or crackles.   Cards: RRR, nl S1/S2; no LE edema; no JVD  Abd: non-distended; soft and NT on exam; +bs  Ext: MATEUSZ; no joint effusion or tenderness in upper and lower extremities; no cyanosis  Neuro: Awake and Alert; +dysarthria, difficult to understand speech; moving all extremities.   Skin: no new rashes; warm to touch;     LABS:                        9.4    8.09  )-----------( 213      ( 10 Oct 2022 07:44 )             29.8     10-10    136  |  102  |  24<H>  ----------------------------<  166<H>  3.3<L>   |  23  |  0.36<L>    Ca    8.0<L>      10 Oct 2022 07:44  Phos  2.3     10-10  Mg     1.60     10-10                  RADIOLOGY & ADDITIONAL TESTS:  Results Reviewed: Y  Imaging Personally Reviewed: Y  Electrocardiogram Personally Reviewed: Y    COORDINATION OF CARE:  Care Discussed with Consultants/Other Providers [Y/N]: Y  Prior or Outpatient Records Reviewed [Y/N]: Y  
[facility] Division of Hospital Medicine  Christopher Gr MD  In House Pager [phone]    Patient is a 82y old  Male who presents with a chief complaint of hallucinations (14 Oct 2022 16:35)      SUBJECTIVE / OVERNIGHT EVENTS:  NAEO, Patient seen and examined at bedside, patient was initially calm, then started closing his eyes, and covering his head, screaming "he's over there, stop hitting me".   unable to obtain ROS secondary to mental status.       MEDICATIONS  (STANDING):  atorvastatin 20 milliGRAM(s) Oral at bedtime  carbidopa/levodopa  25/100 2 Tablet(s) Oral <User Schedule>  clopidogrel Tablet 75 milliGRAM(s) Oral daily  dextrose 5%. 1000 milliLiter(s) (100 mL/Hr) IV Continuous <Continuous>  dextrose 5%. 1000 milliLiter(s) (50 mL/Hr) IV Continuous <Continuous>  dextrose 50% Injectable 25 Gram(s) IV Push once  dextrose 50% Injectable 12.5 Gram(s) IV Push once  dextrose 50% Injectable 25 Gram(s) IV Push once  glucagon  Injectable 1 milliGRAM(s) IntraMuscular once  heparin   Injectable 5000 Unit(s) SubCutaneous every 12 hours  influenza  Vaccine (HIGH DOSE) 0.7 milliLiter(s) IntraMuscular once  insulin lispro (ADMELOG) corrective regimen sliding scale   SubCutaneous three times a day before meals  insulin lispro (ADMELOG) corrective regimen sliding scale   SubCutaneous at bedtime  mirtazapine 15 milliGRAM(s) Oral at bedtime  Opicapone (Ongentys) 50 milliGRAM(s) 50 milliGRAM(s) Oral at bedtime  pantoprazole  Injectable 40 milliGRAM(s) IV Push every 24 hours  polyethylene glycol 3350 17 Gram(s) Oral daily  QUEtiapine 25 milliGRAM(s) Oral daily  QUEtiapine 12.5 milliGRAM(s) Oral at bedtime    MEDICATIONS  (PRN):  acetaminophen     Tablet .. 650 milliGRAM(s) Oral every 6 hours PRN Temp greater or equal to 38C (100.4F), Mild Pain (1 - 3)  clonazePAM Oral Disintegrating Tablet 0.25 milliGRAM(s) Oral daily PRN anxiety  dextrose Oral Gel 15 Gram(s) Oral once PRN Blood Glucose LESS THAN 70 milliGRAM(s)/deciliter  QUEtiapine 12.5 milliGRAM(s) Oral every 6 hours PRN anxiety and agitation    CAPILLARY BLOOD GLUCOSE      POCT Blood Glucose.: 148 mg/dL (15 Oct 2022 08:19)  POCT Blood Glucose.: 165 mg/dL (14 Oct 2022 21:52)  POCT Blood Glucose.: 160 mg/dL (14 Oct 2022 17:08)  POCT Blood Glucose.: 160 mg/dL (14 Oct 2022 12:32)    I&O's Summary      PHYSICAL EXAM:  Vital Signs Last 24 Hrs  T(C): 36.8 (15 Oct 2022 05:46), Max: 36.8 (15 Oct 2022 05:46)  T(F): 98.2 (15 Oct 2022 05:46), Max: 98.2 (15 Oct 2022 05:46)  HR: 74 (15 Oct 2022 05:46) (74 - 98)  BP: 119/72 (15 Oct 2022 05:46) (119/72 - 146/67)  BP(mean): --  RR: 18 (15 Oct 2022 05:46) (18 - 20)  SpO2: 100% (15 Oct 2022 05:46) (98% - 100%)    Parameters below as of 15 Oct 2022 05:46  Patient On (Oxygen Delivery Method): room air        Gen: NAD; sitting in bed comfortably   Head: midline bruise over forehead. no significant collection.   Pulm: no accessory muscle use; lungs clear on auscultation bilaterally; no wheezing or crackles.   Cards: RRR, nl S1/S2; no LE edema; no JVD  Abd: non-distended; soft and NT on exam; +bs  Ext: MATEUSZ; no joint effusion or tenderness in upper and lower extremities; no cyanosis  Neuro: Awake and Alert; non-focal; moving all extremities.   Skin: no new rashes; warm to touch;     LABS:                        11.2   7.89  )-----------( 234      ( 14 Oct 2022 05:29 )             35.9     10-14    138  |  101  |  13  ----------------------------<  134<H>  4.2   |  26  |  0.40<L>    Ca    8.9      14 Oct 2022 05:29  Phos  2.8     10-14  Mg     1.80     10-14                  RADIOLOGY & ADDITIONAL TESTS:  Results Reviewed: Y  Imaging Personally Reviewed: Y  Electrocardiogram Personally Reviewed: Y    COORDINATION OF CARE:  Care Discussed with Consultants/Other Providers [Y/N]: Y  Prior or Outpatient Records Reviewed [Y/N]: Y  
[facility] Division of Hospital Medicine  Christopher Gr MD  In House Pager [phone]    Patient is a 82y old  Male who presents with a chief complaint of hallucinations (11 Oct 2022 13:30)      SUBJECTIVE / OVERNIGHT EVENTS:  NAEO, Patient seen and examined at bedside, patient is more lethargic, keeping eyes closed, but opens eyes when instructed to, then quickly shuts them. evaluated with ACP that knows patient well during this hospitalization, patient tend to have this behavior when he's having hallucination. when asked about the hallucination, patient nods his head. he reports pain in his head and shoulder from people hitting him. He just received Seroquel earlier, which did not seem to help with symptoms at this time.   No fever, no chills, no SOB, no CP, no n/v/d, no abd pain, no dysuria      MEDICATIONS  (STANDING):  atorvastatin 20 milliGRAM(s) Oral at bedtime  carbidopa/levodopa  25/100 2.5 Tablet(s) Oral <User Schedule>  clonazePAM Oral Disintegrating Tablet 0.75 milliGRAM(s) Oral every 8 hours  clopidogrel Tablet 75 milliGRAM(s) Oral daily  dextrose 5%. 1000 milliLiter(s) (100 mL/Hr) IV Continuous <Continuous>  dextrose 5%. 1000 milliLiter(s) (50 mL/Hr) IV Continuous <Continuous>  dextrose 50% Injectable 25 Gram(s) IV Push once  dextrose 50% Injectable 12.5 Gram(s) IV Push once  dextrose 50% Injectable 25 Gram(s) IV Push once  glucagon  Injectable 1 milliGRAM(s) IntraMuscular once  heparin   Injectable 5000 Unit(s) SubCutaneous every 12 hours  influenza  Vaccine (HIGH DOSE) 0.7 milliLiter(s) IntraMuscular once  insulin lispro (ADMELOG) corrective regimen sliding scale   SubCutaneous three times a day before meals  insulin lispro (ADMELOG) corrective regimen sliding scale   SubCutaneous at bedtime  mirtazapine 15 milliGRAM(s) Oral at bedtime  Opicapone (Ongentys) 50 milliGRAM(s) 50 milliGRAM(s) Oral at bedtime  pantoprazole  Injectable 40 milliGRAM(s) IV Push every 24 hours  polyethylene glycol 3350 17 Gram(s) Oral daily  QUEtiapine 12.5 milliGRAM(s) Oral two times a day    MEDICATIONS  (PRN):  acetaminophen     Tablet .. 650 milliGRAM(s) Oral every 6 hours PRN Temp greater or equal to 38C (100.4F), Mild Pain (1 - 3)  dextrose Oral Gel 15 Gram(s) Oral once PRN Blood Glucose LESS THAN 70 milliGRAM(s)/deciliter    CAPILLARY BLOOD GLUCOSE      POCT Blood Glucose.: 163 mg/dL (12 Oct 2022 12:16)  POCT Blood Glucose.: 115 mg/dL (12 Oct 2022 08:38)  POCT Blood Glucose.: 321 mg/dL (11 Oct 2022 22:54)  POCT Blood Glucose.: 183 mg/dL (11 Oct 2022 17:27)    I&O's Summary      PHYSICAL EXAM:  Vital Signs Last 24 Hrs  T(C): 36.5 (12 Oct 2022 11:30), Max: 36.8 (11 Oct 2022 21:00)  T(F): 97.7 (12 Oct 2022 11:30), Max: 98.2 (11 Oct 2022 21:00)  HR: 86 (12 Oct 2022 11:30) (71 - 86)  BP: 138/74 (12 Oct 2022 11:30) (107/55 - 138/74)  BP(mean): --  RR: 17 (12 Oct 2022 11:30) (17 - 18)  SpO2: 99% (12 Oct 2022 11:30) (99% - 100%)    Parameters below as of 12 Oct 2022 11:30  Patient On (Oxygen Delivery Method): room air        Gen: cachetic elderly male, with mild distress.  Pulm: no accessory muscle use; lungs clear on auscultation bilaterally; no wheezing or crackles.   Cards: RRR, nl S1/S2; no LE edema; no JVD  Abd: non-distended; soft and NT on exam; +bs  Ext: MATEUSZ; no joint effusion or tenderness in upper and lower extremities; no cyanosis  Neuro: Awake and Alert; non-focal; moving all extremities.   Skin: no new rashes; warm to touch; RUE ecchymosis, superficial thrombophlebitis from infiltrated IV.     LABS:                        9.8    7.49  )-----------( 216      ( 12 Oct 2022 07:39 )             32.1     10-12    139  |  106  |  22  ----------------------------<  126<H>  4.4   |  25  |  0.34<L>    Ca    8.1<L>      12 Oct 2022 07:39  Phos  2.8     10-12  Mg     1.80     10-12                  RADIOLOGY & ADDITIONAL TESTS:  Results Reviewed: Y  Imaging Personally Reviewed: Y  Electrocardiogram Personally Reviewed: Y    COORDINATION OF CARE:  Care Discussed with Consultants/Other Providers [Y/N]: Y  Prior or Outpatient Records Reviewed [Y/N]: Y  
Laurie Corrigan    Pager [phone]    Patient is a 82y old  Male who presents with a chief complaint of hallucinations (08 Oct 2022 09:12)      SUBJECTIVE / OVERNIGHT EVENTS: No acute overnight events. States he is doing "very well" and would like to go home. Dispo issues explained to patient. States wife is "driving me nuts". Otherwise without complaints. No hallucinations this morning. Denies fevers, chills, nausea, vomiting, chest pain, SOB.    MEDICATIONS  (STANDING):  atorvastatin 20 milliGRAM(s) Oral at bedtime  carbidopa/levodopa  25/100 2.5 Tablet(s) Oral <User Schedule>  clonazePAM Oral Disintegrating Tablet 0.75 milliGRAM(s) Oral every 8 hours  clopidogrel Tablet 75 milliGRAM(s) Oral daily  dextrose 5%. 1000 milliLiter(s) (50 mL/Hr) IV Continuous <Continuous>  dextrose 5%. 1000 milliLiter(s) (100 mL/Hr) IV Continuous <Continuous>  dextrose 50% Injectable 25 Gram(s) IV Push once  dextrose 50% Injectable 12.5 Gram(s) IV Push once  dextrose 50% Injectable 25 Gram(s) IV Push once  glucagon  Injectable 1 milliGRAM(s) IntraMuscular once  heparin   Injectable 5000 Unit(s) SubCutaneous every 12 hours  influenza  Vaccine (HIGH DOSE) 0.7 milliLiter(s) IntraMuscular once  insulin lispro (ADMELOG) corrective regimen sliding scale   SubCutaneous three times a day before meals  insulin lispro (ADMELOG) corrective regimen sliding scale   SubCutaneous at bedtime  lactated ringers. 1000 milliLiter(s) (125 mL/Hr) IV Continuous <Continuous>  mirtazapine 15 milliGRAM(s) Oral at bedtime  Opicapone (Ongentys) 50 milliGRAM(s) 50 milliGRAM(s) Oral at bedtime  pantoprazole  Injectable 40 milliGRAM(s) IV Push every 24 hours  pimavanserin Capsule 34 milliGRAM(s) Oral <User Schedule>  polyethylene glycol 3350 17 Gram(s) Oral daily    MEDICATIONS  (PRN):  acetaminophen     Tablet .. 650 milliGRAM(s) Oral every 6 hours PRN Temp greater or equal to 38C (100.4F), Mild Pain (1 - 3)  dextrose Oral Gel 15 Gram(s) Oral once PRN Blood Glucose LESS THAN 70 milliGRAM(s)/deciliter  QUEtiapine 12.5 milliGRAM(s) Oral every 6 hours PRN Hallucinations    Allergies    No Known Allergies    Intolerances        Vital Signs Last 24 Hrs  T(C): 36.8 (08 Oct 2022 11:57), Max: 37.1 (07 Oct 2022 21:49)  T(F): 98.2 (08 Oct 2022 11:57), Max: 98.7 (07 Oct 2022 21:49)  HR: 80 (08 Oct 2022 11:57) (80 - 87)  BP: 123/67 (08 Oct 2022 11:57) (123/67 - 127/64)  BP(mean): --  RR: 17 (08 Oct 2022 11:57) (16 - 17)  SpO2: 99% (08 Oct 2022 11:57) (96% - 99%)    Parameters below as of 08 Oct 2022 11:57  Patient On (Oxygen Delivery Method): room air      Daily     Daily   CAPILLARY BLOOD GLUCOSE      POCT Blood Glucose.: 74 mg/dL (08 Oct 2022 12:20)  POCT Blood Glucose.: 84 mg/dL (08 Oct 2022 08:40)  POCT Blood Glucose.: 117 mg/dL (07 Oct 2022 21:17)  POCT Blood Glucose.: 119 mg/dL (07 Oct 2022 17:27)    I&O's Summary    07 Oct 2022 07:01  -  08 Oct 2022 07:00  --------------------------------------------------------  IN: 2025 mL / OUT: 0 mL / NET: 2025 mL        PHYSICAL EXAM:  CONSTITUTIONAL: NAD,   EYES: EOMI; conjunctiva and sclera clear  NECK: Supple,   RESPIRATORY: Normal respiratory effort; lungs are clear to auscultation bilaterally  CARDIOVASCULAR: RRR, normal S1 and S2  ABDOMEN: Nontender to palpation, normoactive bowel sounds, no rebound/guarding;   MUSCULOSKELETAL no joint swelling or tenderness to palpation  PSYCH: Awake and alert, AAO x 3  NEUROLOGY: bradykinesia   SKIN: warm and dry    LABS:    Hgb Trend: 11.0<--, 10.9<--, 11.9<--, 10.5<--, 10.4<--        Creatinine Trend: 0.35<--, 0.31<--, 0.27<--, 0.34<--, 0.34<--, 0.39<--              RADIOLOGY & ADDITIONAL TESTS:    Imaging Personally Reviewed.    Consultant(s) Notes Reviewed.    Care Discussed with Consultants/Other Providers.      
[facility] Division of Hospital Medicine  Kasia Chavez MD  Pager (M-F, 8A-5P): [phone]      Patient is a 81y old  Male who presents with a chief complaint of hallucinations (02 Oct 2022 14:27)      SUBJECTIVE / OVERNIGHT EVENTS:   patient with horrible episode of hallucinations last night   now he is alert   Neuro follow up requested, recommend Ativan, EKG for psychotic episodes      MEDICATIONS  (STANDING):  carbidopa/levodopa  25/100 2.5 Tablet(s) Oral <User Schedule>  dextrose 5%. 1000 milliLiter(s) (100 mL/Hr) IV Continuous <Continuous>  dextrose 5%. 1000 milliLiter(s) (50 mL/Hr) IV Continuous <Continuous>  dextrose 50% Injectable 25 Gram(s) IV Push once  dextrose 50% Injectable 12.5 Gram(s) IV Push once  dextrose 50% Injectable 25 Gram(s) IV Push once  glucagon  Injectable 1 milliGRAM(s) IntraMuscular once  heparin   Injectable 5000 Unit(s) SubCutaneous every 12 hours  influenza  Vaccine (HIGH DOSE) 0.7 milliLiter(s) IntraMuscular once  insulin lispro (ADMELOG) corrective regimen sliding scale   SubCutaneous every 6 hours  lactated ringers. 1000 milliLiter(s) (125 mL/Hr) IV Continuous <Continuous>  mirtazapine 15 milliGRAM(s) Oral at bedtime  Opicapone (Ongentys) 50 milliGRAM(s)   Oral <User Schedule>  pantoprazole  Injectable 40 milliGRAM(s) IV Push every 24 hours  pimavanserin Capsule 34 milliGRAM(s) Oral <User Schedule>  piperacillin/tazobactam IVPB.. 3.375 Gram(s) IV Intermittent every 8 hours  polyethylene glycol 3350 17 Gram(s) Oral daily    MEDICATIONS  (PRN):  acetaminophen     Tablet .. 650 milliGRAM(s) Oral every 6 hours PRN Temp greater or equal to 38C (100.4F), Mild Pain (1 - 3)  clonazePAM Oral Disintegrating Tablet 0.75 milliGRAM(s) Oral every 8 hours PRN Anxiety  dextrose Oral Gel 15 Gram(s) Oral once PRN Blood Glucose LESS THAN 70 milliGRAM(s)/deciliter  LORazepam   Injectable 1 milliGRAM(s) IV Push every 6 hours PRN severed hallucinations      CAPILLARY BLOOD GLUCOSE  98 (02 Oct 2022 09:03)      POCT Blood Glucose.: 102 mg/dL (02 Oct 2022 13:12)  POCT Blood Glucose.: 98 mg/dL (02 Oct 2022 09:02)  POCT Blood Glucose.: 126 mg/dL (01 Oct 2022 21:53)  POCT Blood Glucose.: 127 mg/dL (01 Oct 2022 17:27)    I&O's Summary    01 Oct 2022 07:01  -  02 Oct 2022 07:00  --------------------------------------------------------  IN: 1600 mL / OUT: 300 mL / NET: 1300 mL        PHYSICAL EXAM:  Vital Signs Last 24 Hrs  T(C): 36.6 (02 Oct 2022 06:00), Max: 36.7 (01 Oct 2022 16:45)  T(F): 97.8 (02 Oct 2022 06:00), Max: 98.1 (01 Oct 2022 16:45)  HR: 79 (02 Oct 2022 06:00) (63 - 79)  BP: 154/64 (02 Oct 2022 06:00) (108/51 - 154/64)  BP(mean): --  RR: 22 (02 Oct 2022 06:00) (16 - 22)  SpO2: 100% (02 Oct 2022 06:00) (97% - 100%)    Parameters below as of 02 Oct 2022 06:00  Patient On (Oxygen Delivery Method): room air      CONSTITUTIONAL: NAD,   EYES: EOMI; conjunctiva and sclera clear  NECK: Supple,   RESPIRATORY: Normal respiratory effort; lungs are clear to auscultation bilaterally  CARDIOVASCULAR: Regular rate and rhythm, normal S1 and S2,  No lower extremity edema;   ABDOMEN: Nontender to palpation, normoactive bowel sounds, no rebound/guarding;   MUSKULOSKELETAL:  no clubbing or cyanosis of digits; no joint swelling or tenderness to palpation  PSYCH: A+O to person, place,  affect appropriate  NEUROLOGY: bradykinesia   SKIN: No rashes; no palpable lesions    LABS:                        10.4   7.35  )-----------( 238      ( 02 Oct 2022 06:42 )             34.5     10-    142  |  107  |  9   ----------------------------<  102<H>  3.9   |  23  |  0.34<L>    Ca    8.5      02 Oct 2022 06:42  Phos  2.6     10  Mg     1.50     10-02    TPro  6.6  /  Alb  3.8  /  TBili  0.4  /  DBili  x   /  AST  44<H>  /  ALT  <5<L>  /  AlkPhos  72  10    PT/INR - ( 01 Oct 2022 03:00 )   PT: 13.4 sec;   INR: 1.15 ratio         PTT - ( 01 Oct 2022 03:00 )  PTT:29.9 sec      Urinalysis Basic - ( 30 Sep 2022 23:00 )    Color: Yellow / Appearance: Clear / S.019 / pH: x  Gluc: x / Ketone: Negative  / Bili: Negative / Urobili: <2 mg/dL   Blood: x / Protein: Trace / Nitrite: Negative   Leuk Esterase: Negative / RBC: x / WBC x   Sq Epi: x / Non Sq Epi: x / Bacteria: x        Culture - Urine (collected 30 Sep 2022 22:48)  Source: Clean Catch Clean Catch (Midstream)  Final Report (02 Oct 2022 08:36):    <10,000 CFU/mL Normal Urogenital Kim    Culture - Blood (collected 30 Sep 2022 20:58)  Source: .Blood Blood  Preliminary Report (02 Oct 2022 01:02):    No growth to date.    Culture - Blood (collected 30 Sep 2022 20:43)  Source: .Blood Blood  Preliminary Report (02 Oct 2022 01:02):    No growth to date.        RADIOLOGY & ADDITIONAL TESTS:  Results Reviewed:   Imaging Personally Reviewed:  Electrocardiogram Personally Reviewed:    COORDINATION OF CARE:  Care Discussed with Consultants/Other Providers [Y/N]:  Prior or Outpatient Records Reviewed [Y/N]:  
[facility] Division of Hospital Medicine  Christopher Gr MD  In House Pager [phone]    Patient is a 82y old  Male who presents with a chief complaint of hallucinations (10 Oct 2022 13:59)      SUBJECTIVE / OVERNIGHT EVENTS:  NAEO, Patient seen and examined at bedside, no acute complaints today. he was pleasant and eager to be discharged.   No fever, no chills, no SOB, no CP, no n/v/d, no abd pain, no dysuria      MEDICATIONS  (STANDING):  atorvastatin 20 milliGRAM(s) Oral at bedtime  carbidopa/levodopa  25/100 2.5 Tablet(s) Oral <User Schedule>  clonazePAM Oral Disintegrating Tablet 0.75 milliGRAM(s) Oral every 8 hours  clopidogrel Tablet 75 milliGRAM(s) Oral daily  dextrose 5%. 1000 milliLiter(s) (100 mL/Hr) IV Continuous <Continuous>  dextrose 5%. 1000 milliLiter(s) (50 mL/Hr) IV Continuous <Continuous>  dextrose 50% Injectable 25 Gram(s) IV Push once  dextrose 50% Injectable 12.5 Gram(s) IV Push once  dextrose 50% Injectable 25 Gram(s) IV Push once  glucagon  Injectable 1 milliGRAM(s) IntraMuscular once  heparin   Injectable 5000 Unit(s) SubCutaneous every 12 hours  influenza  Vaccine (HIGH DOSE) 0.7 milliLiter(s) IntraMuscular once  insulin lispro (ADMELOG) corrective regimen sliding scale   SubCutaneous three times a day before meals  insulin lispro (ADMELOG) corrective regimen sliding scale   SubCutaneous at bedtime  mirtazapine 15 milliGRAM(s) Oral at bedtime  Opicapone (Ongentys) 50 milliGRAM(s) 50 milliGRAM(s) Oral at bedtime  pantoprazole  Injectable 40 milliGRAM(s) IV Push every 24 hours  polyethylene glycol 3350 17 Gram(s) Oral daily    MEDICATIONS  (PRN):  acetaminophen     Tablet .. 650 milliGRAM(s) Oral every 6 hours PRN Temp greater or equal to 38C (100.4F), Mild Pain (1 - 3)  dextrose Oral Gel 15 Gram(s) Oral once PRN Blood Glucose LESS THAN 70 milliGRAM(s)/deciliter  QUEtiapine 12.5 milliGRAM(s) Oral every 6 hours PRN Hallucinations    CAPILLARY BLOOD GLUCOSE      POCT Blood Glucose.: 172 mg/dL (11 Oct 2022 12:26)  POCT Blood Glucose.: 175 mg/dL (11 Oct 2022 08:39)  POCT Blood Glucose.: 247 mg/dL (10 Oct 2022 21:44)  POCT Blood Glucose.: 174 mg/dL (10 Oct 2022 17:25)    I&O's Summary    10 Oct 2022 07:01  -  11 Oct 2022 07:00  --------------------------------------------------------  IN: 200 mL / OUT: 0 mL / NET: 200 mL        PHYSICAL EXAM:  Vital Signs Last 24 Hrs  T(C): 36.3 (11 Oct 2022 11:20), Max: 37.2 (10 Oct 2022 18:00)  T(F): 97.4 (11 Oct 2022 11:20), Max: 98.9 (10 Oct 2022 18:00)  HR: 73 (11 Oct 2022 11:20) (66 - 84)  BP: 138/71 (11 Oct 2022 11:20) (100/58 - 138/71)  BP(mean): --  RR: 16 (11 Oct 2022 11:20) (16 - 18)  SpO2: 100% (11 Oct 2022 11:20) (98% - 100%)    Parameters below as of 11 Oct 2022 11:20  Patient On (Oxygen Delivery Method): room air        Gen: NAD; sitting in bed comfortably   Pulm: no accessory muscle use; lungs clear on auscultation bilaterally; no wheezing or crackles.   Cards: RRR, nl S1/S2; no LE edema; no JVD  Abd: non-distended; soft and NT on exam; +bs  Ext: MATEUSZ; no joint effusion or tenderness in upper and lower extremities; no cyanosis  Neuro: Awake and Alert; non-focal; moving all extremities. dysarthric.   Skin: no new rashes; warm to touch;     LABS:                        10.4   10.63 )-----------( 201      ( 11 Oct 2022 03:00 )             33.3     10-11    135  |  102  |  25<H>  ----------------------------<  186<H>  5.0   |  24  |  0.38<L>    Ca    8.3<L>      11 Oct 2022 03:00  Phos  3.0     10-11  Mg     2.00     10-11                  RADIOLOGY & ADDITIONAL TESTS:  Results Reviewed: Y  Imaging Personally Reviewed: Y  Electrocardiogram Personally Reviewed: Y    COORDINATION OF CARE:  Care Discussed with Consultants/Other Providers [Y/N]: Y  Prior or Outpatient Records Reviewed [Y/N]: Y  
INTERVAL HPI/OVERNIGHT EVENTS:  Patient was seen and examined. Patient alert to place and year. ROS otherwise negative.     VITAL SIGNS:  T(F): 97.9 (10-17-22 @ 15:00)  HR: 88 (10-17-22 @ 15:00)  BP: 136/65 (10-17-22 @ 15:00)  RR: 17 (10-17-22 @ 15:00)  SpO2: 98% (10-17-22 @ 15:00)  Wt(kg): --    PHYSICAL EXAM:    Constitutional: Elderly male, NAD   HEENT: PERRL, EOMI, sclera non-icteric, neck supple, trachea midline, no masses, no JVD, MMM   Respiratory: CTA b/l, good air entry b/l, no wheezing, no rhonchi, no rales, without accessory muscle use and no intercostal retractions  Cardiovascular: RRR, normal S1S2, no M/R/G  Gastrointestinal: soft, NTND, no masses palpable, BS normal  Extremities: Warm, well perfused, pulses equal bilateral upper and lower extremities, no edema, no clubbing  Neurological: AAOx3 (knows he is at [facility], states it is 2022)   Skin: Normal temperature, warm, dry    MEDICATIONS  (STANDING):  atorvastatin 20 milliGRAM(s) Oral at bedtime  carbidopa/levodopa  25/100 2 Tablet(s) Oral <User Schedule>  clopidogrel Tablet 75 milliGRAM(s) Oral daily  dextrose 5%. 1000 milliLiter(s) (50 mL/Hr) IV Continuous <Continuous>  dextrose 5%. 1000 milliLiter(s) (100 mL/Hr) IV Continuous <Continuous>  dextrose 50% Injectable 25 Gram(s) IV Push once  dextrose 50% Injectable 25 Gram(s) IV Push once  dextrose 50% Injectable 12.5 Gram(s) IV Push once  glucagon  Injectable 1 milliGRAM(s) IntraMuscular once  heparin   Injectable 5000 Unit(s) SubCutaneous every 12 hours  influenza  Vaccine (HIGH DOSE) 0.7 milliLiter(s) IntraMuscular once  insulin lispro (ADMELOG) corrective regimen sliding scale   SubCutaneous three times a day before meals  insulin lispro (ADMELOG) corrective regimen sliding scale   SubCutaneous at bedtime  mirtazapine 15 milliGRAM(s) Oral at bedtime  Opicapone (Ongentys) 50 milliGRAM(s) 50 milliGRAM(s) Oral at bedtime  pantoprazole  Injectable 40 milliGRAM(s) IV Push every 24 hours  polyethylene glycol 3350 17 Gram(s) Oral daily  QUEtiapine 12.5 milliGRAM(s) Oral <User Schedule>  QUEtiapine 25 milliGRAM(s) Oral at bedtime    MEDICATIONS  (PRN):  acetaminophen     Tablet .. 650 milliGRAM(s) Oral every 6 hours PRN Temp greater or equal to 38C (100.4F), Mild Pain (1 - 3)  clonazePAM Oral Disintegrating Tablet 0.25 milliGRAM(s) Oral daily PRN anxiety  dextrose Oral Gel 15 Gram(s) Oral once PRN Blood Glucose LESS THAN 70 milliGRAM(s)/deciliter  QUEtiapine 12.5 milliGRAM(s) Oral every 6 hours PRN anxiety and agitation      Allergies    No Known Allergies    Intolerances        LABS:                        10.2   10.22 )-----------( 156      ( 17 Oct 2022 06:15 )             33.2     10-17    137  |  102  |  24<H>  ----------------------------<  120<H>  3.9   |  25  |  0.41<L>    Ca    8.8      17 Oct 2022 06:15  Phos  2.6     10-17  Mg     1.70     10-17            RADIOLOGY & ADDITIONAL TESTS:  Reviewed
[facility] Division of Hospital Medicine  Christopher Gr MD  In House Pager [phone]    Patient is a 82y old  Male who presents with a chief complaint of hallucinations (15 Oct 2022 12:14)      SUBJECTIVE / OVERNIGHT EVENTS:  NAEO, Patient seen and examined at bedside, anxious and tired today. he's unsure why. no other complaints.   No fever, no chills, no SOB, no CP, no n/v/d, no abd pain, no dysuria      MEDICATIONS  (STANDING):  atorvastatin 20 milliGRAM(s) Oral at bedtime  carbidopa/levodopa  25/100 2 Tablet(s) Oral <User Schedule>  clopidogrel Tablet 75 milliGRAM(s) Oral daily  dextrose 5%. 1000 milliLiter(s) (100 mL/Hr) IV Continuous <Continuous>  dextrose 5%. 1000 milliLiter(s) (50 mL/Hr) IV Continuous <Continuous>  dextrose 50% Injectable 25 Gram(s) IV Push once  dextrose 50% Injectable 12.5 Gram(s) IV Push once  dextrose 50% Injectable 25 Gram(s) IV Push once  glucagon  Injectable 1 milliGRAM(s) IntraMuscular once  heparin   Injectable 5000 Unit(s) SubCutaneous every 12 hours  influenza  Vaccine (HIGH DOSE) 0.7 milliLiter(s) IntraMuscular once  insulin lispro (ADMELOG) corrective regimen sliding scale   SubCutaneous three times a day before meals  insulin lispro (ADMELOG) corrective regimen sliding scale   SubCutaneous at bedtime  mirtazapine 15 milliGRAM(s) Oral at bedtime  Opicapone (Ongentys) 50 milliGRAM(s) 50 milliGRAM(s) Oral at bedtime  pantoprazole  Injectable 40 milliGRAM(s) IV Push every 24 hours  polyethylene glycol 3350 17 Gram(s) Oral daily  QUEtiapine 25 milliGRAM(s) Oral daily  QUEtiapine 12.5 milliGRAM(s) Oral at bedtime    MEDICATIONS  (PRN):  acetaminophen     Tablet .. 650 milliGRAM(s) Oral every 6 hours PRN Temp greater or equal to 38C (100.4F), Mild Pain (1 - 3)  clonazePAM Oral Disintegrating Tablet 0.25 milliGRAM(s) Oral daily PRN anxiety  dextrose Oral Gel 15 Gram(s) Oral once PRN Blood Glucose LESS THAN 70 milliGRAM(s)/deciliter  QUEtiapine 12.5 milliGRAM(s) Oral every 6 hours PRN anxiety and agitation    CAPILLARY BLOOD GLUCOSE      POCT Blood Glucose.: 135 mg/dL (16 Oct 2022 08:30)  POCT Blood Glucose.: 148 mg/dL (15 Oct 2022 22:36)  POCT Blood Glucose.: 137 mg/dL (15 Oct 2022 17:39)  POCT Blood Glucose.: 178 mg/dL (15 Oct 2022 12:18)    I&O's Summary      PHYSICAL EXAM:  Vital Signs Last 24 Hrs  T(C): 36.8 (16 Oct 2022 05:50), Max: 37 (15 Oct 2022 20:55)  T(F): 98.2 (16 Oct 2022 05:50), Max: 98.6 (15 Oct 2022 20:55)  HR: 87 (16 Oct 2022 05:50) (82 - 97)  BP: 128/62 (16 Oct 2022 05:50) (128/62 - 138/68)  BP(mean): --  RR: 18 (16 Oct 2022 05:50) (18 - 19)  SpO2: 100% (16 Oct 2022 05:50) (98% - 100%)    Parameters below as of 16 Oct 2022 05:50  Patient On (Oxygen Delivery Method): room air        Gen: NAD; sitting in bed comfortably   Pulm: no accessory muscle use; lungs clear on auscultation bilaterally; no wheezing or crackles.   Cards: RRR, nl S1/S2; no LE edema; no JVD  Abd: non-distended; soft and NT on exam; +bs  Ext: MATEUSZ; no joint effusion or tenderness in upper and lower extremities; no cyanosis  Neuro: Awake and Alert; non-focal; moving all extremities.   Skin: no new rashes; warm to touch;     LABS:                        9.5    9.39  )-----------( 152      ( 16 Oct 2022 06:51 )             30.4     10-16    139  |  103  |  19  ----------------------------<  130<H>  4.0   |  24  |  0.43<L>    Ca    8.5      16 Oct 2022 06:51  Phos  2.4     10-16  Mg     1.60     10-16                  RADIOLOGY & ADDITIONAL TESTS:  Results Reviewed: Y  Imaging Personally Reviewed: Y  Electrocardiogram Personally Reviewed: Y    COORDINATION OF CARE:  Care Discussed with Consultants/Other Providers [Y/N]: Y  Prior or Outpatient Records Reviewed [Y/N]: Y

## 2022-10-17 NOTE — BH CONSULTATION LIAISON PROGRESS NOTE - NSBHCHARTREVIEWVS_PSY_A_CORE FT
Vital Signs Last 24 Hrs  T(C): 36.8 (16 Oct 2022 05:50), Max: 37 (15 Oct 2022 20:55)  T(F): 98.2 (16 Oct 2022 05:50), Max: 98.6 (15 Oct 2022 20:55)  HR: 87 (16 Oct 2022 05:50) (82 - 97)  BP: 128/62 (16 Oct 2022 05:50) (128/62 - 138/68)  BP(mean): --  RR: 18 (16 Oct 2022 05:50) (18 - 19)  SpO2: 100% (16 Oct 2022 05:50) (98% - 100%)    Parameters below as of 16 Oct 2022 05:50  Patient On (Oxygen Delivery Method): room air    
Vital Signs Last 24 Hrs  T(C): 36.7 (13 Oct 2022 05:54), Max: 36.8 (12 Oct 2022 22:33)  T(F): 98.1 (13 Oct 2022 05:54), Max: 98.3 (12 Oct 2022 22:33)  HR: 71 (13 Oct 2022 05:54) (69 - 71)  BP: 135/64 (13 Oct 2022 05:54) (131/60 - 135/64)  BP(mean): --  RR: 17 (13 Oct 2022 05:54) (17 - 18)  SpO2: 100% (13 Oct 2022 05:54) (100% - 100%)    Parameters below as of 13 Oct 2022 05:54  Patient On (Oxygen Delivery Method): room air    
Vital Signs Last 24 Hrs  T(C): 36.8 (17 Oct 2022 06:30), Max: 36.9 (16 Oct 2022 15:25)  T(F): 98.3 (17 Oct 2022 06:30), Max: 98.4 (16 Oct 2022 15:25)  HR: 79 (17 Oct 2022 06:30) (79 - 93)  BP: 138/67 (17 Oct 2022 06:30) (136/84 - 147/77)  BP(mean): --  RR: 18 (17 Oct 2022 06:30) (17 - 18)  SpO2: 97% (17 Oct 2022 06:30) (97% - 100%)    Parameters below as of 17 Oct 2022 06:30  Patient On (Oxygen Delivery Method): room air    
Vital Signs Last 24 Hrs  T(C): 36.4 (14 Oct 2022 05:34), Max: 36.5 (13 Oct 2022 14:41)  T(F): 97.5 (14 Oct 2022 05:34), Max: 97.7 (13 Oct 2022 14:41)  HR: 81 (14 Oct 2022 05:34) (73 - 81)  BP: 146/59 (14 Oct 2022 05:34) (140/72 - 150/73)  BP(mean): --  RR: 18 (14 Oct 2022 05:34) (18 - 18)  SpO2: 100% (14 Oct 2022 05:34) (100% - 100%)    Parameters below as of 14 Oct 2022 05:34  Patient On (Oxygen Delivery Method): room air    
Vital Signs Last 24 Hrs  T(C): 36.8 (15 Oct 2022 05:46), Max: 36.8 (15 Oct 2022 05:46)  T(F): 98.2 (15 Oct 2022 05:46), Max: 98.2 (15 Oct 2022 05:46)  HR: 74 (15 Oct 2022 05:46) (74 - 98)  BP: 119/72 (15 Oct 2022 05:46) (119/72 - 146/67)  BP(mean): --  RR: 18 (15 Oct 2022 05:46) (18 - 20)  SpO2: 100% (15 Oct 2022 05:46) (98% - 100%)    Parameters below as of 15 Oct 2022 05:46  Patient On (Oxygen Delivery Method): room air

## 2022-10-17 NOTE — PROGRESS NOTE ADULT - PROBLEM SELECTOR PLAN 6
DIET: Soft and bite sized CC  DVT: SQ Heparin   DISPO: Pending hospital course. Discussed w. wife and daughter (Keeley). Per Keeley, pt would benefit from LTC/NH given increased needs. Wife however would like pt to come home , open to rehab. Pt w.o HCP; wife makes decisions for the pt.    PT consulted; recs [facility]
DIET: Soft and bite sized CC  DVT: SQ Heparin   DISPO:   PT consulted; recs [facility]. Discussed w. wife and daughter (Keeley). Per Keeley, pt would benefit from LTC/NH given increased needs. Wife however would like pt to come home but is open to rehab. Pt w/o HCP and so wife makes decisions for the pt. Goes back and forth between home vs rehab.     As of 10/7 wanted pt to come home w/ supplies (hospital bed) set up.  SW made aware.
DIET: Soft and bite sized CC  DVT: SQ Heparin   DISPO: Pending hospital course. Discussed w. wife and daughter (Keeley). Per Keeley, pt would benefit from LTC/NH given increased needs. Wife however would like pt to come home but is open to rehab. Pt w/o HCP and so wife makes decisions for the pt. Goes back and forth between home vs rehab.     PT consulted; recs [facility]    Wife to inform team of dispo decision (rehab vs home).
-continue ISS  -Monitor fingersticks
DIET: Soft and bite sized CC  DVT: SQ Heparin   DISPO:   PT consulted; recs [facility]. Discussed w. wife and daughter (Keeley). Per Keeley, pt would benefit from LTC/NH given increased needs. Wife however would like pt to come home but is open to rehab. Pt w/o HCP and so wife makes decisions for the pt. Goes back and forth between home vs rehab.     As of 10/7 wanted pt to come home w/ supplies (hospital bed) set up.  SW made aware.  10/10 per CM/SW, family wishes for rehab now. f/u for placement.
-continue ISS  -Monitor fingersticks
DIET: Soft and bite sized CC  DVT: SQ Heparin   DISPO:   PT consulted; recs [facility]. Discussed w. wife and daughter (Keeley). Per Keeley, pt would benefit from LTC/NH given increased needs. Wife however would like pt to come home but is open to rehab. Pt w/o HCP and so wife makes decisions for the pt. Goes back and forth between home vs rehab.     As of 10/7 wanted pt to come home w/ supplies (hospital bed) set up.
-continue ISS  -Monitor fingersticks
DIET: Soft and bite sized CC  DVT: SQ Heparin   DISPO: Pending hospital course. Discussed w. wife and daughter (Keeley). Per Keeley, pt would benefit from LTC/NH given increased needs. Wife however would like pt to come home but is open to rehab. Pt w/o HCP and so wife makes decisions for the pt.    PT consulted; recs [facility]    Wife to inform team of dispo decision (rehab vs home).
Speech and swallow eval    dysphagia diet (because of Parkinsons)  heparin subQ DVT prophylaxis   Correct electrolytes PRN   FULL code
F-100 cc/hr LR  E-replete prn  N-NPO in case of procedure, if GI and vasc need no interventions, can give dysphagia diet (because of Parkinsons)  heparin subQ DVT prophylaxis   FULL code
-continue ISS  -Monitor fingersticks
DIET: Soft and bite sized CC  DVT: SQ Heparin   DISPO: Pending hospital course. Family interested in pursuing nursing home/Long term living facility for patient as they are concerned pt requires too much care at home. SW/CM made aware, will discuss w/ family.
-continue ISS  -Monitor fingersticks
DIET: Soft and bite sized CC  DVT: SQ Heparin   DISPO: Pending hospital course
DIET: Soft and bite sized CC  DVT: SQ Heparin   DISPO: Pending hospital course. Discussed w. wife and daughter (Keeley). Per Keeley, pt would benefit from LTC/NH given increased needs. Wife however would like pt to come home , open to rehab. Pt w.o HCP; wife makes decisions for the pt.    PT consulted.
-continue ISS  -Monitor fingersticks

## 2022-10-17 NOTE — PROGRESS NOTE ADULT - PROBLEM SELECTOR PROBLEM 1
Parkinsons

## 2022-10-17 NOTE — PROGRESS NOTE ADULT - PROBLEM SELECTOR PLAN 3
-Pt w/ chronic PAD, functionally bedbound and w/o acute symptoms  -Vasc surg consulted, recs no acute surgical interventions  -c/w Plavix / statin  -Outpatient vasc surg follow up
-Lactate elevated on admission, now resolved  -Lower c/f infection at this time  -Suspect 2/2 atherosclerotic disease. CTA A/P w/ extensive atherosclerotic change w/ multifocal   areas of significant stenosis in the superior and inferior mesenteric arteries. Also w/ Severe stenoses in the proximal right deep femoral artery.  -Pt w/o sx of mesenteric ischemia  -Vasc surg consulted, recs. no acute surgical interventions  -Blood /urine Cx NGTD, Procal wnl.
-Pt w/ chronic PAD, functionally bedbound and w/o acute symptoms  -Vasc surg consulted, recs no acute surgical interventions  -c/w Plavix / statin  -Outpatient vasc surg follow up
-f/u vascular surgery recs  -TERA/PVR and mesenteric artery US ordered
-f/u vascular surgery recs  -TERA/PVR and mesenteric artery US ordered
-Pt w/ chronic PAD, functionally bedbound and w/o acute symptoms  -Vasc surg consulted, recs no acute surgical interventions  -c/w Plavix / statin  -Outpatient vasc surg follow up
-Pt w/ chronic PAD, functionally bedbound and w/o acute symptoms  -Vasc surg consulted, recs no acute surgical interventions  -c/w Plavix / statin  -Outpatient vasc surg follow up
-Lactate elevated on admission, now resolved  -Lower c/f infection at this time  -Suspect 2/2 atherosclerotic disease. CTA A/P w/ extensive atherosclerotic change w/ multifocal   areas of significant stenosis in the superior and inferior mesenteric arteries. Also w/ Severe stenoses in the proximal right deep femoral artery.  -Pt w/o sx of mesenteric ischemia  -Vasc surg consulted, recs. no acute surgical interventions  -Blood /urine Cx NGTD, Procal wnl .
-Pt w/ chronic PAD, functionally bedbound and w/o acute symptoms  -Vasc surg consulted, recs no acute surgical interventions  -Resume Plavix / statin  -Obtain lipid panel  -Outpatient vasc surg follow up
-Pt w/ chronic PAD, functionally bedbound and w/o acute symptoms  -Vasc surg consulted, recs no acute surgical interventions  -c/w Plavix / statin  -Outpatient vasc surg follow up
-Lactate elevated on admission, now resolved  -Lower c/f infection at this time  -Suspect 2/2 atherosclerotic disease. CTA A/P w/ extensive atherosclerotic change w/ multifocal   areas of significant stenosis in the superior and inferior mesenteric arteries. Also w/ Severe stenoses in the proximal right deep femoral artery.  -Pt w/o sx of mesenteric ischemia  -Vasc surg consulted, recs. no acute surgical interventions  -Blood /urine Cx NGTD, Procal wnl.
-Pt w/ chronic PAD, functionally bedbound and w/o acute symptoms  -Vasc surg consulted, recs no acute surgical interventions  -c/w Plavix / statin  -Outpatient vasc surg follow up
-Lactate elevated on admission, now resolved  -Lower c/f infection at this time  -Suspect 2/2 atherosclerotic disease. CTA A/P w/ extensive atherosclerotic change w/ multifocal   areas of significant stenosis in the superior and inferior mesenteric arteries. Also w/ Severe stenoses in the proximal right deep femoral artery.  -Pt w/o sx of mesenteric ischemia  -Vasc surg consulted, recs. no acute surgical interventions  -Blood /urine Cx NGTD, Procal wnl .
-Lactate elevated on admission, now resolved  -Lower c/f infection at this time  -Suspect 2/2 atherosclerotic disease. CTA A/P w/ extensive atherosclerotic change w/ multifocal   areas of significant stenosis in the superior and inferior mesenteric arteries. Also w/ Severe stenoses in the proximal right deep femoral artery.  -Pt w/o sx of mesenteric ischemia  -Vasc surg consulted, recs. no acute surgical interventions  -Blood /urine Cx NGTD, Procal wnl .
-Lactate elevated on admission, now resolved  -Lower c/f infection at this time  -Suspect 2/2 atherosclerotic disease. CTA A/P w/ extensive atherosclerotic change w/ multifocal   areas of significant stenosis in the superior and inferior mesenteric arteries. Also w/ Severe stenoses in the proximal right deep femoral artery.  -Pt w/o sx of mesenteric ischemia  -Vasc surg consulted, recs. no acute surgical interventions  -Blood /urine Cx NGTD, Procal wnl.

## 2022-10-17 NOTE — PROGRESS NOTE ADULT - PROBLEM SELECTOR PROBLEM 3
Lactic acidosis
Peripheral artery disease
Peripheral artery disease
Lactic acidosis
Lactic acidosis
Peripheral artery disease
Lactic acidosis
Lactic acidosis
Peripheral artery disease
Lactic acidosis

## 2022-10-17 NOTE — PROGRESS NOTE ADULT - PROBLEM SELECTOR PLAN 7
DIET: Soft and bite sized CC  DVT: SQ Heparin   DISPO:   PT consulted; recs [facility]. Discussed w. wife and daughter (Keeley). Per Keeley, pt would benefit from LTC/NH given increased needs. Wife however would like pt to come home but is open to rehab. Pt w/o HCP and so wife makes decisions for the pt. Goes back and forth between home vs rehab.     As of 10/10, family wants [facility]. pending control of psychiatric symptoms.
DIET: Soft and bite sized CC  DVT: SQ Heparin   DISPO:   PT consulted; recs [facility]. Discussed w. wife and daughter (Keeley). Per Keeley, pt would benefit from LTC/NH given increased needs. Wife however would like pt to come home but is open to rehab. Pt w/o HCP and so wife makes decisions for the pt. Goes back and forth between home vs rehab.     As of 10/10, family wants [facility]. pending control of psychiatric symptoms.
DIET: Soft and bite sized CC  DVT: SQ Heparin   DISPO:   PT consulted; recs [facility]. Discussed w. wife and daughter (Keeley). Per Keeley, pt would benefit from LTC/NH given increased needs. Wife however would like pt to come home but is open to rehab. Pt w/o HCP and so wife makes decisions for the pt. Goes back and forth between home vs rehab.     As of 10/10, family wants [facility]. pending control of psychiatric symptoms.   [facility] arrangement made. d/c on hold due to persistent psychiatric symptoms. Psych recs appreciated 10/17, no further psych optimization required. Anticipate discharge to [facility] 10/18 vs 10/19.
DIET: Soft and bite sized CC  DVT: SQ Heparin   DISPO:   PT consulted; recs [facility]. Discussed w. wife and daughter (Keeley). Per Keeley, pt would benefit from LTC/NH given increased needs. Wife however would like pt to come home but is open to rehab. Pt w/o HCP and so wife makes decisions for the pt. Goes back and forth between home vs rehab.     As of 10/10, family wants [facility]. pending control of psychiatric symptoms.   [facility] arrangement made. d/c on hold due to persistent psychiatric symptoms.
DIET: Soft and bite sized CC  DVT: SQ Heparin   DISPO:   PT consulted; recs [facility]. Discussed w. wife and daughter (Keeley). Per Keeley, pt would benefit from LTC/NH given increased needs. Wife however would like pt to come home but is open to rehab. Pt w/o HCP and so wife makes decisions for the pt. Goes back and forth between home vs rehab.     As of 10/10, family wants [facility]. pending control of psychiatric symptoms.   [facility] arrangement made. d/c on hold due to persistent psychiatric symptoms.
DIET: Soft and bite sized CC  DVT: SQ Heparin   DISPO:   PT consulted; recs [facility]. Discussed w. wife and daughter (Keeley). Per Keeley, pt would benefit from LTC/NH given increased needs. Wife however would like pt to come home but is open to rehab. Pt w/o HCP and so wife makes decisions for the pt. Goes back and forth between home vs rehab.     As of 10/10, family wants [facility]. pending control of psychiatric symptoms.

## 2022-10-17 NOTE — PROGRESS NOTE ADULT - PROBLEM SELECTOR PLAN 2
-Lactate elevated on admission, now resolved  -Lower c/f infection at this time  -Suspect 2/2 atherosclerotic disease. CTA A/P w/ extensive atherosclerotic change w/ multifocal   areas of significant stenosis in the superior and inferior mesenteric arteries. Also w/ Severe stenoses in the proximal right deep femoral artery.  -Pt w/o sx of mesenteric ischemia  -Vasc surg consulted, recs. no acute surgical interventions  -Blood /urine Cx NGTD, Procal wnl .
-Lactate elevated on admission, now resolved  -Lower c/f infection at this time  -Suspect 2/2 atherosclerotic disease. CTA A/P w/ extensive atherosclerotic change w/ multifocal   areas of significant stenosis in the superior and inferior mesenteric arteries. Also w/ Severe stenoses in the proximal right deep femoral artery.  -Pt w/o sx of mesenteric ischemia  -Vasc surg consulted, recs. no acute surgical interventions  -Blood /urine Cx NGTD, Procal wnl .
initially thought to be secondary to Pimavanserin per outpatient neurology.  patient has been off this medication for the past few days.   now with recurrence of Hallucination of passed family members.   currently on Klonopin and Seroquel. intermittently effective in controlling his symptoms.  seen to have increase sedative effect.   appreciate psych rec.   - klonopin 0.25mg prn.    - Seroquel 12.5mg prn for further hallucination.  ctm symptoms.  - discussed with psych, will switch to Seroquel 12.5mg in AM and 25mg qHS to optimize sleep overnight.
-Lactate elevated on admission, now downtrending   -Lower c/f infection at this time  -Suspect 2/2 atherosclerotic disease. CTA A/P w/ extensive atherosclerotic change w/ multifocal   areas of significant stenosis in the superior and inferior mesenteric arteries. Also w/ Severe stenoses in the proximal right deep femoral artery.  -Pt w/o sx of mesenteric ischemia  -Vasc surg consulted, recs. no acute surgical interventions  -Blood /urine Cx NGTD, Procal wnl .
-Lactate elevated on admission, now resolved  -Lower c/f infection at this time  -Suspect 2/2 atherosclerotic disease. CTA A/P w/ extensive atherosclerotic change w/ multifocal   areas of significant stenosis in the superior and inferior mesenteric arteries. Also w/ Severe stenoses in the proximal right deep femoral artery.  -Pt w/o sx of mesenteric ischemia  -Vasc surg consulted, recs. no acute surgical interventions  -Blood /urine Cx NGTD, Procal wnl .
-Lactate elevated on admission, now resolved  -Lower c/f infection at this time  -Suspect 2/2 atherosclerotic disease. CTA A/P w/ extensive atherosclerotic change w/ multifocal   areas of significant stenosis in the superior and inferior mesenteric arteries. Also w/ Severe stenoses in the proximal right deep femoral artery.  -Pt w/o sx of mesenteric ischemia  -Vasc surg consulted, recs. no acute surgical interventions  -Blood /urine Cx NGTD, Procal wnl .
initially thought to be secondary to Pimavanserin per outpatient neurology.  patient has been off this medication for the past few days.   now with recurrence of Hallucination of passed family members.   currently on Klonopin and Seroquel. intermittently effective in controlling his symptoms.  seen to have increase sedative effect.   appreciate psych rec.   now on standing Seroquel and Klonopin and Seroquel prn.   ctm symptoms.
-lactate elevation to 13.7---->1.2 after fluids and antibiotics  -per wife does not believe pt had seizure like activity, urination, or tongue biting but can order vEEG to r/o underlying seizure  -can continue with zosyn and iv fluids for now  -follow up blood cultures times 2, procalcitonin wnl lower concern for underlying bacterial infection.  -possibly due to PAD (r femoral, SMA, ARTURO), vascular consulted appreciate recommendations
-Lactate elevated on admission, now resolved  -Lower c/f infection at this time  -Suspect 2/2 atherosclerotic disease. CTA A/P w/ extensive atherosclerotic change w/ multifocal   areas of significant stenosis in the superior and inferior mesenteric arteries. Also w/ Severe stenoses in the proximal right deep femoral artery.  -Pt w/o sx of mesenteric ischemia  -Vasc surg consulted, recs. no acute surgical interventions  -Blood /urine Cx NGTD, Procal wnl .
initially thought to be secondary to Pimavanserin per outpatient neurology.  patient has been off this medication for the past few days.   now with recurrence of Hallucination of passed family members.   currently on Klonopin and Seroquel. intermittently effective in controlling his symptoms.  seen to have increase sedative effect.   reconsult psych to help with management of hallucination.
-Lactate elevated on admission, now resolved  -Lower c/f infection at this time  -Suspect 2/2 atherosclerotic disease. CTA A/P w/ extensive atherosclerotic change w/ multifocal   areas of significant stenosis in the superior and inferior mesenteric arteries. Also w/ Severe stenoses in the proximal right deep femoral artery.  -Pt w/o sx of mesenteric ischemia  -Vasc surg consulted, recs. no acute surgical interventions  -Blood /urine Cx NGTD, Procal wnl .
-Lactate elevated on admission, now resolved  -Lower c/f infection at this time  -Suspect 2/2 atherosclerotic disease. CTA A/P w/ extensive atherosclerotic change w/ multifocal   areas of significant stenosis in the superior and inferior mesenteric arteries. Also w/ Severe stenoses in the proximal right deep femoral artery.  -Pt w/o sx of mesenteric ischemia  -Vasc surg consulted, recs. no acute surgical interventions  -Blood /urine Cx NGTD, Procal wnl .
initially thought to be secondary to Pimavanserin per outpatient neurology.  patient has been off this medication for the past few days.   now with recurrence of Hallucination of passed family members.   currently on Klonopin and Seroquel. intermittently effective in controlling his symptoms.  seen to have increase sedative effect.   appreciate psych rec.   now on standing Seroquel, as well as Klonopin and Seroquel prn.   ctm symptoms.
-Lactate elevated on admission, now resolved  -Lower c/f infection at this time  -Suspect 2/2 atherosclerotic disease. CTA A/P w/ extensive atherosclerotic change w/ multifocal   areas of significant stenosis in the superior and inferior mesenteric arteries. Also w/ Severe stenoses in the proximal right deep femoral artery.  -Pt w/o sx of mesenteric ischemia  -Vasc surg consulted, recs. no acute surgical interventions  -Blood /urine Cx NGTD, Procal wnl .
-lactate elevation to 13.7---->1.2 after fluids and antibiotics  -per wife does not believe pt had seizure like activity, urination, or tongue biting but can order vEEG to r/o underlying seizure  -DC  with zosyn   -follow up blood cultures times negative UC negative , procalcitonin wnl lower concern for underlying bacterial infection.  -possibly due to PAD (r femoral, SMA, ARTURO), vascular consulted appreciate recommendations
initially thought to be secondary to Pimavanserin per outpatient neurology.  patient has been off this medication for the past few days.   now with recurrence of Hallucination of passed family members.   currently on Klonopin and Seroquel. intermittently effective in controlling his symptoms.  seen to have increase sedative effect.   appreciate psych rec.   -increase AM seroquel to 25mg, keep PM seroquel at 12.5mg. c/w seroquel PRN for further hallucination.   - klonopin 0.25mg prn.    ctm symptoms.
appreciate psych rec.   - klonopin 0.25mg prn.    - Seroquel 12.5mg prn for further hallucination. ctm symptoms.  -  Seroquel 12.5mg in AM and 25mg qHS to optimize sleep overnight.

## 2022-10-17 NOTE — PROGRESS NOTE ADULT - ASSESSMENT
Mr. Millan is an 82 yo man with a h/o idiopathic Parkinson disease with worsening hallucinations/delusions.  It is critical that he be continued on all of his home medications at the same doses and timing except for the increased dose of clonazepam 0.75 mg Q 8  Continue home sinemet, Pimavanserin and ongentys   Call Patient's private neurologist Dr. Michael Zamora for further recommendations  Consult Behavioral health     Thank you    
81 year old male hx of insulin dependent DM2, Parkinson's dx, sick sinus syndrome w/ pacemaker p/w hallucinations now improving, found to have colonic mass and arterial disease 
81 year old male hx of insulin dependent DM2, Parkinson's dx, sick sinus syndrome w/ pacemaker p/w hallucinations found to have colonic mass and arterial disease.     4 by 4 cm intraperitoneal mass  mod SMA stenosis, severe ARTURO stenosis. severe r. deep femoral artery stenosis.
82y/o M PMHx DM, Gastropresis, sciatica, Parkinson's disease p/w hallucinations, altered mental status today reportedly hallucinations and now with PVD. Patient has advanced stage of parkinson's and is functionally bedbound, PVD is chronic and has no acute ischemic symptoms. No symptoms of mesentery ischemia.     PLAN:   - No acute vascular surgical intervention  - Recommend medical management of PVD   - Remainder care per primary      Vascular Surgery, Team C Surgery  P# [id number]
Patient is an 81 year old male hx of insulin dependent DM2, Parkinson's dx, sick sinus syndrome w/ pacemaker, here w/ hallucinations found to have colonic mass and arterial disease.     4 by 4 cm intraperitoneal mass  mod SMA stenosis, severe ARTURO stenosis. severe r. deep femoral artery stenosis.
81 year old male hx of insulin dependent DM2, Parkinson's dx, sick sinus syndrome w/ pacemaker p/w hallucinations now improving, found to have colonic mass and arterial disease 
81 year old male hx of insulin dependent DM2, Parkinson's dx, sick sinus syndrome w/ pacemaker p/w hallucinations now improving, found to have colonic mass and arterial disease 
81 year old male hx of insulin dependent DM2, Parkinson's dx, sick sinus syndrome w/ pacemaker p/w hallucinations found to have colonic mass and arterial disease. 
81 year old male hx of insulin dependent DM2, Parkinson's dx, sick sinus syndrome w/ pacemaker p/w hallucinations now improving, found to have colonic mass and arterial disease 
81 year old male hx of insulin dependent DM2, Parkinson's dx, sick sinus syndrome w/ pacemaker p/w hallucinations now improving, found to have colonic mass and arterial disease. hallucination secondary to meds vs parkinson dementia. symptoms is improved with seroquel. acutely halluciated again yesterday. needs further psychiatric stabilization prior to d/c. 
Patient is an 81 year old male hx of insulin dependent DM2, Parkinson's dx, sick sinus syndrome w/ pacemaker, here w/ hallucinations found to have colonic mass and arterial disease.     4 by 4 cm intraperitoneal mass  mod SMA stenosis, severe ARTURO stenosis. severe r. deep femoral artery stenosis.
81 year old male hx of insulin dependent DM2, Parkinson's dx, sick sinus syndrome w/ pacemaker p/w hallucinations now improving, found to have colonic mass and arterial disease 
81 year old male hx of insulin dependent DM2, Parkinson's dx, sick sinus syndrome w/ pacemaker p/w hallucinations found to have colonic mass and arterial disease. 
81 year old male hx of insulin dependent DM2, Parkinson's dx, sick sinus syndrome w/ pacemaker p/w hallucinations now improving, found to have colonic mass and arterial disease. hallucination secondary to meds vs parkinson dementia. symptoms is improved with seroquel. stable for d/c to [facility] today.     45 minutes spent coordinating discharge 
81 year old male hx of insulin dependent DM2, Parkinson's dx, sick sinus syndrome w/ pacemaker p/w hallucinations now improving, found to have colonic mass and arterial disease 
81 year old male hx of insulin dependent DM2, Parkinson's dx, sick sinus syndrome w/ pacemaker p/w hallucinations now improving, found to have colonic mass and arterial disease. hallucination secondary to meds vs parkinson dementia. symptoms is improved with seroquel. acutely halluciated again yesterday. needs further psychiatric stabilization prior to d/c. 
81 year old male hx of insulin dependent DM2, Parkinson's dx, sick sinus syndrome w/ pacemaker p/w hallucinations now improving, found to have colonic mass and arterial disease. hallucination secondary to meds vs parkinson dementia. symptoms is improved with seroquel. acutely halluciated again yesterday. needs further psychiatric stabilization prior to d/c.

## 2022-10-17 NOTE — BH CONSULTATION LIAISON PROGRESS NOTE - MSE UNSTRUCTURED FT
Seen today, alert, awake, cooperative, pleasant, with no observed distress. Denied anxiety, safety concerns, paranoia, or hallucinations. Had broader affect and seemed appropriate/improved.

## 2022-10-17 NOTE — PROGRESS NOTE ADULT - PROBLEM SELECTOR PLAN 1
-Discussed w/ pt's neurologist Dr. Michael Zamora 10/4, stated Pimavanserin likely contributing to increased hallucinations. Pt per MD has been tried on pimavanserin in the past. Improved parkinson's but worsened hallucinations, pt taken off. Restarted again on medication recently.  -decrease home Sinemet carbidopa/levodopa  25/100 2Tablet(s) q4H per psych rec.   -C/w home Opicapone (Ongentys) 50 mg QD   -D/c ativan  -D/c pimavanserin  -C/W Seroquel 12.5mg PO AM and 25mg PO qHS; can continue as outpt  --Continue Sinemet 25/100 dose 2 TABS per current schedule (2am, 6am, 10am, 2pm, 6pm, 10pm) (decreased from 2.5 tabs) to decrease psychotic features.  ***Family aware that decrease in Sinemet may worsen Parkinsonism but believe that patient's psychiatric symptoms need to be prioritized over motor symptoms.  --C/w Klonopin 0.25 mg PO once daily PRN for anxiety  --C/w Seroquel 12.5 mg PO q 6 hr PRN for anxiety/agitation

## 2022-10-17 NOTE — PROGRESS NOTE ADULT - REASON FOR ADMISSION
hallucinations

## 2022-10-17 NOTE — PROGRESS NOTE ADULT - NUTRITIONAL ASSESSMENT
This patient has been assessed with a concern for Malnutrition and has been determined to have a diagnosis/diagnoses of Severe protein-calorie malnutrition.    This patient is being managed with:   Diet Soft and Bite Sized-  Consistent Carbohydrate {Evening Snack} (CSTCHOSN)  Mildly Thick Liquids (MILDTHICKLIQS)  Supplement Feeding Modality:  Oral  Glucerna Shake Cans or Servings Per Day:  1       Frequency:  Three Times a day  Entered: Oct 12 2022  9:34AM    
This patient has been assessed with a concern for Malnutrition and has been determined to have a diagnosis/diagnoses of Severe protein-calorie malnutrition.    This patient is being managed with:   Diet Soft and Bite Sized-  Consistent Carbohydrate {Evening Snack} (CSTCHOSN)  Mildly Thick Liquids (MILDTHICKLIQS)  Supplement Feeding Modality:  Oral  Glucerna Shake Cans or Servings Per Day:  1       Frequency:  Two Times a day  Entered: Oct  8 2022 12:41PM    
This patient has been assessed with a concern for Malnutrition and has been determined to have a diagnosis/diagnoses of Severe protein-calorie malnutrition.    This patient is being managed with:   Diet Soft and Bite Sized-  Consistent Carbohydrate {Evening Snack} (CSTCHOSN)  Mildly Thick Liquids (MILDTHICKLIQS)  Supplement Feeding Modality:  Oral  Glucerna Shake Cans or Servings Per Day:  1       Frequency:  Three Times a day  Entered: Oct 12 2022  9:34AM    
This patient has been assessed with a concern for Malnutrition and has been determined to have a diagnosis/diagnoses of Severe protein-calorie malnutrition.    This patient is being managed with:   Diet Soft and Bite Sized-  Consistent Carbohydrate {Evening Snack} (CSTCHOSN)  Mildly Thick Liquids (MILDTHICKLIQS)  Supplement Feeding Modality:  Oral  Glucerna Shake Cans or Servings Per Day:  1       Frequency:  Two Times a day  Entered: Oct  8 2022 12:41PM    
This patient has been assessed with a concern for Malnutrition and has been determined to have a diagnosis/diagnoses of Severe protein-calorie malnutrition.    This patient is being managed with:   Diet Soft and Bite Sized-  Consistent Carbohydrate {Evening Snack} (CSTCHOSN)  Mildly Thick Liquids (MILDTHICKLIQS)  Supplement Feeding Modality:  Oral  Glucerna Shake Cans or Servings Per Day:  1       Frequency:  Three Times a day  Entered: Oct 12 2022  9:34AM    
This patient has been assessed with a concern for Malnutrition and has been determined to have a diagnosis/diagnoses of Severe protein-calorie malnutrition.    This patient is being managed with:   Diet Soft and Bite Sized-  Consistent Carbohydrate {Evening Snack} (CSTCHOSN)  Mildly Thick Liquids (MILDTHICKLIQS)  Supplement Feeding Modality:  Oral  Glucerna Shake Cans or Servings Per Day:  1       Frequency:  Three Times a day  Entered: Oct 12 2022  9:34AM

## 2022-10-17 NOTE — BH CONSULTATION LIAISON PROGRESS NOTE - CURRENT MEDICATION
MEDICATIONS  (STANDING):  atorvastatin 20 milliGRAM(s) Oral at bedtime  carbidopa/levodopa  25/100 2 Tablet(s) Oral <User Schedule>  clopidogrel Tablet 75 milliGRAM(s) Oral daily  dextrose 5%. 1000 milliLiter(s) (100 mL/Hr) IV Continuous <Continuous>  dextrose 5%. 1000 milliLiter(s) (50 mL/Hr) IV Continuous <Continuous>  dextrose 50% Injectable 25 Gram(s) IV Push once  dextrose 50% Injectable 12.5 Gram(s) IV Push once  dextrose 50% Injectable 25 Gram(s) IV Push once  glucagon  Injectable 1 milliGRAM(s) IntraMuscular once  heparin   Injectable 5000 Unit(s) SubCutaneous every 12 hours  influenza  Vaccine (HIGH DOSE) 0.7 milliLiter(s) IntraMuscular once  insulin lispro (ADMELOG) corrective regimen sliding scale   SubCutaneous three times a day before meals  insulin lispro (ADMELOG) corrective regimen sliding scale   SubCutaneous at bedtime  mirtazapine 15 milliGRAM(s) Oral at bedtime  Opicapone (Ongentys) 50 milliGRAM(s) 50 milliGRAM(s) Oral at bedtime  pantoprazole  Injectable 40 milliGRAM(s) IV Push every 24 hours  polyethylene glycol 3350 17 Gram(s) Oral daily  QUEtiapine 25 milliGRAM(s) Oral daily  QUEtiapine 12.5 milliGRAM(s) Oral at bedtime    MEDICATIONS  (PRN):  acetaminophen     Tablet .. 650 milliGRAM(s) Oral every 6 hours PRN Temp greater or equal to 38C (100.4F), Mild Pain (1 - 3)  clonazePAM Oral Disintegrating Tablet 0.25 milliGRAM(s) Oral daily PRN anxiety  dextrose Oral Gel 15 Gram(s) Oral once PRN Blood Glucose LESS THAN 70 milliGRAM(s)/deciliter  QUEtiapine 12.5 milliGRAM(s) Oral every 6 hours PRN anxiety and agitation  
MEDICATIONS  (STANDING):  atorvastatin 20 milliGRAM(s) Oral at bedtime  carbidopa/levodopa  25/100 2.5 Tablet(s) Oral <User Schedule>  clopidogrel Tablet 75 milliGRAM(s) Oral daily  dextrose 5%. 1000 milliLiter(s) (100 mL/Hr) IV Continuous <Continuous>  dextrose 5%. 1000 milliLiter(s) (50 mL/Hr) IV Continuous <Continuous>  dextrose 50% Injectable 25 Gram(s) IV Push once  dextrose 50% Injectable 12.5 Gram(s) IV Push once  dextrose 50% Injectable 25 Gram(s) IV Push once  glucagon  Injectable 1 milliGRAM(s) IntraMuscular once  heparin   Injectable 5000 Unit(s) SubCutaneous every 12 hours  influenza  Vaccine (HIGH DOSE) 0.7 milliLiter(s) IntraMuscular once  insulin lispro (ADMELOG) corrective regimen sliding scale   SubCutaneous three times a day before meals  insulin lispro (ADMELOG) corrective regimen sliding scale   SubCutaneous at bedtime  mirtazapine 15 milliGRAM(s) Oral at bedtime  Opicapone (Ongentys) 50 milliGRAM(s) 50 milliGRAM(s) Oral at bedtime  pantoprazole  Injectable 40 milliGRAM(s) IV Push every 24 hours  polyethylene glycol 3350 17 Gram(s) Oral daily  QUEtiapine 12.5 milliGRAM(s) Oral two times a day    MEDICATIONS  (PRN):  acetaminophen     Tablet .. 650 milliGRAM(s) Oral every 6 hours PRN Temp greater or equal to 38C (100.4F), Mild Pain (1 - 3)  clonazePAM Oral Disintegrating Tablet 0.25 milliGRAM(s) Oral daily PRN anxiety  dextrose Oral Gel 15 Gram(s) Oral once PRN Blood Glucose LESS THAN 70 milliGRAM(s)/deciliter  QUEtiapine 12.5 milliGRAM(s) Oral every 6 hours PRN anxiety and agitation  
MEDICATIONS  (STANDING):  atorvastatin 20 milliGRAM(s) Oral at bedtime  carbidopa/levodopa  25/100 2.5 Tablet(s) Oral <User Schedule>  clopidogrel Tablet 75 milliGRAM(s) Oral daily  dextrose 5%. 1000 milliLiter(s) (100 mL/Hr) IV Continuous <Continuous>  dextrose 5%. 1000 milliLiter(s) (50 mL/Hr) IV Continuous <Continuous>  dextrose 50% Injectable 25 Gram(s) IV Push once  dextrose 50% Injectable 12.5 Gram(s) IV Push once  dextrose 50% Injectable 25 Gram(s) IV Push once  glucagon  Injectable 1 milliGRAM(s) IntraMuscular once  heparin   Injectable 5000 Unit(s) SubCutaneous every 12 hours  influenza  Vaccine (HIGH DOSE) 0.7 milliLiter(s) IntraMuscular once  insulin lispro (ADMELOG) corrective regimen sliding scale   SubCutaneous three times a day before meals  insulin lispro (ADMELOG) corrective regimen sliding scale   SubCutaneous at bedtime  mirtazapine 15 milliGRAM(s) Oral at bedtime  Opicapone (Ongentys) 50 milliGRAM(s) 50 milliGRAM(s) Oral at bedtime  pantoprazole  Injectable 40 milliGRAM(s) IV Push every 24 hours  polyethylene glycol 3350 17 Gram(s) Oral daily  QUEtiapine 12.5 milliGRAM(s) Oral two times a day    MEDICATIONS  (PRN):  acetaminophen     Tablet .. 650 milliGRAM(s) Oral every 6 hours PRN Temp greater or equal to 38C (100.4F), Mild Pain (1 - 3)  clonazePAM Oral Disintegrating Tablet 0.25 milliGRAM(s) Oral daily PRN anxiety  dextrose Oral Gel 15 Gram(s) Oral once PRN Blood Glucose LESS THAN 70 milliGRAM(s)/deciliter  QUEtiapine 12.5 milliGRAM(s) Oral every 6 hours PRN anxiety and agitation  
MEDICATIONS  (STANDING):  atorvastatin 20 milliGRAM(s) Oral at bedtime  carbidopa/levodopa  25/100 2 Tablet(s) Oral <User Schedule>  clopidogrel Tablet 75 milliGRAM(s) Oral daily  dextrose 5%. 1000 milliLiter(s) (50 mL/Hr) IV Continuous <Continuous>  dextrose 5%. 1000 milliLiter(s) (100 mL/Hr) IV Continuous <Continuous>  dextrose 50% Injectable 25 Gram(s) IV Push once  dextrose 50% Injectable 25 Gram(s) IV Push once  dextrose 50% Injectable 12.5 Gram(s) IV Push once  glucagon  Injectable 1 milliGRAM(s) IntraMuscular once  heparin   Injectable 5000 Unit(s) SubCutaneous every 12 hours  influenza  Vaccine (HIGH DOSE) 0.7 milliLiter(s) IntraMuscular once  insulin lispro (ADMELOG) corrective regimen sliding scale   SubCutaneous three times a day before meals  insulin lispro (ADMELOG) corrective regimen sliding scale   SubCutaneous at bedtime  mirtazapine 15 milliGRAM(s) Oral at bedtime  Opicapone (Ongentys) 50 milliGRAM(s) 50 milliGRAM(s) Oral at bedtime  pantoprazole  Injectable 40 milliGRAM(s) IV Push every 24 hours  polyethylene glycol 3350 17 Gram(s) Oral daily  QUEtiapine 25 milliGRAM(s) Oral at bedtime  QUEtiapine 12.5 milliGRAM(s) Oral <User Schedule>    MEDICATIONS  (PRN):  acetaminophen     Tablet .. 650 milliGRAM(s) Oral every 6 hours PRN Temp greater or equal to 38C (100.4F), Mild Pain (1 - 3)  clonazePAM Oral Disintegrating Tablet 0.25 milliGRAM(s) Oral daily PRN anxiety  dextrose Oral Gel 15 Gram(s) Oral once PRN Blood Glucose LESS THAN 70 milliGRAM(s)/deciliter  QUEtiapine 12.5 milliGRAM(s) Oral every 6 hours PRN anxiety and agitation  
MEDICATIONS  (STANDING):  atorvastatin 20 milliGRAM(s) Oral at bedtime  carbidopa/levodopa  25/100 2.5 Tablet(s) Oral <User Schedule>  clopidogrel Tablet 75 milliGRAM(s) Oral daily  dextrose 5%. 1000 milliLiter(s) (100 mL/Hr) IV Continuous <Continuous>  dextrose 5%. 1000 milliLiter(s) (50 mL/Hr) IV Continuous <Continuous>  dextrose 50% Injectable 25 Gram(s) IV Push once  dextrose 50% Injectable 12.5 Gram(s) IV Push once  dextrose 50% Injectable 25 Gram(s) IV Push once  glucagon  Injectable 1 milliGRAM(s) IntraMuscular once  heparin   Injectable 5000 Unit(s) SubCutaneous every 12 hours  influenza  Vaccine (HIGH DOSE) 0.7 milliLiter(s) IntraMuscular once  insulin lispro (ADMELOG) corrective regimen sliding scale   SubCutaneous three times a day before meals  insulin lispro (ADMELOG) corrective regimen sliding scale   SubCutaneous at bedtime  mirtazapine 15 milliGRAM(s) Oral at bedtime  Opicapone (Ongentys) 50 milliGRAM(s) 50 milliGRAM(s) Oral at bedtime  pantoprazole  Injectable 40 milliGRAM(s) IV Push every 24 hours  polyethylene glycol 3350 17 Gram(s) Oral daily  QUEtiapine 12.5 milliGRAM(s) Oral two times a day    MEDICATIONS  (PRN):  acetaminophen     Tablet .. 650 milliGRAM(s) Oral every 6 hours PRN Temp greater or equal to 38C (100.4F), Mild Pain (1 - 3)  clonazePAM Oral Disintegrating Tablet 0.25 milliGRAM(s) Oral daily PRN anxiety  dextrose Oral Gel 15 Gram(s) Oral once PRN Blood Glucose LESS THAN 70 milliGRAM(s)/deciliter  QUEtiapine 12.5 milliGRAM(s) Oral every 6 hours PRN anxiety and agitation

## 2022-10-17 NOTE — PROGRESS NOTE ADULT - PROBLEM SELECTOR PLAN 4
-Pt w/ chronic PAD, functionally bedbound and w/o acute symptoms  -Vasc surg consulted, recs no acute surgical interventions  -c/w Plavix / statin  -Outpatient vasc surg follow up
-Work up during hospitalization sig for 4 x 4 cm intraperitoneal mass  -Family in favor of outpatient evaluation w/ possible biopsy    -Rediscussed w/ wife 10/7, wife expresses concern about what mass could possibly be. Informed it remains unclear at this time but neoplasm cannot be ruled out. Wife would like pt to follow up with outpatient gastroenterologist regarding mass.
-Pt w/ chronic PAD, functionally bedbound and w/o acute symptoms  -Vasc surg consulted, recs no acute surgical interventions  -c/w Plavix / statin  -Outpatient vasc surg follow up
-Work up during hospitalization sig for 4 x 4 cm intraperitoneal mass  -Family in favor of outpatient evaluation w/ possible biopsy    -Rediscussed w/ wife 10/7, wife expressed concern about what mass could possibly be. Informed it remains unclear at this time but neoplasm cannot be ruled out. Wife would like pt to follow up with outpatient gastroenterologist regarding mass.
-Pt w/ chronic PAD, functionally bedbound and w/o acute symptoms  -Vasc surg consulted, recs no acute surgical interventions  -c/w Plavix / statin  -Outpatient vasc surg follow up
-Pt w/ chronic PAD, functionally bedbound and w/o acute symptoms  -Vasc surg consulted, recs no acute surgical interventions  -c/w Plavix / statin  -Outpatient vasc surg follow up
-Work up during hospitalization sig for 4 x 4 cm intraperitoneal mass  -Family in favor of outpatient evaluation w/ possible biopsy
-Pt w/ chronic PAD, functionally bedbound and w/o acute symptoms  -Vasc surg consulted, recs no acute surgical interventions  -c/w Plavix / statin  -Outpatient vasc surg follow up
-Work up during hospitalization sig for 4 x 4 cm intraperitoneal mass  -Family in favor of outpatient evaluation w/ possible biopsy    -Rediscussed w/ wife 10/7, wife expressed concern about what mass could possibly be. Informed it remains unclear at this time but neoplasm cannot be ruled out. Wife would like pt to follow up with outpatient gastroenterologist regarding mass.
-Work up during hospitalization sig for 4 x 4 cm intraperitoneal mass  -Family in favor of outpatient evaluation w/ possible biopsy
-Pt w/ chronic PAD, functionally bedbound and w/o acute symptoms  -Vasc surg consulted, recs no acute surgical interventions  -c/w Plavix / statin  -Outpatient vasc surg follow up
-Work up during hospitalization sig for 4 x 4 cm intraperitoneal mass  -Family in favor of outpatient evaluation w/ possible biopsy
-Work up during hospitalization sig for 4 x 4 cm intraperitoneal mass  -Family in favor of outpatient evaluation w/ possible biopsy    -Rediscussed w/ wife 10/7, wife expressed concern about what mass could possibly be. Informed it remains unclear at this time but neoplasm cannot be ruled out. Wife would like pt to follow up with outpatient gastroenterologist regarding mass.
-procal wnl, ct chest neg for pna, u/a wnl  -lower concern for bacterial component      #Intraperitoneal mass  -4 by 4 cm intraperitoneal mass  -can get outpatient evaluation of this and possible biopsy per wife
-Work up during hospitalization sig for 4 x 4 cm intraperitoneal mass  -Family in favor of outpatient evaluation w/ possible biopsy    -Rediscussed w/ wife 10/7, wife expressed concern about what mass could possibly be. Informed it remains unclear at this time but neoplasm cannot be ruled out. Wife would like pt to follow up with outpatient gastroenterologist regarding mass.
-Work up during hospitalization sig for 4 x 4 cm intraperitoneal mass  -Family in favor of outpatient evaluation w/ possible biopsy
-procal wnl, ct chest neg for pna, u/a wnl  -lower concern for bacterial component      #Intraperitoneal mass  -4 by 4 cm intraperitoneal mass  -can get outpatient evaluation of this and possible biopsy per wife

## 2022-10-17 NOTE — BH CONSULTATION LIAISON PROGRESS NOTE - NSBHASSESSMENTFT_PSY_ALL_CORE
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 Past psychiatric history of anxiety and depression diagnosed by neurologist and PCP. Past Medical hx insulin dependent DM2, Parkinson's dx, sick sinus syndrome w/ pacemaker.  No prior psychiatric hospitalization, does not see an outpatient psychiatrist. BIBEMS w/ family, p/w hallucinations now improving, found to have colonic mass and arterial disease. Psychiatry consulted for hallucinations. Per chart note: "actively hallucinating - thinks his family was murdered and he is seeing them dead".    10/12: Pt seen and assessed at bedside. Somnolent, but able to participate in limited interview. A&Ox4. Denies SI/HI/AH/VH at this time. Per spouse, pt began experiencing increased hallucinations, paranoia, depression, after starting neuplastin 3 weeks ago. Per spouse, plan for subacute rehab. Spouse undecided at this time, expresses concern for somnolence and current immobility. Spouse amenable to medication management.     10/13: No PRNs overnight. Pt alert today. Speech difficulty at baseline d/t PD diagnosis. Denies AH/VH at this time, however seems paranoid throughout interview. Denies SI/HI.    10/14: Compliant with meds, no PRNs required. Motor symptoms of Parkinsonism mildly improved. However, has notable psychotic features, specifically paranoia towards team and family members, which he is acting on (i.e. calling family, easily distractible, frequently scanning room). Family educated about difficulties in managing parkinsonism and psychosis simultaneously; believes psychotic symptoms should be managed more aggressively between the two.     10/15 no apparent psychosis, still has tremor and some cogwheel rigidity, but does not appear to be impairing, continue with current plan    10/16 unable to assess psychosis due to impaired speech. Tremor not apparent today. Cogwheel rigidity ongoing. Will increase qHS Seroquel as pt is getting additional Seroquel PRNs frequently.    Plan:  --Defer obs to primary team  --C/W Seroquel 12.5mg PO AM and 25mg PO qHS; can continue as outpt  --Continue Sinemet 25/100 dose 2 TABS per current schedule (2am, 6am, 10am, 2pm, 6pm, 10pm) (decreased from 2.5 tabs) to decrease psychotic features.  ***Family aware that decrease in Sinemet may worsen Parkinsonism but believe that patient's psychiatric symptoms need to be prioritized over motor symptoms.  --C/w Klonopin 0.25 mg PO once daily PRN for anxiety  --C/w Seroquel 12.5 mg PO q 6 hr PRN for anxiety/agitation  -- In order to enhance patient's overall well-being and clinical course, please try avoiding anticholinergics, and antihistamines (Can cause worsening confusion/delirium). Additionally, continue reorientation, supportive care, maintaining regular sleep/wake cycle, and optimizing nutritional/medical factors.   --CL psychiatry to sign off as acuity is lower now. No indication for inpt psych. No psych contraindications to discharge when medically cleared.
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 Past psychiatric history of anxiety and depression diagnosed by neurologist and PCP. Past Medical hx insulin dependent DM2, Parkinson's dx, sick sinus syndrome w/ pacemaker.  No prior psychiatric hospitalization, does not see an outpatient psychiatrist. BIBEMS w/ family, p/w hallucinations now improving, found to have colonic mass and arterial disease. Psychiatry consulted for hallucinations. Per chart note: "actively hallucinating - thinks his family was murdered and he is seeing them dead".    10/12: Pt seen and assessed at bedside. Somnolent, but able to participate in limited interview. A&Ox4. Denies SI/HI/AH/VH at this time. Per spouse, pt began experiencing increased hallucinations, paranoia, depression, after starting neuplastin 3 weeks ago. Per spouse, plan for subacute rehab. Spouse undecided at this time, expresses concern for somnolence and current immobility. Spouse amenable to medication management.     10/13: No PRNs overnight. Pt alert today. Speech difficulty at baseline d/t PD diagnosis. Denies AH/VH at this time, however seems paranoid throughout interview. Denies SI/HI.    10/14: Compliant with meds, no PRNs required. Motor symptoms of Parkinsonism mildly improved. However, has notable psychotic features, specifically paranoia towards team and family members, which he is acting on (i.e. calling family, easily distractible, frequently scanning room). Family educated about difficulties in managing parkinsonism and psychosis simultaneously; believes psychotic symptoms should be managed more aggressively between the two.     10/15 no apparent psychosis, still has tremor and some cogwheel rigidity, but does not appear to be impairing, continue with current plan    Plan:  --Defer obs to primary team  --C/w Seroquel 12.5 mg PO BID  --DECREASE Sinemet 25/100 dose from 2.5 tabs to 2 TABS per current schedule (2am, 6am, 10am, 2pm, 6pm, 10pm) to decrease psychotic features.  ***Family aware that decrease in Sinemet may worsen Parkinsonism but believe that patient's psychiatric symptoms need to be prioritized over motor symptoms.  --C/w Klonopin 0.25 mg PO once daily PRN for anxiety  --C/w Seroquel 12.5 mg PO q 6 hr PRN for anxiety/agitation  --Encourage medical team to use PRN Seroquel liberally   --Monitor EKG  -- Antipsychotics can only be given if qtc < 500   -- In order to enhance patient's overall well-being and clinical course, please try avoiding anticholinergics, and antihistamines (Can cause worsening confusion/delirium). Additionally, continue reorientation, supportive care, maintaining regular sleep/wake cycle, and optimizing nutritional/medical factors.   --CL psychiatry to follow
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 Past psychiatric history of anxiety and depression diagnosed by neurologist and PCP. Past Medical hx insulin dependent DM2, Parkinson's dx, sick sinus syndrome w/ pacemaker.  No prior psychiatric hospitalization, does not see an outpatient psychiatrist. BIBEMS w/ family, p/w hallucinations now improving, found to have colonic mass and arterial disease. Psychiatry consulted for hallucinations. Per chart note: "actively hallucinating - thinks his family was murdered and he is seeing them dead".    10/12: Pt seen and assessed at bedside. Somnolent, but able to participate in limited interview. A&Ox4. Denies SI/HI/AH/VH at this time. Per spouse, pt began experiencing increased hallucinations, paranoia, depression, after starting neuplastin 3 weeks ago. Per spouse, plan for subacute rehab. Spouse undecided at this time, expresses concern for somnolence and current immobility. Spouse amenable to medication management.     10/13: No PRNs overnight. Pt alert today. Speech difficulty at baseline d/t PD diagnosis. Denies AH/VH at this time, however seems paranoid throughout interview. Denies SI/HI.    Plan:  --Defer obs to primary team  --C/w Seroquel 12.5 mg PO BID  --C/w Klonopin 0.25 mg PO once daily PRN for anxiety  --C/w Seroquel 12.5 mg PO q 6 hr PRN for anxiety/agitation  --Encourage medical team to use PRN Seroquel liberally   --Monitor EKG  -- Antipsychotics can only be given if qtc < 500   -- In order to enhance patient's overall well-being and clinical course, please try avoiding anticholinergics, and antihistamines (Can cause worsening confusion/delirium). Additionally, continue reorientation, supportive care, maintaining regular sleep/wake cycle, and optimizing nutritional/medical factors.   --CL psychiatry to follow
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 Past psychiatric history of anxiety and depression diagnosed by neurologist and PCP. Past Medical hx insulin dependent DM2, Parkinson's dx, sick sinus syndrome w/ pacemaker.  No prior psychiatric hospitalization, does not see an outpatient psychiatrist. BIBEMS w/ family, p/w hallucinations now improving, found to have colonic mass and arterial disease. Psychiatry consulted for hallucinations. Per chart note: "actively hallucinating - thinks his family was murdered and he is seeing them dead".    10/12: Pt seen and assessed at bedside. Somnolent, but able to participate in limited interview. A&Ox4. Denies SI/HI/AH/VH at this time. Per spouse, pt began experiencing increased hallucinations, paranoia, depression, after starting neuplastin 3 weeks ago. Per spouse, plan for subacute rehab. Spouse undecided at this time, expresses concern for somnolence and current immobility. Spouse amenable to medication management.     10/13: No PRNs overnight. Pt alert today. Speech difficulty at baseline d/t PD diagnosis. Denies AH/VH at this time, however seems paranoid throughout interview. Denies SI/HI.    10/14: Compliant with meds, no PRNs required. Motor symptoms of Parkinsonism mildly improved. However, has notable psychotic features, specifically paranoia towards team and family members, which he is acting on (i.e. calling family, easily distractible, frequently scanning room). Family educated about difficulties in managing parkinsonism and psychosis simultaneously; believes psychotic symptoms should be managed more aggressively between the two.     Plan:  --Defer obs to primary team  --C/w Seroquel 12.5 mg PO BID  --DECREASE Sinemet 25/100 dose from 2.5 tabs to 2 TABS per current schedule (2am, 6am, 10am, 2pm, 6pm, 10pm) to decrease psychotic features.  ***Family aware that decrease in Sinemet may worsen Parkinsonism but believe that patient's psychiatric symptoms need to be prioritized over motor symptoms.  --C/w Klonopin 0.25 mg PO once daily PRN for anxiety  --C/w Seroquel 12.5 mg PO q 6 hr PRN for anxiety/agitation  --Encourage medical team to use PRN Seroquel liberally   --Monitor EKG  -- Antipsychotics can only be given if qtc < 500   -- In order to enhance patient's overall well-being and clinical course, please try avoiding anticholinergics, and antihistamines (Can cause worsening confusion/delirium). Additionally, continue reorientation, supportive care, maintaining regular sleep/wake cycle, and optimizing nutritional/medical factors.   --CL psychiatry to follow
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 Past psychiatric history of anxiety and depression diagnosed by neurologist and PCP. Past Medical hx insulin dependent DM2, Parkinson's dx, sick sinus syndrome w/ pacemaker.  No prior psychiatric hospitalization, does not see an outpatient psychiatrist. BIBEMS w/ family, p/w hallucinations now improving, found to have colonic mass and arterial disease. Psychiatry consulted for hallucinations. Per chart note: "actively hallucinating - thinks his family was murdered and he is seeing them dead".    10/12: Pt seen and assessed at bedside. Somnolent, but able to participate in limited interview. A&Ox4. Denies SI/HI/AH/VH at this time. Per spouse, pt began experiencing increased hallucinations, paranoia, depression, after starting neuplastin 3 weeks ago. Per spouse, plan for subacute rehab. Spouse undecided at this time, expresses concern for somnolence and current immobility. Spouse amenable to medication management.     10/13: No PRNs overnight. Pt alert today. Speech difficulty at baseline d/t PD diagnosis. Denies AH/VH at this time, however seems paranoid throughout interview. Denies SI/HI.    10/14: Compliant with meds, no PRNs required. Motor symptoms of Parkinsonism mildly improved. However, has notable psychotic features, specifically paranoia towards team and family members, which he is acting on (i.e. calling family, easily distractible, frequently scanning room). Family educated about difficulties in managing parkinsonism and psychosis simultaneously; believes psychotic symptoms should be managed more aggressively between the two.     10/15 no apparent psychosis, still has tremor and some cogwheel rigidity, but does not appear to be impairing, continue with current plan    10/16 unable to assess psychosis due to impaired speech. Tremor not apparent today. Cogwheel rigidity ongoing. Will increase qHS Seroquel as pt is getting additional Seroquel PRNs frequently.    Plan:  --Defer obs to primary team  --Increase Seroquel 12.5 mg PO BID to 12.5mg PO in AM and 25mg PO qHS  --Continue Sinemet 25/100 dose 2 TABS per current schedule (2am, 6am, 10am, 2pm, 6pm, 10pm) (decreased from 2.5 tabs) to decrease psychotic features.  ***Family aware that decrease in Sinemet may worsen Parkinsonism but believe that patient's psychiatric symptoms need to be prioritized over motor symptoms.  --C/w Klonopin 0.25 mg PO once daily PRN for anxiety  --C/w Seroquel 12.5 mg PO q 6 hr PRN for anxiety/agitation  --Encourage medical team to use PRN Seroquel liberally   --Monitor EKG  -- Antipsychotics can only be given if qtc < 500   -- In order to enhance patient's overall well-being and clinical course, please try avoiding anticholinergics, and antihistamines (Can cause worsening confusion/delirium). Additionally, continue reorientation, supportive care, maintaining regular sleep/wake cycle, and optimizing nutritional/medical factors.   --CL psychiatry to follow

## 2022-10-17 NOTE — BH CONSULTATION LIAISON PROGRESS NOTE - NSBHATTESTBILLINGAW_PSY_A_CORE
[id number] Inpatient care - high complexity - 35 minutes
Non-billable
[id number] Inpatient care - high complexity - 35 minutes
[id number] Inpatient care - high complexity - 35 minutes

## 2022-10-17 NOTE — BH CONSULTATION LIAISON PROGRESS NOTE - NSBHATTESTTYPEVISIT_PSY_A_CORE
Attending Only
Attending with Resident/Fellow/Student
Resident/Fellow with telephonic supervision
Attending with Resident/Fellow/Student
Resident/Fellow with telephonic supervision

## 2022-10-22 ENCOUNTER — INPATIENT (INPATIENT)
Facility: HOSPITAL | Age: 82
LOS: 10 days | Discharge: NURSING, REHAB OR LONG-TERM CARE | End: 2022-11-02
Attending: HOSPITALIST | Admitting: HOSPITALIST

## 2022-10-22 VITALS
TEMPERATURE: 99 F | SYSTOLIC BLOOD PRESSURE: 137 MMHG | RESPIRATION RATE: 18 BRPM | HEIGHT: 67 IN | HEART RATE: 42 BPM | DIASTOLIC BLOOD PRESSURE: 64 MMHG | OXYGEN SATURATION: 99 %

## 2022-10-22 PROCEDURE — 93010 ELECTROCARDIOGRAM REPORT: CPT

## 2022-10-22 PROCEDURE — 99285 EMERGENCY DEPT VISIT HI MDM: CPT | Mod: CS

## 2022-10-22 NOTE — ED ADULT TRIAGE NOTE - CHIEF COMPLAINT QUOTE
Pt brought in by EMS from [facility] for pneumonia. As per ppw, pt had chest x-ray done that shows bibasilar pneumonia. EMS states pt was lethargic earlier today as per staff member. Pt noted to be bradycardiac, heart rate in the 40's. Hx of ppm, CAD, parkinson's disease

## 2022-10-23 ENCOUNTER — TRANSCRIPTION ENCOUNTER (OUTPATIENT)
Age: 82
End: 2022-10-23

## 2022-10-23 DIAGNOSIS — Z29.9 ENCOUNTER FOR PROPHYLACTIC MEASURES, UNSPECIFIED: ICD-10-CM

## 2022-10-23 DIAGNOSIS — E78.5 HYPERLIPIDEMIA, UNSPECIFIED: ICD-10-CM

## 2022-10-23 DIAGNOSIS — I25.10 ATHEROSCLEROTIC HEART DISEASE OF NATIVE CORONARY ARTERY WITHOUT ANGINA PECTORIS: ICD-10-CM

## 2022-10-23 DIAGNOSIS — G20 PARKINSON'S DISEASE: ICD-10-CM

## 2022-10-23 DIAGNOSIS — E11.9 TYPE 2 DIABETES MELLITUS WITHOUT COMPLICATIONS: ICD-10-CM

## 2022-10-23 DIAGNOSIS — J18.9 PNEUMONIA, UNSPECIFIED ORGANISM: ICD-10-CM

## 2022-10-23 LAB
ALBUMIN SERPL ELPH-MCNC: 4 G/DL — SIGNIFICANT CHANGE UP (ref 3.3–5)
ALP SERPL-CCNC: 66 U/L — SIGNIFICANT CHANGE UP (ref 40–120)
ALT FLD-CCNC: <5 U/L — LOW (ref 4–41)
ANION GAP SERPL CALC-SCNC: 12 MMOL/L — SIGNIFICANT CHANGE UP (ref 7–14)
APPEARANCE UR: ABNORMAL
APTT BLD: 27.4 SEC — SIGNIFICANT CHANGE UP (ref 27–36.3)
AST SERPL-CCNC: 19 U/L — SIGNIFICANT CHANGE UP (ref 4–40)
B PERT DNA SPEC QL NAA+PROBE: SIGNIFICANT CHANGE UP
B PERT+PARAPERT DNA PNL SPEC NAA+PROBE: SIGNIFICANT CHANGE UP
BASOPHILS # BLD AUTO: 0.05 K/UL — SIGNIFICANT CHANGE UP (ref 0–0.2)
BASOPHILS NFR BLD AUTO: 0.4 % — SIGNIFICANT CHANGE UP (ref 0–2)
BILIRUB SERPL-MCNC: 0.8 MG/DL — SIGNIFICANT CHANGE UP (ref 0.2–1.2)
BILIRUB UR-MCNC: NEGATIVE — SIGNIFICANT CHANGE UP
BLOOD GAS VENOUS COMPREHENSIVE RESULT: SIGNIFICANT CHANGE UP
BORDETELLA PARAPERTUSSIS (RAPRVP): SIGNIFICANT CHANGE UP
BUN SERPL-MCNC: 22 MG/DL — SIGNIFICANT CHANGE UP (ref 7–23)
C PNEUM DNA SPEC QL NAA+PROBE: SIGNIFICANT CHANGE UP
CALCIUM SERPL-MCNC: 9.1 MG/DL — SIGNIFICANT CHANGE UP (ref 8.4–10.5)
CHLORIDE SERPL-SCNC: 100 MMOL/L — SIGNIFICANT CHANGE UP (ref 98–107)
CO2 SERPL-SCNC: 22 MMOL/L — SIGNIFICANT CHANGE UP (ref 22–31)
COLOR SPEC: YELLOW — SIGNIFICANT CHANGE UP
CREAT SERPL-MCNC: 0.45 MG/DL — LOW (ref 0.5–1.3)
DIFF PNL FLD: ABNORMAL
EGFR: 104 ML/MIN/1.73M2 — SIGNIFICANT CHANGE UP
EOSINOPHIL # BLD AUTO: 0.07 K/UL — SIGNIFICANT CHANGE UP (ref 0–0.5)
EOSINOPHIL NFR BLD AUTO: 0.6 % — SIGNIFICANT CHANGE UP (ref 0–6)
FLUAV SUBTYP SPEC NAA+PROBE: SIGNIFICANT CHANGE UP
FLUBV RNA SPEC QL NAA+PROBE: SIGNIFICANT CHANGE UP
GLUCOSE SERPL-MCNC: 127 MG/DL — HIGH (ref 70–99)
GLUCOSE UR QL: NEGATIVE — SIGNIFICANT CHANGE UP
HADV DNA SPEC QL NAA+PROBE: SIGNIFICANT CHANGE UP
HCOV 229E RNA SPEC QL NAA+PROBE: SIGNIFICANT CHANGE UP
HCOV HKU1 RNA SPEC QL NAA+PROBE: SIGNIFICANT CHANGE UP
HCOV NL63 RNA SPEC QL NAA+PROBE: SIGNIFICANT CHANGE UP
HCOV OC43 RNA SPEC QL NAA+PROBE: SIGNIFICANT CHANGE UP
HCT VFR BLD CALC: 38 % — LOW (ref 39–50)
HGB BLD-MCNC: 11.5 G/DL — LOW (ref 13–17)
HMPV RNA SPEC QL NAA+PROBE: SIGNIFICANT CHANGE UP
HPIV1 RNA SPEC QL NAA+PROBE: SIGNIFICANT CHANGE UP
HPIV2 RNA SPEC QL NAA+PROBE: SIGNIFICANT CHANGE UP
HPIV3 RNA SPEC QL NAA+PROBE: SIGNIFICANT CHANGE UP
HPIV4 RNA SPEC QL NAA+PROBE: SIGNIFICANT CHANGE UP
IANC: 9.71 K/UL — HIGH (ref 1.8–7.4)
IMM GRANULOCYTES NFR BLD AUTO: 0.4 % — SIGNIFICANT CHANGE UP (ref 0–0.9)
INR BLD: 1.23 RATIO — HIGH (ref 0.88–1.16)
KETONES UR-MCNC: ABNORMAL
LEUKOCYTE ESTERASE UR-ACNC: ABNORMAL
LYMPHOCYTES # BLD AUTO: 1.41 K/UL — SIGNIFICANT CHANGE UP (ref 1–3.3)
LYMPHOCYTES # BLD AUTO: 11.9 % — LOW (ref 13–44)
M PNEUMO DNA SPEC QL NAA+PROBE: SIGNIFICANT CHANGE UP
MCHC RBC-ENTMCNC: 29.3 PG — SIGNIFICANT CHANGE UP (ref 27–34)
MCHC RBC-ENTMCNC: 30.3 GM/DL — LOW (ref 32–36)
MCV RBC AUTO: 96.7 FL — SIGNIFICANT CHANGE UP (ref 80–100)
MONOCYTES # BLD AUTO: 0.59 K/UL — SIGNIFICANT CHANGE UP (ref 0–0.9)
MONOCYTES NFR BLD AUTO: 5 % — SIGNIFICANT CHANGE UP (ref 2–14)
NEUTROPHILS # BLD AUTO: 9.71 K/UL — HIGH (ref 1.8–7.4)
NEUTROPHILS NFR BLD AUTO: 81.7 % — HIGH (ref 43–77)
NITRITE UR-MCNC: NEGATIVE — SIGNIFICANT CHANGE UP
NRBC # BLD: 0 /100 WBCS — SIGNIFICANT CHANGE UP (ref 0–0)
NRBC # FLD: 0 K/UL — SIGNIFICANT CHANGE UP (ref 0–0)
PH UR: 6.5 — SIGNIFICANT CHANGE UP (ref 5–8)
PLATELET # BLD AUTO: 363 K/UL — SIGNIFICANT CHANGE UP (ref 150–400)
POTASSIUM SERPL-MCNC: 4.9 MMOL/L — SIGNIFICANT CHANGE UP (ref 3.5–5.3)
POTASSIUM SERPL-SCNC: 4.9 MMOL/L — SIGNIFICANT CHANGE UP (ref 3.5–5.3)
PROT SERPL-MCNC: 7.7 G/DL — SIGNIFICANT CHANGE UP (ref 6–8.3)
PROT UR-MCNC: ABNORMAL
PROTHROM AB SERPL-ACNC: 14.3 SEC — HIGH (ref 10.5–13.4)
RAPID RVP RESULT: SIGNIFICANT CHANGE UP
RBC # BLD: 3.93 M/UL — LOW (ref 4.2–5.8)
RBC # FLD: 14.6 % — HIGH (ref 10.3–14.5)
RSV RNA SPEC QL NAA+PROBE: SIGNIFICANT CHANGE UP
RV+EV RNA SPEC QL NAA+PROBE: SIGNIFICANT CHANGE UP
SARS-COV-2 RNA SPEC QL NAA+PROBE: SIGNIFICANT CHANGE UP
SODIUM SERPL-SCNC: 134 MMOL/L — LOW (ref 135–145)
SP GR SPEC: 1.02 — SIGNIFICANT CHANGE UP (ref 1.01–1.05)
UROBILINOGEN FLD QL: ABNORMAL
WBC # BLD: 11.88 K/UL — HIGH (ref 3.8–10.5)
WBC # FLD AUTO: 11.88 K/UL — HIGH (ref 3.8–10.5)

## 2022-10-23 PROCEDURE — 71046 X-RAY EXAM CHEST 2 VIEWS: CPT | Mod: 26

## 2022-10-23 PROCEDURE — 99223 1ST HOSP IP/OBS HIGH 75: CPT

## 2022-10-23 RX ORDER — AZITHROMYCIN 500 MG/1
500 TABLET, FILM COATED ORAL EVERY 24 HOURS
Refills: 0 | Status: DISCONTINUED | OUTPATIENT
Start: 2022-10-23 | End: 2022-10-30

## 2022-10-23 RX ORDER — INSULIN GLARGINE 100 [IU]/ML
12 INJECTION, SOLUTION SUBCUTANEOUS AT BEDTIME
Refills: 0 | Status: DISCONTINUED | OUTPATIENT
Start: 2022-10-23 | End: 2022-10-24

## 2022-10-23 RX ORDER — QUETIAPINE FUMARATE 200 MG/1
12.5 TABLET, FILM COATED ORAL DAILY
Refills: 0 | Status: DISCONTINUED | OUTPATIENT
Start: 2022-10-23 | End: 2022-10-26

## 2022-10-23 RX ORDER — DEXTROSE 50 % IN WATER 50 %
25 SYRINGE (ML) INTRAVENOUS ONCE
Refills: 0 | Status: DISCONTINUED | OUTPATIENT
Start: 2022-10-23 | End: 2022-11-02

## 2022-10-23 RX ORDER — QUETIAPINE FUMARATE 200 MG/1
25 TABLET, FILM COATED ORAL AT BEDTIME
Refills: 0 | Status: DISCONTINUED | OUTPATIENT
Start: 2022-10-23 | End: 2022-10-26

## 2022-10-23 RX ORDER — ENOXAPARIN SODIUM 100 MG/ML
40 INJECTION SUBCUTANEOUS EVERY 24 HOURS
Refills: 0 | Status: DISCONTINUED | OUTPATIENT
Start: 2022-10-23 | End: 2022-11-02

## 2022-10-23 RX ORDER — LANOLIN ALCOHOL/MO/W.PET/CERES
3 CREAM (GRAM) TOPICAL AT BEDTIME
Refills: 0 | Status: DISCONTINUED | OUTPATIENT
Start: 2022-10-23 | End: 2022-11-02

## 2022-10-23 RX ORDER — ATORVASTATIN CALCIUM 80 MG/1
20 TABLET, FILM COATED ORAL AT BEDTIME
Refills: 0 | Status: DISCONTINUED | OUTPATIENT
Start: 2022-10-23 | End: 2022-11-02

## 2022-10-23 RX ORDER — GLUCAGON INJECTION, SOLUTION 0.5 MG/.1ML
1 INJECTION, SOLUTION SUBCUTANEOUS ONCE
Refills: 0 | Status: DISCONTINUED | OUTPATIENT
Start: 2022-10-23 | End: 2022-11-02

## 2022-10-23 RX ORDER — ACETAMINOPHEN 500 MG
650 TABLET ORAL EVERY 6 HOURS
Refills: 0 | Status: DISCONTINUED | OUTPATIENT
Start: 2022-10-23 | End: 2022-11-02

## 2022-10-23 RX ORDER — DEXTROSE 50 % IN WATER 50 %
15 SYRINGE (ML) INTRAVENOUS ONCE
Refills: 0 | Status: DISCONTINUED | OUTPATIENT
Start: 2022-10-23 | End: 2022-11-02

## 2022-10-23 RX ORDER — DEXTROSE 50 % IN WATER 50 %
12.5 SYRINGE (ML) INTRAVENOUS ONCE
Refills: 0 | Status: DISCONTINUED | OUTPATIENT
Start: 2022-10-23 | End: 2022-11-02

## 2022-10-23 RX ORDER — PANTOPRAZOLE SODIUM 20 MG/1
40 TABLET, DELAYED RELEASE ORAL
Refills: 0 | Status: DISCONTINUED | OUTPATIENT
Start: 2022-10-23 | End: 2022-11-02

## 2022-10-23 RX ORDER — CARBIDOPA AND LEVODOPA 25; 100 MG/1; MG/1
2 TABLET ORAL
Refills: 0 | Status: DISCONTINUED | OUTPATIENT
Start: 2022-10-23 | End: 2022-11-02

## 2022-10-23 RX ORDER — CLONAZEPAM 1 MG
0.25 TABLET ORAL DAILY
Refills: 0 | Status: DISCONTINUED | OUTPATIENT
Start: 2022-10-23 | End: 2022-10-30

## 2022-10-23 RX ORDER — CEFTRIAXONE 500 MG/1
1000 INJECTION, POWDER, FOR SOLUTION INTRAMUSCULAR; INTRAVENOUS EVERY 24 HOURS
Refills: 0 | Status: COMPLETED | OUTPATIENT
Start: 2022-10-23 | End: 2022-10-27

## 2022-10-23 RX ORDER — POLYETHYLENE GLYCOL 3350 17 G/17G
17 POWDER, FOR SOLUTION ORAL
Refills: 0 | Status: DISCONTINUED | OUTPATIENT
Start: 2022-10-23 | End: 2022-11-02

## 2022-10-23 RX ORDER — INSULIN LISPRO 100/ML
VIAL (ML) SUBCUTANEOUS AT BEDTIME
Refills: 0 | Status: DISCONTINUED | OUTPATIENT
Start: 2022-10-23 | End: 2022-11-02

## 2022-10-23 RX ORDER — SENNA PLUS 8.6 MG/1
2 TABLET ORAL AT BEDTIME
Refills: 0 | Status: DISCONTINUED | OUTPATIENT
Start: 2022-10-23 | End: 2022-11-02

## 2022-10-23 RX ORDER — INSULIN LISPRO 100/ML
VIAL (ML) SUBCUTANEOUS
Refills: 0 | Status: DISCONTINUED | OUTPATIENT
Start: 2022-10-23 | End: 2022-11-02

## 2022-10-23 RX ORDER — AZITHROMYCIN 500 MG/1
500 TABLET, FILM COATED ORAL ONCE
Refills: 0 | Status: COMPLETED | OUTPATIENT
Start: 2022-10-23 | End: 2022-10-23

## 2022-10-23 RX ORDER — MIRTAZAPINE 45 MG/1
15 TABLET, ORALLY DISINTEGRATING ORAL AT BEDTIME
Refills: 0 | Status: DISCONTINUED | OUTPATIENT
Start: 2022-10-23 | End: 2022-11-02

## 2022-10-23 RX ORDER — CEFTRIAXONE 500 MG/1
1000 INJECTION, POWDER, FOR SOLUTION INTRAMUSCULAR; INTRAVENOUS ONCE
Refills: 0 | Status: COMPLETED | OUTPATIENT
Start: 2022-10-23 | End: 2022-10-23

## 2022-10-23 RX ORDER — CARBIDOPA AND LEVODOPA 25; 100 MG/1; MG/1
1 TABLET ORAL
Refills: 0 | Status: DISCONTINUED | OUTPATIENT
Start: 2022-10-23 | End: 2022-10-23

## 2022-10-23 RX ORDER — SODIUM CHLORIDE 9 MG/ML
1000 INJECTION INTRAMUSCULAR; INTRAVENOUS; SUBCUTANEOUS ONCE
Refills: 0 | Status: COMPLETED | OUTPATIENT
Start: 2022-10-23 | End: 2022-10-23

## 2022-10-23 RX ORDER — CLOPIDOGREL BISULFATE 75 MG/1
75 TABLET, FILM COATED ORAL DAILY
Refills: 0 | Status: DISCONTINUED | OUTPATIENT
Start: 2022-10-23 | End: 2022-11-02

## 2022-10-23 RX ADMIN — Medication 0.25 MILLIGRAM(S): at 18:41

## 2022-10-23 RX ADMIN — ATORVASTATIN CALCIUM 20 MILLIGRAM(S): 80 TABLET, FILM COATED ORAL at 21:50

## 2022-10-23 RX ADMIN — QUETIAPINE FUMARATE 12.5 MILLIGRAM(S): 200 TABLET, FILM COATED ORAL at 11:59

## 2022-10-23 RX ADMIN — INSULIN GLARGINE 12 UNIT(S): 100 INJECTION, SOLUTION SUBCUTANEOUS at 22:46

## 2022-10-23 RX ADMIN — AZITHROMYCIN 255 MILLIGRAM(S): 500 TABLET, FILM COATED ORAL at 15:28

## 2022-10-23 RX ADMIN — SODIUM CHLORIDE 1000 MILLILITER(S): 9 INJECTION INTRAMUSCULAR; INTRAVENOUS; SUBCUTANEOUS at 00:49

## 2022-10-23 RX ADMIN — PANTOPRAZOLE SODIUM 40 MILLIGRAM(S): 20 TABLET, DELAYED RELEASE ORAL at 11:59

## 2022-10-23 RX ADMIN — CEFTRIAXONE 100 MILLIGRAM(S): 500 INJECTION, POWDER, FOR SOLUTION INTRAMUSCULAR; INTRAVENOUS at 14:44

## 2022-10-23 RX ADMIN — Medication 3 MILLIGRAM(S): at 21:51

## 2022-10-23 RX ADMIN — MIRTAZAPINE 15 MILLIGRAM(S): 45 TABLET, ORALLY DISINTEGRATING ORAL at 21:50

## 2022-10-23 RX ADMIN — ENOXAPARIN SODIUM 40 MILLIGRAM(S): 100 INJECTION SUBCUTANEOUS at 14:44

## 2022-10-23 RX ADMIN — CLOPIDOGREL BISULFATE 75 MILLIGRAM(S): 75 TABLET, FILM COATED ORAL at 11:58

## 2022-10-23 RX ADMIN — CARBIDOPA AND LEVODOPA 2 TABLET(S): 25; 100 TABLET ORAL at 21:48

## 2022-10-23 RX ADMIN — Medication 1: at 16:59

## 2022-10-23 RX ADMIN — CEFTRIAXONE 100 MILLIGRAM(S): 500 INJECTION, POWDER, FOR SOLUTION INTRAMUSCULAR; INTRAVENOUS at 00:49

## 2022-10-23 RX ADMIN — AZITHROMYCIN 255 MILLIGRAM(S): 500 TABLET, FILM COATED ORAL at 02:58

## 2022-10-23 RX ADMIN — CARBIDOPA AND LEVODOPA 2 TABLET(S): 25; 100 TABLET ORAL at 14:42

## 2022-10-23 NOTE — ED PROVIDER NOTE - NS ED ROS FT
CONSTITUTIONAL: +fever, no chills  EYES: no visual changes, no eye pain  EARS: no ear drainage, no ear pain, no change in hearing  NOSE: no nasal congestion  MOUTH/THROAT: no sore throat  CV: No chest pain, no palpitations  RESP: +cough   GI: No n/v/d, no abd pain  : no dysuria, no hematuria, no flank pain  MSK: no back pain, no extremity pain  SKIN: no rashes  NEURO: no headache, no focal weakness, no decreased sensation/parasthesias

## 2022-10-23 NOTE — ED PROVIDER NOTE - CLINICAL SUMMARY MEDICAL DECISION MAKING FREE TEXT BOX
Pt brought in by EMS from [facility] for pneumonia. As per ppw, pt had chest x-ray done that shows bibasilar pneumonia. EMS states pt was lethargic earlier today as per staff member. Pt noted to be bradycardiac, heart rate in the 40's. Hx of ppm, CAD, parkinson's disease      Plan for sepsis w/u. Plan for abx treatment with ceftriaxone and azithromycin for pneumonia EKG showed sinus rhythm with 1st degree AV block  Plan to admit to medicine.

## 2022-10-23 NOTE — H&P ADULT - PROBLEM SELECTOR PLAN 2
- - was initially on 2.5 tabs at (2am, 6am,10am, 2pm, 6pm, 10pm), but was decreased during recent admission to 2 tabs psych due to decrease psychotic features   - c/w Klonopin 0.25 mg PO qd for PRN   - on ([employer]) opicacone 50mg qd (wife to bring in bottle), not on formulary   - c/w Seroquel 12.5 mg qAM  - Was on 25 mg qhs for agitation, per wife this causes patient to be very sedated and sleep for several hours; will place as PRN for now can adjust as necessary  - c/w mirtazapine 15mg qd

## 2022-10-23 NOTE — H&P ADULT - PROBLEM SELECTOR PLAN 5
DVT ppx: Lovenox 40mg   Diet: CC diet  Dispo; Pending PT recs; and pending culture results and sensitivities - c/w atorvastatin 20mg qhs

## 2022-10-23 NOTE — ED ADULT NURSE REASSESSMENT NOTE - NS ED NURSE REASSESS COMMENT FT1
Pt resting in no acute distress resps even unlabored on RA medication given per MAR, incontinent  diaper and bed linen changed,   pending dispo pt and family updated

## 2022-10-23 NOTE — H&P ADULT - PROBLEM SELECTOR PLAN 3
- on 12 Units qhs, was dishcarged with novolog 5-9 units with meals, per NH records no novolog noted   - will c/w 12 units qhs and MED qmeals and qhs; adjust as needed - on 12 Units qhs, was discharged with novolog 5-9 units with meals, per NH records no Novolog noted   - will c/w 12 units qhs and MED qmeals and qhs; adjust as needed

## 2022-10-23 NOTE — ED ADULT NURSE NOTE - EXTENSIONS OF SELF_ADULT
DATE OF OPERATION:  06/04/18 - \[facility]\""

 

DATE OF BIRTH:  [birth year]

 

SURGEON:  Franki Fabian MD

 

ASSISTANT:  Gertrude Negron.

 

ANESTHESIOLOGIST:  Dr. Sotomayor.

 

ANESTHESIA:  Local MAC.

 

PRE-OP DIAGNOSIS:  Right radial sesamoiditis.

 

POST-OP DIAGNOSIS:  Right radial sesamoiditis.

 

OPERATIVE PROCEDURE:  Excision of right thumb radial MCP joint sesamoid.

 

INDICATIONS:  Carolina has progressive radial sesamoiditis.  Pain was not going 
way. She is always very extremely point tender right on the bone.  The radial 
collateral ligament was calm.  There was no instability there.  I talked to her 
about risks and benefits.  She wanted to proceed with excision of the bone.

 

ESTIMATED BLOOD LOSS:  2 mL.

 

COMPLICATIONS:  None.

 

FINDINGS:  As expected.

 

DESCRIPTION OF PROCEDURE:  Carolina was seen in the preoperative area.  The 
correct side, site, and procedure were identified.  We came back to the 
operating room. The arm was cleansed, then I infiltrated the operative area 
with 0.25% plain Marcaine.  The arm was prepped and draped in the usual 
fashion.  A time-out was performed.

 

The arm was exsanguinated with the Esmarch and the tourniquet was inflated to 
250 mmHg.  I made a V-shaped incision right over the radial sesamoid bone.  
There was an ulnarly based flap.  Dissection was carried down.  Full-thickness 
flaps were raised.  The radial digital nerve was identified and retracted 
ulnarly.  I longitudinally split the tendon over the sesamoid bone.  The bone 
was excised by releasing all the soft tissue around its margin.  The bone was 
removed and handed off en bloc as a specimen.  The area was then irrigated out.
  The soft tissue was closed with couple of figure-of-eight 4-0 Ethibond 
sutures.  The radial collateral ligament was again tested and there was good 
stability at both 0 and 30 degrees.  I decided no further exploration of that 
ligament was merited as it did not really seem like it was painful on exam 
preoperatively.  The wound was closed with 4-0 nylon suture and was dressed 
with soft dressings.  Tourniquet was deflated and the patient was taken to 
recovery room in stable condition.

 

 [id number]/CPS #: [id number]

[facility]
None

## 2022-10-23 NOTE — H&P ADULT - PROBLEM SELECTOR PLAN 1
- CXR with LLL opacity consistent with PNA, outpatient CXR correlated,   - WBC 11.88, nl lactate  - received CTX and azithromycin, will continue for treatment of CAP  - f/u Bcx x2 and UCx   - - CXR with LLL opacity consistent with PNA, outpatient CXR correlated,   - WBC 11.88, nl lactate  - received CTX and azithromycin, will continue for treatment of CAP  - f/u Bcx x2 and UCx

## 2022-10-23 NOTE — ED PROVIDER NOTE - ATTENDING CONTRIBUTION TO CARE
I performed a face-to-face evaluation of the patient and performed a history and physical examination along with the resident or ACP, and/or medical student above.  I agree with the history and physical examination as documented by the resident or ACP, and/or medical student above.  Priscilla:  81yo M w/ pmh as above, sent from NH accompanied by wife for eval of ams/lethargy/pna by cxr. Pt's wife stating that pt's seroquel dose increased earlier in the week because he reportedly experienced worsening AVH but wasn't found to be sick until cxr performed. Denies cough, cp, sob, f/c. Pt's heart rate in the 40s upon arrival, has since improved to 80s, ecg paced. labs, imaging, meds, tba.

## 2022-10-23 NOTE — PATIENT PROFILE ADULT - FALL HARM RISK - RISK INTERVENTIONS
Assistance OOB with selected safe patient handling equipment/Assistance with ambulation/Communicate Fall Risk and Risk Factors to all staff, patient, and family/Discuss with provider need for PT consult/Monitor gait and stability/Reinforce activity limits and safety measures with patient and family/Visual Cue: Yellow wristband/Bed in lowest position, wheels locked, appropriate side rails in place/Call bell, personal items and telephone in reach/Instruct patient to call for assistance before getting out of bed or chair/Non-slip footwear when patient is out of bed/[place] to call system/Physically safe environment - no spills, clutter or unnecessary equipment/Purposeful Proactive Rounding/Room/bathroom lighting operational, light cord in reach

## 2022-10-23 NOTE — H&P ADULT - ASSESSMENT
Mr. Millan is an 82 y/o M with pmhx of parkinson's dz, DM2, who presents from [facility] for fevers and increased lethargy. Found to have LLL PNA on CXR, admitted for PNA Mr. Millan is an 80 y/o M with pmhx of parkinson's dz, DM2, who presents from [facility] for fevers and increased lethargy. Found to have LLL PNA on CXR, admitted for PNA.

## 2022-10-23 NOTE — H&P ADULT - NSHPSOCIALHISTORY_GEN_ALL_CORE
Lives at home with wife and daughter   Ambulate with walker at home   Denies ETOH use, Illict or tobacco use

## 2022-10-23 NOTE — H&P ADULT - HISTORY OF PRESENT ILLNESS
Mr. Millan is an 82 y/o M with pmhx of parkinson's dz, DM2, who presents from [facility] for fevers and increased lethargy.         In ED, Tmaxm HR, BP, RR. CXT showed LLL opacity consistent with PNA. Recvied 1 dose of CTX and azithromycin with 1L bolus. Admitted to medicine for furhter managment.  Mr. Millan is an 80 y/o M with pmhx of parkinson's dz, DM2, who presents from [facility] for fevers and increased lethargy. Patient is not a great historian Wife Tamar at bedside states that she was with patient at nursing yesterday evening and he appeared more fatigued and tired than normal. She states that she was called this AM that patient was being admitted to [facility] for pneumonia. Per nursing home records patient had CXR showing bibasilar pneumonia with elevated WBC count. Patient denies any SOB, chills, cough, headaches fevers or chills.        In ED, Tmax 99.4, HR 42-86, CXR showed LLL opacity consistent with PNA. Received 1 dose of CTX and azithromycin with 1L bolus. Admitted to medicine for further management  Mr. Millan is an 80 y/o M with pmhx of parkinson's dz, DM2, CAD, sick sinus syndrome s/p PPM who presents from [facility] for fevers and increased lethargy. Patient is not a great historian Wife Tamar at bedside states that she was with patient at nursing yesterday evening and he appeared more fatigued and tired than normal. She states that she was called this AM that patient was being admitted to [facility] for pneumonia. Per nursing home records patient had CXR showing bibasilar pneumonia with elevated WBC count. Patient denies any SOB, chills, cough, headaches fevers or chills.      Patient recently discharged 10/1-10/17. Was admitted for hallucinations was found ot have abdominal mass. Plan was for outpatient GI followup for biopsy.       In ED, Tmax 99.4, HR 42-86, CXR showed LLL opacity consistent with PNA. Received 1 dose of CTX and azithromycin with 1L bolus. Admitted to medicine for further management

## 2022-10-23 NOTE — H&P ADULT - NSHPPHYSICALEXAM_GEN_ALL_CORE
T(C): 37 (10-23-22 @ 10:27), Max: 37.4 (10-22-22 @ 23:41)  HR: 90 (10-23-22 @ 10:27) (42 - 94)  BP: 145/91 (10-23-22 @ 10:27) (124/72 - 147/82)  RR: 17 (10-23-22 @ 10:27) (17 - 20)  SpO2: 100% (10-23-22 @ 10:27) (98% - 100%)    CONSTITUTIONAL: Sitting up in bed comfortably, Able to follow commands; mumbled speech   EYES: No conjunctival or scleral injection, non-icteric  RESP: No respiratory distress, no use of accessory muscles; CTA  anteriroly b/l,   CV: RRR, +S1S2, no MRG;  no peripheral edema  GI: Soft, diffusely tender, ND, no rebound, no guarding; no palpable masses; no hepatosplenomegaly; no hernia palpated  SKIN: No rashes or ulcers noted; no subcutaneous nodules or induration palpable  MSK: no joint effusions noted   NEURO: 5/5 sterngth in UE and LE; sensation intact in upper and lower extremities b/l to light touch   PSYCH: A+O x 3,

## 2022-10-23 NOTE — ED PROVIDER NOTE - OBJECTIVE STATEMENT
83 y/o M PMx 83 y/o PMHx DM, Parkinson's disease presenting with concerns of fever, cough 81 y/o PMHx DM, Parkinson's disease brought in by EMS from [facility] for pneumonia. As per wife, pt had chest x-ray done that shows bibasilar pneumonia. EMS states pt was lethargic earlier today as per staff member. Pt noted to be bradycardiac, heart rate in the 40's. Patient wife states patient has been more lethargic since recent discharge from hospital x1 week ago. States that patient seroquel dose has increased since discharge. States patient was febrile at nursing home. Denies any cough, congestion, chest pain, abdominal pain, nausea, vomiting, diarrhea. Denies any recent travel or sick contacts.   PMD: Mir Plata

## 2022-10-23 NOTE — ED ADULT NURSE REASSESSMENT NOTE - GENERAL PATIENT STATE
comfortable appearance/family/SO at bedside/smiling/interactive
comfortable appearance/family/SO at bedside/no change observed

## 2022-10-23 NOTE — H&P ADULT - PROBLEM SELECTOR PLAN 6
DVT ppx: Lovenox 40mg   Diet: CC diet  Dispo; Pending PT recs; and pending culture results and sensitivities

## 2022-10-23 NOTE — ED ADULT NURSE NOTE - NS ED NURSE PATIENT LEFT UNIT TIME
[Dear  ___] : Dear  [unfilled], [Consult Letter:] : I had the pleasure of evaluating your patient, [unfilled]. [Please see my note below.] : Please see my note below. [Consult Closing:] : Thank you very much for allowing me to participate in the care of this patient.  If you have any questions, please do not hesitate to contact me. [Sincerely,] : Sincerely, [FreeTextEntry3] : Anushka Barroso MD\par Pediatric Otolaryngology / Head and Neck Surgery\par \par [facility]\par [address]\[place] [address]\par Tel [phone]\par Fax [phone]\par \par [address] [facility], [address]\[place] [address] \par Tel [phone]\par Fax [phone]  06:45

## 2022-10-23 NOTE — ED ADULT NURSE NOTE - OBJECTIVE STATEMENT
THOMPSON RN: pt. is an 81 yo M rcvd to rm 14 [facility]IBEMS from [facility] for increased lethargy. Pt. A&Ox4 and non-ambulatory. Pmhx, DM2, Parkinsons, HTN, HLD, CAD. Per NH chest xray showed pt. potentially has pneumonia. Pt. endorsing decreased PO intake and weakness. Skin warm and dry. Afebrile rectally. Bigeminy on cardiac monitor- MD aware. Resp. equal and unlabored b/l. O2 sat 100% on RA. Arrives w/ #22g PIV to lt. wrist. Established #20g PIV to RFA. Labs sent. Comfort measures provided, safety measures implemented, call bell in reach. MD at bedside. Report given to primary RN.

## 2022-10-23 NOTE — H&P ADULT - NSHPLABSRESULTS_GEN_ALL_CORE
11.5   11.88 )-----------( 363      ( 23 Oct 2022 00:45 )             38.0       10-23    134<L>  |  100  |  22  ----------------------------<  127<H>  4.9   |  22  |  0.45<L>    Ca    9.1      23 Oct 2022 00:45    TPro  7.7  /  Alb  4.0  /  TBili  0.8  /  DBili  x   /  AST  19  /  ALT  <5<L>  /  AlkPhos  66  10-23              Urinalysis Basic - ( 23 Oct 2022 04:15 )    Color: Yellow / Appearance: Slightly Turbid / S.018 / pH: x  Gluc: x / Ketone: Small  / Bili: Negative / Urobili: 12 mg/dL   Blood: x / Protein: Trace / Nitrite: Negative   Leuk Esterase: Large / RBC: 12 /HPF / WBC >50 /HPF   Sq Epi: x / Non Sq Epi: 3 /HPF / Bacteria: Many        PT/INR - ( 23 Oct 2022 00:45 )   PT: 14.3 sec;   INR: 1.23 ratio         PTT - ( 23 Oct 2022 00:45 )  PTT:27.4 sec    Lactate Trend            CAPILLARY BLOOD GLUCOSE      POCT Blood Glucose.: 137 mg/dL (23 Oct 2022 06:23)        Culture Results:   <10,000 CFU/mL Normal Urogenital Kim ( @ 22:48)  Culture Results:   No Growth Final ( @ 20:58)  Culture Results:   No Growth Final ( @ 20:43)  < from: Xray Chest 2 Views PA/Lat (10.23.22 @ 02:02) >      CLINICAL INDICATION: cough    TECHNIQUE: 2 views; Frontal view of the chest were obtainedon 10/23/2022   2:02 AM    COMPARISON: 2022    FINDINGS:  Left chest wall pacemaker and mediastinal surgical clips.    The heart size is normal.    Airspace opacity is seen at the left lung base obscured in the   hemidiaphragm consistent with pneumonia. Generalized vascular congestion   slightly increased. Possible trace left effusion.    There is no pneumothorax.    IMPRESSION: Left lower lobe pneumonia with vascular congestion.    < end of copied text > 11.5   11.88 )-----------( 363      ( 23 Oct 2022 00:45 )             38.0       10-23    134<L>  |  100  |  22  ----------------------------<  127<H>  4.9   |  22  |  0.45<L>    Ca    9.1      23 Oct 2022 00:45    TPro  7.7  /  Alb  4.0  /  TBili  0.8  /  DBili  x   /  AST  19  /  ALT  <5<L>  /  AlkPhos  66  10-23              Urinalysis Basic - ( 23 Oct 2022 04:15 )    Color: Yellow / Appearance: Slightly Turbid / S.018 / pH: x  Gluc: x / Ketone: Small  / Bili: Negative / Urobili: 12 mg/dL   Blood: x / Protein: Trace / Nitrite: Negative   Leuk Esterase: Large / RBC: 12 /HPF / WBC >50 /HPF   Sq Epi: x / Non Sq Epi: 3 /HPF / Bacteria: Many        PT/INR - ( 23 Oct 2022 00:45 )   PT: 14.3 sec;   INR: 1.23 ratio         PTT - ( 23 Oct 2022 00:45 )  PTT:27.4 sec    Lactate Trend            CAPILLARY BLOOD GLUCOSE      POCT Blood Glucose.: 137 mg/dL (23 Oct 2022 06:23)        Culture Results:   <10,000 CFU/mL Normal Urogenital Kim ( @ 22:48)  Culture Results:   No Growth Final ( @ 20:58)  Culture Results:   No Growth Final ( @ 20:43)  < from: Xray Chest 2 Views PA/Lat (10.23.22 @ 02:02) >      CLINICAL INDICATION: cough    TECHNIQUE: 2 views; Frontal view of the chest were obtainedon 10/23/2022   2:02 AM    COMPARISON: 2022    FINDINGS:  Left chest wall pacemaker and mediastinal surgical clips.    The heart size is normal.    Airspace opacity is seen at the left lung base obscured in the   hemidiaphragm consistent with pneumonia. Generalized vascular congestion   slightly increased. Possible trace left effusion.    There is no pneumothorax.    IMPRESSION: Left lower lobe pneumonia with vascular congestion.    < end of copied text >      EKG personally reveiwed by me: Multiple PVC noted, 1st Degree AVB, NE interval 246, No ST-T wave changes appreciated; [id number]

## 2022-10-23 NOTE — ED ADULT NURSE REASSESSMENT NOTE - COMFORT CARE
pt had bm x 1 soft brown stool, cleanse and diaper changed/assisted to bedpan
plan of care explained/repositioned/side rails up/warm blanket provided

## 2022-10-23 NOTE — ED ADULT NURSE NOTE - NSIMPLEMENTINTERV_GEN_ALL_ED
Implemented All Fall with Harm Risk Interventions:  [place] to call system. Call bell, personal items and telephone within reach. Instruct patient to call for assistance. Room bathroom lighting operational. Non-slip footwear when patient is off stretcher. Physically safe environment: no spills, clutter or unnecessary equipment. Stretcher in lowest position, wheels locked, appropriate side rails in place. Provide visual cue, wrist band, yellow gown, etc. Monitor gait and stability. Monitor for mental status changes and reorient to person, place, and time. Review medications for side effects contributing to fall risk. Reinforce activity limits and safety measures with patient and family. Provide visual clues: red socks.

## 2022-10-23 NOTE — ED ADULT NURSE NOTE - CHIEF COMPLAINT QUOTE
Pt brought in by EMS from [facility] for pneumonia. As per ppw, pt had chest x-ray done that shows bibasilar pneumonia. EMS states pt was lethargic earlier today as per staff member. Pt noted to be bradycardiac, heart rate in the 40's. Hx of ppm, CAD, parkinson's disease

## 2022-10-23 NOTE — ED PROVIDER NOTE - PHYSICAL EXAMINATION
Physical Exam:    Gen: NAD, AOx2  Head: NCAT  HEENT: EOMI, PEERLA  Lung: CTAB, no respiratory distress, no wheezes/rhonchi/rales B/L, speaking in full sentences  CV: RRR, no murmurs, rubs or gallops  Abd: soft, NT, ND, no guarding, no rigidity, no rebound tenderness, no CVA tenderness   MSK: no visible deformities, ROM normal in UE/LE, no back pain  Neuro: No focal sensory or motor deficits  Skin: Warm, well perfused, no rash, no leg swelling  Psych: normal affect, calm Physical Exam:    Gen: NAD, AOx2  Head: NCAT  HEENT: EOMI, PEERLA  Lung: CTAB, no respiratory distress, no wheezes/rhonchi/rales B/L  CV: RRR, no murmurs, rubs or gallops  Abd: soft, NT, ND, no guarding, no rigidity, no rebound tenderness, no CVA tenderness   MSK: no visible deformities, ROM normal in UE/LE, no back pain  Neuro: No focal sensory or motor deficits  Skin: Warm, well perfused, no rash, no leg swelling

## 2022-10-24 DIAGNOSIS — N39.0 URINARY TRACT INFECTION, SITE NOT SPECIFIED: ICD-10-CM

## 2022-10-24 LAB
A1C WITH ESTIMATED AVERAGE GLUCOSE RESULT: 5 % — SIGNIFICANT CHANGE UP (ref 4–5.6)
ALBUMIN SERPL ELPH-MCNC: 3.1 G/DL — LOW (ref 3.3–5)
ALP SERPL-CCNC: 57 U/L — SIGNIFICANT CHANGE UP (ref 40–120)
ALT FLD-CCNC: <5 U/L — SIGNIFICANT CHANGE UP (ref 4–41)
ANION GAP SERPL CALC-SCNC: 9 MMOL/L — SIGNIFICANT CHANGE UP (ref 7–14)
AST SERPL-CCNC: 10 U/L — SIGNIFICANT CHANGE UP (ref 4–40)
BASOPHILS # BLD AUTO: 0.06 K/UL — SIGNIFICANT CHANGE UP (ref 0–0.2)
BASOPHILS NFR BLD AUTO: 0.7 % — SIGNIFICANT CHANGE UP (ref 0–2)
BILIRUB SERPL-MCNC: 0.4 MG/DL — SIGNIFICANT CHANGE UP (ref 0.2–1.2)
BUN SERPL-MCNC: 19 MG/DL — SIGNIFICANT CHANGE UP (ref 7–23)
CALCIUM SERPL-MCNC: 8.6 MG/DL — SIGNIFICANT CHANGE UP (ref 8.4–10.5)
CHLORIDE SERPL-SCNC: 105 MMOL/L — SIGNIFICANT CHANGE UP (ref 98–107)
CHOLEST SERPL-MCNC: 130 MG/DL — SIGNIFICANT CHANGE UP
CO2 SERPL-SCNC: 25 MMOL/L — SIGNIFICANT CHANGE UP (ref 22–31)
CREAT SERPL-MCNC: 0.42 MG/DL — LOW (ref 0.5–1.3)
EGFR: 106 ML/MIN/1.73M2 — SIGNIFICANT CHANGE UP
EOSINOPHIL # BLD AUTO: 0.17 K/UL — SIGNIFICANT CHANGE UP (ref 0–0.5)
EOSINOPHIL NFR BLD AUTO: 1.9 % — SIGNIFICANT CHANGE UP (ref 0–6)
ESTIMATED AVERAGE GLUCOSE: 97 — SIGNIFICANT CHANGE UP
GLUCOSE BLDC GLUCOMTR-MCNC: 105 MG/DL — HIGH (ref 70–99)
GLUCOSE BLDC GLUCOMTR-MCNC: 129 MG/DL — HIGH (ref 70–99)
GLUCOSE BLDC GLUCOMTR-MCNC: 89 MG/DL — SIGNIFICANT CHANGE UP (ref 70–99)
GLUCOSE SERPL-MCNC: 60 MG/DL — LOW (ref 70–99)
HCT VFR BLD CALC: 32.8 % — LOW (ref 39–50)
HDLC SERPL-MCNC: 38 MG/DL — LOW
HGB BLD-MCNC: 10 G/DL — LOW (ref 13–17)
IANC: 6.21 K/UL — SIGNIFICANT CHANGE UP (ref 1.8–7.4)
IMM GRANULOCYTES NFR BLD AUTO: 0.3 % — SIGNIFICANT CHANGE UP (ref 0–0.9)
LIPID PNL WITH DIRECT LDL SERPL: 78 MG/DL — SIGNIFICANT CHANGE UP
LYMPHOCYTES # BLD AUTO: 1.82 K/UL — SIGNIFICANT CHANGE UP (ref 1–3.3)
LYMPHOCYTES # BLD AUTO: 20.4 % — SIGNIFICANT CHANGE UP (ref 13–44)
MCHC RBC-ENTMCNC: 29.6 PG — SIGNIFICANT CHANGE UP (ref 27–34)
MCHC RBC-ENTMCNC: 30.5 GM/DL — LOW (ref 32–36)
MCV RBC AUTO: 97 FL — SIGNIFICANT CHANGE UP (ref 80–100)
MONOCYTES # BLD AUTO: 0.65 K/UL — SIGNIFICANT CHANGE UP (ref 0–0.9)
MONOCYTES NFR BLD AUTO: 7.3 % — SIGNIFICANT CHANGE UP (ref 2–14)
NEUTROPHILS # BLD AUTO: 6.21 K/UL — SIGNIFICANT CHANGE UP (ref 1.8–7.4)
NEUTROPHILS NFR BLD AUTO: 69.4 % — SIGNIFICANT CHANGE UP (ref 43–77)
NON HDL CHOLESTEROL: 92 MG/DL — SIGNIFICANT CHANGE UP
NRBC # BLD: 0 /100 WBCS — SIGNIFICANT CHANGE UP (ref 0–0)
NRBC # FLD: 0 K/UL — SIGNIFICANT CHANGE UP (ref 0–0)
PLATELET # BLD AUTO: 273 K/UL — SIGNIFICANT CHANGE UP (ref 150–400)
POTASSIUM SERPL-MCNC: 3.8 MMOL/L — SIGNIFICANT CHANGE UP (ref 3.5–5.3)
POTASSIUM SERPL-SCNC: 3.8 MMOL/L — SIGNIFICANT CHANGE UP (ref 3.5–5.3)
PROT SERPL-MCNC: 6 G/DL — SIGNIFICANT CHANGE UP (ref 6–8.3)
RBC # BLD: 3.38 M/UL — LOW (ref 4.2–5.8)
RBC # FLD: 14.8 % — HIGH (ref 10.3–14.5)
SODIUM SERPL-SCNC: 139 MMOL/L — SIGNIFICANT CHANGE UP (ref 135–145)
TRIGL SERPL-MCNC: 69 MG/DL — SIGNIFICANT CHANGE UP
WBC # BLD: 8.94 K/UL — SIGNIFICANT CHANGE UP (ref 3.8–10.5)
WBC # FLD AUTO: 8.94 K/UL — SIGNIFICANT CHANGE UP (ref 3.8–10.5)

## 2022-10-24 PROCEDURE — 99233 SBSQ HOSP IP/OBS HIGH 50: CPT

## 2022-10-24 RX ORDER — INSULIN GLARGINE 100 [IU]/ML
8 INJECTION, SOLUTION SUBCUTANEOUS AT BEDTIME
Refills: 0 | Status: DISCONTINUED | OUTPATIENT
Start: 2022-10-25 | End: 2022-10-31

## 2022-10-24 RX ORDER — INSULIN GLARGINE 100 [IU]/ML
6 INJECTION, SOLUTION SUBCUTANEOUS ONCE
Refills: 0 | Status: COMPLETED | OUTPATIENT
Start: 2022-10-24 | End: 2022-10-24

## 2022-10-24 RX ORDER — INSULIN GLARGINE 100 [IU]/ML
8 INJECTION, SOLUTION SUBCUTANEOUS AT BEDTIME
Refills: 0 | Status: DISCONTINUED | OUTPATIENT
Start: 2022-10-24 | End: 2022-10-24

## 2022-10-24 RX ADMIN — CARBIDOPA AND LEVODOPA 2 TABLET(S): 25; 100 TABLET ORAL at 06:08

## 2022-10-24 RX ADMIN — CLOPIDOGREL BISULFATE 75 MILLIGRAM(S): 75 TABLET, FILM COATED ORAL at 13:35

## 2022-10-24 RX ADMIN — CARBIDOPA AND LEVODOPA 2 TABLET(S): 25; 100 TABLET ORAL at 21:54

## 2022-10-24 RX ADMIN — ATORVASTATIN CALCIUM 20 MILLIGRAM(S): 80 TABLET, FILM COATED ORAL at 21:55

## 2022-10-24 RX ADMIN — QUETIAPINE FUMARATE 12.5 MILLIGRAM(S): 200 TABLET, FILM COATED ORAL at 13:35

## 2022-10-24 RX ADMIN — SENNA PLUS 2 TABLET(S): 8.6 TABLET ORAL at 21:55

## 2022-10-24 RX ADMIN — AZITHROMYCIN 255 MILLIGRAM(S): 500 TABLET, FILM COATED ORAL at 14:49

## 2022-10-24 RX ADMIN — CARBIDOPA AND LEVODOPA 2 TABLET(S): 25; 100 TABLET ORAL at 13:48

## 2022-10-24 RX ADMIN — CARBIDOPA AND LEVODOPA 2 TABLET(S): 25; 100 TABLET ORAL at 17:46

## 2022-10-24 RX ADMIN — CEFTRIAXONE 100 MILLIGRAM(S): 500 INJECTION, POWDER, FOR SOLUTION INTRAMUSCULAR; INTRAVENOUS at 13:49

## 2022-10-24 RX ADMIN — MIRTAZAPINE 15 MILLIGRAM(S): 45 TABLET, ORALLY DISINTEGRATING ORAL at 21:55

## 2022-10-24 RX ADMIN — CARBIDOPA AND LEVODOPA 2 TABLET(S): 25; 100 TABLET ORAL at 02:28

## 2022-10-24 RX ADMIN — PANTOPRAZOLE SODIUM 40 MILLIGRAM(S): 20 TABLET, DELAYED RELEASE ORAL at 06:05

## 2022-10-24 RX ADMIN — INSULIN GLARGINE 6 UNIT(S): 100 INJECTION, SOLUTION SUBCUTANEOUS at 23:08

## 2022-10-24 RX ADMIN — ENOXAPARIN SODIUM 40 MILLIGRAM(S): 100 INJECTION SUBCUTANEOUS at 13:49

## 2022-10-24 NOTE — PROGRESS NOTE ADULT - PROBLEM SELECTOR PLAN 4
- on 12 Units qhs, was discharged with novolog 5-9 units with meals, per NH records no Novolog noted   - will c/w 12 units qhs and MED qmeals and qhs; adjust as needed

## 2022-10-24 NOTE — PROGRESS NOTE ADULT - SUBJECTIVE AND OBJECTIVE BOX
Patient is a 82y old  Male who presents with a chief complaint of fevers at [facility] (23 Oct 2022 09:09)      SUBJECTIVE / OVERNIGHT EVENTS:    MEDICATIONS  (STANDING):  atorvastatin 20 milliGRAM(s) Oral at bedtime  azithromycin  IVPB 500 milliGRAM(s) IV Intermittent every 24 hours  carbidopa/levodopa  25/100 2 Tablet(s) Oral <User Schedule>  cefTRIAXone   IVPB 1000 milliGRAM(s) IV Intermittent every 24 hours  clopidogrel Tablet 75 milliGRAM(s) Oral daily  dextrose 50% Injectable 25 Gram(s) IV Push once  dextrose 50% Injectable 12.5 Gram(s) IV Push once  dextrose 50% Injectable 25 Gram(s) IV Push once  dextrose Oral Gel 15 Gram(s) Oral once  enoxaparin Injectable 40 milliGRAM(s) SubCutaneous every 24 hours  glucagon  Injectable 1 milliGRAM(s) IntraMuscular once  insulin glargine Injectable (LANTUS) 12 Unit(s) SubCutaneous at bedtime  insulin lispro (ADMELOG) corrective regimen sliding scale   SubCutaneous three times a day before meals  insulin lispro (ADMELOG) corrective regimen sliding scale   SubCutaneous at bedtime  mirtazapine 15 milliGRAM(s) Oral at bedtime  opicapone (ogentys) 50 milliGRAM(s) 1 Capsule(s) Oral at bedtime  pantoprazole    Tablet 40 milliGRAM(s) Oral before breakfast  polyethylene glycol 3350 17 Gram(s) Oral two times a day  QUEtiapine 12.5 milliGRAM(s) Oral daily  senna 2 Tablet(s) Oral at bedtime    MEDICATIONS  (PRN):  acetaminophen     Tablet .. 650 milliGRAM(s) Oral every 6 hours PRN Temp greater or equal to 38C (100.4F), Mild Pain (1 - 3)  clonazePAM Oral Disintegrating Tablet 0.25 milliGRAM(s) Oral daily PRN anxiety  melatonin 3 milliGRAM(s) Oral at bedtime PRN Insomnia  QUEtiapine 25 milliGRAM(s) Oral at bedtime PRN agitiation      Vital Signs Last 24 Hrs  T(C): 36.9 (24 Oct 2022 06:00), Max: 36.9 (24 Oct 2022 06:00)  T(F): 98.4 (24 Oct 2022 06:00), Max: 98.4 (24 Oct 2022 06:00)  HR: 65 (24 Oct 2022 06:00) (65 - 72)  BP: 127/75 (24 Oct 2022 06:00) (127/75 - 140/76)  BP(mean): --  RR: 18 (24 Oct 2022 06:00) (18 - 18)  SpO2: 100% (24 Oct 2022 06:00) (100% - 100%)    Parameters below as of 24 Oct 2022 06:00  Patient On (Oxygen Delivery Method): room air      CAPILLARY BLOOD GLUCOSE      POCT Blood Glucose.: 105 mg/dL (24 Oct 2022 10:01)  POCT Blood Glucose.: 79 mg/dL (24 Oct 2022 08:33)  POCT Blood Glucose.: 113 mg/dL (23 Oct 2022 22:19)  POCT Blood Glucose.: 195 mg/dL (23 Oct 2022 16:49)    I&O's Summary      PHYSICAL EXAM:  GENERAL: NAD, well-developed  HEAD:  Atraumatic, Normocephalic  EYES: EOMI, PERRLA, conjunctiva and sclera clear  NECK: Supple, No JVD  CHEST/LUNG: Clear to auscultation bilaterally; No wheeze  HEART: Regular rate and rhythm; No murmurs, rubs, or gallops  ABDOMEN: Soft, Nontender, Nondistended; Bowel sounds present  EXTREMITIES:  2+ Peripheral Pulses, No clubbing, cyanosis, or edema  PSYCH: AAOx3  NEUROLOGY: non-focal  SKIN: No rashes or lesions    LABS:                        10.0   8.94  )-----------( 273      ( 24 Oct 2022 06:35 )             32.8     10-24    139  |  105  |  19  ----------------------------<  60<L>  3.8   |  25  |  0.42<L>    Ca    8.6      24 Oct 2022 06:35    TPro  6.0  /  Alb  3.1<L>  /  TBili  0.4  /  DBili  x   /  AST  10  /  ALT  <5  /  AlkPhos  57  10-24    PT/INR - ( 23 Oct 2022 00:45 )   PT: 14.3 sec;   INR: 1.23 ratio         PTT - ( 23 Oct 2022 00:45 )  PTT:27.4 sec      Urinalysis Basic - ( 23 Oct 2022 04:15 )    Color: Yellow / Appearance: Slightly Turbid / S.018 / pH: x  Gluc: x / Ketone: Small  / Bili: Negative / Urobili: 12 mg/dL   Blood: x / Protein: Trace / Nitrite: Negative   Leuk Esterase: Large / RBC: 12 /HPF / WBC >50 /HPF   Sq Epi: x / Non Sq Epi: 3 /HPF / Bacteria: Many        RADIOLOGY & ADDITIONAL TESTS:    Imaging Personally Reviewed:    Consultant(s) Notes Reviewed:      Care Discussed with Consultants/Other Providers:   Patient is a 82y old  Male who presents with a chief complaint of fevers at [facility] (23 Oct 2022 09:09)      SUBJECTIVE / OVERNIGHT EVENTS:  Patient has no new complaints. Denies cp, SOB, abdominal pain, N/V/D     MEDICATIONS  (STANDING):  atorvastatin 20 milliGRAM(s) Oral at bedtime  azithromycin  IVPB 500 milliGRAM(s) IV Intermittent every 24 hours  carbidopa/levodopa  25/100 2 Tablet(s) Oral <User Schedule>  cefTRIAXone   IVPB 1000 milliGRAM(s) IV Intermittent every 24 hours  clopidogrel Tablet 75 milliGRAM(s) Oral daily  dextrose 50% Injectable 25 Gram(s) IV Push once  dextrose 50% Injectable 12.5 Gram(s) IV Push once  dextrose 50% Injectable 25 Gram(s) IV Push once  dextrose Oral Gel 15 Gram(s) Oral once  enoxaparin Injectable 40 milliGRAM(s) SubCutaneous every 24 hours  glucagon  Injectable 1 milliGRAM(s) IntraMuscular once  insulin glargine Injectable (LANTUS) 12 Unit(s) SubCutaneous at bedtime  insulin lispro (ADMELOG) corrective regimen sliding scale   SubCutaneous three times a day before meals  insulin lispro (ADMELOG) corrective regimen sliding scale   SubCutaneous at bedtime  mirtazapine 15 milliGRAM(s) Oral at bedtime  opicapone (ogentys) 50 milliGRAM(s) 1 Capsule(s) Oral at bedtime  pantoprazole    Tablet 40 milliGRAM(s) Oral before breakfast  polyethylene glycol 3350 17 Gram(s) Oral two times a day  QUEtiapine 12.5 milliGRAM(s) Oral daily  senna 2 Tablet(s) Oral at bedtime    MEDICATIONS  (PRN):  acetaminophen     Tablet .. 650 milliGRAM(s) Oral every 6 hours PRN Temp greater or equal to 38C (100.4F), Mild Pain (1 - 3)  clonazePAM Oral Disintegrating Tablet 0.25 milliGRAM(s) Oral daily PRN anxiety  melatonin 3 milliGRAM(s) Oral at bedtime PRN Insomnia  QUEtiapine 25 milliGRAM(s) Oral at bedtime PRN agitiation      Vital Signs Last 24 Hrs  T(C): 36.9 (24 Oct 2022 06:00), Max: 36.9 (24 Oct 2022 06:00)  T(F): 98.4 (24 Oct 2022 06:00), Max: 98.4 (24 Oct 2022 06:00)  HR: 65 (24 Oct 2022 06:00) (65 - 72)  BP: 127/75 (24 Oct 2022 06:00) (127/75 - 140/76)  BP(mean): --  RR: 18 (24 Oct 2022 06:00) (18 - 18)  SpO2: 100% (24 Oct 2022 06:00) (100% - 100%)    Parameters below as of 24 Oct 2022 06:00  Patient On (Oxygen Delivery Method): room air      CAPILLARY BLOOD GLUCOSE      POCT Blood Glucose.: 105 mg/dL (24 Oct 2022 10:01)  POCT Blood Glucose.: 79 mg/dL (24 Oct 2022 08:33)  POCT Blood Glucose.: 113 mg/dL (23 Oct 2022 22:19)  POCT Blood Glucose.: 195 mg/dL (23 Oct 2022 16:49)    I&O's Summary      PHYSICAL EXAM:   GENERAL: NAD, well-developed  HEAD:  Atraumatic, Normocephalic  EYES: EOMI, PERRLA, conjunctiva and sclera clear  NECK: Supple, No JVD  CHEST/LUNG: Clear to auscultation bilaterally; No wheeze  HEART: Regular rate and rhythm; No murmurs, rubs, or gallops  ABDOMEN: Soft, Nontender, Nondistended; Bowel sounds present  EXTREMITIES:  2+ Peripheral Pulses, No clubbing, cyanosis, or edema  PSYCH: AAOx3  NEUROLOGY: non-focal  SKIN: No rashes or lesions    LABS:                        10.0   8.94  )-----------( 273      ( 24 Oct 2022 06:35 )             32.8     10-24    139  |  105  |  19  ----------------------------<  60<L>  3.8   |  25  |  0.42<L>    Ca    8.6      24 Oct 2022 06:35    TPro  6.0  /  Alb  3.1<L>  /  TBili  0.4  /  DBili  x   /  AST  10  /  ALT  <5  /  AlkPhos  57  10-24    PT/INR - ( 23 Oct 2022 00:45 )   PT: 14.3 sec;   INR: 1.23 ratio         PTT - ( 23 Oct 2022 00:45 )  PTT:27.4 sec      Urinalysis Basic - ( 23 Oct 2022 04:15 )    Color: Yellow / Appearance: Slightly Turbid / S.018 / pH: x  Gluc: x / Ketone: Small  / Bili: Negative / Urobili: 12 mg/dL   Blood: x / Protein: Trace / Nitrite: Negative   Leuk Esterase: Large / RBC: 12 /HPF / WBC >50 /HPF   Sq Epi: x / Non Sq Epi: 3 /HPF / Bacteria: Many        RADIOLOGY & ADDITIONAL TESTS:    Imaging Personally Reviewed:    Consultant(s) Notes Reviewed:      Care Discussed with Consultants/Other Providers:

## 2022-10-24 NOTE — PHYSICAL THERAPY INITIAL EVALUATION ADULT - ACTIVE RANGE OF MOTION EXAMINATION, REHAB EVAL
except bilateral active shoulder ROM limited/bilateral upper extremity Active ROM was WFL (within functional limits)/bilateral  lower extremity Active ROM was WFL (within functional limits)

## 2022-10-24 NOTE — PHYSICAL THERAPY INITIAL EVALUATION ADULT - ADDITIONAL COMMENTS
Pt. admitted from rehab.     Pt. was left in bed post PT Evaluation, no apparent distress, all lines intact. Ansley BECKFORD made aware.

## 2022-10-24 NOTE — PROGRESS NOTE ADULT - PROBLEM SELECTOR PLAN 3
- was initially on 2.5 tabs at (2am, 6am,10am, 2pm, 6pm, 10pm), but was decreased during recent admission to 2 tabs psych due to decrease psychotic features   - c/w Klonopin 0.25 mg PO qd for PRN   - on (Ongentys) opicacone 50mg qd (wife to bring in bottle), not on formulary   - c/w Seroquel 12.5 mg qAM  - Was on 25 mg qhs for agitation, per wife this causes patient to be very sedated and sleep for several hours; will place as PRN for now can adjust as necessary  - c/w mirtazapine 15mg qd

## 2022-10-25 LAB
-  AMIKACIN: SIGNIFICANT CHANGE UP
-  AMOXICILLIN/CLAVULANIC ACID: SIGNIFICANT CHANGE UP
-  AMPICILLIN/SULBACTAM: SIGNIFICANT CHANGE UP
-  AMPICILLIN: SIGNIFICANT CHANGE UP
-  AZTREONAM: SIGNIFICANT CHANGE UP
-  CEFAZOLIN: SIGNIFICANT CHANGE UP
-  CEFEPIME: SIGNIFICANT CHANGE UP
-  CEFOXITIN: SIGNIFICANT CHANGE UP
-  CEFTRIAXONE: SIGNIFICANT CHANGE UP
-  CIPROFLOXACIN: SIGNIFICANT CHANGE UP
-  ERTAPENEM: SIGNIFICANT CHANGE UP
-  GENTAMICIN: SIGNIFICANT CHANGE UP
-  IMIPENEM: SIGNIFICANT CHANGE UP
-  LEVOFLOXACIN: SIGNIFICANT CHANGE UP
-  MEROPENEM: SIGNIFICANT CHANGE UP
-  NITROFURANTOIN: SIGNIFICANT CHANGE UP
-  PIPERACILLIN/TAZOBACTAM: SIGNIFICANT CHANGE UP
-  TIGECYCLINE: SIGNIFICANT CHANGE UP
-  TOBRAMYCIN: SIGNIFICANT CHANGE UP
-  TRIMETHOPRIM/SULFAMETHOXAZOLE: SIGNIFICANT CHANGE UP
ALBUMIN SERPL ELPH-MCNC: 3.6 G/DL — SIGNIFICANT CHANGE UP (ref 3.3–5)
ALP SERPL-CCNC: 60 U/L — SIGNIFICANT CHANGE UP (ref 40–120)
ALT FLD-CCNC: <5 U/L — LOW (ref 4–41)
ANION GAP SERPL CALC-SCNC: 14 MMOL/L — SIGNIFICANT CHANGE UP (ref 7–14)
AST SERPL-CCNC: 12 U/L — SIGNIFICANT CHANGE UP (ref 4–40)
BASOPHILS # BLD AUTO: 0.05 K/UL — SIGNIFICANT CHANGE UP (ref 0–0.2)
BASOPHILS NFR BLD AUTO: 0.6 % — SIGNIFICANT CHANGE UP (ref 0–2)
BILIRUB SERPL-MCNC: 0.4 MG/DL — SIGNIFICANT CHANGE UP (ref 0.2–1.2)
BUN SERPL-MCNC: 15 MG/DL — SIGNIFICANT CHANGE UP (ref 7–23)
CALCIUM SERPL-MCNC: 8.5 MG/DL — SIGNIFICANT CHANGE UP (ref 8.4–10.5)
CHLORIDE SERPL-SCNC: 102 MMOL/L — SIGNIFICANT CHANGE UP (ref 98–107)
CO2 SERPL-SCNC: 22 MMOL/L — SIGNIFICANT CHANGE UP (ref 22–31)
CREAT SERPL-MCNC: 0.42 MG/DL — LOW (ref 0.5–1.3)
CULTURE RESULTS: SIGNIFICANT CHANGE UP
EGFR: 106 ML/MIN/1.73M2 — SIGNIFICANT CHANGE UP
EOSINOPHIL # BLD AUTO: 0.17 K/UL — SIGNIFICANT CHANGE UP (ref 0–0.5)
EOSINOPHIL NFR BLD AUTO: 2 % — SIGNIFICANT CHANGE UP (ref 0–6)
GLUCOSE BLDC GLUCOMTR-MCNC: 105 MG/DL — HIGH (ref 70–99)
GLUCOSE BLDC GLUCOMTR-MCNC: 111 MG/DL — HIGH (ref 70–99)
GLUCOSE BLDC GLUCOMTR-MCNC: 70 MG/DL — SIGNIFICANT CHANGE UP (ref 70–99)
GLUCOSE BLDC GLUCOMTR-MCNC: 97 MG/DL — SIGNIFICANT CHANGE UP (ref 70–99)
GLUCOSE SERPL-MCNC: 98 MG/DL — SIGNIFICANT CHANGE UP (ref 70–99)
HCT VFR BLD CALC: 36.3 % — LOW (ref 39–50)
HGB BLD-MCNC: 10.9 G/DL — LOW (ref 13–17)
IANC: 5.51 K/UL — SIGNIFICANT CHANGE UP (ref 1.8–7.4)
IMM GRANULOCYTES NFR BLD AUTO: 0.6 % — SIGNIFICANT CHANGE UP (ref 0–0.9)
LYMPHOCYTES # BLD AUTO: 1.88 K/UL — SIGNIFICANT CHANGE UP (ref 1–3.3)
LYMPHOCYTES # BLD AUTO: 22.4 % — SIGNIFICANT CHANGE UP (ref 13–44)
MCHC RBC-ENTMCNC: 29.1 PG — SIGNIFICANT CHANGE UP (ref 27–34)
MCHC RBC-ENTMCNC: 30 GM/DL — LOW (ref 32–36)
MCV RBC AUTO: 97.1 FL — SIGNIFICANT CHANGE UP (ref 80–100)
METHOD TYPE: SIGNIFICANT CHANGE UP
MONOCYTES # BLD AUTO: 0.72 K/UL — SIGNIFICANT CHANGE UP (ref 0–0.9)
MONOCYTES NFR BLD AUTO: 8.6 % — SIGNIFICANT CHANGE UP (ref 2–14)
NEUTROPHILS # BLD AUTO: 5.51 K/UL — SIGNIFICANT CHANGE UP (ref 1.8–7.4)
NEUTROPHILS NFR BLD AUTO: 65.8 % — SIGNIFICANT CHANGE UP (ref 43–77)
NRBC # BLD: 0 /100 WBCS — SIGNIFICANT CHANGE UP (ref 0–0)
NRBC # FLD: 0 K/UL — SIGNIFICANT CHANGE UP (ref 0–0)
ORGANISM # SPEC MICROSCOPIC CNT: SIGNIFICANT CHANGE UP
ORGANISM # SPEC MICROSCOPIC CNT: SIGNIFICANT CHANGE UP
PLATELET # BLD AUTO: 340 K/UL — SIGNIFICANT CHANGE UP (ref 150–400)
POTASSIUM SERPL-MCNC: 3.8 MMOL/L — SIGNIFICANT CHANGE UP (ref 3.5–5.3)
POTASSIUM SERPL-SCNC: 3.8 MMOL/L — SIGNIFICANT CHANGE UP (ref 3.5–5.3)
PROT SERPL-MCNC: 6.5 G/DL — SIGNIFICANT CHANGE UP (ref 6–8.3)
RBC # BLD: 3.74 M/UL — LOW (ref 4.2–5.8)
RBC # FLD: 14.5 % — SIGNIFICANT CHANGE UP (ref 10.3–14.5)
SODIUM SERPL-SCNC: 138 MMOL/L — SIGNIFICANT CHANGE UP (ref 135–145)
SPECIMEN SOURCE: SIGNIFICANT CHANGE UP
WBC # BLD: 8.38 K/UL — SIGNIFICANT CHANGE UP (ref 3.8–10.5)
WBC # FLD AUTO: 8.38 K/UL — SIGNIFICANT CHANGE UP (ref 3.8–10.5)

## 2022-10-25 PROCEDURE — 99233 SBSQ HOSP IP/OBS HIGH 50: CPT

## 2022-10-25 RX ORDER — INSULIN GLARGINE 100 [IU]/ML
6 INJECTION, SOLUTION SUBCUTANEOUS AT BEDTIME
Refills: 0 | Status: COMPLETED | OUTPATIENT
Start: 2022-10-25 | End: 2022-10-25

## 2022-10-25 RX ORDER — HALOPERIDOL DECANOATE 100 MG/ML
0.5 INJECTION INTRAMUSCULAR ONCE
Refills: 0 | Status: DISCONTINUED | OUTPATIENT
Start: 2022-10-25 | End: 2022-10-25

## 2022-10-25 RX ORDER — SODIUM CHLORIDE 9 MG/ML
1000 INJECTION, SOLUTION INTRAVENOUS
Refills: 0 | Status: DISCONTINUED | OUTPATIENT
Start: 2022-10-25 | End: 2022-10-28

## 2022-10-25 RX ADMIN — POLYETHYLENE GLYCOL 3350 17 GRAM(S): 17 POWDER, FOR SOLUTION ORAL at 06:52

## 2022-10-25 RX ADMIN — MIRTAZAPINE 15 MILLIGRAM(S): 45 TABLET, ORALLY DISINTEGRATING ORAL at 22:22

## 2022-10-25 RX ADMIN — CLOPIDOGREL BISULFATE 75 MILLIGRAM(S): 75 TABLET, FILM COATED ORAL at 13:09

## 2022-10-25 RX ADMIN — INSULIN GLARGINE 6 UNIT(S): 100 INJECTION, SOLUTION SUBCUTANEOUS at 23:04

## 2022-10-25 RX ADMIN — CARBIDOPA AND LEVODOPA 2 TABLET(S): 25; 100 TABLET ORAL at 17:33

## 2022-10-25 RX ADMIN — SODIUM CHLORIDE 70 MILLILITER(S): 9 INJECTION, SOLUTION INTRAVENOUS at 10:08

## 2022-10-25 RX ADMIN — CARBIDOPA AND LEVODOPA 2 TABLET(S): 25; 100 TABLET ORAL at 06:52

## 2022-10-25 RX ADMIN — AZITHROMYCIN 255 MILLIGRAM(S): 500 TABLET, FILM COATED ORAL at 14:07

## 2022-10-25 RX ADMIN — CEFTRIAXONE 100 MILLIGRAM(S): 500 INJECTION, POWDER, FOR SOLUTION INTRAMUSCULAR; INTRAVENOUS at 13:51

## 2022-10-25 RX ADMIN — ATORVASTATIN CALCIUM 20 MILLIGRAM(S): 80 TABLET, FILM COATED ORAL at 22:22

## 2022-10-25 RX ADMIN — CARBIDOPA AND LEVODOPA 2 TABLET(S): 25; 100 TABLET ORAL at 13:09

## 2022-10-25 RX ADMIN — PANTOPRAZOLE SODIUM 40 MILLIGRAM(S): 20 TABLET, DELAYED RELEASE ORAL at 06:52

## 2022-10-25 RX ADMIN — CARBIDOPA AND LEVODOPA 2 TABLET(S): 25; 100 TABLET ORAL at 02:30

## 2022-10-25 RX ADMIN — ENOXAPARIN SODIUM 40 MILLIGRAM(S): 100 INJECTION SUBCUTANEOUS at 13:53

## 2022-10-25 RX ADMIN — SENNA PLUS 2 TABLET(S): 8.6 TABLET ORAL at 22:22

## 2022-10-25 RX ADMIN — CARBIDOPA AND LEVODOPA 2 TABLET(S): 25; 100 TABLET ORAL at 22:22

## 2022-10-25 RX ADMIN — CARBIDOPA AND LEVODOPA 2 TABLET(S): 25; 100 TABLET ORAL at 09:24

## 2022-10-25 RX ADMIN — QUETIAPINE FUMARATE 12.5 MILLIGRAM(S): 200 TABLET, FILM COATED ORAL at 13:09

## 2022-10-25 NOTE — PROGRESS NOTE ADULT - SUBJECTIVE AND OBJECTIVE BOX
Patient is a 82y old  Male who presents with a chief complaint of fevers at [facility], [facility] (24 Oct 2022 10:35)      SUBJECTIVE / OVERNIGHT EVENTS:    MEDICATIONS  (STANDING):  atorvastatin 20 milliGRAM(s) Oral at bedtime  azithromycin  IVPB 500 milliGRAM(s) IV Intermittent every 24 hours  carbidopa/levodopa  25/100 2 Tablet(s) Oral <User Schedule>  cefTRIAXone   IVPB 1000 milliGRAM(s) IV Intermittent every 24 hours  clopidogrel Tablet 75 milliGRAM(s) Oral daily  dextrose 50% Injectable 25 Gram(s) IV Push once  dextrose 50% Injectable 12.5 Gram(s) IV Push once  dextrose 50% Injectable 25 Gram(s) IV Push once  dextrose Oral Gel 15 Gram(s) Oral once  enoxaparin Injectable 40 milliGRAM(s) SubCutaneous every 24 hours  glucagon  Injectable 1 milliGRAM(s) IntraMuscular once  insulin glargine Injectable (LANTUS) 8 Unit(s) SubCutaneous at bedtime  insulin lispro (ADMELOG) corrective regimen sliding scale   SubCutaneous three times a day before meals  insulin lispro (ADMELOG) corrective regimen sliding scale   SubCutaneous at bedtime  mirtazapine 15 milliGRAM(s) Oral at bedtime  opicapone (ogentys) 50 milliGRAM(s) 1 Capsule(s) Oral at bedtime  pantoprazole    Tablet 40 milliGRAM(s) Oral before breakfast  polyethylene glycol 3350 17 Gram(s) Oral two times a day  QUEtiapine 12.5 milliGRAM(s) Oral daily  senna 2 Tablet(s) Oral at bedtime    MEDICATIONS  (PRN):  acetaminophen     Tablet .. 650 milliGRAM(s) Oral every 6 hours PRN Temp greater or equal to 38C (100.4F), Mild Pain (1 - 3)  clonazePAM Oral Disintegrating Tablet 0.25 milliGRAM(s) Oral daily PRN anxiety  melatonin 3 milliGRAM(s) Oral at bedtime PRN Insomnia  QUEtiapine 25 milliGRAM(s) Oral at bedtime PRN agitiation      Vital Signs Last 24 Hrs  T(C): 36.8 (25 Oct 2022 06:50), Max: 36.9 (24 Oct 2022 11:15)  T(F): 98.2 (25 Oct 2022 06:50), Max: 98.5 (24 Oct 2022 11:15)  HR: 74 (25 Oct 2022 06:50) (70 - 76)  BP: 130/68 (25 Oct 2022 06:50) (130/68 - 138/68)  BP(mean): --  RR: 18 (25 Oct 2022 06:50) (16 - 18)  SpO2: 100% (25 Oct 2022 06:50) (100% - 100%)    Parameters below as of 25 Oct 2022 06:50  Patient On (Oxygen Delivery Method): room air      CAPILLARY BLOOD GLUCOSE      POCT Blood Glucose.: 70 mg/dL (25 Oct 2022 08:03)  POCT Blood Glucose.: 129 mg/dL (24 Oct 2022 22:26)  POCT Blood Glucose.: 89 mg/dL (24 Oct 2022 20:55)  POCT Blood Glucose.: 105 mg/dL (24 Oct 2022 17:10)  POCT Blood Glucose.: 89 mg/dL (24 Oct 2022 12:19)  POCT Blood Glucose.: 105 mg/dL (24 Oct 2022 10:01)    I&O's Summary      PHYSICAL EXAM:  GENERAL: NAD, well-developed  HEAD:  Atraumatic, Normocephalic  EYES: EOMI, PERRLA, conjunctiva and sclera clear  NECK: Supple, No JVD  CHEST/LUNG: Clear to auscultation bilaterally; No wheeze  HEART: Regular rate and rhythm; No murmurs, rubs, or gallops  ABDOMEN: Soft, Nontender, Nondistended; Bowel sounds present  EXTREMITIES:  2+ Peripheral Pulses, No clubbing, cyanosis, or edema  PSYCH: AAOx3  NEUROLOGY: non-focal  SKIN: No rashes or lesions    LABS:                        10.9   8.38  )-----------( 340      ( 25 Oct 2022 05:00 )             36.3     10-25    138  |  102  |  15  ----------------------------<  98  3.8   |  22  |  0.42<L>    Ca    8.5      25 Oct 2022 05:00    TPro  6.5  /  Alb  3.6  /  TBili  0.4  /  DBili  x   /  AST  12  /  ALT  <5<L>  /  AlkPhos  60  10-25              RADIOLOGY & ADDITIONAL TESTS:    Imaging Personally Reviewed:    Consultant(s) Notes Reviewed:      Care Discussed with Consultants/Other Providers:   Patient is a 82y old  Male who presents with a chief complaint of fevers at [facility] (24 Oct 2022 10:35)      SUBJECTIVE / OVERNIGHT EVENTS:  Patient seen eating when feed with nurse's aide w/o difficulty. Denies cp, SOB, abdominal pain, N/V/D     MEDICATIONS  (STANDING):  atorvastatin 20 milliGRAM(s) Oral at bedtime  azithromycin  IVPB 500 milliGRAM(s) IV Intermittent every 24 hours  carbidopa/levodopa  25/100 2 Tablet(s) Oral <User Schedule>  cefTRIAXone   IVPB 1000 milliGRAM(s) IV Intermittent every 24 hours  clopidogrel Tablet 75 milliGRAM(s) Oral daily  dextrose 50% Injectable 25 Gram(s) IV Push once  dextrose 50% Injectable 12.5 Gram(s) IV Push once  dextrose 50% Injectable 25 Gram(s) IV Push once  dextrose Oral Gel 15 Gram(s) Oral once  enoxaparin Injectable 40 milliGRAM(s) SubCutaneous every 24 hours  glucagon  Injectable 1 milliGRAM(s) IntraMuscular once  insulin glargine Injectable (LANTUS) 8 Unit(s) SubCutaneous at bedtime  insulin lispro (ADMELOG) corrective regimen sliding scale   SubCutaneous three times a day before meals  insulin lispro (ADMELOG) corrective regimen sliding scale   SubCutaneous at bedtime  mirtazapine 15 milliGRAM(s) Oral at bedtime  opicapone (ogentys) 50 milliGRAM(s) 1 Capsule(s) Oral at bedtime  pantoprazole    Tablet 40 milliGRAM(s) Oral before breakfast  polyethylene glycol 3350 17 Gram(s) Oral two times a day  QUEtiapine 12.5 milliGRAM(s) Oral daily  senna 2 Tablet(s) Oral at bedtime    MEDICATIONS  (PRN):  acetaminophen     Tablet .. 650 milliGRAM(s) Oral every 6 hours PRN Temp greater or equal to 38C (100.4F), Mild Pain (1 - 3)  clonazePAM Oral Disintegrating Tablet 0.25 milliGRAM(s) Oral daily PRN anxiety  melatonin 3 milliGRAM(s) Oral at bedtime PRN Insomnia  QUEtiapine 25 milliGRAM(s) Oral at bedtime PRN agitiation      Vital Signs Last 24 Hrs  T(C): 36.8 (25 Oct 2022 06:50), Max: 36.9 (24 Oct 2022 11:15)  T(F): 98.2 (25 Oct 2022 06:50), Max: 98.5 (24 Oct 2022 11:15)  HR: 74 (25 Oct 2022 06:50) (70 - 76)  BP: 130/68 (25 Oct 2022 06:50) (130/68 - 138/68)  BP(mean): --  RR: 18 (25 Oct 2022 06:50) (16 - 18)  SpO2: 100% (25 Oct 2022 06:50) (100% - 100%)    Parameters below as of 25 Oct 2022 06:50  Patient On (Oxygen Delivery Method): room air      CAPILLARY BLOOD GLUCOSE      POCT Blood Glucose.: 70 mg/dL (25 Oct 2022 08:03)  POCT Blood Glucose.: 129 mg/dL (24 Oct 2022 22:26)  POCT Blood Glucose.: 89 mg/dL (24 Oct 2022 20:55)  POCT Blood Glucose.: 105 mg/dL (24 Oct 2022 17:10)  POCT Blood Glucose.: 89 mg/dL (24 Oct 2022 12:19)  POCT Blood Glucose.: 105 mg/dL (24 Oct 2022 10:01)    I&O's Summary      PHYSICAL EXAM:   GENERAL: NAD, well-developed  HEAD:  Atraumatic, Normocephalic  EYES: EOMI, PERRLA, conjunctiva and sclera clear  NECK: Supple, No JVD  CHEST/LUNG: Clear to auscultation bilaterally; No wheeze  HEART: Regular rate and rhythm; No murmurs, rubs, or gallops  ABDOMEN: Soft, Nontender, Nondistended; Bowel sounds present  EXTREMITIES:  2+ Peripheral Pulses, No clubbing, cyanosis, or edema  PSYCH: AAOx3  NEUROLOGY: non-focal  SKIN: No rashes or lesions    LABS:                        10.9   8.38  )-----------( 340      ( 25 Oct 2022 05:00 )             36.3     10-25    138  |  102  |  15  ----------------------------<  98  3.8   |  22  |  0.42<L>    Ca    8.5      25 Oct 2022 05:00    TPro  6.5  /  Alb  3.6  /  TBili  0.4  /  DBili  x   /  AST  12  /  ALT  <5<L>  /  AlkPhos  60  10-25              RADIOLOGY & ADDITIONAL TESTS:    Imaging Personally Reviewed:    Consultant(s) Notes Reviewed:      Care Discussed with Consultants/Other Providers:

## 2022-10-25 NOTE — PROGRESS NOTE ADULT - PROBLEM SELECTOR PLAN 7
DVT ppx: Lovenox 40mg   Diet: CC diet  Dispo; Pending PT recs; and pending culture results and sensitivities DVT ppx: Lovenox 40mg   Diet: CC diet  Dispo; PT rec subacute rehab

## 2022-10-25 NOTE — DIETITIAN INITIAL EVALUATION ADULT - REASON FOR ADMISSION
Pneumonia due to infectious organism  80 y/o Male w/ hx Parkinson's dz, DM2, who presents from [facility] for fevers and increased lethargy. Found to have LLL PNA on CXR and UTI.

## 2022-10-25 NOTE — DIETITIAN INITIAL EVALUATION ADULT - ADD RECOMMEND
1. Defer diet consistency to MD discretion.   2. Encourage PO intake and honor food preferences as able.   3. Continue to document PO in RN flow sheets and monitor weekly weights.

## 2022-10-25 NOTE — PROGRESS NOTE ADULT - PROBLEM SELECTOR PLAN 2
Urine Cx 10/23 + GNR; F/U sensitivities  c/w ceftriaxone Urine Cx 10/23: + Klebsiella PNA; F/U sensitivities  c/w ceftriaxone

## 2022-10-25 NOTE — DIETITIAN INITIAL EVALUATION ADULT - ORAL INTAKE PTA/DIET HISTORY
pt with recent admission to [facility] (beginning Oct. 2022) with relatively poor PO. Diagnosed with severe protein calorie malnutrition.

## 2022-10-25 NOTE — PROGRESS NOTE ADULT - PROBLEM SELECTOR PLAN 4
- on 12 Units qhs, was discharged with novolog 5-9 units with meals, per NH records no Novolog noted   - will c/w 12 units qhs and MED qmeals and qhs; adjust as needed - on 12 Units qhs, was discharged with novolog 5-9 units with meals, per NH records no Novolog noted   - will c/w 12 units qhs and MED qmeals and qhs; adjust as needed  -Patient had hypoglycemia in a.m. due to poor PO intake  -Start D5W IVFs  -Monitoring FSs

## 2022-10-25 NOTE — DIETITIAN NUTRITION RISK NOTIFICATION - TREATMENT: THE FOLLOWING DIET HAS BEEN RECOMMENDED
Diet, Minced and Moist:   Consistent Carbohydrate {No Snacks} (CSTCHO)  Mildly Thick Liquids (MILDTHICKLIQS)  Supplement Feeding Modality:  Oral  Glucerna Shake Cans or Servings Per Day:  1       Frequency:  Three Times a day (10-23-22 @ 11:28) [Active]

## 2022-10-25 NOTE — DIETITIAN INITIAL EVALUATION ADULT - OTHER INFO
Pt is alert; garbled speech. Unable to provide collateral. Per RN, pt ate a couple spoonfuls of breakfast and a few sips of Glucerna this AM with maximum encouragement. 10/25 bedside swallow assessment recommending to defer PO diet to MD given patient refusal/accepted limited trials AND 1) defer PO diet to MD given patient refusal/accepted limited trials. 2) Consider short-term non-oral means of nutrition/hydration/medication. No reported GI issues (nausea/vomiting/diarrhea/constipation.) Dosing wt (64 kg) likely incorrect given physical appearance.

## 2022-10-25 NOTE — SWALLOW BEDSIDE ASSESSMENT ADULT - SWALLOW EVAL: RECOMMENDED DIET
1) defer PO diet to MD given patient refusal/accepted limited trials. 2) Consider short-term non-oral means of nutrition/hydration/medication. 1) defer PO diet to MD given patient refusal/accepted limited trials. 2) Consider short-term non-oral means of nutrition/hydration/medication. 3) RD consult to ensure adequate caloric/hydration intake.

## 2022-10-25 NOTE — DIETITIAN INITIAL EVALUATION ADULT - PERTINENT MEDS FT
MEDICATIONS  (STANDING):  atorvastatin 20 milliGRAM(s) Oral at bedtime  azithromycin  IVPB 500 milliGRAM(s) IV Intermittent every 24 hours  carbidopa/levodopa  25/100 2 Tablet(s) Oral <User Schedule>  cefTRIAXone   IVPB 1000 milliGRAM(s) IV Intermittent every 24 hours  clopidogrel Tablet 75 milliGRAM(s) Oral daily  dextrose 5%. 1000 milliLiter(s) (70 mL/Hr) IV Continuous <Continuous>  dextrose 50% Injectable 25 Gram(s) IV Push once  dextrose 50% Injectable 12.5 Gram(s) IV Push once  dextrose 50% Injectable 25 Gram(s) IV Push once  dextrose Oral Gel 15 Gram(s) Oral once  enoxaparin Injectable 40 milliGRAM(s) SubCutaneous every 24 hours  glucagon  Injectable 1 milliGRAM(s) IntraMuscular once  insulin glargine Injectable (LANTUS) 8 Unit(s) SubCutaneous at bedtime  insulin lispro (ADMELOG) corrective regimen sliding scale   SubCutaneous three times a day before meals  insulin lispro (ADMELOG) corrective regimen sliding scale   SubCutaneous at bedtime  mirtazapine 15 milliGRAM(s) Oral at bedtime  opicapone (ogentys) 50 milliGRAM(s) 1 Capsule(s) Oral at bedtime  pantoprazole    Tablet 40 milliGRAM(s) Oral before breakfast  polyethylene glycol 3350 17 Gram(s) Oral two times a day  QUEtiapine 12.5 milliGRAM(s) Oral daily  senna 2 Tablet(s) Oral at bedtime    MEDICATIONS  (PRN):  acetaminophen     Tablet .. 650 milliGRAM(s) Oral every 6 hours PRN Temp greater or equal to 38C (100.4F), Mild Pain (1 - 3)  clonazePAM Oral Disintegrating Tablet 0.25 milliGRAM(s) Oral daily PRN anxiety  melatonin 3 milliGRAM(s) Oral at bedtime PRN Insomnia  QUEtiapine 25 milliGRAM(s) Oral at bedtime PRN agitiation

## 2022-10-25 NOTE — SWALLOW BEDSIDE ASSESSMENT ADULT - SWALLOW EVAL: DIAGNOSIS
Patient accepted limited PO trials. Patient accepted 1oz of mildly thick liquids and refused regular solids, minced and moist solids, and puree by stating "No" and turning away from PO trials presented via cup and teaspoon, despite encouragement from SLP and RN. Oral/pharyngeal swallow could not be adequately assessed given patient accepted limited PO trials.

## 2022-10-25 NOTE — PROVIDER CONTACT NOTE (OTHER) - ACTION/TREATMENT ORDERED:
TAMARA Mack notified. Requested to give Pt juice and recheck blood glucose which was 129. New order given for Lantus 6 units. Will continue to monitor.

## 2022-10-25 NOTE — PROGRESS NOTE ADULT - PROBLEM SELECTOR PLAN 1
- CXR with LLL opacity consistent with PNA, outpatient CXR correlated,   - WBC 11.88, nl lactate  - c/w Ceftriaxone/Zithromax  - Blood Cx 10/23 NTD

## 2022-10-26 DIAGNOSIS — G93.41 METABOLIC ENCEPHALOPATHY: ICD-10-CM

## 2022-10-26 LAB
ALBUMIN SERPL ELPH-MCNC: 3.2 G/DL — LOW (ref 3.3–5)
ALP SERPL-CCNC: 53 U/L — SIGNIFICANT CHANGE UP (ref 40–120)
ALT FLD-CCNC: <5 U/L — LOW (ref 4–41)
ANION GAP SERPL CALC-SCNC: 15 MMOL/L — HIGH (ref 7–14)
AST SERPL-CCNC: 9 U/L — SIGNIFICANT CHANGE UP (ref 4–40)
BASOPHILS # BLD AUTO: 0.04 K/UL — SIGNIFICANT CHANGE UP (ref 0–0.2)
BASOPHILS NFR BLD AUTO: 0.4 % — SIGNIFICANT CHANGE UP (ref 0–2)
BILIRUB SERPL-MCNC: 0.4 MG/DL — SIGNIFICANT CHANGE UP (ref 0.2–1.2)
BUN SERPL-MCNC: 12 MG/DL — SIGNIFICANT CHANGE UP (ref 7–23)
CALCIUM SERPL-MCNC: 8.2 MG/DL — LOW (ref 8.4–10.5)
CHLORIDE SERPL-SCNC: 100 MMOL/L — SIGNIFICANT CHANGE UP (ref 98–107)
CO2 SERPL-SCNC: 20 MMOL/L — LOW (ref 22–31)
CREAT SERPL-MCNC: 0.42 MG/DL — LOW (ref 0.5–1.3)
EGFR: 106 ML/MIN/1.73M2 — SIGNIFICANT CHANGE UP
EOSINOPHIL # BLD AUTO: 0.03 K/UL — SIGNIFICANT CHANGE UP (ref 0–0.5)
EOSINOPHIL NFR BLD AUTO: 0.3 % — SIGNIFICANT CHANGE UP (ref 0–6)
GLUCOSE BLDC GLUCOMTR-MCNC: 101 MG/DL — HIGH (ref 70–99)
GLUCOSE BLDC GLUCOMTR-MCNC: 106 MG/DL — HIGH (ref 70–99)
GLUCOSE BLDC GLUCOMTR-MCNC: 111 MG/DL — HIGH (ref 70–99)
GLUCOSE BLDC GLUCOMTR-MCNC: 67 MG/DL — LOW (ref 70–99)
GLUCOSE BLDC GLUCOMTR-MCNC: 70 MG/DL — SIGNIFICANT CHANGE UP (ref 70–99)
GLUCOSE BLDC GLUCOMTR-MCNC: 77 MG/DL — SIGNIFICANT CHANGE UP (ref 70–99)
GLUCOSE BLDC GLUCOMTR-MCNC: 81 MG/DL — SIGNIFICANT CHANGE UP (ref 70–99)
GLUCOSE BLDC GLUCOMTR-MCNC: 82 MG/DL — SIGNIFICANT CHANGE UP (ref 70–99)
GLUCOSE SERPL-MCNC: 68 MG/DL — LOW (ref 70–99)
HCT VFR BLD CALC: 30.6 % — LOW (ref 39–50)
HGB BLD-MCNC: 9.4 G/DL — LOW (ref 13–17)
IANC: 7.88 K/UL — HIGH (ref 1.8–7.4)
IMM GRANULOCYTES NFR BLD AUTO: 0.3 % — SIGNIFICANT CHANGE UP (ref 0–0.9)
LYMPHOCYTES # BLD AUTO: 0.55 K/UL — LOW (ref 1–3.3)
LYMPHOCYTES # BLD AUTO: 6.1 % — LOW (ref 13–44)
MAGNESIUM SERPL-MCNC: 1.7 MG/DL — SIGNIFICANT CHANGE UP (ref 1.6–2.6)
MCHC RBC-ENTMCNC: 29.5 PG — SIGNIFICANT CHANGE UP (ref 27–34)
MCHC RBC-ENTMCNC: 30.7 GM/DL — LOW (ref 32–36)
MCV RBC AUTO: 95.9 FL — SIGNIFICANT CHANGE UP (ref 80–100)
MONOCYTES # BLD AUTO: 0.54 K/UL — SIGNIFICANT CHANGE UP (ref 0–0.9)
MONOCYTES NFR BLD AUTO: 6 % — SIGNIFICANT CHANGE UP (ref 2–14)
NEUTROPHILS # BLD AUTO: 7.88 K/UL — HIGH (ref 1.8–7.4)
NEUTROPHILS NFR BLD AUTO: 86.9 % — HIGH (ref 43–77)
NRBC # BLD: 0 /100 WBCS — SIGNIFICANT CHANGE UP (ref 0–0)
NRBC # FLD: 0 K/UL — SIGNIFICANT CHANGE UP (ref 0–0)
PHOSPHATE SERPL-MCNC: 3.6 MG/DL — SIGNIFICANT CHANGE UP (ref 2.5–4.5)
PLATELET # BLD AUTO: 244 K/UL — SIGNIFICANT CHANGE UP (ref 150–400)
POTASSIUM SERPL-MCNC: 3.5 MMOL/L — SIGNIFICANT CHANGE UP (ref 3.5–5.3)
POTASSIUM SERPL-SCNC: 3.5 MMOL/L — SIGNIFICANT CHANGE UP (ref 3.5–5.3)
PROT SERPL-MCNC: 5.4 G/DL — LOW (ref 6–8.3)
RBC # BLD: 3.19 M/UL — LOW (ref 4.2–5.8)
RBC # FLD: 14.5 % — SIGNIFICANT CHANGE UP (ref 10.3–14.5)
SARS-COV-2 RNA SPEC QL NAA+PROBE: SIGNIFICANT CHANGE UP
SODIUM SERPL-SCNC: 135 MMOL/L — SIGNIFICANT CHANGE UP (ref 135–145)
WBC # BLD: 9.07 K/UL — SIGNIFICANT CHANGE UP (ref 3.8–10.5)
WBC # FLD AUTO: 9.07 K/UL — SIGNIFICANT CHANGE UP (ref 3.8–10.5)

## 2022-10-26 PROCEDURE — 99223 1ST HOSP IP/OBS HIGH 75: CPT

## 2022-10-26 PROCEDURE — 99233 SBSQ HOSP IP/OBS HIGH 50: CPT

## 2022-10-26 RX ORDER — QUETIAPINE FUMARATE 200 MG/1
12.5 TABLET, FILM COATED ORAL EVERY 6 HOURS
Refills: 0 | Status: DISCONTINUED | OUTPATIENT
Start: 2022-10-26 | End: 2022-11-02

## 2022-10-26 RX ORDER — INSULIN GLARGINE 100 [IU]/ML
6 INJECTION, SOLUTION SUBCUTANEOUS AT BEDTIME
Refills: 0 | Status: COMPLETED | OUTPATIENT
Start: 2022-10-26 | End: 2022-10-26

## 2022-10-26 RX ORDER — DEXTROSE 50 % IN WATER 50 %
12.5 SYRINGE (ML) INTRAVENOUS ONCE
Refills: 0 | Status: COMPLETED | OUTPATIENT
Start: 2022-10-26 | End: 2022-10-26

## 2022-10-26 RX ORDER — HALOPERIDOL DECANOATE 100 MG/ML
0.5 INJECTION INTRAMUSCULAR ONCE
Refills: 0 | Status: DISCONTINUED | OUTPATIENT
Start: 2022-10-26 | End: 2022-10-26

## 2022-10-26 RX ORDER — DEXTROSE 50 % IN WATER 50 %
15 SYRINGE (ML) INTRAVENOUS ONCE
Refills: 0 | Status: COMPLETED | OUTPATIENT
Start: 2022-10-26 | End: 2022-10-26

## 2022-10-26 RX ORDER — QUETIAPINE FUMARATE 200 MG/1
12.5 TABLET, FILM COATED ORAL
Refills: 0 | Status: DISCONTINUED | OUTPATIENT
Start: 2022-10-26 | End: 2022-11-02

## 2022-10-26 RX ADMIN — CARBIDOPA AND LEVODOPA 2 TABLET(S): 25; 100 TABLET ORAL at 13:07

## 2022-10-26 RX ADMIN — CARBIDOPA AND LEVODOPA 2 TABLET(S): 25; 100 TABLET ORAL at 09:31

## 2022-10-26 RX ADMIN — SODIUM CHLORIDE 70 MILLILITER(S): 9 INJECTION, SOLUTION INTRAVENOUS at 07:36

## 2022-10-26 RX ADMIN — QUETIAPINE FUMARATE 25 MILLIGRAM(S): 200 TABLET, FILM COATED ORAL at 01:57

## 2022-10-26 RX ADMIN — INSULIN GLARGINE 6 UNIT(S): 100 INJECTION, SOLUTION SUBCUTANEOUS at 22:06

## 2022-10-26 RX ADMIN — Medication 3 MILLIGRAM(S): at 02:00

## 2022-10-26 RX ADMIN — PANTOPRAZOLE SODIUM 40 MILLIGRAM(S): 20 TABLET, DELAYED RELEASE ORAL at 05:33

## 2022-10-26 RX ADMIN — CARBIDOPA AND LEVODOPA 2 TABLET(S): 25; 100 TABLET ORAL at 01:19

## 2022-10-26 RX ADMIN — POLYETHYLENE GLYCOL 3350 17 GRAM(S): 17 POWDER, FOR SOLUTION ORAL at 05:33

## 2022-10-26 RX ADMIN — QUETIAPINE FUMARATE 12.5 MILLIGRAM(S): 200 TABLET, FILM COATED ORAL at 20:11

## 2022-10-26 RX ADMIN — CARBIDOPA AND LEVODOPA 2 TABLET(S): 25; 100 TABLET ORAL at 20:04

## 2022-10-26 RX ADMIN — CARBIDOPA AND LEVODOPA 2 TABLET(S): 25; 100 TABLET ORAL at 05:33

## 2022-10-26 RX ADMIN — AZITHROMYCIN 255 MILLIGRAM(S): 500 TABLET, FILM COATED ORAL at 14:07

## 2022-10-26 RX ADMIN — SENNA PLUS 2 TABLET(S): 8.6 TABLET ORAL at 20:04

## 2022-10-26 RX ADMIN — SODIUM CHLORIDE 70 MILLILITER(S): 9 INJECTION, SOLUTION INTRAVENOUS at 13:07

## 2022-10-26 RX ADMIN — CARBIDOPA AND LEVODOPA 2 TABLET(S): 25; 100 TABLET ORAL at 17:29

## 2022-10-26 RX ADMIN — CLOPIDOGREL BISULFATE 75 MILLIGRAM(S): 75 TABLET, FILM COATED ORAL at 13:08

## 2022-10-26 RX ADMIN — MIRTAZAPINE 15 MILLIGRAM(S): 45 TABLET, ORALLY DISINTEGRATING ORAL at 20:04

## 2022-10-26 RX ADMIN — CEFTRIAXONE 100 MILLIGRAM(S): 500 INJECTION, POWDER, FOR SOLUTION INTRAMUSCULAR; INTRAVENOUS at 14:04

## 2022-10-26 RX ADMIN — Medication 12.5 GRAM(S): at 07:38

## 2022-10-26 RX ADMIN — ATORVASTATIN CALCIUM 20 MILLIGRAM(S): 80 TABLET, FILM COATED ORAL at 20:04

## 2022-10-26 RX ADMIN — QUETIAPINE FUMARATE 12.5 MILLIGRAM(S): 200 TABLET, FILM COATED ORAL at 13:07

## 2022-10-26 RX ADMIN — ENOXAPARIN SODIUM 40 MILLIGRAM(S): 100 INJECTION SUBCUTANEOUS at 14:04

## 2022-10-26 NOTE — BH CONSULTATION LIAISON ASSESSMENT NOTE - NSBHATTESTCOMMENTATTENDFT_PSY_A_CORE
Chart reviewed. Pt known to me from previous admission. Discussed with fellow. Will continue to follow.

## 2022-10-26 NOTE — BH CONSULTATION LIAISON ASSESSMENT NOTE - NSBHCHARTREVIEWLAB_PSY_A_CORE FT
9.4    9.07  )-----------( 244      ( 26 Oct 2022 03:47 )             30.6   10-26    135  |  100  |  12  ----------------------------<  68<L>  3.5   |  20<L>  |  0.42<L>    Ca    8.2<L>      26 Oct 2022 03:47  Phos  3.6     10-26  Mg     1.70     10-26    TPro  5.4<L>  /  Alb  3.2<L>  /  TBili  0.4  /  DBili  x   /  AST  9   /  ALT  <5<L>  /  AlkPhos  53  10-26

## 2022-10-26 NOTE — PROVIDER CONTACT NOTE (HYPOGLYCEMIA EVENT) - NS PROVIDER CONTACT CONTRIBUTING FACTORS OF EPISODE
Poor oral intake within the last 24 hours Poor oral intake within the last 24 hours/Previous finger stick less than 100 mg/dL

## 2022-10-26 NOTE — PROVIDER CONTACT NOTE (OTHER) - ACTION/TREATMENT ORDERED:
ACP Natalee notified and order given for Haldol. PO Seroquel and melatonin offered again and was taken. ACP made aware and Haldol D/C'd by provider. ACP Natalee notified and order given for Haldol. PO Seroquel and melatonin offered again and was taken. ACP made aware and Haldol D/C'd by provider. No distress noted. Will continue to monitor.

## 2022-10-26 NOTE — BH CONSULTATION LIAISON ASSESSMENT NOTE - RISK ASSESSMENT
Acute/chronic medical conditions, h/o depression and anxiety, new onset psychosis, intermittent changes in functioning     Residential stability, relationship stability,   no hx of psychiatric hospitalizations,

## 2022-10-26 NOTE — BH CONSULTATION LIAISON ASSESSMENT NOTE - HPI (INCLUDE ILLNESS QUALITY, SEVERITY, DURATION, TIMING, CONTEXT, MODIFYING FACTORS, ASSOCIATED SIGNS AND SYMPTOMS)
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 No prior psychiatric hospitalization, does not see an outpatient psychiatrist. Past Medical hx insulin dependent DM2, Parkinson's dx, sick sinus syndrome w/ pacemaker brought in from [facility] for fevers and increased lethargy, admitted for PNA, also found to have a UTI.     Psychiatry consulted for change in behavior, med management.     Pt seen and assessed at bedside. His is noted to be awake and alert but acutely delirious, confused, unable to participate in meaningful interview. Speech is inaudible/dysarthric, unable to follow simple commands, appears to be actively hallucinating. Wife reports this acute changed has occurred over the last couple days, was previously able to speak, answer questions, follow commands. She reports that when he was discharged from the hospital and transferred to subacute rehab, his seroquel regimen had been increased up to three times a day (was reportedly receiving it 2x/day throughout hospitalization) causing him to become more sedated.     Spoke to pt's wife Tamar [phone], she is in agreement to retrial pt on seroquel 12.5 BID at this time (increase from 12.5 Qday)    Per chart review from 10/12/22:  "  Interview deferred to collateral, wife Tamar at bedside. Pt was diagnosed with PD 10-12 years ago. States hallucinations ongoing over past few months but have worsened since neuplazid started 3 weeks ago. Hallucinations include people standing around his bed, bugs crawling on him, being sodomized. States pt is normally high functioning, they run a business together. When asked, wife states patient was diagnosed with depression by PCP and was on Lexapro "years ago". Medication was effective but pt took himself off of it. Prior to admission, pt prescribed Klonopin 0.25 mg daily PRN for anxiety by outpt neurologist. Wife states pt infrequently took this medication at home. Wife notes pt has been sleepy since transitioning to Klonopin 0.75 mg TID this admission. Wife denies prior psychiatric hospitalizations, denies access to firearms.   "

## 2022-10-26 NOTE — PROVIDER CONTACT NOTE (OTHER) - RECOMMENDATIONS
As per ACP Evonne, 12.5g IVP dextrose to be administered and repeat fingerstick in 15 minutes. As pe TAMARA Sarmiento , 12.5g IVP dextrose to be administered and repeat fingerstick in 15 minutes.

## 2022-10-26 NOTE — PROVIDER CONTACT NOTE (OTHER) - ACTION/TREATMENT ORDERED:
ACP Natalee notified. Requested to give Lantus 6 units instead of 8 units. PO fluids and food encouraged. No distress noted. Will continue to monitor.

## 2022-10-26 NOTE — PROGRESS NOTE ADULT - PROBLEM SELECTOR PLAN 3
multifactorial due to UTI and/or not being on adequate Seroquel dosing.  Dose of Seroquel was reduced on admission b/c wife said 25mg was sedating patient.  Will consult psychiatry for advice on managing Seroquel.

## 2022-10-26 NOTE — PROVIDER CONTACT NOTE (OTHER) - ACTION/TREATMENT ORDERED:
As per ACP Evonne, 12.5g IVP dextrose to be administered and repeat fingerstick in 15 minutes. As per ACP Elizabeth Torsten , 12.5g IVP dextrose to be administered and repeat fingerstick in 15 minutes.

## 2022-10-26 NOTE — PROGRESS NOTE ADULT - SUBJECTIVE AND OBJECTIVE BOX
Patient is a 82y old  Male who presents with a chief complaint of Pneumonia due to infectious organism  80 y/o Male w/ hx Parkinson's dz, DM2, who presents from [facility] for fevers and increased lethargy. Found to have LLL PNA on CXR and UTI.    (25 Oct 2022 10:52)      SUBJECTIVE / OVERNIGHT EVENTS:  Patient confused today. He was seen counting to 10 in Ma[place] repeatedly. He was eventually redirectable and is calm now. Still not eating well.     MEDICATIONS  (STANDING):  atorvastatin 20 milliGRAM(s) Oral at bedtime  azithromycin  IVPB 500 milliGRAM(s) IV Intermittent every 24 hours  carbidopa/levodopa  25/100 2 Tablet(s) Oral <User Schedule>  cefTRIAXone   IVPB 1000 milliGRAM(s) IV Intermittent every 24 hours  clopidogrel Tablet 75 milliGRAM(s) Oral daily  dextrose 5%. 1000 milliLiter(s) (70 mL/Hr) IV Continuous <Continuous>  dextrose 50% Injectable 25 Gram(s) IV Push once  dextrose 50% Injectable 12.5 Gram(s) IV Push once  dextrose 50% Injectable 25 Gram(s) IV Push once  dextrose Oral Gel 15 Gram(s) Oral once  enoxaparin Injectable 40 milliGRAM(s) SubCutaneous every 24 hours  glucagon  Injectable 1 milliGRAM(s) IntraMuscular once  insulin glargine Injectable (LANTUS) 8 Unit(s) SubCutaneous at bedtime  insulin lispro (ADMELOG) corrective regimen sliding scale   SubCutaneous three times a day before meals  insulin lispro (ADMELOG) corrective regimen sliding scale   SubCutaneous at bedtime  mirtazapine 15 milliGRAM(s) Oral at bedtime  opicapone (ogentys) 50 milliGRAM(s) 1 Capsule(s) Oral at bedtime  pantoprazole    Tablet 40 milliGRAM(s) Oral before breakfast  polyethylene glycol 3350 17 Gram(s) Oral two times a day  QUEtiapine 12.5 milliGRAM(s) Oral daily  senna 2 Tablet(s) Oral at bedtime    MEDICATIONS  (PRN):  acetaminophen     Tablet .. 650 milliGRAM(s) Oral every 6 hours PRN Temp greater or equal to 38C (100.4F), Mild Pain (1 - 3)  clonazePAM Oral Disintegrating Tablet 0.25 milliGRAM(s) Oral daily PRN anxiety  melatonin 3 milliGRAM(s) Oral at bedtime PRN Insomnia  QUEtiapine 25 milliGRAM(s) Oral at bedtime PRN agitiation      Vital Signs Last 24 Hrs  T(C): 36.8 (26 Oct 2022 14:24), Max: 36.8 (26 Oct 2022 10:30)  T(F): 98.3 (26 Oct 2022 14:24), Max: 98.3 (26 Oct 2022 14:24)  HR: 83 (26 Oct 2022 14:24) (80 - 86)  BP: 104/72 (26 Oct 2022 14:24) (104/72 - 138/74)  BP(mean): --  RR: 18 (26 Oct 2022 14:24) (18 - 18)  SpO2: 100% (26 Oct 2022 14:24) (99% - 100%)    Parameters below as of 26 Oct 2022 14:24  Patient On (Oxygen Delivery Method): room air      CAPILLARY BLOOD GLUCOSE      POCT Blood Glucose.: 101 mg/dL (26 Oct 2022 12:10)  POCT Blood Glucose.: 111 mg/dL (26 Oct 2022 08:20)  POCT Blood Glucose.: 106 mg/dL (26 Oct 2022 08:00)  POCT Blood Glucose.: 82 mg/dL (26 Oct 2022 07:25)  POCT Blood Glucose.: 67 mg/dL (26 Oct 2022 07:17)  POCT Blood Glucose.: 81 mg/dL (26 Oct 2022 01:37)  POCT Blood Glucose.: 111 mg/dL (25 Oct 2022 21:08)  POCT Blood Glucose.: 105 mg/dL (25 Oct 2022 17:35)    I&O's Summary      PHYSICAL EXAM:  GENERAL: NAD, well-developed  HEAD:  Atraumatic, Normocephalic  EYES: EOMI, PERRLA, conjunctiva and sclera clear  NECK: Supple, No JVD  CHEST/LUNG: Clear to auscultation bilaterally; No wheeze  HEART: Regular rate and rhythm; No murmurs, rubs, or gallops  ABDOMEN: Soft, Nontender, Nondistended; Bowel sounds present  EXTREMITIES:  2+ Peripheral Pulses, No clubbing, cyanosis, or edema  PSYCH: AAOx1; confused  NEUROLOGY: non-focal  SKIN: No rashes or lesions    LABS:                        9.4    9.07  )-----------( 244      ( 26 Oct 2022 03:47 )             30.6     10-26    135  |  100  |  12  ----------------------------<  68<L>  3.5   |  20<L>  |  0.42<L>    Ca    8.2<L>      26 Oct 2022 03:47  Phos  3.6     10-26  Mg     1.70     10-26    TPro  5.4<L>  /  Alb  3.2<L>  /  TBili  0.4  /  DBili  x   /  AST  9   /  ALT  <5<L>  /  AlkPhos  53  10-26              RADIOLOGY & ADDITIONAL TESTS:    Imaging Personally Reviewed:    Consultant(s) Notes Reviewed:      Care Discussed with Consultants/Other Providers:

## 2022-10-26 NOTE — BH CONSULTATION LIAISON ASSESSMENT NOTE - NSBHCHARTREVIEWVS_PSY_A_CORE FT
Vital Signs Last 24 Hrs  T(C): 36.8 (26 Oct 2022 14:24), Max: 36.8 (26 Oct 2022 10:30)  T(F): 98.3 (26 Oct 2022 14:24), Max: 98.3 (26 Oct 2022 14:24)  HR: 83 (26 Oct 2022 14:24) (80 - 86)  BP: 104/72 (26 Oct 2022 14:24) (104/72 - 138/74)  BP(mean): --  RR: 18 (26 Oct 2022 14:24) (18 - 18)  SpO2: 100% (26 Oct 2022 14:24) (99% - 100%)    Parameters below as of 26 Oct 2022 14:24  Patient On (Oxygen Delivery Method): room air

## 2022-10-26 NOTE — PROVIDER CONTACT NOTE (HYPOGLYCEMIA EVENT) - NS PROVIDER CONTACT BACKGROUND-HYPO
Age: 82y    Gender: Male    POCT Blood Glucose:  111 mg/dL (10-26-22 @ 08:20)  106 mg/dL (10-26-22 @ 08:00)  82 mg/dL (10-26-22 @ 07:25)  67 mg/dL (10-26-22 @ 07:17)  81 mg/dL (10-26-22 @ 01:37)  111 mg/dL (10-25-22 @ 21:08)  105 mg/dL (10-25-22 @ 17:35)  97 mg/dL (10-25-22 @ 12:22)      eMAR:atorvastatin   20 milliGRAM(s) Oral (10-25-22 @ 22:22)    dextrose 50% Injectable   12.5 Gram(s) IV Push (10-26-22 @ 07:38)    insulin glargine Injectable (LANTUS)   6 Unit(s) SubCutaneous (10-25-22 @ 23:04)

## 2022-10-26 NOTE — BH CONSULTATION LIAISON ASSESSMENT NOTE - SUMMARY
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 No prior psychiatric hospitalization, does not see an outpatient psychiatrist. Past Medical hx insulin dependent DM2, Parkinson's dx, sick sinus syndrome w/ pacemaker brought in from [facility] for fevers and increased lethargy, admitted for PNA, also found to have a UTI.     Psychiatry consulted for change in behavior, med management.     Upon evaluation, patient in bed, restless, unable ot participate in meaningful interview. Per collateral, change in mentation has been occurring over last few days. Collateral also notes patient had been doing well with regard to agitation/confusion throughout previous hospitalization while on seroquel BID but became overly sedated at [facility] when his regimen was increased to TID. Patient has been maintained on seroquel 12.5mg Qday thus far, receiving PRNs, remains agitated. Will increase seroquel to 12.5 BID at this time. D/w patient's wife Tamar who is present at bedside, in agreement with plan.   Pt's AMS could be d/t infection processes (uti, pna) vs sinemet induced     Plan:  --Defer obs to primary team  --Increase seroquel 12.5 Qday  to seroquel 12.5 BID; HOLD for sedation, respiratory depression  - Seroquel 12.5mg PO Q6H PRN agitation  - Olanzapine 1.25mg PO Q6H PRN Severe agitation  --Monitor EKG  -- Antipsychotics can only be given if qtc < 500   -- In order to enhance patient's overall well-being and clinical course, please try avoiding anticholinergics, and antihistamines (Can cause worsening confusion/delirium). Additionally, continue reorientation, supportive care, maintaining regular sleep/wake cycle, and optimizing nutritional/medical factors.   --CL psychiatry to follow

## 2022-10-26 NOTE — PROGRESS NOTE ADULT - PROBLEM SELECTOR PLAN 4
- was initially on 2.5 tabs at (2am, 6am,10am, 2pm, 6pm, 10pm), but was decreased during recent admission to 2 tabs psych due to decrease psychotic features   - c/w Klonopin 0.25 mg PO qd for PRN   - on (Ongentys) opicacone 50mg qd (wife to bring in bottle), not on formulary   - c/w Seroquel 12.5 mg qAM  - Was on 25 mg qhs for agitation, per wife this causes patient to be very sedated and sleep for several hours. Will consult psych for guidance  - c/w mirtazapine 15mg qd

## 2022-10-27 LAB
ANION GAP SERPL CALC-SCNC: 12 MMOL/L — SIGNIFICANT CHANGE UP (ref 7–14)
BUN SERPL-MCNC: 16 MG/DL — SIGNIFICANT CHANGE UP (ref 7–23)
CALCIUM SERPL-MCNC: 8.5 MG/DL — SIGNIFICANT CHANGE UP (ref 8.4–10.5)
CHLORIDE SERPL-SCNC: 96 MMOL/L — LOW (ref 98–107)
CO2 SERPL-SCNC: 23 MMOL/L — SIGNIFICANT CHANGE UP (ref 22–31)
CREAT SERPL-MCNC: 0.47 MG/DL — LOW (ref 0.5–1.3)
EGFR: 102 ML/MIN/1.73M2 — SIGNIFICANT CHANGE UP
GLUCOSE BLDC GLUCOMTR-MCNC: 115 MG/DL — HIGH (ref 70–99)
GLUCOSE BLDC GLUCOMTR-MCNC: 116 MG/DL — HIGH (ref 70–99)
GLUCOSE BLDC GLUCOMTR-MCNC: 116 MG/DL — HIGH (ref 70–99)
GLUCOSE BLDC GLUCOMTR-MCNC: 127 MG/DL — HIGH (ref 70–99)
GLUCOSE BLDC GLUCOMTR-MCNC: 221 MG/DL — HIGH (ref 70–99)
GLUCOSE SERPL-MCNC: 114 MG/DL — HIGH (ref 70–99)
HCT VFR BLD CALC: 34.6 % — LOW (ref 39–50)
HGB BLD-MCNC: 10.6 G/DL — LOW (ref 13–17)
MAGNESIUM SERPL-MCNC: 2.1 MG/DL — SIGNIFICANT CHANGE UP (ref 1.6–2.6)
MCHC RBC-ENTMCNC: 29.4 PG — SIGNIFICANT CHANGE UP (ref 27–34)
MCHC RBC-ENTMCNC: 30.6 GM/DL — LOW (ref 32–36)
MCV RBC AUTO: 96.1 FL — SIGNIFICANT CHANGE UP (ref 80–100)
NRBC # BLD: 0 /100 WBCS — SIGNIFICANT CHANGE UP (ref 0–0)
NRBC # FLD: 0 K/UL — SIGNIFICANT CHANGE UP (ref 0–0)
PHOSPHATE SERPL-MCNC: 3.5 MG/DL — SIGNIFICANT CHANGE UP (ref 2.5–4.5)
PLATELET # BLD AUTO: 211 K/UL — SIGNIFICANT CHANGE UP (ref 150–400)
POTASSIUM SERPL-MCNC: 3.8 MMOL/L — SIGNIFICANT CHANGE UP (ref 3.5–5.3)
POTASSIUM SERPL-SCNC: 3.8 MMOL/L — SIGNIFICANT CHANGE UP (ref 3.5–5.3)
RBC # BLD: 3.6 M/UL — LOW (ref 4.2–5.8)
RBC # FLD: 14.3 % — SIGNIFICANT CHANGE UP (ref 10.3–14.5)
SODIUM SERPL-SCNC: 131 MMOL/L — LOW (ref 135–145)
WBC # BLD: 6.18 K/UL — SIGNIFICANT CHANGE UP (ref 3.8–10.5)
WBC # FLD AUTO: 6.18 K/UL — SIGNIFICANT CHANGE UP (ref 3.8–10.5)

## 2022-10-27 PROCEDURE — 99233 SBSQ HOSP IP/OBS HIGH 50: CPT

## 2022-10-27 RX ADMIN — AZITHROMYCIN 255 MILLIGRAM(S): 500 TABLET, FILM COATED ORAL at 16:23

## 2022-10-27 RX ADMIN — ENOXAPARIN SODIUM 40 MILLIGRAM(S): 100 INJECTION SUBCUTANEOUS at 15:08

## 2022-10-27 RX ADMIN — CEFTRIAXONE 100 MILLIGRAM(S): 500 INJECTION, POWDER, FOR SOLUTION INTRAMUSCULAR; INTRAVENOUS at 15:35

## 2022-10-27 RX ADMIN — CARBIDOPA AND LEVODOPA 2 TABLET(S): 25; 100 TABLET ORAL at 01:50

## 2022-10-27 RX ADMIN — SENNA PLUS 2 TABLET(S): 8.6 TABLET ORAL at 21:02

## 2022-10-27 RX ADMIN — QUETIAPINE FUMARATE 12.5 MILLIGRAM(S): 200 TABLET, FILM COATED ORAL at 04:57

## 2022-10-27 RX ADMIN — CARBIDOPA AND LEVODOPA 2 TABLET(S): 25; 100 TABLET ORAL at 09:50

## 2022-10-27 RX ADMIN — CARBIDOPA AND LEVODOPA 2 TABLET(S): 25; 100 TABLET ORAL at 21:01

## 2022-10-27 RX ADMIN — INSULIN GLARGINE 8 UNIT(S): 100 INJECTION, SOLUTION SUBCUTANEOUS at 22:00

## 2022-10-27 RX ADMIN — Medication 2: at 18:18

## 2022-10-27 RX ADMIN — QUETIAPINE FUMARATE 12.5 MILLIGRAM(S): 200 TABLET, FILM COATED ORAL at 18:17

## 2022-10-27 RX ADMIN — POLYETHYLENE GLYCOL 3350 17 GRAM(S): 17 POWDER, FOR SOLUTION ORAL at 04:57

## 2022-10-27 RX ADMIN — ATORVASTATIN CALCIUM 20 MILLIGRAM(S): 80 TABLET, FILM COATED ORAL at 21:02

## 2022-10-27 RX ADMIN — CARBIDOPA AND LEVODOPA 2 TABLET(S): 25; 100 TABLET ORAL at 04:57

## 2022-10-27 RX ADMIN — MIRTAZAPINE 15 MILLIGRAM(S): 45 TABLET, ORALLY DISINTEGRATING ORAL at 21:03

## 2022-10-27 RX ADMIN — PANTOPRAZOLE SODIUM 40 MILLIGRAM(S): 20 TABLET, DELAYED RELEASE ORAL at 04:57

## 2022-10-27 RX ADMIN — CARBIDOPA AND LEVODOPA 2 TABLET(S): 25; 100 TABLET ORAL at 18:17

## 2022-10-27 NOTE — PROGRESS NOTE ADULT - SUBJECTIVE AND OBJECTIVE BOX
Patient is a 82y old  Male who presents with a chief complaint of fevers at nursing home, [facility] (26 Oct 2022 14:35)      SUBJECTIVE / OVERNIGHT EVENTS:  Patient no longer agitated but now in a vegetative state not conversing and barely eating with assistance. unable to obtain ROS. Wife at bedside.     MEDICATIONS  (STANDING):  atorvastatin 20 milliGRAM(s) Oral at bedtime  azithromycin  IVPB 500 milliGRAM(s) IV Intermittent every 24 hours  carbidopa/levodopa  25/100 2 Tablet(s) Oral <User Schedule>  cefTRIAXone   IVPB 1000 milliGRAM(s) IV Intermittent every 24 hours  clopidogrel Tablet 75 milliGRAM(s) Oral daily  dextrose 5%. 1000 milliLiter(s) (70 mL/Hr) IV Continuous <Continuous>  dextrose 50% Injectable 25 Gram(s) IV Push once  dextrose 50% Injectable 12.5 Gram(s) IV Push once  dextrose 50% Injectable 25 Gram(s) IV Push once  dextrose Oral Gel 15 Gram(s) Oral once  enoxaparin Injectable 40 milliGRAM(s) SubCutaneous every 24 hours  glucagon  Injectable 1 milliGRAM(s) IntraMuscular once  insulin glargine Injectable (LANTUS) 8 Unit(s) SubCutaneous at bedtime  insulin lispro (ADMELOG) corrective regimen sliding scale   SubCutaneous three times a day before meals  insulin lispro (ADMELOG) corrective regimen sliding scale   SubCutaneous at bedtime  mirtazapine 15 milliGRAM(s) Oral at bedtime  opicapone (ogentys) 50 milliGRAM(s) 1 Capsule(s) Oral at bedtime  pantoprazole    Tablet 40 milliGRAM(s) Oral before breakfast  polyethylene glycol 3350 17 Gram(s) Oral two times a day  QUEtiapine 12.5 milliGRAM(s) Oral two times a day  senna 2 Tablet(s) Oral at bedtime    MEDICATIONS  (PRN):  acetaminophen     Tablet .. 650 milliGRAM(s) Oral every 6 hours PRN Temp greater or equal to 38C (100.4F), Mild Pain (1 - 3)  clonazePAM Oral Disintegrating Tablet 0.25 milliGRAM(s) Oral daily PRN anxiety  melatonin 3 milliGRAM(s) Oral at bedtime PRN Insomnia  QUEtiapine 12.5 milliGRAM(s) Oral every 6 hours PRN agitation      Vital Signs Last 24 Hrs  T(C): 36.2 (27 Oct 2022 10:30), Max: 36.8 (26 Oct 2022 14:24)  T(F): 97.2 (27 Oct 2022 10:30), Max: 98.3 (26 Oct 2022 14:24)  HR: 76 (27 Oct 2022 10:30) (76 - 88)  BP: 134/55 (27 Oct 2022 10:30) (104/72 - 134/55)  BP(mean): --  RR: 16 (27 Oct 2022 10:30) (16 - 18)  SpO2: 97% (27 Oct 2022 10:30) (97% - 100%)    Parameters below as of 27 Oct 2022 10:30  Patient On (Oxygen Delivery Method): room air      CAPILLARY BLOOD GLUCOSE      POCT Blood Glucose.: 127 mg/dL (27 Oct 2022 12:04)  POCT Blood Glucose.: 116 mg/dL (27 Oct 2022 08:21)  POCT Blood Glucose.: 115 mg/dL (27 Oct 2022 06:00)  POCT Blood Glucose.: 77 mg/dL (26 Oct 2022 21:27)  POCT Blood Glucose.: 70 mg/dL (26 Oct 2022 17:20)    I&O's Summary    26 Oct 2022 07:01  -  27 Oct 2022 07:00  --------------------------------------------------------  IN: 1240 mL / OUT: 400 mL / NET: 840 mL        PHYSICAL EXAM:  GENERAL: NAD, well-developed  HEAD:  Atraumatic, Normocephalic  EYES: EOMI, PERRLA, conjunctiva and sclera clear  NECK: Supple, No JVD  CHEST/LUNG: Clear to auscultation bilaterally; No wheeze  HEART: Regular rate and rhythm; No murmurs, rubs, or gallops  ABDOMEN: Soft, Nontender, Nondistended; Bowel sounds present  EXTREMITIES:  2+ Peripheral Pulses, No clubbing, cyanosis, or edema  PSYCH: AAOx0. Retracts to pain stimuli and tracts movements with eyes but Nonverbal.   NEUROLOGY: non-focal  SKIN: No rashes or lesions    LABS:                        10.6   6.18  )-----------( 211      ( 27 Oct 2022 05:49 )             34.6     10-27    131<L>  |  96<L>  |  16  ----------------------------<  114<H>  3.8   |  23  |  0.47<L>    Ca    8.5      27 Oct 2022 05:49  Phos  3.5     10-27  Mg     2.10     10-27    TPro  5.4<L>  /  Alb  3.2<L>  /  TBili  0.4  /  DBili  x   /  AST  9   /  ALT  <5<L>  /  AlkPhos  53  10-26              RADIOLOGY & ADDITIONAL TESTS:    Imaging Personally Reviewed:    Consultant(s) Notes Reviewed:      Care Discussed with Consultants/Other Providers:

## 2022-10-27 NOTE — BH CONSULTATION LIAISON PROGRESS NOTE - NSBHASSESSMENTFT_PSY_ALL_CORE
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 No prior psychiatric hospitalization, does not see an outpatient psychiatrist. Past Medical hx insulin dependent DM2, Parkinson's dx, sick sinus syndrome w/ pacemaker brought in from [facility] for fevers and increased lethargy, admitted for PNA, also found to have a UTI.     Psychiatry consulted for change in behavior, med management.     Upon evaluation, patient in bed, restless, unable ot participate in meaningful interview. Per collateral, change in mentation has been occurring over last few days. Collateral also notes patient had been doing well with regard to agitation/confusion throughout previous hospitalization while on seroquel BID but became overly sedated at [facility] when his regimen was increased to TID. Patient has been maintained on seroquel 12.5mg Qday thus far, receiving PRNs, remains agitated. Will increase seroquel to 12.5 BID at this time. D/w patient's wife Tamar who is present at bedside, in agreement with plan.   Pt's AMS could be d/t infection processes (uti, pna) vs sinemet induced     10/27: No PRNS, VSS. Patient in better behavioral control today, calmer, not restless. Able to engage in limited examination, knows he is at [facility], believes it is Nov 2022. Endorsing . Patient's last hospitalization his sinemet dosage was decreased from 2.5 tablets of 25/100 6x/day to 2 tablets 25/100 6x/day with good effect. Seroquel 12.5 bid was titrated to 12.5 qam and 25QHS. Will keep patient at current dose of seroquel 12.5 BID for now before making any further dosage adjustments. Delirium likely resolving in setting of tx of uti/pna + restarting on previous antipsychotic regimen    Plan:  --Defer obs to primary team  --C/w seroquel 12.5 Qday  to seroquel 12.5 BID; HOLD for sedation, respiratory depression  - Seroquel 12.5mg PO Q6H PRN agitation  - Olanzapine 1.25mg PO Q6H PRN Severe agitation  --Monitor EKG  -- Antipsychotics can only be given if qtc < 500   -- In order to enhance patient's overall well-being and clinical course, please try avoiding anticholinergics, and antihistamines (Can cause worsening confusion/delirium). Additionally, continue reorientation, supportive care, maintaining regular sleep/wake cycle, and optimizing nutritional/medical factors.   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 No prior psychiatric hospitalization, does not see an outpatient psychiatrist. Past Medical hx insulin dependent DM2, Parkinson's dx, sick sinus syndrome w/ pacemaker brought in from [facility] for fevers and increased lethargy, admitted for PNA, also found to have a UTI.     Psychiatry consulted for change in behavior, med management.     Upon evaluation, patient in bed, restless, unable ot participate in meaningful interview. Per collateral, change in mentation has been occurring over last few days. Collateral also notes patient had been doing well with regard to agitation/confusion throughout previous hospitalization while on seroquel BID but became overly sedated at [facility] when his regimen was increased to TID. Patient has been maintained on seroquel 12.5mg Qday thus far, receiving PRNs, remains agitated. Will increase seroquel to 12.5 BID at this time. D/w patient's wife Tamar who is present at bedside, in agreement with plan.   Pt's AMS could be d/t infection processes (uti, pna) vs sinemet induced     10/27: No PRNS, VSS. Patient in better behavioral control today, calmer, not restless. Able to engage in limited examination, knows he is at [facility], believes it is Nov 2022. Endorsing . Of note, patient's last hospitalization ~ 2 weeks ago his sinemet dosage was decreased from 2.5 tablets of 25/100 6x/day to 2 tablets 25/100 6x/day with good effect. Seroquel 12.5 bid was titrated to 12.5 qam and 25QHS. Will keep patient at current dose of seroquel 12.5 BID for now before making any further dosage adjustments. Delirium likely resolving in setting of tx of uti/pna + restarting on previous antipsychotic regimen    Plan:  --Defer obs to primary team  --C/w seroquel 12.5 BID; HOLD for sedation, respiratory depression  - Seroquel 12.5mg PO Q6H PRN agitation  - Olanzapine 1.25mg PO Q6H PRN Severe agitation  --Monitor EKG  -- Antipsychotics can only be given if qtc < 500   -- In order to enhance patient's overall well-being and clinical course, please try avoiding anticholinergics, and antihistamines (Can cause worsening confusion/delirium). Additionally, continue reorientation, supportive care, maintaining regular sleep/wake cycle, and optimizing nutritional/medical factors.   --CL psychiatry to follow

## 2022-10-27 NOTE — BH CONSULTATION LIAISON PROGRESS NOTE - OTHER
Able to occasionally track certain objects with encouragement  Impaired, distracted  Limited  Blank/masked facies  Mild cogwheeling in u/e b/l

## 2022-10-27 NOTE — PROGRESS NOTE ADULT - PROBLEM SELECTOR PLAN 4
- was initially on 2.5 tabs at (2am, 6am,10am, 2pm, 6pm, 10pm), but was decreased during recent admission to 2 tabs psych due to decrease psychotic features   - c/w Klonopin 0.25 mg PO qd for PRN   - on (Ongentys) opicacone 50mg qd (wife to bring in bottle), not on formulary   - c/w Seroquel 12.5 mg bid  - c/w mirtazapine 15mg qd

## 2022-10-27 NOTE — PROGRESS NOTE ADULT - PROBLEM SELECTOR PLAN 3
multifactorial due to UTI and/or not being on adequate Seroquel dosing.  Dose of Seroquel was reduced on admission b/c wife said 25mg was sedating patient.  Psych consulted appreciated and changed seroquel to 12.5mg bid with improvement in agitation but now more sedated in a vegetative state.

## 2022-10-28 ENCOUNTER — TRANSCRIPTION ENCOUNTER (OUTPATIENT)
Age: 82
End: 2022-10-28

## 2022-10-28 DIAGNOSIS — E87.1 HYPO-OSMOLALITY AND HYPONATREMIA: ICD-10-CM

## 2022-10-28 LAB
ANION GAP SERPL CALC-SCNC: 13 MMOL/L — SIGNIFICANT CHANGE UP (ref 7–14)
BUN SERPL-MCNC: 9 MG/DL — SIGNIFICANT CHANGE UP (ref 7–23)
CALCIUM SERPL-MCNC: 8.2 MG/DL — LOW (ref 8.4–10.5)
CHLORIDE SERPL-SCNC: 93 MMOL/L — LOW (ref 98–107)
CO2 SERPL-SCNC: 18 MMOL/L — LOW (ref 22–31)
CREAT SERPL-MCNC: 0.38 MG/DL — LOW (ref 0.5–1.3)
CULTURE RESULTS: SIGNIFICANT CHANGE UP
CULTURE RESULTS: SIGNIFICANT CHANGE UP
EGFR: 109 ML/MIN/1.73M2 — SIGNIFICANT CHANGE UP
GLUCOSE BLDC GLUCOMTR-MCNC: 105 MG/DL — HIGH (ref 70–99)
GLUCOSE BLDC GLUCOMTR-MCNC: 134 MG/DL — HIGH (ref 70–99)
GLUCOSE BLDC GLUCOMTR-MCNC: 145 MG/DL — HIGH (ref 70–99)
GLUCOSE BLDC GLUCOMTR-MCNC: 146 MG/DL — HIGH (ref 70–99)
GLUCOSE SERPL-MCNC: 133 MG/DL — HIGH (ref 70–99)
HCT VFR BLD CALC: 36.4 % — LOW (ref 39–50)
HGB BLD-MCNC: 11 G/DL — LOW (ref 13–17)
MAGNESIUM SERPL-MCNC: 1.8 MG/DL — SIGNIFICANT CHANGE UP (ref 1.6–2.6)
MCHC RBC-ENTMCNC: 29.6 PG — SIGNIFICANT CHANGE UP (ref 27–34)
MCHC RBC-ENTMCNC: 30.2 GM/DL — LOW (ref 32–36)
MCV RBC AUTO: 97.8 FL — SIGNIFICANT CHANGE UP (ref 80–100)
NRBC # BLD: 0 /100 WBCS — SIGNIFICANT CHANGE UP (ref 0–0)
NRBC # FLD: 0 K/UL — SIGNIFICANT CHANGE UP (ref 0–0)
PHOSPHATE SERPL-MCNC: 2.6 MG/DL — SIGNIFICANT CHANGE UP (ref 2.5–4.5)
PLATELET # BLD AUTO: 239 K/UL — SIGNIFICANT CHANGE UP (ref 150–400)
POTASSIUM SERPL-MCNC: 3.9 MMOL/L — SIGNIFICANT CHANGE UP (ref 3.5–5.3)
POTASSIUM SERPL-SCNC: 3.9 MMOL/L — SIGNIFICANT CHANGE UP (ref 3.5–5.3)
RBC # BLD: 3.72 M/UL — LOW (ref 4.2–5.8)
RBC # FLD: 14.1 % — SIGNIFICANT CHANGE UP (ref 10.3–14.5)
SODIUM SERPL-SCNC: 124 MMOL/L — LOW (ref 135–145)
SPECIMEN SOURCE: SIGNIFICANT CHANGE UP
SPECIMEN SOURCE: SIGNIFICANT CHANGE UP
T4 FREE SERPL-MCNC: 1.4 NG/DL — SIGNIFICANT CHANGE UP (ref 0.9–1.8)
TSH SERPL-MCNC: 10.33 UIU/ML — HIGH (ref 0.27–4.2)
VIT B12 SERPL-MCNC: 374 PG/ML — SIGNIFICANT CHANGE UP (ref 200–900)
WBC # BLD: 6.55 K/UL — SIGNIFICANT CHANGE UP (ref 3.8–10.5)
WBC # FLD AUTO: 6.55 K/UL — SIGNIFICANT CHANGE UP (ref 3.8–10.5)

## 2022-10-28 PROCEDURE — 99233 SBSQ HOSP IP/OBS HIGH 50: CPT

## 2022-10-28 RX ADMIN — CARBIDOPA AND LEVODOPA 2 TABLET(S): 25; 100 TABLET ORAL at 02:00

## 2022-10-28 RX ADMIN — INSULIN GLARGINE 8 UNIT(S): 100 INJECTION, SOLUTION SUBCUTANEOUS at 22:30

## 2022-10-28 RX ADMIN — CARBIDOPA AND LEVODOPA 2 TABLET(S): 25; 100 TABLET ORAL at 05:46

## 2022-10-28 RX ADMIN — PANTOPRAZOLE SODIUM 40 MILLIGRAM(S): 20 TABLET, DELAYED RELEASE ORAL at 07:00

## 2022-10-28 RX ADMIN — QUETIAPINE FUMARATE 12.5 MILLIGRAM(S): 200 TABLET, FILM COATED ORAL at 17:42

## 2022-10-28 RX ADMIN — CLOPIDOGREL BISULFATE 75 MILLIGRAM(S): 75 TABLET, FILM COATED ORAL at 13:23

## 2022-10-28 RX ADMIN — ENOXAPARIN SODIUM 40 MILLIGRAM(S): 100 INJECTION SUBCUTANEOUS at 13:31

## 2022-10-28 RX ADMIN — CARBIDOPA AND LEVODOPA 2 TABLET(S): 25; 100 TABLET ORAL at 10:02

## 2022-10-28 RX ADMIN — Medication 0.25 MILLIGRAM(S): at 19:17

## 2022-10-28 RX ADMIN — AZITHROMYCIN 255 MILLIGRAM(S): 500 TABLET, FILM COATED ORAL at 14:58

## 2022-10-28 RX ADMIN — CARBIDOPA AND LEVODOPA 2 TABLET(S): 25; 100 TABLET ORAL at 17:42

## 2022-10-28 RX ADMIN — ATORVASTATIN CALCIUM 20 MILLIGRAM(S): 80 TABLET, FILM COATED ORAL at 21:00

## 2022-10-28 RX ADMIN — CARBIDOPA AND LEVODOPA 2 TABLET(S): 25; 100 TABLET ORAL at 13:23

## 2022-10-28 RX ADMIN — QUETIAPINE FUMARATE 12.5 MILLIGRAM(S): 200 TABLET, FILM COATED ORAL at 14:24

## 2022-10-28 RX ADMIN — QUETIAPINE FUMARATE 12.5 MILLIGRAM(S): 200 TABLET, FILM COATED ORAL at 05:46

## 2022-10-28 RX ADMIN — SENNA PLUS 2 TABLET(S): 8.6 TABLET ORAL at 21:01

## 2022-10-28 RX ADMIN — MIRTAZAPINE 15 MILLIGRAM(S): 45 TABLET, ORALLY DISINTEGRATING ORAL at 21:01

## 2022-10-28 RX ADMIN — POLYETHYLENE GLYCOL 3350 17 GRAM(S): 17 POWDER, FOR SOLUTION ORAL at 05:46

## 2022-10-28 RX ADMIN — CARBIDOPA AND LEVODOPA 2 TABLET(S): 25; 100 TABLET ORAL at 21:01

## 2022-10-28 NOTE — PROGRESS NOTE ADULT - PROBLEM SELECTOR PLAN 3
Urine Cx 10/23: + Klebsiella PNA sensitive to ceftriaxone  c/w ceftriaxone Urine Cx 10/23: + Klebsiella PNA sensitive to ceftriaxone  s/p 5 days of ceftriaxone

## 2022-10-28 NOTE — BH CONSULTATION LIAISON PROGRESS NOTE - ATTENDING COMMENTS
Chart reviewed. Case discussed with fellow. Agree with above assessment/recs. Will continue to follow as indicated per psych acuity.

## 2022-10-28 NOTE — DISCHARGE NOTE PROVIDER - NSDCCPCAREPLAN_GEN_ALL_CORE_FT
PRINCIPAL DISCHARGE DIAGNOSIS  Diagnosis: Pneumonia  Assessment and Plan of Treatment: You were treated with antibiotics for pneumonia.  Follow up with your primary care physician for further monitoring in 1-2 weeks. Please call to arrange appointment.        SECONDARY DISCHARGE DIAGNOSES  Diagnosis: Parkinson disease  Assessment and Plan of Treatment: Please continue taking your anti-Parkinson's medication as directed.  Follow up with neurology as needed.

## 2022-10-28 NOTE — PROGRESS NOTE ADULT - SUBJECTIVE AND OBJECTIVE BOX
Patient is a 82y old  Male who presents with a chief complaint of fevers at nursing home, [facility] (27 Oct 2022 14:21)      SUBJECTIVE / OVERNIGHT EVENTS:    MEDICATIONS  (STANDING):  atorvastatin 20 milliGRAM(s) Oral at bedtime  azithromycin  IVPB 500 milliGRAM(s) IV Intermittent every 24 hours  carbidopa/levodopa  25/100 2 Tablet(s) Oral <User Schedule>  clopidogrel Tablet 75 milliGRAM(s) Oral daily  dextrose 50% Injectable 25 Gram(s) IV Push once  dextrose 50% Injectable 12.5 Gram(s) IV Push once  dextrose 50% Injectable 25 Gram(s) IV Push once  dextrose Oral Gel 15 Gram(s) Oral once  enoxaparin Injectable 40 milliGRAM(s) SubCutaneous every 24 hours  glucagon  Injectable 1 milliGRAM(s) IntraMuscular once  insulin glargine Injectable (LANTUS) 8 Unit(s) SubCutaneous at bedtime  insulin lispro (ADMELOG) corrective regimen sliding scale   SubCutaneous three times a day before meals  insulin lispro (ADMELOG) corrective regimen sliding scale   SubCutaneous at bedtime  mirtazapine 15 milliGRAM(s) Oral at bedtime  opicapone (ogentys) 50 milliGRAM(s) 1 Capsule(s) Oral at bedtime  pantoprazole    Tablet 40 milliGRAM(s) Oral before breakfast  polyethylene glycol 3350 17 Gram(s) Oral two times a day  QUEtiapine 12.5 milliGRAM(s) Oral two times a day  senna 2 Tablet(s) Oral at bedtime    MEDICATIONS  (PRN):  acetaminophen     Tablet .. 650 milliGRAM(s) Oral every 6 hours PRN Temp greater or equal to 38C (100.4F), Mild Pain (1 - 3)  clonazePAM Oral Disintegrating Tablet 0.25 milliGRAM(s) Oral daily PRN anxiety  melatonin 3 milliGRAM(s) Oral at bedtime PRN Insomnia  QUEtiapine 12.5 milliGRAM(s) Oral every 6 hours PRN agitation      Vital Signs Last 24 Hrs  T(C): 36.4 (28 Oct 2022 05:51), Max: 36.4 (28 Oct 2022 05:51)  T(F): 97.5 (28 Oct 2022 05:51), Max: 97.5 (28 Oct 2022 05:51)  HR: 66 (28 Oct 2022 05:51) (66 - 76)  BP: 133/75 (28 Oct 2022 05:51) (104/65 - 134/55)  BP(mean): --  RR: 16 (28 Oct 2022 05:51) (16 - 16)  SpO2: 100% (28 Oct 2022 05:51) (97% - 100%)    Parameters below as of 28 Oct 2022 05:51  Patient On (Oxygen Delivery Method): room air      CAPILLARY BLOOD GLUCOSE      POCT Blood Glucose.: 146 mg/dL (28 Oct 2022 08:38)  POCT Blood Glucose.: 116 mg/dL (27 Oct 2022 21:50)  POCT Blood Glucose.: 221 mg/dL (27 Oct 2022 17:24)  POCT Blood Glucose.: 127 mg/dL (27 Oct 2022 12:04)    I&O's Summary      PHYSICAL EXAM:  GENERAL: NAD, well-developed  HEAD:  Atraumatic, Normocephalic  EYES: EOMI, PERRLA, conjunctiva and sclera clear  NECK: Supple, No JVD  CHEST/LUNG: Clear to auscultation bilaterally; No wheeze  HEART: Regular rate and rhythm; No murmurs, rubs, or gallops  ABDOMEN: Soft, Nontender, Nondistended; Bowel sounds present  EXTREMITIES:  2+ Peripheral Pulses, No clubbing, cyanosis, or edema  PSYCH: AAOx3  NEUROLOGY: non-focal  SKIN: No rashes or lesions    LABS:                        11.0   6.55  )-----------( 239      ( 28 Oct 2022 05:10 )             36.4     10-28    124<L>  |  93<L>  |  9   ----------------------------<  133<H>  3.9   |  18<L>  |  0.38<L>    Ca    8.2<L>      28 Oct 2022 05:10  Phos  2.6     10-28  Mg     1.80     10-28                RADIOLOGY & ADDITIONAL TESTS:    Imaging Personally Reviewed:    Consultant(s) Notes Reviewed:      Care Discussed with Consultants/Other Providers:   Patient is a 82y old  Male who presents with a chief complaint of fevers at nursing [place], [facility] (27 Oct 2022 14:21)      SUBJECTIVE / OVERNIGHT EVENTS:  Patient clinically improved now speaking and more awake and aware. Patient has no new complaints and appears to be at baseline. Denies cp, SOB, abdominal pain, N/V/D     MEDICATIONS  (STANDING):  atorvastatin 20 milliGRAM(s) Oral at bedtime  azithromycin  IVPB 500 milliGRAM(s) IV Intermittent every 24 hours  carbidopa/levodopa  25/100 2 Tablet(s) Oral <User Schedule>  clopidogrel Tablet 75 milliGRAM(s) Oral daily  dextrose 50% Injectable 25 Gram(s) IV Push once  dextrose 50% Injectable 12.5 Gram(s) IV Push once  dextrose 50% Injectable 25 Gram(s) IV Push once  dextrose Oral Gel 15 Gram(s) Oral once  enoxaparin Injectable 40 milliGRAM(s) SubCutaneous every 24 hours  glucagon  Injectable 1 milliGRAM(s) IntraMuscular once  insulin glargine Injectable (LANTUS) 8 Unit(s) SubCutaneous at bedtime  insulin lispro (ADMELOG) corrective regimen sliding scale   SubCutaneous three times a day before meals  insulin lispro (ADMELOG) corrective regimen sliding scale   SubCutaneous at bedtime  mirtazapine 15 milliGRAM(s) Oral at bedtime  opicapone (ogentys) 50 milliGRAM(s) 1 Capsule(s) Oral at bedtime  pantoprazole    Tablet 40 milliGRAM(s) Oral before breakfast  polyethylene glycol 3350 17 Gram(s) Oral two times a day  QUEtiapine 12.5 milliGRAM(s) Oral two times a day  senna 2 Tablet(s) Oral at bedtime    MEDICATIONS  (PRN):  acetaminophen     Tablet .. 650 milliGRAM(s) Oral every 6 hours PRN Temp greater or equal to 38C (100.4F), Mild Pain (1 - 3)  clonazePAM Oral Disintegrating Tablet 0.25 milliGRAM(s) Oral daily PRN anxiety  melatonin 3 milliGRAM(s) Oral at bedtime PRN Insomnia  QUEtiapine 12.5 milliGRAM(s) Oral every 6 hours PRN agitation      Vital Signs Last 24 Hrs  T(C): 36.4 (28 Oct 2022 05:51), Max: 36.4 (28 Oct 2022 05:51)  T(F): 97.5 (28 Oct 2022 05:51), Max: 97.5 (28 Oct 2022 05:51)  HR: 66 (28 Oct 2022 05:51) (66 - 76)  BP: 133/75 (28 Oct 2022 05:51) (104/65 - 134/55)  BP(mean): --  RR: 16 (28 Oct 2022 05:51) (16 - 16)  SpO2: 100% (28 Oct 2022 05:51) (97% - 100%)    Parameters below as of 28 Oct 2022 05:51  Patient On (Oxygen Delivery Method): room air      CAPILLARY BLOOD GLUCOSE      POCT Blood Glucose.: 146 mg/dL (28 Oct 2022 08:38)  POCT Blood Glucose.: 116 mg/dL (27 Oct 2022 21:50)  POCT Blood Glucose.: 221 mg/dL (27 Oct 2022 17:24)  POCT Blood Glucose.: 127 mg/dL (27 Oct 2022 12:04)    I&O's Summary      PHYSICAL EXAM:  GENERAL: NAD, well-developed  HEAD:  Atraumatic, Normocephalic  EYES: EOMI, PERRLA, conjunctiva and sclera clear  NECK: Supple, No JVD  CHEST/LUNG: Clear to auscultation bilaterally; No wheeze  HEART: Regular rate and rhythm; No murmurs, rubs, or gallops  ABDOMEN: Soft, Nontender, Nondistended; Bowel sounds present  EXTREMITIES:  2+ Peripheral Pulses, No clubbing, cyanosis, or edema  PSYCH: AAOx1  NEUROLOGY: non-focal  SKIN: No rashes or lesions    LABS:                        11.0   6.55  )-----------( 239      ( 28 Oct 2022 05:10 )             36.4     10-28    124<L>  |  93<L>  |  9   ----------------------------<  133<H>  3.9   |  18<L>  |  0.38<L>    Ca    8.2<L>      28 Oct 2022 05:10  Phos  2.6     10-28  Mg     1.80     10-28                RADIOLOGY & ADDITIONAL TESTS:    Imaging Personally Reviewed:    Consultant(s) Notes Reviewed:      Care Discussed with Consultants/Other Providers:

## 2022-10-28 NOTE — DISCHARGE NOTE PROVIDER - HOSPITAL COURSE
82 y/o Male w/ hx Parkinson's dz, DM2, who presents from [facility] for fevers and increased lethargy. Found to have LLL PNA on CXR and UTI now s/p abx treatment. Patient now with waxing and waning metabolic encephalopathy. Positive urine retention of > 500 cc of urine on 10/29 s/p straight cath.     Metabolic encephalopathy.   · multifactorial due to UTI and/or not being on adequate Seroquel dosing.  Dose of Seroquel was reduced on admission b/c wife said 25mg was sedating patient.  Psych consulted appreciated and changed seroquel to 12.5mg bid with improvement in agitation and overall mental status.  However mental status has been waxing and waning since 10/29 with hallucinations and agitation  -Bladder scan shows urine retention > 500cc and patient undergoing straight cath protocol.   -repeat UA negative  -Psych f/up.    Type 2 diabetes mellitus.   - Episode of hypoglycemia this AM d/t poor PO intake   - Hgb A1c 5.0  - discontinue Lantus and c/w ISS   -Monitoring FSs  -Encourage PO intake with assistance.    Left lower lobe pneumonia.   - CXR with LLL opacity consistent with PNA, outpatient CXR correlated,   - WBC 11.88, nl lactate  - s/p 7 days of Zithromax and 5 days of ceftriaxone ( last dose 10/29)     Blood Cx 10/23 NTD  - Patient on room air.    Parkinson disease.   - was initially on 2.5 tabs at (2am, 6am,10am, 2pm, 6pm, 10pm), but was decreased during recent admission to 2 tabs psych due to decrease psychotic features   - c/w Klonopin 0.25 mg PO qd for PRN   - on (Ongentys) opicacone 50mg qd (wife to bring in bottle), not on formulary   - c/w Seroquel 12.5 mg bid  - c/w mirtazapine 15mg qd.    Hyponatremia.   Likely SIADH, resolved with fluid restriction to 1 liter/day.    Acute UTI.   · Urine Cx 10/23: + Klebsiella PNA sensitive to ceftriaxone  s/p 5 days of ceftriaxone  repeat UA on 10/29 negative for UTI.    CAD (coronary artery disease).   - c/w clopidogrel 75mg qd and statin.    Hyperlipidemia.   - c/w atorvastatin 20mg qhs.    Need for prophylactic measure.   DVT ppx: Lovenox 40mg   Diet: CC diet  Dispo; PT rec subacute rehab family would like to take home.      On 11/2/22, case was discussed with Dr. Wright, patient is medically cleared and optimized for discharge today. All medications were reviewed with attending, and sent to mutually agreed upon pharmacy

## 2022-10-28 NOTE — PROGRESS NOTE ADULT - PROBLEM SELECTOR PLAN 2
check urine electrolytes, and urine Osm  -check TSH, free T4  -restrict Na to 1 liter/day  -d/c IVFs

## 2022-10-28 NOTE — DISCHARGE NOTE PROVIDER - NSDCFUADDAPPT_GEN_ALL_CORE_FT
APPTS ARE READY TO BE MADE: [ X] YES    Best Family or Patient Contact (if needed): Keeley Millan (SON), [phone]    Additional Information about above appointments (if needed):    1: Follow up appointment with PCP DR Mir Plata [phone]  2:   3:     Other comments or requests:    APPTS ARE READY TO BE MADE: [ X] YES    Best Family or Patient Contact (if needed): Keeley Millan (SON), [phone]    Additional Information about above appointments (if needed):    1: Follow up appointment with PCP DR Mir Plata [phone]  2:   3:     Other comments or requests:   Patient is being discharged to rehab. Caregiver will arrange follow up.

## 2022-10-28 NOTE — BH CONSULTATION LIAISON PROGRESS NOTE - NSBHASSESSMENTFT_PSY_ALL_CORE
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 No prior psychiatric hospitalization, does not see an outpatient psychiatrist. Past Medical hx insulin dependent DM2, Parkinson's dx, sick sinus syndrome w/ pacemaker brought in from [facility] for fevers and increased lethargy, admitted for PNA, also found to have a UTI.     Psychiatry consulted for change in behavior, med management.     Upon evaluation, patient in bed, restless, unable ot participate in meaningful interview. Per collateral, change in mentation has been occurring over last few days. Collateral also notes patient had been doing well with regard to agitation/confusion throughout previous hospitalization while on seroquel BID but became overly sedated at [facility] when his regimen was increased to TID. Patient has been maintained on seroquel 12.5mg Qday thus far, receiving PRNs, remains agitated. Will increase seroquel to 12.5 BID at this time. D/w patient's wife Tamar who is present at bedside, in agreement with plan.   Pt's AMS could be d/t infection processes (uti, pna) vs sinemet induced     10/27: No PRNS, VSS. Patient in better behavioral control today, calmer, not restless. Able to engage in limited examination, knows he is at [facility], believes it is Nov 2022. Endorsing VH. Of note, patient's last hospitalization ~ 2 weeks ago his sinemet dosage was decreased from 2.5 tablets of 25/100 6x/day to 2 tablets 25/100 6x/day with good effect. Seroquel 12.5 bid was titrated to 12.5 qam and 25QHS. Will keep patient at current dose of seroquel 12.5 BID for now before making any further dosage adjustments. Delirium likely resolving in setting of tx of uti/pna + restarting on previous antipsychotic regimen    10/28: No PRNs. VSS. Pt with hypoNA 124 (yesterday 131). Pt endorsing generalized weakness, but overall stable mood. Denying thomas VH/paranoia although at times does appear to be hallucinating. Denies causing him any distress however when they do occur.     Plan:  --Defer obs to primary team  --Workup for hypoNa--> Urine studies to r/o SIADH   - Would also check TSH, Ft4, b12, folate, vit d.   -- C/w seroquel 12.5 BID; HOLD for sedation, respiratory depression  - Seroquel 12.5mg PO Q6H PRN agitation  - Olanzapine 1.25mg PO Q6H PRN Severe agitation  --Monitor EKG  -- Antipsychotics can only be given if qtc < 500   -- In order to enhance patient's overall well-being and clinical course, please try avoiding anticholinergics, and antihistamines (Can cause worsening confusion/delirium). Additionally, continue reorientation, supportive care, maintaining regular sleep/wake cycle, glasses/hearing aids and optimizing nutritional/medical factors.     --CL psychiatry to follow

## 2022-10-28 NOTE — PROGRESS NOTE ADULT - PROBLEM SELECTOR PLAN 6
- on 12 Units qhs, was discharged with novolog 5-9 units with meals, per NH records no Novolog noted   - will c/w 12 units qhs and MED qmeals and qhs; adjust as needed  -Hypoglycemia due to poor PO intake resolved  -c/w D5W IVFs  -Monitoring FSs  -Encourage PO intake with assistance - on 12 Units qhs, was discharged with novolog 5-9 units with meals, per NH records no Novolog noted   - will c/w 12 units qhs and MED qmeals and qhs; adjust as needed  -Monitoring FSs  -Encourage PO intake with assistance

## 2022-10-28 NOTE — PROGRESS NOTE ADULT - PROBLEM SELECTOR PLAN 1
- CXR with LLL opacity consistent with PNA, outpatient CXR correlated,   - WBC 11.88, nl lactate  - c/w Ceftriaxone/Zithromax  - Blood Cx 10/23 NTD - CXR with LLL opacity consistent with PNA, outpatient CXR correlated,   - WBC 11.88, nl lactate  - Zithromax D#6/7  - Blood Cx 10/23 NTD

## 2022-10-28 NOTE — DISCHARGE NOTE PROVIDER - CARE PROVIDER_API CALL
primary  care provider,   Please follow up with your primary care provider as needed  Phone: (   )    -  Fax: (   )    -  Follow Up Time:

## 2022-10-28 NOTE — PROGRESS NOTE ADULT - PROBLEM SELECTOR PLAN 4
multifactorial due to UTI and/or not being on adequate Seroquel dosing.  Dose of Seroquel was reduced on admission b/c wife said 25mg was sedating patient.  Psych consulted appreciated and changed seroquel to 12.5mg bid with improvement in agitation but now more sedated in a vegetative state. multifactorial due to UTI and/or not being on adequate Seroquel dosing.  Dose of Seroquel was reduced on admission b/c wife said 25mg was sedating patient.  Psych consulted appreciated and changed seroquel to 12.5mg bid with improvement in agitation and overall mental status.

## 2022-10-28 NOTE — DISCHARGE NOTE PROVIDER - PROVIDER TOKENS
FREE:[LAST:[primary  care provider],PHONE:[(   )    -],FAX:[(   )    -],ADDRESS:[Please follow up with your primary care provider as needed]]

## 2022-10-29 LAB
ANION GAP SERPL CALC-SCNC: 11 MMOL/L — SIGNIFICANT CHANGE UP (ref 7–14)
APPEARANCE UR: CLEAR — SIGNIFICANT CHANGE UP
BACTERIA # UR AUTO: NEGATIVE — SIGNIFICANT CHANGE UP
BASOPHILS # BLD AUTO: 0.04 K/UL — SIGNIFICANT CHANGE UP (ref 0–0.2)
BASOPHILS NFR BLD AUTO: 0.6 % — SIGNIFICANT CHANGE UP (ref 0–2)
BILIRUB UR-MCNC: NEGATIVE — SIGNIFICANT CHANGE UP
BUN SERPL-MCNC: 6 MG/DL — LOW (ref 7–23)
CALCIUM SERPL-MCNC: 8.7 MG/DL — SIGNIFICANT CHANGE UP (ref 8.4–10.5)
CHLORIDE SERPL-SCNC: 101 MMOL/L — SIGNIFICANT CHANGE UP (ref 98–107)
CO2 SERPL-SCNC: 25 MMOL/L — SIGNIFICANT CHANGE UP (ref 22–31)
COLOR SPEC: SIGNIFICANT CHANGE UP
CREAT SERPL-MCNC: 0.36 MG/DL — LOW (ref 0.5–1.3)
DIFF PNL FLD: ABNORMAL
EGFR: 111 ML/MIN/1.73M2 — SIGNIFICANT CHANGE UP
EOSINOPHIL # BLD AUTO: 0.13 K/UL — SIGNIFICANT CHANGE UP (ref 0–0.5)
EOSINOPHIL NFR BLD AUTO: 2 % — SIGNIFICANT CHANGE UP (ref 0–6)
EPI CELLS # UR: 5 /HPF — SIGNIFICANT CHANGE UP (ref 0–5)
GLUCOSE BLDC GLUCOMTR-MCNC: 126 MG/DL — HIGH (ref 70–99)
GLUCOSE BLDC GLUCOMTR-MCNC: 69 MG/DL — LOW (ref 70–99)
GLUCOSE BLDC GLUCOMTR-MCNC: 79 MG/DL — SIGNIFICANT CHANGE UP (ref 70–99)
GLUCOSE BLDC GLUCOMTR-MCNC: 81 MG/DL — SIGNIFICANT CHANGE UP (ref 70–99)
GLUCOSE BLDC GLUCOMTR-MCNC: 92 MG/DL — SIGNIFICANT CHANGE UP (ref 70–99)
GLUCOSE SERPL-MCNC: 82 MG/DL — SIGNIFICANT CHANGE UP (ref 70–99)
GLUCOSE UR QL: NEGATIVE — SIGNIFICANT CHANGE UP
HCT VFR BLD CALC: 37.2 % — LOW (ref 39–50)
HGB BLD-MCNC: 11.3 G/DL — LOW (ref 13–17)
HYALINE CASTS # UR AUTO: 2 /LPF — SIGNIFICANT CHANGE UP (ref 0–7)
IANC: 4.75 K/UL — SIGNIFICANT CHANGE UP (ref 1.8–7.4)
IMM GRANULOCYTES NFR BLD AUTO: 0.3 % — SIGNIFICANT CHANGE UP (ref 0–0.9)
KETONES UR-MCNC: ABNORMAL
LEUKOCYTE ESTERASE UR-ACNC: NEGATIVE — SIGNIFICANT CHANGE UP
LYMPHOCYTES # BLD AUTO: 1.06 K/UL — SIGNIFICANT CHANGE UP (ref 1–3.3)
LYMPHOCYTES # BLD AUTO: 16.2 % — SIGNIFICANT CHANGE UP (ref 13–44)
MAGNESIUM SERPL-MCNC: 2 MG/DL — SIGNIFICANT CHANGE UP (ref 1.6–2.6)
MCHC RBC-ENTMCNC: 29.2 PG — SIGNIFICANT CHANGE UP (ref 27–34)
MCHC RBC-ENTMCNC: 30.4 GM/DL — LOW (ref 32–36)
MCV RBC AUTO: 96.1 FL — SIGNIFICANT CHANGE UP (ref 80–100)
MONOCYTES # BLD AUTO: 0.53 K/UL — SIGNIFICANT CHANGE UP (ref 0–0.9)
MONOCYTES NFR BLD AUTO: 8.1 % — SIGNIFICANT CHANGE UP (ref 2–14)
NEUTROPHILS # BLD AUTO: 4.75 K/UL — SIGNIFICANT CHANGE UP (ref 1.8–7.4)
NEUTROPHILS NFR BLD AUTO: 72.8 % — SIGNIFICANT CHANGE UP (ref 43–77)
NITRITE UR-MCNC: NEGATIVE — SIGNIFICANT CHANGE UP
NRBC # BLD: 0 /100 WBCS — SIGNIFICANT CHANGE UP (ref 0–0)
NRBC # FLD: 0 K/UL — SIGNIFICANT CHANGE UP (ref 0–0)
PH UR: 6.5 — SIGNIFICANT CHANGE UP (ref 5–8)
PHOSPHATE SERPL-MCNC: 2.6 MG/DL — SIGNIFICANT CHANGE UP (ref 2.5–4.5)
PLATELET # BLD AUTO: 238 K/UL — SIGNIFICANT CHANGE UP (ref 150–400)
POTASSIUM SERPL-MCNC: 3.8 MMOL/L — SIGNIFICANT CHANGE UP (ref 3.5–5.3)
POTASSIUM SERPL-SCNC: 3.8 MMOL/L — SIGNIFICANT CHANGE UP (ref 3.5–5.3)
PROT UR-MCNC: NEGATIVE — SIGNIFICANT CHANGE UP
RBC # BLD: 3.87 M/UL — LOW (ref 4.2–5.8)
RBC # FLD: 14.1 % — SIGNIFICANT CHANGE UP (ref 10.3–14.5)
RBC CASTS # UR COMP ASSIST: 22 /HPF — HIGH (ref 0–4)
SODIUM SERPL-SCNC: 137 MMOL/L — SIGNIFICANT CHANGE UP (ref 135–145)
SP GR SPEC: 1.01 — LOW (ref 1.01–1.05)
UROBILINOGEN FLD QL: SIGNIFICANT CHANGE UP
WBC # BLD: 6.53 K/UL — SIGNIFICANT CHANGE UP (ref 3.8–10.5)
WBC # FLD AUTO: 6.53 K/UL — SIGNIFICANT CHANGE UP (ref 3.8–10.5)
WBC UR QL: 3 /HPF — SIGNIFICANT CHANGE UP (ref 0–5)

## 2022-10-29 PROCEDURE — 99233 SBSQ HOSP IP/OBS HIGH 50: CPT

## 2022-10-29 RX ADMIN — CARBIDOPA AND LEVODOPA 2 TABLET(S): 25; 100 TABLET ORAL at 01:59

## 2022-10-29 RX ADMIN — Medication 0.25 MILLIGRAM(S): at 13:15

## 2022-10-29 RX ADMIN — CARBIDOPA AND LEVODOPA 2 TABLET(S): 25; 100 TABLET ORAL at 10:53

## 2022-10-29 RX ADMIN — AZITHROMYCIN 255 MILLIGRAM(S): 500 TABLET, FILM COATED ORAL at 18:05

## 2022-10-29 RX ADMIN — MIRTAZAPINE 15 MILLIGRAM(S): 45 TABLET, ORALLY DISINTEGRATING ORAL at 21:42

## 2022-10-29 RX ADMIN — CARBIDOPA AND LEVODOPA 2 TABLET(S): 25; 100 TABLET ORAL at 13:13

## 2022-10-29 RX ADMIN — SENNA PLUS 2 TABLET(S): 8.6 TABLET ORAL at 21:42

## 2022-10-29 RX ADMIN — QUETIAPINE FUMARATE 12.5 MILLIGRAM(S): 200 TABLET, FILM COATED ORAL at 04:37

## 2022-10-29 RX ADMIN — QUETIAPINE FUMARATE 12.5 MILLIGRAM(S): 200 TABLET, FILM COATED ORAL at 18:04

## 2022-10-29 RX ADMIN — ENOXAPARIN SODIUM 40 MILLIGRAM(S): 100 INJECTION SUBCUTANEOUS at 18:05

## 2022-10-29 RX ADMIN — ATORVASTATIN CALCIUM 20 MILLIGRAM(S): 80 TABLET, FILM COATED ORAL at 21:42

## 2022-10-29 RX ADMIN — CARBIDOPA AND LEVODOPA 2 TABLET(S): 25; 100 TABLET ORAL at 18:04

## 2022-10-29 RX ADMIN — CARBIDOPA AND LEVODOPA 2 TABLET(S): 25; 100 TABLET ORAL at 04:36

## 2022-10-29 RX ADMIN — INSULIN GLARGINE 8 UNIT(S): 100 INJECTION, SOLUTION SUBCUTANEOUS at 21:42

## 2022-10-29 RX ADMIN — PANTOPRAZOLE SODIUM 40 MILLIGRAM(S): 20 TABLET, DELAYED RELEASE ORAL at 04:37

## 2022-10-29 RX ADMIN — CLOPIDOGREL BISULFATE 75 MILLIGRAM(S): 75 TABLET, FILM COATED ORAL at 13:13

## 2022-10-29 RX ADMIN — POLYETHYLENE GLYCOL 3350 17 GRAM(S): 17 POWDER, FOR SOLUTION ORAL at 18:03

## 2022-10-29 RX ADMIN — CARBIDOPA AND LEVODOPA 2 TABLET(S): 25; 100 TABLET ORAL at 21:41

## 2022-10-29 NOTE — PROGRESS NOTE ADULT - PROBLEM SELECTOR PLAN 1
- CXR with LLL opacity consistent with PNA, outpatient CXR correlated,   - WBC 11.88, nl lactate  - Zithromax D#7/7  - Blood Cx 10/23 NTD

## 2022-10-29 NOTE — PROGRESS NOTE ADULT - PROBLEM SELECTOR PLAN 4
multifactorial due to UTI and/or not being on adequate Seroquel dosing.  Dose of Seroquel was reduced on admission b/c wife said 25mg was sedating patient.  Psych consulted appreciated and changed seroquel to 12.5mg bid with improvement in agitation and overall mental status.  However on 10/29 patient confused again so will repeat UA and check Bladder scan for urine retention

## 2022-10-29 NOTE — PROGRESS NOTE ADULT - SUBJECTIVE AND OBJECTIVE BOX
Patient is a 82y old  Male who presents with a chief complaint of fevers at nursing home, [facility] (28 Oct 2022 12:12)      SUBJECTIVE / OVERNIGHT EVENTS:  Patient confused today and thinks there are insects under his bed. Nurse provided the translation. He also was hypoglycemic in a.m to 69. Unable to obtain ROS due to confusion    MEDICATIONS  (STANDING):  atorvastatin 20 milliGRAM(s) Oral at bedtime  azithromycin  IVPB 500 milliGRAM(s) IV Intermittent every 24 hours  carbidopa/levodopa  25/100 2 Tablet(s) Oral <User Schedule>  clopidogrel Tablet 75 milliGRAM(s) Oral daily  dextrose 50% Injectable 25 Gram(s) IV Push once  dextrose 50% Injectable 12.5 Gram(s) IV Push once  dextrose 50% Injectable 25 Gram(s) IV Push once  dextrose Oral Gel 15 Gram(s) Oral once  enoxaparin Injectable 40 milliGRAM(s) SubCutaneous every 24 hours  glucagon  Injectable 1 milliGRAM(s) IntraMuscular once  insulin glargine Injectable (LANTUS) 8 Unit(s) SubCutaneous at bedtime  insulin lispro (ADMELOG) corrective regimen sliding scale   SubCutaneous three times a day before meals  insulin lispro (ADMELOG) corrective regimen sliding scale   SubCutaneous at bedtime  mirtazapine 15 milliGRAM(s) Oral at bedtime  opicapone (ogentys) 50 milliGRAM(s) 1 Capsule(s) Oral at bedtime  pantoprazole    Tablet 40 milliGRAM(s) Oral before breakfast  polyethylene glycol 3350 17 Gram(s) Oral two times a day  QUEtiapine 12.5 milliGRAM(s) Oral two times a day  senna 2 Tablet(s) Oral at bedtime    MEDICATIONS  (PRN):  acetaminophen     Tablet .. 650 milliGRAM(s) Oral every 6 hours PRN Temp greater or equal to 38C (100.4F), Mild Pain (1 - 3)  clonazePAM Oral Disintegrating Tablet 0.25 milliGRAM(s) Oral daily PRN anxiety  melatonin 3 milliGRAM(s) Oral at bedtime PRN Insomnia  QUEtiapine 12.5 milliGRAM(s) Oral every 6 hours PRN agitation      Vital Signs Last 24 Hrs  T(C): 36.7 (29 Oct 2022 11:13), Max: 36.7 (29 Oct 2022 04:20)  T(F): 98.1 (29 Oct 2022 11:13), Max: 98.1 (29 Oct 2022 11:13)  HR: 76 (29 Oct 2022 11:13) (62 - 76)  BP: 126/82 (29 Oct 2022 11:13) (126/82 - 138/68)  BP(mean): --  RR: 18 (29 Oct 2022 11:13) (17 - 18)  SpO2: 100% (29 Oct 2022 11:13) (100% - 100%)    Parameters below as of 29 Oct 2022 11:13  Patient On (Oxygen Delivery Method): room air      CAPILLARY BLOOD GLUCOSE      POCT Blood Glucose.: 92 mg/dL (29 Oct 2022 12:15)  POCT Blood Glucose.: 79 mg/dL (29 Oct 2022 09:22)  POCT Blood Glucose.: 69 mg/dL (29 Oct 2022 08:35)  POCT Blood Glucose.: 134 mg/dL (28 Oct 2022 22:18)  POCT Blood Glucose.: 145 mg/dL (28 Oct 2022 16:56)    I&O's Summary      PHYSICAL EXAM:   GENERAL: NAD, well-developed  HEAD:  Atraumatic, Normocephalic  EYES: EOMI, PERRLA, conjunctiva and sclera clear  NECK: Supple, No JVD  CHEST/LUNG: Clear to auscultation bilaterally; No wheeze  HEART: Regular rate and rhythm; No murmurs, rubs, or gallops  ABDOMEN: Soft, Nontender, Nondistended; Bowel sounds present  EXTREMITIES:  2+ Peripheral Pulses, No clubbing, cyanosis, or edema  PSYCH: AAOx0; confused  NEUROLOGY: non-focal  SKIN: No rashes or lesions    LABS:                        11.3   6.53  )-----------( 238      ( 29 Oct 2022 04:20 )             37.2     10-29    137  |  101  |  6<L>  ----------------------------<  82  3.8   |  25  |  0.36<L>    Ca    8.7      29 Oct 2022 04:10  Phos  2.6     10-29  Mg     2.00     10-29                RADIOLOGY & ADDITIONAL TESTS:    Imaging Personally Reviewed:    Consultant(s) Notes Reviewed:      Care Discussed with Consultants/Other Providers:

## 2022-10-29 NOTE — PROGRESS NOTE ADULT - PROBLEM SELECTOR PLAN 6
- on 12 Units qhs, was discharged with novolog 5-9 units with meals, per NH records no Novolog noted   - will c/w 12 units qhs and MED qmeals and qhs; adjust as needed  -patient had recurrent hypoglycemia in a.m. due to confusion and not eating again.   -Monitoring FSs  -Encourage PO intake with assistance  If hypoglycemia persists will restart D5W IVFs

## 2022-10-30 LAB
ANION GAP SERPL CALC-SCNC: 10 MMOL/L — SIGNIFICANT CHANGE UP (ref 7–14)
BASOPHILS # BLD AUTO: 0.05 K/UL — SIGNIFICANT CHANGE UP (ref 0–0.2)
BASOPHILS NFR BLD AUTO: 0.7 % — SIGNIFICANT CHANGE UP (ref 0–2)
BUN SERPL-MCNC: 7 MG/DL — SIGNIFICANT CHANGE UP (ref 7–23)
CALCIUM SERPL-MCNC: 8.6 MG/DL — SIGNIFICANT CHANGE UP (ref 8.4–10.5)
CHLORIDE SERPL-SCNC: 102 MMOL/L — SIGNIFICANT CHANGE UP (ref 98–107)
CO2 SERPL-SCNC: 27 MMOL/L — SIGNIFICANT CHANGE UP (ref 22–31)
CREAT SERPL-MCNC: 0.38 MG/DL — LOW (ref 0.5–1.3)
EGFR: 109 ML/MIN/1.73M2 — SIGNIFICANT CHANGE UP
EOSINOPHIL # BLD AUTO: 0.16 K/UL — SIGNIFICANT CHANGE UP (ref 0–0.5)
EOSINOPHIL NFR BLD AUTO: 2.2 % — SIGNIFICANT CHANGE UP (ref 0–6)
GLUCOSE BLDC GLUCOMTR-MCNC: 128 MG/DL — HIGH (ref 70–99)
GLUCOSE BLDC GLUCOMTR-MCNC: 93 MG/DL — SIGNIFICANT CHANGE UP (ref 70–99)
GLUCOSE BLDC GLUCOMTR-MCNC: 98 MG/DL — SIGNIFICANT CHANGE UP (ref 70–99)
GLUCOSE SERPL-MCNC: 71 MG/DL — SIGNIFICANT CHANGE UP (ref 70–99)
HCT VFR BLD CALC: 36.5 % — LOW (ref 39–50)
HGB BLD-MCNC: 11.2 G/DL — LOW (ref 13–17)
IANC: 4.21 K/UL — SIGNIFICANT CHANGE UP (ref 1.8–7.4)
IMM GRANULOCYTES NFR BLD AUTO: 0.4 % — SIGNIFICANT CHANGE UP (ref 0–0.9)
LYMPHOCYTES # BLD AUTO: 2.13 K/UL — SIGNIFICANT CHANGE UP (ref 1–3.3)
LYMPHOCYTES # BLD AUTO: 29.4 % — SIGNIFICANT CHANGE UP (ref 13–44)
MAGNESIUM SERPL-MCNC: 1.8 MG/DL — SIGNIFICANT CHANGE UP (ref 1.6–2.6)
MCHC RBC-ENTMCNC: 29.6 PG — SIGNIFICANT CHANGE UP (ref 27–34)
MCHC RBC-ENTMCNC: 30.7 GM/DL — LOW (ref 32–36)
MCV RBC AUTO: 96.6 FL — SIGNIFICANT CHANGE UP (ref 80–100)
MONOCYTES # BLD AUTO: 0.66 K/UL — SIGNIFICANT CHANGE UP (ref 0–0.9)
MONOCYTES NFR BLD AUTO: 9.1 % — SIGNIFICANT CHANGE UP (ref 2–14)
NEUTROPHILS # BLD AUTO: 4.21 K/UL — SIGNIFICANT CHANGE UP (ref 1.8–7.4)
NEUTROPHILS NFR BLD AUTO: 58.2 % — SIGNIFICANT CHANGE UP (ref 43–77)
NRBC # BLD: 0 /100 WBCS — SIGNIFICANT CHANGE UP (ref 0–0)
NRBC # FLD: 0 K/UL — SIGNIFICANT CHANGE UP (ref 0–0)
PHOSPHATE SERPL-MCNC: 2.5 MG/DL — SIGNIFICANT CHANGE UP (ref 2.5–4.5)
PLATELET # BLD AUTO: 256 K/UL — SIGNIFICANT CHANGE UP (ref 150–400)
POTASSIUM SERPL-MCNC: 3.6 MMOL/L — SIGNIFICANT CHANGE UP (ref 3.5–5.3)
POTASSIUM SERPL-SCNC: 3.6 MMOL/L — SIGNIFICANT CHANGE UP (ref 3.5–5.3)
RBC # BLD: 3.78 M/UL — LOW (ref 4.2–5.8)
RBC # FLD: 14.5 % — SIGNIFICANT CHANGE UP (ref 10.3–14.5)
SODIUM SERPL-SCNC: 139 MMOL/L — SIGNIFICANT CHANGE UP (ref 135–145)
WBC # BLD: 7.24 K/UL — SIGNIFICANT CHANGE UP (ref 3.8–10.5)
WBC # FLD AUTO: 7.24 K/UL — SIGNIFICANT CHANGE UP (ref 3.8–10.5)

## 2022-10-30 PROCEDURE — 99233 SBSQ HOSP IP/OBS HIGH 50: CPT

## 2022-10-30 RX ADMIN — CLOPIDOGREL BISULFATE 75 MILLIGRAM(S): 75 TABLET, FILM COATED ORAL at 18:20

## 2022-10-30 RX ADMIN — POLYETHYLENE GLYCOL 3350 17 GRAM(S): 17 POWDER, FOR SOLUTION ORAL at 06:17

## 2022-10-30 RX ADMIN — MIRTAZAPINE 15 MILLIGRAM(S): 45 TABLET, ORALLY DISINTEGRATING ORAL at 23:21

## 2022-10-30 RX ADMIN — CARBIDOPA AND LEVODOPA 2 TABLET(S): 25; 100 TABLET ORAL at 02:21

## 2022-10-30 RX ADMIN — QUETIAPINE FUMARATE 12.5 MILLIGRAM(S): 200 TABLET, FILM COATED ORAL at 18:20

## 2022-10-30 RX ADMIN — ENOXAPARIN SODIUM 40 MILLIGRAM(S): 100 INJECTION SUBCUTANEOUS at 18:19

## 2022-10-30 RX ADMIN — ATORVASTATIN CALCIUM 20 MILLIGRAM(S): 80 TABLET, FILM COATED ORAL at 23:22

## 2022-10-30 RX ADMIN — CARBIDOPA AND LEVODOPA 2 TABLET(S): 25; 100 TABLET ORAL at 06:16

## 2022-10-30 RX ADMIN — CARBIDOPA AND LEVODOPA 2 TABLET(S): 25; 100 TABLET ORAL at 23:21

## 2022-10-30 RX ADMIN — SENNA PLUS 2 TABLET(S): 8.6 TABLET ORAL at 23:21

## 2022-10-30 RX ADMIN — QUETIAPINE FUMARATE 12.5 MILLIGRAM(S): 200 TABLET, FILM COATED ORAL at 06:17

## 2022-10-30 RX ADMIN — INSULIN GLARGINE 8 UNIT(S): 100 INJECTION, SOLUTION SUBCUTANEOUS at 22:14

## 2022-10-30 RX ADMIN — CARBIDOPA AND LEVODOPA 2 TABLET(S): 25; 100 TABLET ORAL at 18:19

## 2022-10-30 RX ADMIN — PANTOPRAZOLE SODIUM 40 MILLIGRAM(S): 20 TABLET, DELAYED RELEASE ORAL at 06:17

## 2022-10-30 RX ADMIN — Medication 0.25 MILLIGRAM(S): at 13:56

## 2022-10-30 RX ADMIN — CARBIDOPA AND LEVODOPA 2 TABLET(S): 25; 100 TABLET ORAL at 11:40

## 2022-10-30 NOTE — SWALLOW BEDSIDE ASSESSMENT ADULT - COMMENTS
Hospitalist note 10/25 "80 y/o Male w/ hx Parkinson's dz, DM2, who presents from [facility] for fevers and increased lethargy. Found to have LLL PNA on CXR and UTI."    CXR 10/23 " Left lower lobe pneumonia with vascular congestion.".    Patient is known to this service as patient was seen during a recent previous admission. Patient was seen for multiple swallow assessments and completed a Cinesophagram on 10/4/22 at which time soft and bite sized solids with mildly thick liquids was recommended (please see report/notes for details).    Patient seen at bedside this AM for an initial assessment of the swallow function, at which time patient was alert. Patient reluctant to participate in swallow assessment and accept PO trials. Patient did not follow directives. Patient verbalizes wants/needs though speech is difficult to understand at times given patient with Parkinson's at baseline.
As per Hospitalist Progress Note "82 y/o Male w/ hx Parkinson's dz, DM2, who presents from [facility] for fevers and increased lethargy. Found to have LLL PNA on CXR and UTI."     CXR 10/23/22 "Left lower lobe pneumonia with vascular congestion."     Of note, patient is known to this service, seen for cinesophagram 10/4 with recommendations for soft and bite sized with mildly thick liquids. Patient seen for bedside swallow assessments 10/13 and 10/25 at which time oropharyngeal swallow unable to be adequately assessed given reduced patient participation.

## 2022-10-30 NOTE — SWALLOW BEDSIDE ASSESSMENT ADULT - ASR SWALLOW RECOMMEND DIAG
Cinesophagram NOT indicated given patient refusal of PO trials
Objective testing NOT warranted given clinical presentation/patient behavior during clinical assessment.

## 2022-10-30 NOTE — PROGRESS NOTE ADULT - PROBLEM SELECTOR PLAN 6
- on 12 Units qhs, was discharged with novolog 5-9 units with meals, per NH records no Novolog noted   - will c/w 12 units qhs and MED qmeals and qhs; adjust as needed  -patient has had intermittent, recurrent hypoglycemia due to confusion due to poor PO intake when confused.  -currently FS stable  -Monitoring FSs  -Encourage PO intake with assistance  If hypoglycemia persists will restart D5W IVFs

## 2022-10-30 NOTE — PROGRESS NOTE ADULT - PROBLEM SELECTOR PLAN 1
- CXR with LLL opacity consistent with PNA, outpatient CXR correlated,   - WBC 11.88, nl lactate  - s/p 7 days of Zithromax and 5 days of ceftriaxone ( last dose 10/29)  - Blood Cx 10/23 NTD  -Patient on room air

## 2022-10-30 NOTE — SWALLOW BEDSIDE ASSESSMENT ADULT - SWALLOW EVAL: DIAGNOSIS
Unable to assess oropharyngeal swallow given patient refusal to accept PO trials. Patient presented with trials of thin liquids, mildly thick liquids and puree. Patient turned head away and pushed spoon stating "no". Therefore oropharyngeal swallow unable to be assessed.

## 2022-10-30 NOTE — SWALLOW BEDSIDE ASSESSMENT ADULT - ADDITIONAL RECOMMENDATIONS
Medical team advised to reconsult this service when patient is medically optimized/more accepting of PO trials to reassess swallow.
1. Aspiration precautions   2. Reflux precautions   3. Reconsult this service as patient is medically optimized/overall participation improves

## 2022-10-30 NOTE — PROGRESS NOTE ADULT - PROBLEM SELECTOR PLAN 3
Urine Cx 10/23: + Klebsiella PNA sensitive to ceftriaxone  s/p 5 days of ceftriaxone  Will repeat Urine Cx since positive urine retention on 10/29 Urine Cx 10/23: + Klebsiella PNA sensitive to ceftriaxone  s/p 5 days of ceftriaxone  repeat UA on 10/29 negative for UTI.

## 2022-10-30 NOTE — PROGRESS NOTE ADULT - SUBJECTIVE AND OBJECTIVE BOX
Patient is a 82y old  Male who presents with a chief complaint of fevers at nursing home, [facility] (29 Oct 2022 14:57)      SUBJECTIVE / OVERNIGHT EVENTS:  Patient was agitated and pulled out IV. He is now calm but reportedly thinks their are insects under his bed. unable to obtain ROS as patient not participating with conversation.     MEDICATIONS  (STANDING):  atorvastatin 20 milliGRAM(s) Oral at bedtime  carbidopa/levodopa  25/100 2 Tablet(s) Oral <User Schedule>  clopidogrel Tablet 75 milliGRAM(s) Oral daily  dextrose 50% Injectable 25 Gram(s) IV Push once  dextrose 50% Injectable 12.5 Gram(s) IV Push once  dextrose 50% Injectable 25 Gram(s) IV Push once  dextrose Oral Gel 15 Gram(s) Oral once  enoxaparin Injectable 40 milliGRAM(s) SubCutaneous every 24 hours  glucagon  Injectable 1 milliGRAM(s) IntraMuscular once  insulin glargine Injectable (LANTUS) 8 Unit(s) SubCutaneous at bedtime  insulin lispro (ADMELOG) corrective regimen sliding scale   SubCutaneous three times a day before meals  insulin lispro (ADMELOG) corrective regimen sliding scale   SubCutaneous at bedtime  mirtazapine 15 milliGRAM(s) Oral at bedtime  opicapone (ogentys) 50 milliGRAM(s) 1 Capsule(s) Oral at bedtime  pantoprazole    Tablet 40 milliGRAM(s) Oral before breakfast  polyethylene glycol 3350 17 Gram(s) Oral two times a day  QUEtiapine 12.5 milliGRAM(s) Oral two times a day  senna 2 Tablet(s) Oral at bedtime    MEDICATIONS  (PRN):  acetaminophen     Tablet .. 650 milliGRAM(s) Oral every 6 hours PRN Temp greater or equal to 38C (100.4F), Mild Pain (1 - 3)  melatonin 3 milliGRAM(s) Oral at bedtime PRN Insomnia  QUEtiapine 12.5 milliGRAM(s) Oral every 6 hours PRN agitation      Vital Signs Last 24 Hrs  T(C): 36.4 (30 Oct 2022 11:04), Max: 37.1 (29 Oct 2022 21:40)  T(F): 97.6 (30 Oct 2022 11:04), Max: 98.7 (29 Oct 2022 21:40)  HR: 67 (30 Oct 2022 11:04) (67 - 73)  BP: 125/68 (30 Oct 2022 11:04) (125/68 - 134/67)  BP(mean): --  RR: 18 (30 Oct 2022 11:04) (18 - 18)  SpO2: 100% (30 Oct 2022 11:04) (99% - 100%)    Parameters below as of 30 Oct 2022 11:04  Patient On (Oxygen Delivery Method): room air      CAPILLARY BLOOD GLUCOSE      POCT Blood Glucose.: 98 mg/dL (30 Oct 2022 12:08)  POCT Blood Glucose.: 126 mg/dL (29 Oct 2022 21:26)  POCT Blood Glucose.: 81 mg/dL (29 Oct 2022 17:06)    I&O's Summary    29 Oct 2022 07:01  -  30 Oct 2022 07:00  --------------------------------------------------------  IN: 484 mL / OUT: 200 mL / NET: 284 mL        PHYSICAL EXAM:  GENERAL: NAD, well-developed  HEAD:  Atraumatic, Normocephalic  EYES: EOMI, PERRLA, conjunctiva and sclera clear  NECK: Supple, No JVD  CHEST/LUNG: Clear to auscultation bilaterally; No wheeze  HEART: Regular rate and rhythm; No murmurs, rubs, or gallops  ABDOMEN: Soft, Nontender, Nondistended; Bowel sounds present  EXTREMITIES:  2+ Peripheral Pulses, No clubbing, cyanosis, or edema  PSYCH: AAOx 0  NEUROLOGY: non-focal  SKIN: No rashes or lesions    LABS:                        11.2   7.24  )-----------( 256      ( 30 Oct 2022 05:24 )             36.5     10-30    139  |  102  |  7   ----------------------------<  71  3.6   |  27  |  0.38<L>    Ca    8.6      30 Oct 2022 05:24  Phos  2.5     10-30  Mg     1.80     10-30            Urinalysis Basic - ( 29 Oct 2022 19:13 )    Color: Light Yellow / Appearance: Clear / S.009 / pH: x  Gluc: x / Ketone: Small  / Bili: Negative / Urobili: <2 mg/dL   Blood: x / Protein: Negative / Nitrite: Negative   Leuk Esterase: Negative / RBC: 22 /HPF / WBC 3 /HPF   Sq Epi: x / Non Sq Epi: 5 /HPF / Bacteria: Negative        RADIOLOGY & ADDITIONAL TESTS:    Imaging Personally Reviewed:    Consultant(s) Notes Reviewed:      Care Discussed with Consultants/Other Providers:

## 2022-10-30 NOTE — PROGRESS NOTE ADULT - PROBLEM SELECTOR PLAN 4
multifactorial due to UTI and/or not being on adequate Seroquel dosing.  Dose of Seroquel was reduced on admission b/c wife said 25mg was sedating patient.  Psych consulted appreciated and changed seroquel to 12.5mg bid with improvement in agitation and overall mental status.  However mental status has been waxing and waning since 10/29 with hallucinations and agitation  -Bladder scan shows urine retention > 500cc and patient undergoing straight cath protocol.   F/U repeat Urine Cx   Will need to reconsult psychiatry in a.m. for med management multifactorial due to UTI and/or not being on adequate Seroquel dosing.  Dose of Seroquel was reduced on admission b/c wife said 25mg was sedating patient.  Psych consulted appreciated and changed seroquel to 12.5mg bid with improvement in agitation and overall mental status.  However mental status has been waxing and waning since 10/29 with hallucinations and agitation  -Bladder scan shows urine retention > 500cc and patient undergoing straight cath protocol.   -repeat UA negative  Will need to reconsult psychiatry in a.m. for med management

## 2022-10-30 NOTE — SWALLOW BEDSIDE ASSESSMENT ADULT - SWALLOW EVAL: CURRENT DIET
Minced and moist with mildly thick liquids
Minced and moist solids and mildly thick liquids, per MD [email or web address]

## 2022-10-30 NOTE — SWALLOW BEDSIDE ASSESSMENT ADULT - SWALLOW EVAL: RECOMMENDED DIET
1) defer PO diet to MD given patient refusal of PO trials 2) Consider short-term non-oral means of nutrition/hydration/medication. 3) Continued RD follow up to ensure adequate caloric/hydration intake

## 2022-10-31 LAB
ANION GAP SERPL CALC-SCNC: 9 MMOL/L — SIGNIFICANT CHANGE UP (ref 7–14)
BASOPHILS # BLD AUTO: 0.05 K/UL — SIGNIFICANT CHANGE UP (ref 0–0.2)
BASOPHILS NFR BLD AUTO: 0.6 % — SIGNIFICANT CHANGE UP (ref 0–2)
BUN SERPL-MCNC: 8 MG/DL — SIGNIFICANT CHANGE UP (ref 7–23)
CALCIUM SERPL-MCNC: 8.8 MG/DL — SIGNIFICANT CHANGE UP (ref 8.4–10.5)
CHLORIDE SERPL-SCNC: 103 MMOL/L — SIGNIFICANT CHANGE UP (ref 98–107)
CO2 SERPL-SCNC: 28 MMOL/L — SIGNIFICANT CHANGE UP (ref 22–31)
CREAT SERPL-MCNC: 0.43 MG/DL — LOW (ref 0.5–1.3)
EGFR: 105 ML/MIN/1.73M2 — SIGNIFICANT CHANGE UP
EOSINOPHIL # BLD AUTO: 0.18 K/UL — SIGNIFICANT CHANGE UP (ref 0–0.5)
EOSINOPHIL NFR BLD AUTO: 2.1 % — SIGNIFICANT CHANGE UP (ref 0–6)
GLUCOSE BLDC GLUCOMTR-MCNC: 105 MG/DL — HIGH (ref 70–99)
GLUCOSE BLDC GLUCOMTR-MCNC: 114 MG/DL — HIGH (ref 70–99)
GLUCOSE BLDC GLUCOMTR-MCNC: 121 MG/DL — HIGH (ref 70–99)
GLUCOSE BLDC GLUCOMTR-MCNC: 141 MG/DL — HIGH (ref 70–99)
GLUCOSE BLDC GLUCOMTR-MCNC: 548 MG/DL — CRITICAL HIGH (ref 70–99)
GLUCOSE BLDC GLUCOMTR-MCNC: 65 MG/DL — LOW (ref 70–99)
GLUCOSE BLDC GLUCOMTR-MCNC: 80 MG/DL — SIGNIFICANT CHANGE UP (ref 70–99)
GLUCOSE SERPL-MCNC: 50 MG/DL — CRITICAL LOW (ref 70–99)
HCT VFR BLD CALC: 40 % — SIGNIFICANT CHANGE UP (ref 39–50)
HGB BLD-MCNC: 12.2 G/DL — LOW (ref 13–17)
IANC: 4.91 K/UL — SIGNIFICANT CHANGE UP (ref 1.8–7.4)
IMM GRANULOCYTES NFR BLD AUTO: 0.3 % — SIGNIFICANT CHANGE UP (ref 0–0.9)
LYMPHOCYTES # BLD AUTO: 2.67 K/UL — SIGNIFICANT CHANGE UP (ref 1–3.3)
LYMPHOCYTES # BLD AUTO: 31.1 % — SIGNIFICANT CHANGE UP (ref 13–44)
MAGNESIUM SERPL-MCNC: 1.9 MG/DL — SIGNIFICANT CHANGE UP (ref 1.6–2.6)
MCHC RBC-ENTMCNC: 29.7 PG — SIGNIFICANT CHANGE UP (ref 27–34)
MCHC RBC-ENTMCNC: 30.5 GM/DL — LOW (ref 32–36)
MCV RBC AUTO: 97.3 FL — SIGNIFICANT CHANGE UP (ref 80–100)
MONOCYTES # BLD AUTO: 0.75 K/UL — SIGNIFICANT CHANGE UP (ref 0–0.9)
MONOCYTES NFR BLD AUTO: 8.7 % — SIGNIFICANT CHANGE UP (ref 2–14)
NEUTROPHILS # BLD AUTO: 4.91 K/UL — SIGNIFICANT CHANGE UP (ref 1.8–7.4)
NEUTROPHILS NFR BLD AUTO: 57.2 % — SIGNIFICANT CHANGE UP (ref 43–77)
NRBC # BLD: 0 /100 WBCS — SIGNIFICANT CHANGE UP (ref 0–0)
NRBC # FLD: 0 K/UL — SIGNIFICANT CHANGE UP (ref 0–0)
PHOSPHATE SERPL-MCNC: 2.5 MG/DL — SIGNIFICANT CHANGE UP (ref 2.5–4.5)
PLATELET # BLD AUTO: 268 K/UL — SIGNIFICANT CHANGE UP (ref 150–400)
POTASSIUM SERPL-MCNC: 3.8 MMOL/L — SIGNIFICANT CHANGE UP (ref 3.5–5.3)
POTASSIUM SERPL-SCNC: 3.8 MMOL/L — SIGNIFICANT CHANGE UP (ref 3.5–5.3)
RBC # BLD: 4.11 M/UL — LOW (ref 4.2–5.8)
RBC # FLD: 14.5 % — SIGNIFICANT CHANGE UP (ref 10.3–14.5)
SODIUM SERPL-SCNC: 140 MMOL/L — SIGNIFICANT CHANGE UP (ref 135–145)
WBC # BLD: 8.59 K/UL — SIGNIFICANT CHANGE UP (ref 3.8–10.5)
WBC # FLD AUTO: 8.59 K/UL — SIGNIFICANT CHANGE UP (ref 3.8–10.5)

## 2022-10-31 PROCEDURE — 99232 SBSQ HOSP IP/OBS MODERATE 35: CPT

## 2022-10-31 RX ORDER — INSULIN GLARGINE 100 [IU]/ML
4 INJECTION, SOLUTION SUBCUTANEOUS AT BEDTIME
Refills: 0 | Status: DISCONTINUED | OUTPATIENT
Start: 2022-10-31 | End: 2022-10-31

## 2022-10-31 RX ORDER — DEXTROSE 50 % IN WATER 50 %
15 SYRINGE (ML) INTRAVENOUS ONCE
Refills: 0 | Status: COMPLETED | OUTPATIENT
Start: 2022-10-31 | End: 2022-10-31

## 2022-10-31 RX ORDER — SODIUM CHLORIDE 9 MG/ML
1000 INJECTION, SOLUTION INTRAVENOUS
Refills: 0 | Status: DISCONTINUED | OUTPATIENT
Start: 2022-10-31 | End: 2022-10-31

## 2022-10-31 RX ADMIN — CARBIDOPA AND LEVODOPA 2 TABLET(S): 25; 100 TABLET ORAL at 02:13

## 2022-10-31 RX ADMIN — POLYETHYLENE GLYCOL 3350 17 GRAM(S): 17 POWDER, FOR SOLUTION ORAL at 06:07

## 2022-10-31 RX ADMIN — CARBIDOPA AND LEVODOPA 2 TABLET(S): 25; 100 TABLET ORAL at 12:25

## 2022-10-31 RX ADMIN — ATORVASTATIN CALCIUM 20 MILLIGRAM(S): 80 TABLET, FILM COATED ORAL at 22:25

## 2022-10-31 RX ADMIN — CLOPIDOGREL BISULFATE 75 MILLIGRAM(S): 75 TABLET, FILM COATED ORAL at 12:26

## 2022-10-31 RX ADMIN — QUETIAPINE FUMARATE 12.5 MILLIGRAM(S): 200 TABLET, FILM COATED ORAL at 06:11

## 2022-10-31 RX ADMIN — QUETIAPINE FUMARATE 12.5 MILLIGRAM(S): 200 TABLET, FILM COATED ORAL at 17:54

## 2022-10-31 RX ADMIN — SENNA PLUS 2 TABLET(S): 8.6 TABLET ORAL at 22:25

## 2022-10-31 RX ADMIN — QUETIAPINE FUMARATE 12.5 MILLIGRAM(S): 200 TABLET, FILM COATED ORAL at 23:12

## 2022-10-31 RX ADMIN — CARBIDOPA AND LEVODOPA 2 TABLET(S): 25; 100 TABLET ORAL at 17:52

## 2022-10-31 RX ADMIN — Medication 15 GRAM(S): at 08:20

## 2022-10-31 RX ADMIN — CARBIDOPA AND LEVODOPA 2 TABLET(S): 25; 100 TABLET ORAL at 06:07

## 2022-10-31 RX ADMIN — ENOXAPARIN SODIUM 40 MILLIGRAM(S): 100 INJECTION SUBCUTANEOUS at 17:53

## 2022-10-31 RX ADMIN — PANTOPRAZOLE SODIUM 40 MILLIGRAM(S): 20 TABLET, DELAYED RELEASE ORAL at 06:13

## 2022-10-31 RX ADMIN — CARBIDOPA AND LEVODOPA 2 TABLET(S): 25; 100 TABLET ORAL at 22:24

## 2022-10-31 RX ADMIN — MIRTAZAPINE 15 MILLIGRAM(S): 45 TABLET, ORALLY DISINTEGRATING ORAL at 22:25

## 2022-10-31 NOTE — CHART NOTE - NSCHARTNOTEFT_GEN_A_CORE
Pt hypoglycemic again this AM. Hypoglycemia protocol followed. D5W 70c/hr started. Will continue to monitor FS. Pt hypoglycemic again this AM. Hypoglycemia protocol followed. Jin VIVEROS'ed. Will continue to monitor FS.

## 2022-10-31 NOTE — BH CONSULTATION LIAISON PROGRESS NOTE - NSBHATTESTTYPEVISIT_PSY_A_CORE
Resident/Fellow with telephonic supervision
Attending with Resident/Fellow/Student
Attending with Resident/Fellow/Student

## 2022-10-31 NOTE — BH CONSULTATION LIAISON PROGRESS NOTE - NSBHATTESTCOMMENTATTENDFT_PSY_A_CORE
Chart reviewed. Pt seen with fellow. Pt known to me. Calm, but a bit slowed/bradyphrenic/bradykinetic (baseline parkinson's). Denied current safety concerns/paranoia/distress, and not sedated. See above for full assessment/recs. Recommend continuing medical stabilization and then discussion w/ family about tradeoffs in PD treatment (Sinemet contributing to psychosis yet treating motoric symptoms). Will continue to follow.
agree with above, patient appears stable, mood fair, watching TV without issue, tolerating seroquel voiding well now without med changes. C/w meds as above, no psych c/i to discharge when medically cleared.

## 2022-10-31 NOTE — BH CONSULTATION LIAISON PROGRESS NOTE - NSBHCHARTREVIEWLAB_PSY_A_CORE FT
11.0   6.55  )-----------( 239      ( 28 Oct 2022 05:10 )             36.4   10-28    124<L>  |  93<L>  |  9   ----------------------------<  133<H>  3.9   |  18<L>  |  0.38<L>    Ca    8.2<L>      28 Oct 2022 05:10  Phos  2.6     10-28  Mg     1.80     10-28    
                      11.0   6.55  )-----------( 239      ( 28 Oct 2022 05:10 )             36.4   10-28    124<L>  |  93<L>  |  9   ----------------------------<  133<H>  3.9   |  18<L>  |  0.38<L>    Ca    8.2<L>      28 Oct 2022 05:10  Phos  2.6     10-28  Mg     1.80     10-28

## 2022-10-31 NOTE — BH CONSULTATION LIAISON PROGRESS NOTE - CURRENT MEDICATION
MEDICATIONS  (STANDING):  atorvastatin 20 milliGRAM(s) Oral at bedtime  carbidopa/levodopa  25/100 2 Tablet(s) Oral <User Schedule>  clopidogrel Tablet 75 milliGRAM(s) Oral daily  dextrose 50% Injectable 25 Gram(s) IV Push once  dextrose 50% Injectable 12.5 Gram(s) IV Push once  dextrose 50% Injectable 25 Gram(s) IV Push once  dextrose Oral Gel 15 Gram(s) Oral once  enoxaparin Injectable 40 milliGRAM(s) SubCutaneous every 24 hours  glucagon  Injectable 1 milliGRAM(s) IntraMuscular once  insulin glargine Injectable (LANTUS) 8 Unit(s) SubCutaneous at bedtime  insulin lispro (ADMELOG) corrective regimen sliding scale   SubCutaneous three times a day before meals  insulin lispro (ADMELOG) corrective regimen sliding scale   SubCutaneous at bedtime  mirtazapine 15 milliGRAM(s) Oral at bedtime  opicapone (ogentys) 50 milliGRAM(s) 1 Capsule(s) Oral at bedtime  pantoprazole    Tablet 40 milliGRAM(s) Oral before breakfast  polyethylene glycol 3350 17 Gram(s) Oral two times a day  QUEtiapine 12.5 milliGRAM(s) Oral two times a day  senna 2 Tablet(s) Oral at bedtime    MEDICATIONS  (PRN):  acetaminophen     Tablet .. 650 milliGRAM(s) Oral every 6 hours PRN Temp greater or equal to 38C (100.4F), Mild Pain (1 - 3)  melatonin 3 milliGRAM(s) Oral at bedtime PRN Insomnia  QUEtiapine 12.5 milliGRAM(s) Oral every 6 hours PRN agitation  
MEDICATIONS  (STANDING):  atorvastatin 20 milliGRAM(s) Oral at bedtime  azithromycin  IVPB 500 milliGRAM(s) IV Intermittent every 24 hours  carbidopa/levodopa  25/100 2 Tablet(s) Oral <User Schedule>  clopidogrel Tablet 75 milliGRAM(s) Oral daily  dextrose 50% Injectable 25 Gram(s) IV Push once  dextrose 50% Injectable 12.5 Gram(s) IV Push once  dextrose 50% Injectable 25 Gram(s) IV Push once  dextrose Oral Gel 15 Gram(s) Oral once  enoxaparin Injectable 40 milliGRAM(s) SubCutaneous every 24 hours  glucagon  Injectable 1 milliGRAM(s) IntraMuscular once  insulin glargine Injectable (LANTUS) 8 Unit(s) SubCutaneous at bedtime  insulin lispro (ADMELOG) corrective regimen sliding scale   SubCutaneous three times a day before meals  insulin lispro (ADMELOG) corrective regimen sliding scale   SubCutaneous at bedtime  mirtazapine 15 milliGRAM(s) Oral at bedtime  opicapone (ogentys) 50 milliGRAM(s) 1 Capsule(s) Oral at bedtime  pantoprazole    Tablet 40 milliGRAM(s) Oral before breakfast  polyethylene glycol 3350 17 Gram(s) Oral two times a day  QUEtiapine 12.5 milliGRAM(s) Oral two times a day  senna 2 Tablet(s) Oral at bedtime    MEDICATIONS  (PRN):  acetaminophen     Tablet .. 650 milliGRAM(s) Oral every 6 hours PRN Temp greater or equal to 38C (100.4F), Mild Pain (1 - 3)  clonazePAM Oral Disintegrating Tablet 0.25 milliGRAM(s) Oral daily PRN anxiety  melatonin 3 milliGRAM(s) Oral at bedtime PRN Insomnia  QUEtiapine 12.5 milliGRAM(s) Oral every 6 hours PRN agitation  
MEDICATIONS  (STANDING):  atorvastatin 20 milliGRAM(s) Oral at bedtime  azithromycin  IVPB 500 milliGRAM(s) IV Intermittent every 24 hours  carbidopa/levodopa  25/100 2 Tablet(s) Oral <User Schedule>  cefTRIAXone   IVPB 1000 milliGRAM(s) IV Intermittent every 24 hours  clopidogrel Tablet 75 milliGRAM(s) Oral daily  dextrose 5%. 1000 milliLiter(s) (70 mL/Hr) IV Continuous <Continuous>  dextrose 50% Injectable 25 Gram(s) IV Push once  dextrose 50% Injectable 12.5 Gram(s) IV Push once  dextrose 50% Injectable 25 Gram(s) IV Push once  dextrose Oral Gel 15 Gram(s) Oral once  enoxaparin Injectable 40 milliGRAM(s) SubCutaneous every 24 hours  glucagon  Injectable 1 milliGRAM(s) IntraMuscular once  insulin glargine Injectable (LANTUS) 8 Unit(s) SubCutaneous at bedtime  insulin lispro (ADMELOG) corrective regimen sliding scale   SubCutaneous three times a day before meals  insulin lispro (ADMELOG) corrective regimen sliding scale   SubCutaneous at bedtime  mirtazapine 15 milliGRAM(s) Oral at bedtime  opicapone (ogentys) 50 milliGRAM(s) 1 Capsule(s) Oral at bedtime  pantoprazole    Tablet 40 milliGRAM(s) Oral before breakfast  polyethylene glycol 3350 17 Gram(s) Oral two times a day  QUEtiapine 12.5 milliGRAM(s) Oral two times a day  senna 2 Tablet(s) Oral at bedtime    MEDICATIONS  (PRN):  acetaminophen     Tablet .. 650 milliGRAM(s) Oral every 6 hours PRN Temp greater or equal to 38C (100.4F), Mild Pain (1 - 3)  clonazePAM Oral Disintegrating Tablet 0.25 milliGRAM(s) Oral daily PRN anxiety  melatonin 3 milliGRAM(s) Oral at bedtime PRN Insomnia  QUEtiapine 12.5 milliGRAM(s) Oral every 6 hours PRN agitation

## 2022-10-31 NOTE — BH CONSULTATION LIAISON PROGRESS NOTE - NSBHASSESSMENTFT_PSY_ALL_CORE
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 No prior psychiatric hospitalization, does not see an outpatient psychiatrist. Past Medical hx insulin dependent DM2, Parkinson's dx, sick sinus syndrome w/ pacemaker brought in from [facility] for fevers and increased lethargy, admitted for PNA, also found to have a UTI.     Psychiatry consulted for change in behavior, med management.     Upon evaluation, patient in bed, restless, unable ot participate in meaningful interview. Per collateral, change in mentation has been occurring over last few days. Collateral also notes patient had been doing well with regard to agitation/confusion throughout previous hospitalization while on seroquel BID but became overly sedated at [facility] when his regimen was increased to TID. Patient has been maintained on seroquel 12.5mg Qday thus far, receiving PRNs, remains agitated. Will increase seroquel to 12.5 BID at this time. D/w patient's wife Tamar who is present at bedside, in agreement with plan.   Pt's AMS could be d/t infection processes (uti, pna) vs sinemet induced     10/27: No PRNS, VSS. Patient in better behavioral control today, calmer, not restless. Able to engage in limited examination, knows he is at [facility], believes it is Nov 2022. Endorsing VH. Of note, patient's last hospitalization ~ 2 weeks ago his sinemet dosage was decreased from 2.5 tablets of 25/100 6x/day to 2 tablets 25/100 6x/day with good effect. Seroquel 12.5 bid was titrated to 12.5 qam and 25QHS. Will keep patient at current dose of seroquel 12.5 BID for now before making any further dosage adjustments. Delirium likely resolving in setting of tx of uti/pna + restarting on previous antipsychotic regimen  10/28: No PRNs. VSS. Pt with hypoNA 124 (yesterday 131). Pt endorsing generalized weakness, but overall stable mood. Denying thomas VH/paranoia although at times does appear to be hallucinating. Denies causing him any distress however when they do occur.     10/29: Two psych PRNs overweekend. VSS. Psychiatry asked to f/u for urinary retention as pt  had one noted episode of retention over weekend. CUrrently voiding, currently denying AVH. Currently oriented. No indication to adjust psychotropic regimen. Discussed with primary team that if this should recur, would discontinue PRN clonazepam and then further evaluate.     Plan:  --Defer obs to primary team  --Workup for hypoNa--> resolved   - Would also check TSH, Ft4, b12, folate, vit d. --> Elevated TSH and normal FT4  -- C/w seroquel 12.5 BID; HOLD for sedation, respiratory depression  - Seroquel 12.5mg PO Q6H PRN agitation  - Olanzapine 1.25mg PO Q6H PRN Severe agitation  - If urinary retention happens again any no other etiologies identified, d/c' clonazepam PRN   --Monitor EKG  -- Antipsychotics can only be given if qtc < 500   -- In order to enhance patient's overall well-being and clinical course, please try avoiding anticholinergics, and antihistamines (Can cause worsening confusion/delirium). Additionally, continue reorientation, supportive care, maintaining regular sleep/wake cycle, glasses/hearing aids and optimizing nutritional/medical factors.     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 No prior psychiatric hospitalization, does not see an outpatient psychiatrist. Past Medical hx insulin dependent DM2, Parkinson's dx, sick sinus syndrome w/ pacemaker brought in from [facility] for fevers and increased lethargy, admitted for PNA, also found to have a UTI.     Psychiatry consulted for change in behavior, med management.     Upon evaluation, patient in bed, restless, unable ot participate in meaningful interview. Per collateral, change in mentation has been occurring over last few days. Collateral also notes patient had been doing well with regard to agitation/confusion throughout previous hospitalization while on seroquel BID but became overly sedated at [facility] when his regimen was increased to TID. Patient has been maintained on seroquel 12.5mg Qday thus far, receiving PRNs, remains agitated. Will increase seroquel to 12.5 BID at this time. D/w patient's wife Tamar who is present at bedside, in agreement with plan.   Pt's AMS could be d/t infection processes (uti, pna) vs sinemet induced     10/27: No PRNS, VSS. Patient in better behavioral control today, calmer, not restless. Able to engage in limited examination, knows he is at [facility], believes it is Nov 2022. Endorsing VH. Of note, patient's last hospitalization ~ 2 weeks ago his sinemet dosage was decreased from 2.5 tablets of 25/100 6x/day to 2 tablets 25/100 6x/day with good effect. Seroquel 12.5 bid was titrated to 12.5 qam and 25QHS. Will keep patient at current dose of seroquel 12.5 BID for now before making any further dosage adjustments. Delirium likely resolving in setting of tx of uti/pna + restarting on previous antipsychotic regimen  10/28: No PRNs. VSS. Pt with hypoNA 124 (yesterday 131). Pt endorsing generalized weakness, but overall stable mood. Denying thomas VH/paranoia although at times does appear to be hallucinating. Denies causing him any distress however when they do occur.   10/31: Two psych PRNs overweekend. VSS. Psychiatry asked to f/u for urinary retention as pt  had one noted episode of retention over weekend. CUrrently voiding, currently denying AVH. Currently oriented. No indication to adjust psychotropic regimen. Discussed with primary team that if this should recur, would discontinue PRN clonazepam and then further evaluate.     Plan:  --Defer obs to primary team  --Workup for hypoNa--> resolved   - Would also check TSH, Ft4, b12, folate, vit d. --> Elevated TSH and normal FT4  -- C/w seroquel 12.5 BID; HOLD for sedation, respiratory depression  - Seroquel 12.5mg PO Q6H PRN agitation  - Olanzapine 1.25mg PO Q6H PRN Severe agitation  - If urinary retention happens again any no other etiologies identified, d/c' clonazepam PRN   --Monitor EKG  -- Antipsychotics can only be given if qtc < 500   -- In order to enhance patient's overall well-being and clinical course, please try avoiding anticholinergics, and antihistamines (Can cause worsening confusion/delirium). Additionally, continue reorientation, supportive care, maintaining regular sleep/wake cycle, glasses/hearing aids and optimizing nutritional/medical factors.     --CL psychiatry to follow

## 2022-10-31 NOTE — PROVIDER CONTACT NOTE (HYPOGLYCEMIA EVENT) - NS PROVIDER CONTACT BACKGROUND-HYPO
Age: 82y    Gender: Male    POCT Blood Glucose:  121 mg/dL (10-31-22 @ 12:19)  105 mg/dL (10-31-22 @ 10:18)  114 mg/dL (10-31-22 @ 09:33)  548 mg/dL (10-31-22 @ 09:28)  80 mg/dL (10-31-22 @ 08:40)  65 mg/dL (10-31-22 @ 07:46)  128 mg/dL (10-30-22 @ 21:30)  93 mg/dL (10-30-22 @ 17:32)      eMAR:atorvastatin   20 milliGRAM(s) Oral (10-30-22 @ 23:22)    dextrose Oral Gel   15 Gram(s) Oral (10-31-22 @ 08:20)    insulin glargine Injectable (LANTUS)   8 Unit(s) SubCutaneous (10-30-22 @ 22:14)

## 2022-10-31 NOTE — PROGRESS NOTE ADULT - PROBLEM SELECTOR PLAN 6
Urine Cx 10/23: + Klebsiella PNA sensitive to ceftriaxone  s/p 5 days of ceftriaxone  repeat UA on 10/29 negative for UTI.

## 2022-10-31 NOTE — BH CONSULTATION LIAISON PROGRESS NOTE - NSBHATTESTBILLINGAW_PSY_A_CORE
Non-billable
[id number] Inpatient care - high complexity - 35 minutes
[id number]-Vinelqjybi Inpatient care - moderate complexity - 25 minutes

## 2022-10-31 NOTE — PROGRESS NOTE ADULT - PROBLEM SELECTOR PLAN 2
- Episode of hypoglycemia this AM d/t poor PO intake   - Hgb A1c 5.0  - discontinue Lantus and c/w ISS   -Monitoring FSs  -Encourage PO intake with assistance

## 2022-10-31 NOTE — BH CONSULTATION LIAISON PROGRESS NOTE - NSBHPSYCHOLCOGORIENT_PSY_A_CORE
Oriented to time, place, person, situation
Not fully oriented...
Oriented to time, place, person, situation

## 2022-10-31 NOTE — BH CONSULTATION LIAISON PROGRESS NOTE - NSBHCHARTREVIEWVS_PSY_A_CORE FT
Vital Signs Last 24 Hrs  T(C): 36.2 (27 Oct 2022 10:30), Max: 36.8 (26 Oct 2022 14:24)  T(F): 97.2 (27 Oct 2022 10:30), Max: 98.3 (26 Oct 2022 14:24)  HR: 76 (27 Oct 2022 10:30) (76 - 88)  BP: 134/55 (27 Oct 2022 10:30) (104/72 - 134/55)  BP(mean): --  RR: 16 (27 Oct 2022 10:30) (16 - 18)  SpO2: 97% (27 Oct 2022 10:30) (97% - 100%)    Parameters below as of 27 Oct 2022 10:30  Patient On (Oxygen Delivery Method): room air    
Vital Signs Last 24 Hrs  T(C): 36.6 (28 Oct 2022 11:17), Max: 36.6 (28 Oct 2022 11:17)  T(F): 97.8 (28 Oct 2022 11:17), Max: 97.8 (28 Oct 2022 11:17)  HR: 60 (28 Oct 2022 11:17) (60 - 73)  BP: 109/89 (28 Oct 2022 11:17) (104/65 - 133/75)  BP(mean): --  RR: 16 (28 Oct 2022 11:17) (16 - 16)  SpO2: 100% (28 Oct 2022 11:17) (100% - 100%)    Parameters below as of 28 Oct 2022 11:17  Patient On (Oxygen Delivery Method): room air    
Vital Signs Last 24 Hrs  T(C): 36.8 (31 Oct 2022 06:02), Max: 36.8 (31 Oct 2022 06:02)  T(F): 98.2 (31 Oct 2022 06:02), Max: 98.2 (31 Oct 2022 06:02)  HR: 80 (31 Oct 2022 06:02) (70 - 80)  BP: 152/76 (31 Oct 2022 06:02) (121/69 - 152/76)  BP(mean): --  RR: 17 (31 Oct 2022 06:02) (16 - 17)  SpO2: 100% (31 Oct 2022 06:02) (99% - 100%)    Parameters below as of 31 Oct 2022 06:02  Patient On (Oxygen Delivery Method): room air

## 2022-10-31 NOTE — PROGRESS NOTE ADULT - PROBLEM SELECTOR PLAN 1
multifactorial due to UTI and/or not being on adequate Seroquel dosing.  Dose of Seroquel was reduced on admission b/c wife said 25mg was sedating patient.  Psych consulted appreciated and changed seroquel to 12.5mg bid with improvement in agitation and overall mental status.  However mental status has been waxing and waning since 10/29 with hallucinations and agitation  -Bladder scan shows urine retention > 500cc and patient undergoing straight cath protocol.   -repeat UA negative  -Psych f/up

## 2022-10-31 NOTE — BH CONSULTATION LIAISON PROGRESS NOTE - NSBHFUPINTERVALHXFT_PSY_A_CORE
Chart reviewed, no psych PRNs overnight. VSS, pt with hypoNa 124 this AM (131 yesterday)  Patient seen and evaluated.  Upon evaluation, patient calm, similar to previous encounter yesterday. Reports feeling weak, denies pain or discomfort. Denies thomas VH currently but stating when they do occur they do not cause any distress although patient notably appeared to be staring/looking frightened occasionally throughout encounter. Knows he is at [facility] and that it is Oct 20 something, 2022. Denies issues with mood or sleep, denying si/hi. 
Chart reviewed, no psych PRNs overnight. VSS, pt started on seroquel 12.5 BID beginning last night   Patient seen and evaluated with psychiatry team including attending.  Upon evaluation, patient is received awake in bed, staring blankly w/ masked facies up at television. Was initially difficult to engage patient until television was turned off. Afterwards patient with encouragement, patient able to turn and face toward writer, responding to simple questions. States his mood is okay. Knows that he is in the hospital at "[facility]". Believes it is Nov 2022 and with another chance, then guesses it is Dec 2022. He states he slept okay last night, feels tired currently. He does report experiencing VH currently which are not currently causing him to feel scared (pt notably looking around room at this point), is not becoming restless or agitated as a result of these VH. Unclear if pt also experiencing AH at this time due to possibly misunderstanding question.    Writer contacted pt's wife Tamar to provide updates with regimen and updated status from psych standpoint. 
Chart reviewed, pt received 2 PRNs clonazepam .25mg over weekend. VSS. Pt with reported episode of urinary retention over weekend, psychiatry asked to follow up.    Upon evaluation, patient calm, watching TV. Reports he has been feeling okay. Currently denies any issues with voiding, said he was able to go to the bathroom this morning without a problem. Is denying any mood symptoms including SI. Denies manic and psychotic sxs, no delusions elicited. States that anytime AVH do occur, they are not distressing to him. Is oriented to self, date and location.   Writer spoke with ACP of primary team. She believes the episode of urinary retention has possibly resolved and is not clear to which extent the retention was occurring. Discussed plan for ACP to follow up with writer if retention recurred or if further clarification is discovered.

## 2022-10-31 NOTE — BH CONSULTATION LIAISON PROGRESS NOTE - NSBHCONSULTFOLLOWAFTERCARE_PSY_A_CORE FT
Pt to follow up with psychiatry at [facility] vs Nursing home depending on disposition, pending medical clearance 
Pt to follow up with psychiatry at [facility] vs Nursing home depending on disposition, pending medical clearance 
Pt to follow up with psychiatry at [facility] vs Nursing home depending on disposition, pending medical clearance

## 2022-10-31 NOTE — PROGRESS NOTE ADULT - SUBJECTIVE AND OBJECTIVE BOX
PROGRESS NOTE:     Patient is a 82y old  Male who presents with a chief complaint of fevers at nursing [place], [facility] (30 Oct 2022 15:37)      SUBJECTIVE / OVERNIGHT EVENTS:    ADDITIONAL REVIEW OF SYSTEMS:    MEDICATIONS  (STANDING):  atorvastatin 20 milliGRAM(s) Oral at bedtime  carbidopa/levodopa  25/100 2 Tablet(s) Oral <User Schedule>  clopidogrel Tablet 75 milliGRAM(s) Oral daily  dextrose 50% Injectable 25 Gram(s) IV Push once  dextrose 50% Injectable 12.5 Gram(s) IV Push once  dextrose 50% Injectable 25 Gram(s) IV Push once  dextrose Oral Gel 15 Gram(s) Oral once  enoxaparin Injectable 40 milliGRAM(s) SubCutaneous every 24 hours  glucagon  Injectable 1 milliGRAM(s) IntraMuscular once  insulin glargine Injectable (LANTUS) 8 Unit(s) SubCutaneous at bedtime  insulin lispro (ADMELOG) corrective regimen sliding scale   SubCutaneous three times a day before meals  insulin lispro (ADMELOG) corrective regimen sliding scale   SubCutaneous at bedtime  mirtazapine 15 milliGRAM(s) Oral at bedtime  opicapone (ogentys) 50 milliGRAM(s) 1 Capsule(s) Oral at bedtime  pantoprazole    Tablet 40 milliGRAM(s) Oral before breakfast  polyethylene glycol 3350 17 Gram(s) Oral two times a day  QUEtiapine 12.5 milliGRAM(s) Oral two times a day  senna 2 Tablet(s) Oral at bedtime    MEDICATIONS  (PRN):  acetaminophen     Tablet .. 650 milliGRAM(s) Oral every 6 hours PRN Temp greater or equal to 38C (100.4F), Mild Pain (1 - 3)  melatonin 3 milliGRAM(s) Oral at bedtime PRN Insomnia  QUEtiapine 12.5 milliGRAM(s) Oral every 6 hours PRN agitation      CAPILLARY BLOOD GLUCOSE      POCT Blood Glucose.: 121 mg/dL (31 Oct 2022 12:19)  POCT Blood Glucose.: 105 mg/dL (31 Oct 2022 10:18)  POCT Blood Glucose.: 114 mg/dL (31 Oct 2022 09:33)  POCT Blood Glucose.: 548 mg/dL (31 Oct 2022 09:28)  POCT Blood Glucose.: 80 mg/dL (31 Oct 2022 08:40)  POCT Blood Glucose.: 65 mg/dL (31 Oct 2022 07:46)  POCT Blood Glucose.: 128 mg/dL (30 Oct 2022 21:30)  POCT Blood Glucose.: 93 mg/dL (30 Oct 2022 17:32)    I&O's Summary    30 Oct 2022 07:01  -  31 Oct 2022 07:00  --------------------------------------------------------  IN: 510 mL / OUT: 0 mL / NET: 510 mL        PHYSICAL EXAM:  Vital Signs Last 24 Hrs  T(C): 36.8 (31 Oct 2022 06:02), Max: 36.8 (31 Oct 2022 06:02)  T(F): 98.2 (31 Oct 2022 06:02), Max: 98.2 (31 Oct 2022 06:02)  HR: 80 (31 Oct 2022 06:02) (70 - 80)  BP: 152/76 (31 Oct 2022 06:02) (121/69 - 152/76)  BP(mean): --  RR: 17 (31 Oct 2022 06:02) (16 - 17)  SpO2: 100% (31 Oct 2022 06:02) (99% - 100%)    Parameters below as of 31 Oct 2022 06:02  Patient On (Oxygen Delivery Method): room air        GENERAL: NAD, well-developed  EYES: EOMI, PERRLA, conjunctiva and sclera clear  NECK: Supple, No JVD  CHEST/LUNG: Clear to auscultation bilaterally; No wheeze  HEART: Regular rate and rhythm; No murmurs, rubs, or gallops  ABDOMEN: Soft, Nontender, Nondistended; Bowel sounds present  EXTREMITIES:  2+ Peripheral Pulses, No clubbing, cyanosis, or edema  PSYCH: AAOx 0  NEUROLOGY: non-focal  SKIN: No rashes or lesions      LABS:                        12.2   8.59  )-----------( 268      ( 31 Oct 2022 05:53 )             40.0     10-31    140  |  103  |  8   ----------------------------<  50<LL>  3.8   |  28  |  0.43<L>    Ca    8.8      31 Oct 2022 05:53  Phos  2.5     10-31  Mg     1.90     10-31            Urinalysis Basic - ( 29 Oct 2022 19:13 )    Color: Light Yellow / Appearance: Clear / S.009 / pH: x  Gluc: x / Ketone: Small  / Bili: Negative / Urobili: <2 mg/dL   Blood: x / Protein: Negative / Nitrite: Negative   Leuk Esterase: Negative / RBC: 22 /HPF / WBC 3 /HPF   Sq Epi: x / Non Sq Epi: 5 /HPF / Bacteria: Negative          RADIOLOGY & ADDITIONAL TESTS:  Results Reviewed:   Imaging Personally Reviewed:  Electrocardiogram Personally Reviewed:    COORDINATION OF CARE:  Care Discussed with Consultants/Other Providers [Y/N]:  Prior or Outpatient Records Reviewed [Y/N]:

## 2022-10-31 NOTE — PROGRESS NOTE ADULT - PROBLEM SELECTOR PLAN 3
- CXR with LLL opacity consistent with PNA, outpatient CXR correlated,   - WBC 11.88, nl lactate  - s/p 7 days of Zithromax and 5 days of ceftriaxone ( last dose 10/29)  - Blood Cx 10/23 NTD  - Patient on room air

## 2022-10-31 NOTE — BH CONSULTATION LIAISON PROGRESS NOTE - NSBHMSESPABN_PSY_A_CORE
Soft volume/Decreased productivity/Increased latency
Soft volume/Slowed rate
Soft volume/Slowed rate/Impaired articulation

## 2022-11-01 LAB
ANION GAP SERPL CALC-SCNC: 11 MMOL/L — SIGNIFICANT CHANGE UP (ref 7–14)
BUN SERPL-MCNC: 9 MG/DL — SIGNIFICANT CHANGE UP (ref 7–23)
CALCIUM SERPL-MCNC: 8.6 MG/DL — SIGNIFICANT CHANGE UP (ref 8.4–10.5)
CHLORIDE SERPL-SCNC: 103 MMOL/L — SIGNIFICANT CHANGE UP (ref 98–107)
CO2 SERPL-SCNC: 24 MMOL/L — SIGNIFICANT CHANGE UP (ref 22–31)
CREAT SERPL-MCNC: 0.39 MG/DL — LOW (ref 0.5–1.3)
EGFR: 108 ML/MIN/1.73M2 — SIGNIFICANT CHANGE UP
GLUCOSE BLDC GLUCOMTR-MCNC: 111 MG/DL — HIGH (ref 70–99)
GLUCOSE BLDC GLUCOMTR-MCNC: 134 MG/DL — HIGH (ref 70–99)
GLUCOSE BLDC GLUCOMTR-MCNC: 163 MG/DL — HIGH (ref 70–99)
GLUCOSE BLDC GLUCOMTR-MCNC: 95 MG/DL — SIGNIFICANT CHANGE UP (ref 70–99)
GLUCOSE SERPL-MCNC: 109 MG/DL — HIGH (ref 70–99)
HCT VFR BLD CALC: 33.1 % — LOW (ref 39–50)
HGB BLD-MCNC: 10.4 G/DL — LOW (ref 13–17)
MAGNESIUM SERPL-MCNC: 1.7 MG/DL — SIGNIFICANT CHANGE UP (ref 1.6–2.6)
MCHC RBC-ENTMCNC: 30.1 PG — SIGNIFICANT CHANGE UP (ref 27–34)
MCHC RBC-ENTMCNC: 31.4 GM/DL — LOW (ref 32–36)
MCV RBC AUTO: 95.7 FL — SIGNIFICANT CHANGE UP (ref 80–100)
NRBC # BLD: 0 /100 WBCS — SIGNIFICANT CHANGE UP (ref 0–0)
NRBC # FLD: 0 K/UL — SIGNIFICANT CHANGE UP (ref 0–0)
PHOSPHATE SERPL-MCNC: 2.3 MG/DL — LOW (ref 2.5–4.5)
PLATELET # BLD AUTO: 231 K/UL — SIGNIFICANT CHANGE UP (ref 150–400)
POTASSIUM SERPL-MCNC: 3.9 MMOL/L — SIGNIFICANT CHANGE UP (ref 3.5–5.3)
POTASSIUM SERPL-SCNC: 3.9 MMOL/L — SIGNIFICANT CHANGE UP (ref 3.5–5.3)
RBC # BLD: 3.46 M/UL — LOW (ref 4.2–5.8)
RBC # FLD: 14.6 % — HIGH (ref 10.3–14.5)
SODIUM SERPL-SCNC: 138 MMOL/L — SIGNIFICANT CHANGE UP (ref 135–145)
WBC # BLD: 7.15 K/UL — SIGNIFICANT CHANGE UP (ref 3.8–10.5)
WBC # FLD AUTO: 7.15 K/UL — SIGNIFICANT CHANGE UP (ref 3.8–10.5)

## 2022-11-01 PROCEDURE — 99232 SBSQ HOSP IP/OBS MODERATE 35: CPT

## 2022-11-01 RX ADMIN — ENOXAPARIN SODIUM 40 MILLIGRAM(S): 100 INJECTION SUBCUTANEOUS at 15:23

## 2022-11-01 RX ADMIN — QUETIAPINE FUMARATE 12.5 MILLIGRAM(S): 200 TABLET, FILM COATED ORAL at 05:12

## 2022-11-01 RX ADMIN — CARBIDOPA AND LEVODOPA 2 TABLET(S): 25; 100 TABLET ORAL at 21:42

## 2022-11-01 RX ADMIN — CARBIDOPA AND LEVODOPA 2 TABLET(S): 25; 100 TABLET ORAL at 15:18

## 2022-11-01 RX ADMIN — CARBIDOPA AND LEVODOPA 2 TABLET(S): 25; 100 TABLET ORAL at 05:12

## 2022-11-01 RX ADMIN — POLYETHYLENE GLYCOL 3350 17 GRAM(S): 17 POWDER, FOR SOLUTION ORAL at 05:12

## 2022-11-01 RX ADMIN — PANTOPRAZOLE SODIUM 40 MILLIGRAM(S): 20 TABLET, DELAYED RELEASE ORAL at 05:17

## 2022-11-01 RX ADMIN — CARBIDOPA AND LEVODOPA 2 TABLET(S): 25; 100 TABLET ORAL at 02:37

## 2022-11-01 RX ADMIN — CLOPIDOGREL BISULFATE 75 MILLIGRAM(S): 75 TABLET, FILM COATED ORAL at 12:39

## 2022-11-01 RX ADMIN — QUETIAPINE FUMARATE 12.5 MILLIGRAM(S): 200 TABLET, FILM COATED ORAL at 18:29

## 2022-11-01 RX ADMIN — MIRTAZAPINE 15 MILLIGRAM(S): 45 TABLET, ORALLY DISINTEGRATING ORAL at 21:41

## 2022-11-01 RX ADMIN — CARBIDOPA AND LEVODOPA 2 TABLET(S): 25; 100 TABLET ORAL at 18:32

## 2022-11-01 RX ADMIN — ATORVASTATIN CALCIUM 20 MILLIGRAM(S): 80 TABLET, FILM COATED ORAL at 21:42

## 2022-11-01 RX ADMIN — CARBIDOPA AND LEVODOPA 2 TABLET(S): 25; 100 TABLET ORAL at 09:03

## 2022-11-01 RX ADMIN — SENNA PLUS 2 TABLET(S): 8.6 TABLET ORAL at 21:42

## 2022-11-01 NOTE — PROGRESS NOTE ADULT - PROBLEM SELECTOR PLAN 2
- Intermittent episodes of hypoglycemia   - Hgb A1c 5.0  - discontinue Lantus and c/w ISS   - Monitoring FSs  - Encourage PO intake with assistance

## 2022-11-01 NOTE — PROGRESS NOTE ADULT - SUBJECTIVE AND OBJECTIVE BOX
PROGRESS NOTE:     Patient is a 82y old  Male who presents with a chief complaint of fevers at nursing [place], [facility] [place] (31 Oct 2022 14:47)      SUBJECTIVE / OVERNIGHT EVENTS: No acute events.     ADDITIONAL REVIEW OF SYSTEMS:    MEDICATIONS  (STANDING):  atorvastatin 20 milliGRAM(s) Oral at bedtime  carbidopa/levodopa  25/100 2 Tablet(s) Oral <User Schedule>  clopidogrel Tablet 75 milliGRAM(s) Oral daily  dextrose 50% Injectable 25 Gram(s) IV Push once  dextrose 50% Injectable 12.5 Gram(s) IV Push once  dextrose 50% Injectable 25 Gram(s) IV Push once  dextrose Oral Gel 15 Gram(s) Oral once  enoxaparin Injectable 40 milliGRAM(s) SubCutaneous every 24 hours  glucagon  Injectable 1 milliGRAM(s) IntraMuscular once  insulin lispro (ADMELOG) corrective regimen sliding scale   SubCutaneous three times a day before meals  insulin lispro (ADMELOG) corrective regimen sliding scale   SubCutaneous at bedtime  mirtazapine 15 milliGRAM(s) Oral at bedtime  opicapone (ogentys) 50 milliGRAM(s) 1 Capsule(s) Oral at bedtime  pantoprazole    Tablet 40 milliGRAM(s) Oral before breakfast  polyethylene glycol 3350 17 Gram(s) Oral two times a day  QUEtiapine 12.5 milliGRAM(s) Oral two times a day  senna 2 Tablet(s) Oral at bedtime    MEDICATIONS  (PRN):  acetaminophen     Tablet .. 650 milliGRAM(s) Oral every 6 hours PRN Temp greater or equal to 38C (100.4F), Mild Pain (1 - 3)  melatonin 3 milliGRAM(s) Oral at bedtime PRN Insomnia  QUEtiapine 12.5 milliGRAM(s) Oral every 6 hours PRN agitation      CAPILLARY BLOOD GLUCOSE      POCT Blood Glucose.: 134 mg/dL (01 Nov 2022 12:16)  POCT Blood Glucose.: 95 mg/dL (01 Nov 2022 08:32)  POCT Blood Glucose.: 141 mg/dL (31 Oct 2022 21:20)    I&O's Summary    31 Oct 2022 07:01  -  01 Nov 2022 07:00  --------------------------------------------------------  IN: 358 mL / OUT: 0 mL / NET: 358 mL        PHYSICAL EXAM:  Vital Signs Last 24 Hrs  T(C): 36.8 (01 Nov 2022 11:18), Max: 37 (01 Nov 2022 05:15)  T(F): 98.2 (01 Nov 2022 11:18), Max: 98.6 (01 Nov 2022 05:15)  HR: 59 (01 Nov 2022 11:18) (59 - 81)  BP: 143/61 (01 Nov 2022 11:18) (122/62 - 155/87)  BP(mean): --  RR: 18 (01 Nov 2022 11:18) (17 - 18)  SpO2: 100% (01 Nov 2022 11:18) (99% - 100%)    Parameters below as of 01 Nov 2022 11:18  Patient On (Oxygen Delivery Method): room air      GENERAL: NAD, well-developed  EYES: EOMI, PERRLA, conjunctiva and sclera clear  NECK: Supple, No JVD  CHEST/LUNG: Clear to auscultation bilaterally; No wheeze  HEART: Regular rate and rhythm; No murmurs, rubs, or gallops  ABDOMEN: Soft, Nontender, Nondistended; Bowel sounds present  EXTREMITIES:  2+ Peripheral Pulses, No clubbing, cyanosis, or edema  PSYCH: AAOx 0  NEUROLOGY: non-focal  SKIN: No rashes or lesions    LABS:                        10.4   7.15  )-----------( 231      ( 01 Nov 2022 06:40 )             33.1     11-01    138  |  103  |  9   ----------------------------<  109<H>  3.9   |  24  |  0.39<L>    Ca    8.6      01 Nov 2022 06:40  Phos  2.3     11-01  Mg     1.70     11-01                  RADIOLOGY & ADDITIONAL TESTS:  Results Reviewed:   Imaging Personally Reviewed:  Electrocardiogram Personally Reviewed:    COORDINATION OF CARE:  Care Discussed with Consultants/Other Providers [Y/N]:  Prior or Outpatient Records Reviewed [Y/N]:

## 2022-11-01 NOTE — PROGRESS NOTE ADULT - CONVERSATION DETAILS
Spoke w/ patient's wife regarding goals of care. Wife states that  would have wanted everything to be done and she wishes he remains full code. She understands that he has poor PO intake but is not sure if he would have wanted a PEG. She will continue to encourage him to eat for now and would defer any decision regarding PEG.

## 2022-11-01 NOTE — CHART NOTE - NSCHARTNOTEFT_GEN_A_CORE
Source: Patient alert, garbled speech. Family [ ]     RN [x ]    Chart [x ]    Reason for Follow-Up Assessement: severe protein calorie malnutrition     80 y/o Male w/ hx Parkinson's dz, DM2, who presents from [facility] for fevers and increased lethargy. Found to have LLL PNA on CXR and UTI now s/p abx treatment. Patient now with waxing and waning metabolic encephalopathy.    Per RN documentation, pt continues to eat <50% of meals; likely meeting <50% estimated caloric needs >1 week. Pt needs full feeding assistance. Per RN, he ate <25% of breakfast meals with 1/2 Glucerna this AM. Pt had a repeat swallow evaluation on 10/30 with SLP recommending to defer PO to MD discretion. Noted hypoglycemia this AM as well. Contacted hospitalist via TEAMS to consider NGT/PEG feeds in GOC discussion as pt with prolonged poor PO. Bed weight taken 11/1 @ 48.1 kg.       GI: WDL.     PO intake:  < 50% [ x]     Anthropometrics:  Weight (kg): 48.1 (11-01), 64 (10-23), 58.3 (10-02)  17% wt loss in 1 month.     Edema: none  Pressure Injuries: none     __________________ Pertinent Medications__________________   MEDICATIONS  (STANDING):  atorvastatin 20 milliGRAM(s) Oral at bedtime  carbidopa/levodopa  25/100 2 Tablet(s) Oral <User Schedule>  clopidogrel Tablet 75 milliGRAM(s) Oral daily  dextrose 50% Injectable 25 Gram(s) IV Push once  dextrose 50% Injectable 12.5 Gram(s) IV Push once  dextrose 50% Injectable 25 Gram(s) IV Push once  dextrose Oral Gel 15 Gram(s) Oral once  enoxaparin Injectable 40 milliGRAM(s) SubCutaneous every 24 hours  glucagon  Injectable 1 milliGRAM(s) IntraMuscular once  insulin lispro (ADMELOG) corrective regimen sliding scale   SubCutaneous three times a day before meals  insulin lispro (ADMELOG) corrective regimen sliding scale   SubCutaneous at bedtime  mirtazapine 15 milliGRAM(s) Oral at bedtime  opicapone (ogentys) 50 milliGRAM(s) 1 Capsule(s) Oral at bedtime  pantoprazole    Tablet 40 milliGRAM(s) Oral before breakfast  polyethylene glycol 3350 17 Gram(s) Oral two times a day  QUEtiapine 12.5 milliGRAM(s) Oral two times a day  senna 2 Tablet(s) Oral at bedtime    MEDICATIONS  (PRN):  acetaminophen     Tablet .. 650 milliGRAM(s) Oral every 6 hours PRN Temp greater or equal to 38C (100.4F), Mild Pain (1 - 3)  melatonin 3 milliGRAM(s) Oral at bedtime PRN Insomnia  QUEtiapine 12.5 milliGRAM(s) Oral every 6 hours PRN agitation      __________________ Pertinent Labs__________________   11-01 Na138 mmol/L Glu 109 mg/dL<H> K+ 3.9 mmol/L Cr  0.39 mg/dL<L> BUN 9 mg/dL 11-01 Phos 2.3 mg/dL<L> 10-26 Alb 3.2 g/dL<L> 10-24 Chol 130 mg/dL LDL --    HDL 38 mg/dL<L> Trig 69 mg/dL        POCT Blood Glucose.: 95 mg/dL (11-01-22 @ 08:32)  POCT Blood Glucose.: 141 mg/dL (10-31-22 @ 21:20)  POCT Blood Glucose.: 121 mg/dL (10-31-22 @ 12:19)  POCT Blood Glucose.: 105 mg/dL (10-31-22 @ 10:18)  POCT Blood Glucose.: 114 mg/dL (10-31-22 @ 09:33)  POCT Blood Glucose.: 548 mg/dL (10-31-22 @ 09:28)  POCT Blood Glucose.: 80 mg/dL (10-31-22 @ 08:40)  POCT Blood Glucose.: 65 mg/dL (10-31-22 @ 07:46)  POCT Blood Glucose.: 128 mg/dL (10-30-22 @ 21:30)  POCT Blood Glucose.: 93 mg/dL (10-30-22 @ 17:32)  POCT Blood Glucose.: 98 mg/dL (10-30-22 @ 12:08)  POCT Blood Glucose.: 126 mg/dL (10-29-22 @ 21:26)  POCT Blood Glucose.: 81 mg/dL (10-29-22 @ 17:06)  POCT Blood Glucose.: 92 mg/dL (10-29-22 @ 12:15)          Estimated Needs:   [ x] no change since previous assessment      Previous Nutrition Diagnosis: severe protein calorie malnutrition     Nutrition Diagnosis is [x ] ongoing      Recommendations:  1. Consider NGT feeds if consistent with GOC/medically feasible.   2. Continue minced and moist/mildly thick liquids per MD discretion. fluid restriction deferred.   3. Consider d/c'ing CSTCHO restriction as pt's POCT <150 and poor PO.   4. Continue Glucerna 2x daily (440kcals, 20g protein)   5. Add multivitamin 1x daily.       Monitoring and Evaluation:      [ x] Tolerance to diet prescription [x ] weights [x ] follow up per protocol  [ ] other:

## 2022-11-02 ENCOUNTER — TRANSCRIPTION ENCOUNTER (OUTPATIENT)
Age: 82
End: 2022-11-02

## 2022-11-02 VITALS
TEMPERATURE: 99 F | RESPIRATION RATE: 18 BRPM | DIASTOLIC BLOOD PRESSURE: 65 MMHG | HEART RATE: 75 BPM | OXYGEN SATURATION: 100 % | SYSTOLIC BLOOD PRESSURE: 142 MMHG

## 2022-11-02 DIAGNOSIS — E43 UNSPECIFIED SEVERE PROTEIN-CALORIE MALNUTRITION: ICD-10-CM

## 2022-11-02 LAB
ANION GAP SERPL CALC-SCNC: 10 MMOL/L — SIGNIFICANT CHANGE UP (ref 7–14)
BUN SERPL-MCNC: 10 MG/DL — SIGNIFICANT CHANGE UP (ref 7–23)
CALCIUM SERPL-MCNC: 8.9 MG/DL — SIGNIFICANT CHANGE UP (ref 8.4–10.5)
CHLORIDE SERPL-SCNC: 101 MMOL/L — SIGNIFICANT CHANGE UP (ref 98–107)
CO2 SERPL-SCNC: 25 MMOL/L — SIGNIFICANT CHANGE UP (ref 22–31)
CREAT SERPL-MCNC: 0.42 MG/DL — LOW (ref 0.5–1.3)
EGFR: 106 ML/MIN/1.73M2 — SIGNIFICANT CHANGE UP
GLUCOSE BLDC GLUCOMTR-MCNC: 116 MG/DL — HIGH (ref 70–99)
GLUCOSE BLDC GLUCOMTR-MCNC: 145 MG/DL — HIGH (ref 70–99)
GLUCOSE SERPL-MCNC: 115 MG/DL — HIGH (ref 70–99)
HCT VFR BLD CALC: 37.3 % — LOW (ref 39–50)
HGB BLD-MCNC: 11.2 G/DL — LOW (ref 13–17)
MAGNESIUM SERPL-MCNC: 1.8 MG/DL — SIGNIFICANT CHANGE UP (ref 1.6–2.6)
MCHC RBC-ENTMCNC: 29.3 PG — SIGNIFICANT CHANGE UP (ref 27–34)
MCHC RBC-ENTMCNC: 30 GM/DL — LOW (ref 32–36)
MCV RBC AUTO: 97.6 FL — SIGNIFICANT CHANGE UP (ref 80–100)
NRBC # BLD: 0 /100 WBCS — SIGNIFICANT CHANGE UP (ref 0–0)
NRBC # FLD: 0 K/UL — SIGNIFICANT CHANGE UP (ref 0–0)
PHOSPHATE SERPL-MCNC: 2.8 MG/DL — SIGNIFICANT CHANGE UP (ref 2.5–4.5)
PLATELET # BLD AUTO: 225 K/UL — SIGNIFICANT CHANGE UP (ref 150–400)
POTASSIUM SERPL-MCNC: 3.7 MMOL/L — SIGNIFICANT CHANGE UP (ref 3.5–5.3)
POTASSIUM SERPL-SCNC: 3.7 MMOL/L — SIGNIFICANT CHANGE UP (ref 3.5–5.3)
RBC # BLD: 3.82 M/UL — LOW (ref 4.2–5.8)
RBC # FLD: 14.3 % — SIGNIFICANT CHANGE UP (ref 10.3–14.5)
SARS-COV-2 RNA SPEC QL NAA+PROBE: SIGNIFICANT CHANGE UP
SODIUM SERPL-SCNC: 136 MMOL/L — SIGNIFICANT CHANGE UP (ref 135–145)
WBC # BLD: 6.9 K/UL — SIGNIFICANT CHANGE UP (ref 3.8–10.5)
WBC # FLD AUTO: 6.9 K/UL — SIGNIFICANT CHANGE UP (ref 3.8–10.5)

## 2022-11-02 PROCEDURE — 99239 HOSP IP/OBS DSCHRG MGMT >30: CPT

## 2022-11-02 RX ORDER — CARBIDOPA AND LEVODOPA 25; 100 MG/1; MG/1
2 TABLET ORAL
Qty: 0 | Refills: 0 | DISCHARGE
Start: 2022-11-02

## 2022-11-02 RX ORDER — ENOXAPARIN SODIUM 100 MG/ML
12 INJECTION SUBCUTANEOUS
Qty: 0 | Refills: 0 | DISCHARGE

## 2022-11-02 RX ORDER — POLYETHYLENE GLYCOL 3350 17 G/17G
17 POWDER, FOR SOLUTION ORAL
Qty: 0 | Refills: 0 | DISCHARGE
Start: 2022-11-02

## 2022-11-02 RX ORDER — ATORVASTATIN CALCIUM 80 MG/1
1 TABLET, FILM COATED ORAL
Qty: 0 | Refills: 0 | DISCHARGE
Start: 2022-11-02

## 2022-11-02 RX ORDER — PANTOPRAZOLE SODIUM 20 MG/1
1 TABLET, DELAYED RELEASE ORAL
Qty: 0 | Refills: 0 | DISCHARGE

## 2022-11-02 RX ORDER — SENNA PLUS 8.6 MG/1
2 TABLET ORAL
Qty: 0 | Refills: 0 | DISCHARGE
Start: 2022-11-02

## 2022-11-02 RX ORDER — CLOPIDOGREL BISULFATE 75 MG/1
1 TABLET, FILM COATED ORAL
Qty: 0 | Refills: 0 | DISCHARGE
Start: 2022-11-02

## 2022-11-02 RX ORDER — LANOLIN ALCOHOL/MO/W.PET/CERES
1 CREAM (GRAM) TOPICAL
Qty: 0 | Refills: 0 | DISCHARGE
Start: 2022-11-02

## 2022-11-02 RX ORDER — MIRTAZAPINE 45 MG/1
1 TABLET, ORALLY DISINTEGRATING ORAL
Qty: 0 | Refills: 0 | DISCHARGE
Start: 2022-11-02

## 2022-11-02 RX ORDER — QUETIAPINE FUMARATE 200 MG/1
0.5 TABLET, FILM COATED ORAL
Qty: 60 | Refills: 0
Start: 2022-11-02 | End: 2022-12-01

## 2022-11-02 RX ORDER — INSULIN ASPART 100 [IU]/ML
5 INJECTION, SOLUTION SUBCUTANEOUS
Qty: 0 | Refills: 0 | DISCHARGE

## 2022-11-02 RX ORDER — MIRTAZAPINE 45 MG/1
1 TABLET, ORALLY DISINTEGRATING ORAL
Qty: 0 | Refills: 0 | DISCHARGE

## 2022-11-02 RX ORDER — OPICAPONE 50 MG/1
1 CAPSULE ORAL
Qty: 0 | Refills: 0 | DISCHARGE

## 2022-11-02 RX ORDER — ACETAMINOPHEN 500 MG
2 TABLET ORAL
Qty: 0 | Refills: 0 | DISCHARGE
Start: 2022-11-02

## 2022-11-02 RX ORDER — QUETIAPINE FUMARATE 200 MG/1
0.5 TABLET, FILM COATED ORAL
Qty: 30 | Refills: 0
Start: 2022-11-02 | End: 2022-12-01

## 2022-11-02 RX ORDER — PANTOPRAZOLE SODIUM 20 MG/1
1 TABLET, DELAYED RELEASE ORAL
Qty: 0 | Refills: 0 | DISCHARGE
Start: 2022-11-02

## 2022-11-02 RX ADMIN — CARBIDOPA AND LEVODOPA 2 TABLET(S): 25; 100 TABLET ORAL at 05:50

## 2022-11-02 RX ADMIN — CARBIDOPA AND LEVODOPA 2 TABLET(S): 25; 100 TABLET ORAL at 15:20

## 2022-11-02 RX ADMIN — PANTOPRAZOLE SODIUM 40 MILLIGRAM(S): 20 TABLET, DELAYED RELEASE ORAL at 05:51

## 2022-11-02 RX ADMIN — POLYETHYLENE GLYCOL 3350 17 GRAM(S): 17 POWDER, FOR SOLUTION ORAL at 05:51

## 2022-11-02 RX ADMIN — CARBIDOPA AND LEVODOPA 2 TABLET(S): 25; 100 TABLET ORAL at 10:03

## 2022-11-02 RX ADMIN — QUETIAPINE FUMARATE 12.5 MILLIGRAM(S): 200 TABLET, FILM COATED ORAL at 05:51

## 2022-11-02 RX ADMIN — ENOXAPARIN SODIUM 40 MILLIGRAM(S): 100 INJECTION SUBCUTANEOUS at 16:05

## 2022-11-02 RX ADMIN — CARBIDOPA AND LEVODOPA 2 TABLET(S): 25; 100 TABLET ORAL at 01:46

## 2022-11-02 RX ADMIN — CLOPIDOGREL BISULFATE 75 MILLIGRAM(S): 75 TABLET, FILM COATED ORAL at 12:30

## 2022-11-02 NOTE — PROGRESS NOTE ADULT - PROBLEM SELECTOR PROBLEM 6
CAD (coronary artery disease)
Hyperlipidemia
Hyperlipidemia
CAD (coronary artery disease)
Type 2 diabetes mellitus
Type 2 diabetes mellitus
Acute UTI
Type 2 diabetes mellitus
Acute UTI
Acute UTI

## 2022-11-02 NOTE — PROGRESS NOTE ADULT - PROBLEM SELECTOR PLAN 5
- c/w clopidogrel 75mg qd
- was initially on 2.5 tabs at (2am, 6am,10am, 2pm, 6pm, 10pm), but was decreased during recent admission to 2 tabs psych due to decrease psychotic features   - c/w Klonopin 0.25 mg PO qd for PRN   - on (Ongentys) opicacone 50mg qd (wife to bring in bottle), not on formulary   - c/w Seroquel 12.5 mg bid  - c/w mirtazapine 15mg qd
Likely SIADH, resolved with fluid restriction to 1 liter/day
- on 12 Units qhs, was discharged with novolog 5-9 units with meals, per NH records no Novolog noted   - will c/w 12 units qhs and MED qmeals and qhs; adjust as needed  -Patient has  had hypoglycemia for 2 days due to poor PO intake  -c/w D5W IVFs  -Monitoring FSs  -Encourage PO intake with assistance
- c/w clopidogrel 75mg qd
- on 12 Units qhs, was discharged with novolog 5-9 units with meals, per NH records no Novolog noted   - will c/w 12 units qhs and MED qmeals and qhs; adjust as needed  -Hypoglycemia due to poor PO intake resolved  -c/w D5W IVFs  -Monitoring FSs  -Encourage PO intake with assistance
- was initially on 2.5 tabs at (2am, 6am,10am, 2pm, 6pm, 10pm), but was decreased during recent admission to 2 tabs psych due to decrease psychotic features   - c/w Klonopin 0.25 mg PO qd for PRN   - on (Ongentys) opicacone 50mg qd (wife to bring in bottle), not on formulary   - c/w Seroquel 12.5 mg bid  - c/w mirtazapine 15mg qd
Likely SIADH, resolved with fluid restriction to 1 liter/day
Likely SIADH, resolved with fluid restriction to 1 liter/day
- was initially on 2.5 tabs at (2am, 6am,10am, 2pm, 6pm, 10pm), but was decreased during recent admission to 2 tabs psych due to decrease psychotic features   - c/w Klonopin 0.25 mg PO qd for PRN   - on (Ongentys) opicacone 50mg qd (wife to bring in bottle), not on formulary   - c/w Seroquel 12.5 mg bid  - c/w mirtazapine 15mg qd

## 2022-11-02 NOTE — PROGRESS NOTE ADULT - ASSESSMENT
80 y/o Male w/ hx Parkinson's dz, DM2, who presents from [facility] for fevers and increased lethargy. Found to have LLL PNA on CXR and UTI now s/p abx treatment. Patient now with waxing and waning metabolic encephalopathy. Positive urine retention of > 500 cc of urine on 10/29 s/p straight cath. 
82 y/o Male w/ hx Parkinson's dz, DM2, who presents from [facility] for fevers and increased lethargy. Found to have LLL PNA on CXR and UTI. 
82 y/o Male w/ hx Parkinson's dz, DM2, who presents from [facility] for fevers and increased lethargy. Found to have LLL PNA on CXR and UTI. 
82 y/o Male w/ hx Parkinson's dz, DM2, who presents from [facility] for fevers and increased lethargy. Found to have LLL PNA on CXR and UTI now s/p abx treatment. Patient now with waxing and waning metabolic encephalopathy. Positive urine retention of > 500 cc of urine on 10/29 s/p straight cath. 
82 y/o Male w/ hx Parkinson's dz, DM2, who presents from [facility] for fevers and increased lethargy. Found to have LLL PNA on CXR and UTI now s/p abx treatment. Patient now with waxing and waning metabolic encephalopathy. Positive urine retention of > 500 cc of urine on 10/29 s/p straight cath. 
82 y/o Male w/ hx Parkinson's dz, DM2, who presents from [facility] for fevers and increased lethargy. Found to have LLL PNA on CXR and UTI. 
82 y/o Male w/ hx Parkinson's dz, DM2, who presents from [facility] for fevers and increased lethargy. Found to have LLL PNA on CXR and UTI. 
82 y/o Male w/ hx Parkinson's dz, DM2, who presents from [facility] for fevers and increased lethargy. Found to have LLL PNA on CXR and UTI. 
82 y/o Male w/ hx Parkinson's dz, DM2, who presents from [facility] for fevers and increased lethargy. Found to have LLL PNA on CXR and UTI. 
80 y/o Male w/ hx Parkinson's dz, DM2, who presents from [facility] for fevers and increased lethargy. Found to have LLL PNA on CXR and UTI now s/p abx treatment. Patient now with waxing and waning metabolic encephalopathy. Positive urine retention of > 500 cc of urine on 10/29 s/p straight cath.

## 2022-11-02 NOTE — DISCHARGE NOTE NURSING/CASE MANAGEMENT/SOCIAL WORK - NSDCFUADDAPPT_GEN_ALL_CORE_FT
APPTS ARE READY TO BE MADE: [ X] YES    Best Family or Patient Contact (if needed): Keeley Millan (SON), [phone]    Additional Information about above appointments (if needed):    1: Follow up appointment with PCP DR Mir Plata [phone]  2:   3:     Other comments or requests:

## 2022-11-02 NOTE — PROVIDER CONTACT NOTE (OTHER) - SITUATION
Blood glucose of 111 with a standing order of Lantus 8 units at bedtime
Pt agitated, POCT checked, 67
Pt noted with agitation and trying to get oob unassisted
Blood glucose of 89 with a standing order of Lantus 8units at bedtime
Patient's bladder scan measured 367 mL

## 2022-11-02 NOTE — PROGRESS NOTE ADULT - PROBLEM SELECTOR PROBLEM 7
Need for prophylactic measure
Need for prophylactic measure
CAD (coronary artery disease)
CAD (coronary artery disease)
Hyperlipidemia
CAD (coronary artery disease)
CAD (coronary artery disease)
Hyperlipidemia
CAD (coronary artery disease)
CAD (coronary artery disease)

## 2022-11-02 NOTE — PROGRESS NOTE ADULT - PROBLEM SELECTOR PROBLEM 1
Left lower lobe pneumonia
Metabolic encephalopathy
Metabolic encephalopathy
Left lower lobe pneumonia
Left lower lobe pneumonia
Metabolic encephalopathy

## 2022-11-02 NOTE — DISCHARGE NOTE NURSING/CASE MANAGEMENT/SOCIAL WORK - NSDCPEFALRISK_GEN_ALL_CORE
For information on Fall & Injury Prevention, visit: [email or web address] OR  [email or web address] OR  https://www.cdc.gov/steadi/patient.html

## 2022-11-02 NOTE — PROGRESS NOTE ADULT - PROBLEM SELECTOR PROBLEM 5
CAD (coronary artery disease)
Hyponatremia
Parkinson disease
Hyponatremia
Type 2 diabetes mellitus
Type 2 diabetes mellitus
CAD (coronary artery disease)
Parkinson disease
Parkinson disease
Hyponatremia

## 2022-11-02 NOTE — PROVIDER CONTACT NOTE (OTHER) - BACKGROUND
Pt admitted with Dx of DM, HTN, CAD, HLD, Parkinson's
(Admit Diagnosis) Pneumonia due to infectious organism  (PMH) Osteoarthrosis  (PMH) Neuropathy  (PMH) Hypertension
Hx of PNA, HTN, CD, DM
Patient bladder scanned every 8 hours, no urine noted during shift.
Pt admitted with Dx of DM, HTN, CAD, HLD, Parkinson's

## 2022-11-02 NOTE — PROGRESS NOTE ADULT - PROBLEM SELECTOR PROBLEM 8
Hyperlipidemia
Hyperlipidemia
Need for prophylactic measure
Need for prophylactic measure
Hyperlipidemia

## 2022-11-02 NOTE — PROGRESS NOTE ADULT - PROBLEM SELECTOR PROBLEM 9
Need for prophylactic measure
Severe protein-calorie malnutrition
Need for prophylactic measure
Need for prophylactic measure

## 2022-11-02 NOTE — PROGRESS NOTE ADULT - NUTRITIONAL ASSESSMENT
This patient has been assessed with a concern for Malnutrition and has been determined to have a diagnosis/diagnoses of Severe protein-calorie malnutrition.    This patient is being managed with:   Diet Minced and Moist-  Consistent Carbohydrate {No Snacks} (CSTCHO)  Mildly Thick Liquids (MILDTHICKLIQS)  Supplement Feeding Modality:  Oral  Glucerna Shake Cans or Servings Per Day:  2       Frequency:  Daily  Entered: Oct 25 2022 11:57AM    
This patient has been assessed with a concern for Malnutrition and has been determined to have a diagnosis/diagnoses of Severe protein-calorie malnutrition.    This patient is being managed with:   Diet Minced and Moist-  Consistent Carbohydrate {No Snacks} (CSTCHO)  1000mL Fluid Restriction ([id number])  Mildly Thick Liquids (MILDTHICKLIQS)  Supplement Feeding Modality:  Oral  Glucerna Shake Cans or Servings Per Day:  2       Frequency:  Daily  Entered: Oct 28 2022  8:30AM    
This patient has been assessed with a concern for Malnutrition and has been determined to have a diagnosis/diagnoses of Severe protein-calorie malnutrition.    This patient is being managed with:   Diet Minced and Moist-  Consistent Carbohydrate {No Snacks} (CSTCHO)  1000mL Fluid Restriction ([id number])  Mildly Thick Liquids (MILDTHICKLIQS)  Supplement Feeding Modality:  Oral  Glucerna Shake Cans or Servings Per Day:  2       Frequency:  Daily  Entered: Oct 28 2022  8:30AM    
This patient has been assessed with a concern for Malnutrition and has been determined to have a diagnosis/diagnoses of Severe protein-calorie malnutrition.    This patient is being managed with:   Diet Minced and Moist-  Consistent Carbohydrate {No Snacks} (CSTCHO)  1000mL Fluid Restriction ([id number])  Mildly Thick Liquids (MILDTHICKLIQS)  Supplement Feeding Modality:  Oral  Glucerna Shake Cans or Servings Per Day:  2       Frequency:  Daily  Entered: Oct 28 2022  8:30AM    
This patient has been assessed with a concern for Malnutrition and has been determined to have a diagnosis/diagnoses of Severe protein-calorie malnutrition.    This patient is being managed with:   Diet Minced and Moist-  Consistent Carbohydrate {No Snacks} (CSTCHO)  1000mL Fluid Restriction ([id number])  Mildly Thick Liquids (MILDTHICKLIQS)  Supplement Feeding Modality:  Oral  Glucerna Shake Cans or Servings Per Day:  2       Frequency:  Daily  Entered: Oct 28 2022  8:30AM    
This patient has been assessed with a concern for Malnutrition and has been determined to have a diagnosis/diagnoses of Severe protein-calorie malnutrition.    This patient is being managed with:   Diet Minced and Moist-  Consistent Carbohydrate {No Snacks} (CSTCHO)  1000mL Fluid Restriction ([id number])  Mildly Thick Liquids (MILDTHICKLIQS)  Supplement Feeding Modality:  Oral  Glucerna Shake Cans or Servings Per Day:  2       Frequency:  Daily  Entered: Oct 28 2022  8:30AM    
This patient has been assessed with a concern for Malnutrition and has been determined to have a diagnosis/diagnoses of Severe protein-calorie malnutrition.    This patient is being managed with:   Diet Minced and Moist-  Consistent Carbohydrate {No Snacks} (CSTCHO)  1000mL Fluid Restriction ([id number])  Mildly Thick Liquids (MILDTHICKLIQS)  Supplement Feeding Modality:  Oral  Glucerna Shake Cans or Servings Per Day:  2       Frequency:  Daily  Entered: Oct 28 2022  8:30AM    
This patient has been assessed with a concern for Malnutrition and has been determined to have a diagnosis/diagnoses of Severe protein-calorie malnutrition.    This patient is being managed with:   Diet Minced and Moist-  Consistent Carbohydrate {No Snacks} (CSTCHO)  Mildly Thick Liquids (MILDTHICKLIQS)  Supplement Feeding Modality:  Oral  Glucerna Shake Cans or Servings Per Day:  2       Frequency:  Daily  Entered: Oct 25 2022 11:57AM

## 2022-11-02 NOTE — PROGRESS NOTE ADULT - REASON FOR ADMISSION
fevers at nursing home, PNA

## 2022-11-02 NOTE — PROGRESS NOTE ADULT - PROBLEM SELECTOR PLAN 8
- c/w atorvastatin 20mg qhs
DVT ppx: Lovenox 40mg   Diet: CC diet  Dispo; PT rec subacute rehab
- c/w atorvastatin 20mg qhs
DVT ppx: Lovenox 40mg   Diet: CC diet  Dispo; PT rec subacute rehab
- c/w atorvastatin 20mg qhs
- c/w atorvastatin 20mg qhs

## 2022-11-02 NOTE — PROGRESS NOTE ADULT - PROBLEM SELECTOR PROBLEM 2
Type 2 diabetes mellitus
Acute UTI
Hyponatremia
Hyponatremia
Acute UTI
Type 2 diabetes mellitus
Acute UTI
Type 2 diabetes mellitus
Acute UTI
Hyponatremia

## 2022-11-02 NOTE — PROGRESS NOTE ADULT - PROBLEM SELECTOR PLAN 9
DVT ppx: Lovenox 40mg   Diet: CC diet  Dispo; PT rec subacute rehab
- migue [place]   - encourage PO intake   - wife not interested in NGT/PEG at this time

## 2022-11-02 NOTE — PROVIDER CONTACT NOTE (OTHER) - ASSESSMENT
Bladder scan measured 367 mL, bladder firm and distended
Pt noted with agitation and trying to get oob unassisted. Redirection provided with no effect. Fingerstick taken and was 81. PO meds offered and initially refused.
Patient noted with a FSBS of 89 with a standing order of Lantus 8units at bedtime. PO intake is poor. Pt asymptomatic.
Patient noted with a FSBS of 111 with a standing order of Lantus 8units at bedtime. Pt PO intake is poor with past hypoglycemic episodes.
Pt agitated, POCT checked reported 67, repeated and resulted 82.

## 2022-11-02 NOTE — PROVIDER CONTACT NOTE (OTHER) - REASON
Pt agitated, POCT checked, 67
FSBS of 89 with a standing order of Lantus 8units at bedtime
Bladder Scan 367mL
FSBS of 111 with a standing order of Lantus 8units at bedtime
Pt noted with agitation and trying to get oob unassisted

## 2022-11-02 NOTE — DISCHARGE NOTE NURSING/CASE MANAGEMENT/SOCIAL WORK - PATIENT PORTAL LINK FT
You can access the FollowMyHealth Patient Portal offered by [facility] by registering at the following website: [email or web address]. By joining [facility]’s FollowMyHealth portal, you will also be able to view your health information using other applications (apps) compatible with our system.

## 2022-11-02 NOTE — PROGRESS NOTE ADULT - SUBJECTIVE AND OBJECTIVE BOX
PROGRESS NOTE:     Patient is a 82y old  Male who presents with a chief complaint of fevers at nursing [place], [facility] (01 Nov 2022 14:09)      SUBJECTIVE / OVERNIGHT EVENTS: No acute events.     ADDITIONAL REVIEW OF SYSTEMS:    MEDICATIONS  (STANDING):  atorvastatin 20 milliGRAM(s) Oral at bedtime  carbidopa/levodopa  25/100 2 Tablet(s) Oral <User Schedule>  clopidogrel Tablet 75 milliGRAM(s) Oral daily  dextrose 50% Injectable 25 Gram(s) IV Push once  dextrose 50% Injectable 12.5 Gram(s) IV Push once  dextrose 50% Injectable 25 Gram(s) IV Push once  dextrose Oral Gel 15 Gram(s) Oral once  enoxaparin Injectable 40 milliGRAM(s) SubCutaneous every 24 hours  glucagon  Injectable 1 milliGRAM(s) IntraMuscular once  insulin lispro (ADMELOG) corrective regimen sliding scale   SubCutaneous three times a day before meals  insulin lispro (ADMELOG) corrective regimen sliding scale   SubCutaneous at bedtime  mirtazapine 15 milliGRAM(s) Oral at bedtime  opicapone (ogentys) 50 milliGRAM(s) 1 Capsule(s) Oral at bedtime  pantoprazole    Tablet 40 milliGRAM(s) Oral before breakfast  polyethylene glycol 3350 17 Gram(s) Oral two times a day  QUEtiapine 12.5 milliGRAM(s) Oral two times a day  senna 2 Tablet(s) Oral at bedtime    MEDICATIONS  (PRN):  acetaminophen     Tablet .. 650 milliGRAM(s) Oral every 6 hours PRN Temp greater or equal to 38C (100.4F), Mild Pain (1 - 3)  melatonin 3 milliGRAM(s) Oral at bedtime PRN Insomnia  QUEtiapine 12.5 milliGRAM(s) Oral every 6 hours PRN agitation      CAPILLARY BLOOD GLUCOSE      POCT Blood Glucose.: 145 mg/dL (02 Nov 2022 12:13)  POCT Blood Glucose.: 116 mg/dL (02 Nov 2022 08:31)  POCT Blood Glucose.: 163 mg/dL (01 Nov 2022 21:51)  POCT Blood Glucose.: 111 mg/dL (01 Nov 2022 17:30)    I&O's Summary    02 Nov 2022 07:01  -  02 Nov 2022 13:51  --------------------------------------------------------  IN: 0 mL / OUT: 325 mL / NET: -325 mL        PHYSICAL EXAM:  Vital Signs Last 24 Hrs  T(C): 37.1 (02 Nov 2022 11:34), Max: 37.1 (02 Nov 2022 11:34)  T(F): 98.7 (02 Nov 2022 11:34), Max: 98.7 (02 Nov 2022 11:34)  HR: 75 (02 Nov 2022 11:34) (64 - 75)  BP: 142/65 (02 Nov 2022 11:34) (120/65 - 142/69)  BP(mean): --  RR: 18 (02 Nov 2022 11:34) (18 - 18)  SpO2: 100% (02 Nov 2022 11:34) (100% - 100%)    Parameters below as of 02 Nov 2022 11:34  Patient On (Oxygen Delivery Method): room air      GENERAL: NAD, well-developed  EYES: EOMI, PERRLA, conjunctiva and sclera clear  NECK: Supple, No JVD  CHEST/LUNG: Clear to auscultation bilaterally; No wheeze  HEART: Regular rate and rhythm; No murmurs, rubs, or gallops  ABDOMEN: Soft, Nontender, Nondistended; Bowel sounds present  EXTREMITIES:  2+ Peripheral Pulses, No clubbing, cyanosis, or edema  PSYCH: AAOx 0  NEUROLOGY: non-focal  SKIN: No rashes or lesions      LABS:                        11.2   6.90  )-----------( 225      ( 02 Nov 2022 06:15 )             37.3     11-02    136  |  101  |  10  ----------------------------<  115<H>  3.7   |  25  |  0.42<L>    Ca    8.9      02 Nov 2022 06:15  Phos  2.8     11-02  Mg     1.80     11-02                  RADIOLOGY & ADDITIONAL TESTS:  Results Reviewed:   Imaging Personally Reviewed:  Electrocardiogram Personally Reviewed:    COORDINATION OF CARE:  Care Discussed with Consultants/Other Providers [Y/N]:  Prior or Outpatient Records Reviewed [Y/N]:

## 2022-11-02 NOTE — PROGRESS NOTE ADULT - PROBLEM SELECTOR PROBLEM 3
Acute UTI
Parkinson disease
Acute UTI
Left lower lobe pneumonia
Metabolic encephalopathy
Parkinson disease
Acute UTI
Left lower lobe pneumonia
Metabolic encephalopathy
Left lower lobe pneumonia

## 2022-11-02 NOTE — PROGRESS NOTE ADULT - PROBLEM SELECTOR PROBLEM 4
Parkinson disease
Metabolic encephalopathy
Parkinson disease
Type 2 diabetes mellitus
Metabolic encephalopathy
Type 2 diabetes mellitus
Parkinson disease
Metabolic encephalopathy

## 2022-11-19 ENCOUNTER — INPATIENT (INPATIENT)
Facility: HOSPITAL | Age: 82
LOS: 3 days | Discharge: NURSING, REHAB OR LONG-TERM CARE | End: 2022-11-23
Attending: STUDENT IN AN ORGANIZED HEALTH CARE EDUCATION/TRAINING PROGRAM | Admitting: STUDENT IN AN ORGANIZED HEALTH CARE EDUCATION/TRAINING PROGRAM

## 2022-11-19 VITALS
TEMPERATURE: 97 F | DIASTOLIC BLOOD PRESSURE: 93 MMHG | OXYGEN SATURATION: 97 % | RESPIRATION RATE: 15 BRPM | SYSTOLIC BLOOD PRESSURE: 178 MMHG | HEIGHT: 68 IN | HEART RATE: 70 BPM

## 2022-11-19 DIAGNOSIS — R62.7 ADULT FAILURE TO THRIVE: ICD-10-CM

## 2022-11-19 LAB
ALBUMIN SERPL ELPH-MCNC: 3.8 G/DL — SIGNIFICANT CHANGE UP (ref 3.3–5)
ALP SERPL-CCNC: 71 U/L — SIGNIFICANT CHANGE UP (ref 40–120)
ALT FLD-CCNC: <5 U/L — LOW (ref 4–41)
ANION GAP SERPL CALC-SCNC: 13 MMOL/L — SIGNIFICANT CHANGE UP (ref 7–14)
APPEARANCE UR: CLEAR — SIGNIFICANT CHANGE UP
AST SERPL-CCNC: 15 U/L — SIGNIFICANT CHANGE UP (ref 4–40)
BACTERIA # UR AUTO: ABNORMAL
BASOPHILS # BLD AUTO: 0.04 K/UL — SIGNIFICANT CHANGE UP (ref 0–0.2)
BASOPHILS NFR BLD AUTO: 0.5 % — SIGNIFICANT CHANGE UP (ref 0–2)
BILIRUB SERPL-MCNC: 0.5 MG/DL — SIGNIFICANT CHANGE UP (ref 0.2–1.2)
BILIRUB UR-MCNC: NEGATIVE — SIGNIFICANT CHANGE UP
BUN SERPL-MCNC: 10 MG/DL — SIGNIFICANT CHANGE UP (ref 7–23)
CALCIUM SERPL-MCNC: 8.8 MG/DL — SIGNIFICANT CHANGE UP (ref 8.4–10.5)
CHLORIDE SERPL-SCNC: 100 MMOL/L — SIGNIFICANT CHANGE UP (ref 98–107)
CO2 SERPL-SCNC: 24 MMOL/L — SIGNIFICANT CHANGE UP (ref 22–31)
COLOR SPEC: ABNORMAL
CREAT SERPL-MCNC: 0.42 MG/DL — LOW (ref 0.5–1.3)
DIFF PNL FLD: NEGATIVE — SIGNIFICANT CHANGE UP
EGFR: 106 ML/MIN/1.73M2 — SIGNIFICANT CHANGE UP
EOSINOPHIL # BLD AUTO: 0.1 K/UL — SIGNIFICANT CHANGE UP (ref 0–0.5)
EOSINOPHIL NFR BLD AUTO: 1.3 % — SIGNIFICANT CHANGE UP (ref 0–6)
EPI CELLS # UR: 1 /HPF — SIGNIFICANT CHANGE UP (ref 0–5)
GLUCOSE SERPL-MCNC: 103 MG/DL — HIGH (ref 70–99)
GLUCOSE UR QL: NEGATIVE — SIGNIFICANT CHANGE UP
HCT VFR BLD CALC: 41.4 % — SIGNIFICANT CHANGE UP (ref 39–50)
HGB BLD-MCNC: 13.1 G/DL — SIGNIFICANT CHANGE UP (ref 13–17)
HYALINE CASTS # UR AUTO: 0 /LPF — SIGNIFICANT CHANGE UP (ref 0–7)
IANC: 5.03 K/UL — SIGNIFICANT CHANGE UP (ref 1.8–7.4)
IMM GRANULOCYTES NFR BLD AUTO: 0.3 % — SIGNIFICANT CHANGE UP (ref 0–0.9)
KETONES UR-MCNC: ABNORMAL
LEUKOCYTE ESTERASE UR-ACNC: ABNORMAL
LYMPHOCYTES # BLD AUTO: 2.16 K/UL — SIGNIFICANT CHANGE UP (ref 1–3.3)
LYMPHOCYTES # BLD AUTO: 27.4 % — SIGNIFICANT CHANGE UP (ref 13–44)
MCHC RBC-ENTMCNC: 29.6 PG — SIGNIFICANT CHANGE UP (ref 27–34)
MCHC RBC-ENTMCNC: 31.6 GM/DL — LOW (ref 32–36)
MCV RBC AUTO: 93.5 FL — SIGNIFICANT CHANGE UP (ref 80–100)
MONOCYTES # BLD AUTO: 0.53 K/UL — SIGNIFICANT CHANGE UP (ref 0–0.9)
MONOCYTES NFR BLD AUTO: 6.7 % — SIGNIFICANT CHANGE UP (ref 2–14)
NEUTROPHILS # BLD AUTO: 5.03 K/UL — SIGNIFICANT CHANGE UP (ref 1.8–7.4)
NEUTROPHILS NFR BLD AUTO: 63.8 % — SIGNIFICANT CHANGE UP (ref 43–77)
NITRITE UR-MCNC: NEGATIVE — SIGNIFICANT CHANGE UP
NRBC # BLD: 0 /100 WBCS — SIGNIFICANT CHANGE UP (ref 0–0)
NRBC # FLD: 0 K/UL — SIGNIFICANT CHANGE UP (ref 0–0)
PH UR: 6.5 — SIGNIFICANT CHANGE UP (ref 5–8)
PLATELET # BLD AUTO: 330 K/UL — SIGNIFICANT CHANGE UP (ref 150–400)
POTASSIUM SERPL-MCNC: 3.5 MMOL/L — SIGNIFICANT CHANGE UP (ref 3.5–5.3)
POTASSIUM SERPL-SCNC: 3.5 MMOL/L — SIGNIFICANT CHANGE UP (ref 3.5–5.3)
PROT SERPL-MCNC: 7.1 G/DL — SIGNIFICANT CHANGE UP (ref 6–8.3)
PROT UR-MCNC: NEGATIVE — SIGNIFICANT CHANGE UP
RBC # BLD: 4.43 M/UL — SIGNIFICANT CHANGE UP (ref 4.2–5.8)
RBC # FLD: 13.2 % — SIGNIFICANT CHANGE UP (ref 10.3–14.5)
RBC CASTS # UR COMP ASSIST: 1 /HPF — SIGNIFICANT CHANGE UP (ref 0–4)
SODIUM SERPL-SCNC: 137 MMOL/L — SIGNIFICANT CHANGE UP (ref 135–145)
SP GR SPEC: 1.01 — SIGNIFICANT CHANGE UP (ref 1.01–1.05)
UROBILINOGEN FLD QL: SIGNIFICANT CHANGE UP
WBC # BLD: 7.88 K/UL — SIGNIFICANT CHANGE UP (ref 3.8–10.5)
WBC # FLD AUTO: 7.88 K/UL — SIGNIFICANT CHANGE UP (ref 3.8–10.5)
WBC UR QL: 14 /HPF — HIGH (ref 0–5)

## 2022-11-19 PROCEDURE — 99285 EMERGENCY DEPT VISIT HI MDM: CPT | Mod: GC

## 2022-11-19 RX ORDER — QUETIAPINE FUMARATE 200 MG/1
12.5 TABLET, FILM COATED ORAL ONCE
Refills: 0 | Status: COMPLETED | OUTPATIENT
Start: 2022-11-19 | End: 2022-11-19

## 2022-11-19 RX ORDER — CEFTRIAXONE 500 MG/1
1000 INJECTION, POWDER, FOR SOLUTION INTRAMUSCULAR; INTRAVENOUS ONCE
Refills: 0 | Status: COMPLETED | OUTPATIENT
Start: 2022-11-19 | End: 2022-11-19

## 2022-11-19 RX ORDER — MIRTAZAPINE 45 MG/1
15 TABLET, ORALLY DISINTEGRATING ORAL ONCE
Refills: 0 | Status: COMPLETED | OUTPATIENT
Start: 2022-11-19 | End: 2022-11-19

## 2022-11-19 RX ORDER — LANOLIN ALCOHOL/MO/W.PET/CERES
3 CREAM (GRAM) TOPICAL ONCE
Refills: 0 | Status: COMPLETED | OUTPATIENT
Start: 2022-11-19 | End: 2022-11-19

## 2022-11-19 RX ORDER — SODIUM CHLORIDE 9 MG/ML
1000 INJECTION INTRAMUSCULAR; INTRAVENOUS; SUBCUTANEOUS ONCE
Refills: 0 | Status: COMPLETED | OUTPATIENT
Start: 2022-11-19 | End: 2022-11-19

## 2022-11-19 RX ORDER — CARBIDOPA AND LEVODOPA 25; 100 MG/1; MG/1
2 TABLET ORAL ONCE
Refills: 0 | Status: COMPLETED | OUTPATIENT
Start: 2022-11-19 | End: 2022-11-19

## 2022-11-19 RX ADMIN — CARBIDOPA AND LEVODOPA 2 TABLET(S): 25; 100 TABLET ORAL at 23:31

## 2022-11-19 RX ADMIN — SODIUM CHLORIDE 1000 MILLILITER(S): 9 INJECTION INTRAMUSCULAR; INTRAVENOUS; SUBCUTANEOUS at 20:48

## 2022-11-19 RX ADMIN — QUETIAPINE FUMARATE 12.5 MILLIGRAM(S): 200 TABLET, FILM COATED ORAL at 22:27

## 2022-11-19 RX ADMIN — Medication 3 MILLIGRAM(S): at 23:31

## 2022-11-19 RX ADMIN — MIRTAZAPINE 15 MILLIGRAM(S): 45 TABLET, ORALLY DISINTEGRATING ORAL at 23:31

## 2022-11-19 RX ADMIN — CEFTRIAXONE 100 MILLIGRAM(S): 500 INJECTION, POWDER, FOR SOLUTION INTRAMUSCULAR; INTRAVENOUS at 23:32

## 2022-11-19 NOTE — ED PROVIDER NOTE - CLINICAL SUMMARY MEDICAL DECISION MAKING FREE TEXT BOX
82M PMH Parkinson's, HTN, CAD on clopidogrel, DM, pacemaker p/w decreased PO intake. Vitals: unremarkable. On exam, heart RRR, lungs CTA b/l, abdomen soft nontender. Pt needs to be admitted for placement. Plan: blood work, UA/UC, 82M PMH Parkinson's, HTN, CAD on clopidogrel, DM, pacemaker p/w decreased PO intake. Vitals: unremarkable. On exam, heart RRR, lungs CTA b/l, abdomen soft nontender. Pt needs to be admitted for placement. Plan: blood work, UA/UC, fluids. Will re-assess.

## 2022-11-19 NOTE — ED PROVIDER NOTE - OBJECTIVE STATEMENT
82M PMH Parkinson's, HTN, CAD on clopidogrel, DM, pacemaker p/w decreased PO intake. Pt has been in subacute rehab for the past 1.5 weeks after being hospitalized here for hallucinations. For the past week, he has not been eating or drinking. Pt says he is unhappy at the facility and wants to go home and that is why he has not been eating and drinking. He feels very depressed because he used to have an active lifestyle prior to the Parkinson's. Pt says he would like something to eat now. Denies fever, N/V, chest pain, SOB, abdominal pain, urinary symptoms, constipation, diarrhea.

## 2022-11-19 NOTE — ED PROVIDER NOTE - PROGRESS NOTE DETAILS
OTony DO PGY-3.  GANESH [place] - Pt w/ pmhx of Parkinson's, DM, HTN presents from rehab facility 2/2 FTT. Dec PO intake over past week. Pt refusing to return to rehab center. Will eval for infectious etiology vs electrolyte abnormalities. TBA social as pt is refusing to go back to prior rehab center.

## 2022-11-19 NOTE — ED ADULT TRIAGE NOTE - CHIEF COMPLAINT QUOTE
Pt brought in for poor appetite  x 1 week.  Pt confused at base line with hx of Parkinson.  FS 72 at triage Pt brought in for poor appetite  x 1 week.  Pt confused at base line with hx of Parkinson.  FS 72 at triage.  IV R FA placed at facility yesterday

## 2022-11-19 NOTE — ED PROVIDER NOTE - PHYSICAL EXAMINATION
PHYSICAL EXAM:  GENERAL: Sitting comfortable in bed, in no acute distress  HENMT: Atraumatic, slightly dry mucous membranes, no oropharyngeal exudates or vesicles, uvula is midline EYES: Clear bilaterally, PERRL, EOMs intact b/l  HEART: RRR, S1/S2, no murmur  RESPIRATORY: Clear to auscultation bilaterally, no wheezes/rhonchi/rales  ABDOMEN: +BS, soft, nontender, nondistended  EXTREMITIES: No lower extremity edema, +2 radial pulses b/l  NEURO: A&Ox4  SKIN: Skin normal color for race, warm, dry and intact

## 2022-11-19 NOTE — ED PROVIDER NOTE - ATTENDING CONTRIBUTION TO CARE
82 year old with decreased po intake. concern for dehydration vs inability to eat 2/2 parkinson's vs occult infection. labs fluids, cxr for aspiration, ua for uti, cmp for electrolytes and renal failure, admit

## 2022-11-19 NOTE — ED CLERICAL - NS ED CLERK NOTE PRE-ARRIVAL INFORMATION; ADDITIONAL PRE-ARRIVAL INFORMATION
This patient is enrolled in the [facility] pneumonia readmission reduction program and has active care navigation. This patient can be followed up by the care navigation team within 24 hours. To arrange close follow-up or to obtain additional clinical information about this patient, please call the contact number above.     For patients previously on the faculty hospitalist or pulmonary service, please call the hospitalist or pulmonary fellow (7a-5p for patients previously admitted to the pulmonary service) for consultation PRIOR to admission or observation.  The hospitalist can be reached at [phone] and pulmonary fellow at [phone].   Consider CDU for management of COPD exacerbation or PNA per guidelines.

## 2022-11-20 DIAGNOSIS — Z29.9 ENCOUNTER FOR PROPHYLACTIC MEASURES, UNSPECIFIED: ICD-10-CM

## 2022-11-20 DIAGNOSIS — I25.10 ATHEROSCLEROTIC HEART DISEASE OF NATIVE CORONARY ARTERY WITHOUT ANGINA PECTORIS: ICD-10-CM

## 2022-11-20 DIAGNOSIS — E78.5 HYPERLIPIDEMIA, UNSPECIFIED: ICD-10-CM

## 2022-11-20 DIAGNOSIS — N39.0 URINARY TRACT INFECTION, SITE NOT SPECIFIED: ICD-10-CM

## 2022-11-20 DIAGNOSIS — F32.89 OTHER SPECIFIED DEPRESSIVE EPISODES: ICD-10-CM

## 2022-11-20 DIAGNOSIS — G20 PARKINSON'S DISEASE: ICD-10-CM

## 2022-11-20 LAB
ANION GAP SERPL CALC-SCNC: 10 MMOL/L — SIGNIFICANT CHANGE UP (ref 7–14)
BUN SERPL-MCNC: 9 MG/DL — SIGNIFICANT CHANGE UP (ref 7–23)
CALCIUM SERPL-MCNC: 8.4 MG/DL — SIGNIFICANT CHANGE UP (ref 8.4–10.5)
CHLORIDE SERPL-SCNC: 105 MMOL/L — SIGNIFICANT CHANGE UP (ref 98–107)
CO2 SERPL-SCNC: 24 MMOL/L — SIGNIFICANT CHANGE UP (ref 22–31)
CREAT SERPL-MCNC: 0.42 MG/DL — LOW (ref 0.5–1.3)
EGFR: 106 ML/MIN/1.73M2 — SIGNIFICANT CHANGE UP
GLUCOSE SERPL-MCNC: 92 MG/DL — SIGNIFICANT CHANGE UP (ref 70–99)
HCT VFR BLD CALC: 39 % — SIGNIFICANT CHANGE UP (ref 39–50)
HGB BLD-MCNC: 12.4 G/DL — LOW (ref 13–17)
MAGNESIUM SERPL-MCNC: 1.8 MG/DL — SIGNIFICANT CHANGE UP (ref 1.6–2.6)
MCHC RBC-ENTMCNC: 29.4 PG — SIGNIFICANT CHANGE UP (ref 27–34)
MCHC RBC-ENTMCNC: 31.8 GM/DL — LOW (ref 32–36)
MCV RBC AUTO: 92.4 FL — SIGNIFICANT CHANGE UP (ref 80–100)
NRBC # BLD: 0 /100 WBCS — SIGNIFICANT CHANGE UP (ref 0–0)
NRBC # FLD: 0 K/UL — SIGNIFICANT CHANGE UP (ref 0–0)
PHOSPHATE SERPL-MCNC: 2.7 MG/DL — SIGNIFICANT CHANGE UP (ref 2.5–4.5)
PLATELET # BLD AUTO: 267 K/UL — SIGNIFICANT CHANGE UP (ref 150–400)
POTASSIUM SERPL-MCNC: 3.5 MMOL/L — SIGNIFICANT CHANGE UP (ref 3.5–5.3)
POTASSIUM SERPL-SCNC: 3.5 MMOL/L — SIGNIFICANT CHANGE UP (ref 3.5–5.3)
RBC # BLD: 4.22 M/UL — SIGNIFICANT CHANGE UP (ref 4.2–5.8)
RBC # FLD: 13.2 % — SIGNIFICANT CHANGE UP (ref 10.3–14.5)
SARS-COV-2 RNA SPEC QL NAA+PROBE: SIGNIFICANT CHANGE UP
SODIUM SERPL-SCNC: 139 MMOL/L — SIGNIFICANT CHANGE UP (ref 135–145)
TSH SERPL-MCNC: 5.32 UIU/ML — HIGH (ref 0.27–4.2)
VIT B12 SERPL-MCNC: 301 PG/ML — SIGNIFICANT CHANGE UP (ref 200–900)
WBC # BLD: 6.85 K/UL — SIGNIFICANT CHANGE UP (ref 3.8–10.5)
WBC # FLD AUTO: 6.85 K/UL — SIGNIFICANT CHANGE UP (ref 3.8–10.5)

## 2022-11-20 PROCEDURE — 12345: CPT | Mod: NC

## 2022-11-20 PROCEDURE — 93010 ELECTROCARDIOGRAM REPORT: CPT

## 2022-11-20 PROCEDURE — 99223 1ST HOSP IP/OBS HIGH 75: CPT

## 2022-11-20 RX ORDER — CLOPIDOGREL BISULFATE 75 MG/1
75 TABLET, FILM COATED ORAL DAILY
Refills: 0 | Status: DISCONTINUED | OUTPATIENT
Start: 2022-11-20 | End: 2022-11-23

## 2022-11-20 RX ORDER — LANOLIN ALCOHOL/MO/W.PET/CERES
3 CREAM (GRAM) TOPICAL AT BEDTIME
Refills: 0 | Status: DISCONTINUED | OUTPATIENT
Start: 2022-11-20 | End: 2022-11-23

## 2022-11-20 RX ORDER — ACETAMINOPHEN 500 MG
650 TABLET ORAL EVERY 6 HOURS
Refills: 0 | Status: DISCONTINUED | OUTPATIENT
Start: 2022-11-20 | End: 2022-11-23

## 2022-11-20 RX ORDER — CARBIDOPA AND LEVODOPA 25; 100 MG/1; MG/1
2 TABLET ORAL
Refills: 0 | Status: DISCONTINUED | OUTPATIENT
Start: 2022-11-20 | End: 2022-11-23

## 2022-11-20 RX ORDER — QUETIAPINE FUMARATE 200 MG/1
12.5 TABLET, FILM COATED ORAL
Refills: 0 | Status: DISCONTINUED | OUTPATIENT
Start: 2022-11-20 | End: 2022-11-23

## 2022-11-20 RX ORDER — SENNA PLUS 8.6 MG/1
2 TABLET ORAL AT BEDTIME
Refills: 0 | Status: DISCONTINUED | OUTPATIENT
Start: 2022-11-20 | End: 2022-11-23

## 2022-11-20 RX ORDER — ENOXAPARIN SODIUM 100 MG/ML
30 INJECTION SUBCUTANEOUS EVERY 24 HOURS
Refills: 0 | Status: DISCONTINUED | OUTPATIENT
Start: 2022-11-20 | End: 2022-11-23

## 2022-11-20 RX ORDER — OLANZAPINE 15 MG/1
2.5 TABLET, FILM COATED ORAL ONCE
Refills: 0 | Status: COMPLETED | OUTPATIENT
Start: 2022-11-20 | End: 2022-11-20

## 2022-11-20 RX ORDER — CEFTRIAXONE 500 MG/1
1000 INJECTION, POWDER, FOR SOLUTION INTRAMUSCULAR; INTRAVENOUS EVERY 24 HOURS
Refills: 0 | Status: DISCONTINUED | OUTPATIENT
Start: 2022-11-20 | End: 2022-11-23

## 2022-11-20 RX ORDER — CARBIDOPA AND LEVODOPA 25; 100 MG/1; MG/1
1 TABLET ORAL
Refills: 0 | Status: DISCONTINUED | OUTPATIENT
Start: 2022-11-20 | End: 2022-11-20

## 2022-11-20 RX ORDER — MIRTAZAPINE 45 MG/1
30 TABLET, ORALLY DISINTEGRATING ORAL AT BEDTIME
Refills: 0 | Status: DISCONTINUED | OUTPATIENT
Start: 2022-11-20 | End: 2022-11-23

## 2022-11-20 RX ADMIN — OLANZAPINE 2.5 MILLIGRAM(S): 15 TABLET, FILM COATED ORAL at 18:16

## 2022-11-20 RX ADMIN — CARBIDOPA AND LEVODOPA 2 TABLET(S): 25; 100 TABLET ORAL at 09:25

## 2022-11-20 RX ADMIN — CARBIDOPA AND LEVODOPA 2 TABLET(S): 25; 100 TABLET ORAL at 05:57

## 2022-11-20 RX ADMIN — CARBIDOPA AND LEVODOPA 2 TABLET(S): 25; 100 TABLET ORAL at 13:09

## 2022-11-20 RX ADMIN — ENOXAPARIN SODIUM 30 MILLIGRAM(S): 100 INJECTION SUBCUTANEOUS at 11:50

## 2022-11-20 RX ADMIN — MIRTAZAPINE 30 MILLIGRAM(S): 45 TABLET, ORALLY DISINTEGRATING ORAL at 22:32

## 2022-11-20 RX ADMIN — CLOPIDOGREL BISULFATE 75 MILLIGRAM(S): 75 TABLET, FILM COATED ORAL at 11:54

## 2022-11-20 RX ADMIN — QUETIAPINE FUMARATE 12.5 MILLIGRAM(S): 200 TABLET, FILM COATED ORAL at 17:03

## 2022-11-20 RX ADMIN — QUETIAPINE FUMARATE 12.5 MILLIGRAM(S): 200 TABLET, FILM COATED ORAL at 09:26

## 2022-11-20 RX ADMIN — SENNA PLUS 2 TABLET(S): 8.6 TABLET ORAL at 22:32

## 2022-11-20 RX ADMIN — CEFTRIAXONE 100 MILLIGRAM(S): 500 INJECTION, POWDER, FOR SOLUTION INTRAMUSCULAR; INTRAVENOUS at 23:42

## 2022-11-20 RX ADMIN — CARBIDOPA AND LEVODOPA 2 TABLET(S): 25; 100 TABLET ORAL at 17:03

## 2022-11-20 NOTE — DIETITIAN INITIAL EVALUATION ADULT - OTHER INFO
As per chart, pt is a 82-year-old male with Parkinson's w/dysphagia, DM2 (recent A1c WNL, basal insulin discontinued), HLD, CAD, sick sinus s/p PPM, depression/anxiety, PAD, SIADH, presenting from SNF with decreased PO intake for the past 1 week. Pt was recently hospitalized for PNA, UTI, metabolic encephalopathy, discharged to SNF 11/02/22.    Patient AO x3 per flow sheet. Fecal incontinent. Noticed missing upper teeth. As per RD, patient completed 50% of breakfast with assistance and encouragement. Noticed hx of poor po intake.  As per chart, pt is a 82-year-old male with Parkinson's w/dysphagia, FTT, DM2 (10/24 A1c WNL, basal insulin discontinued), HLD, CAD, sick sinus s/p PPM, depression/anxiety, PAD, SIADH, acute UTI presenting from SNF with decreased PO intake for the past 1 week. Pt was recently hospitalized for PNA, UTI, metabolic encephalopathy, discharged to SNF 11/02/22.    Patient AO x3 per flow sheet. Pt has blurry speech, may d/t Parkinson's. Patient denied GI issues (nausea/vomiting/diarrhea/constipation.) Noticed with only front teeth. As per RN, patient completed 50% of breakfast with assistance and encouragement. Noticed hx of poor po intake during last admission. Pt c/o dislikes of puree food, likes Glucerna provided. Noticed with 1L fluid restriction, may relate to hx of hyponatremia as per PA. Na is WNL this visit (11/20 139, 11/19 137).  As per chart, pt is a 82-year-old male with Parkinson's w/dysphagia, FTT, DM2 (10/24 A1c WNL, basal insulin discontinued), HLD, CAD, sick sinus s/p PPM, depression/anxiety, PAD, SIADH, acute UTI presenting from SNF with decreased PO intake for the past 1 week. Pt was recently hospitalized for PNA, UTI, metabolic encephalopathy, discharged to SNF 11/02/22.    Patient AO x3 per flow sheet. Pt has blurry speech, may d/t Parkinson's. Patient denied GI issues (nausea/vomiting/diarrhea/constipation.) Noticed with only front teeth. As per RN, patient completed 50% of breakfast with assistance and encouragement. Noticed hx of poor po intake during last admission. Pt c/o dislikes of puree food, likes Glucerna provided. Pt refused swallowing test trials during last visit (10/30). Noticed with 1L fluid restriction, may relate to hx of hyponatremia as per PA. Na is WNL this visit (11/20 139, 11/19 137).  As per chart, pt is a 82-year-old male with Parkinson's w/dysphagia, FTT, DM2 (10/24 A1c WNL, basal insulin discontinued), HLD, CAD, sick sinus s/p PPM, depression/anxiety, PAD, SIADH, acute UTI presenting from SNF with decreased PO intake for the past 1 week. Pt was recently hospitalized for PNA, UTI, metabolic encephalopathy, discharged to SNF 11/02/22.    Patient AO x3 per flow sheet. Pt has garbled speech, may d/t Parkinson's. Patient denied GI issues (nausea/vomiting/diarrhea/constipation.) Noticed with only front teeth. As per RN, patient completed 50% of breakfast with assistance and encouragement. Pt c/o dislikes of puree food, likes Glucerna provided. Pt refused swallowing test trials during last visit (10/30). Noticed with 1L fluid restriction, may relate to hx of hyponatremia as per PA. Na is WNL this visit (11/20 139, 11/19 137).

## 2022-11-20 NOTE — H&P ADULT - PROBLEM SELECTOR PLAN 4
unclear why statin discontinued, would reach out to provider at [facility] in AM; c/w clopidogrel

## 2022-11-20 NOTE — H&P ADULT - PROBLEM SELECTOR PLAN 6
SCDs for DVT ppx enoxaparin for DVT ppx, adjust pending wt enoxaparin for DVT ppx, adjust pending wt    #previous history of DM, recent A1c in non-DM range, regular diet  #history of SIADH - 1L fluid restriction, monitor Na

## 2022-11-20 NOTE — DIETITIAN INITIAL EVALUATION ADULT - ORAL NUTRITION SUPPLEMENTS
Recommend to increase Glucerna 3x daily to provide 660 kcal, 30gm protein. Recommend to substitute liquid protein supplement with pro stat sugar free 2x daily to provide 30 gm protein.  Recommend to increase Glucerna 3x daily to provide 660 kcal, 30gm protein. Recommend to substitute liquid protein supplement with no carb prosource 2x daily to provide 30 gm protein.

## 2022-11-20 NOTE — H&P ADULT - NSHPLABSRESULTS_GEN_ALL_CORE
13.1   7.88  )-----------( 330      ( 2022 20:49 )             41.4         137  |  100  |  10  ----------------------------<  103<H>  3.5   |  24  |  0.42<L>    Ca    8.8      2022 20:49    TPro  7.1  /  Alb  3.8  /  TBili  0.5  /  DBili  x   /  AST  15  /  ALT  <5<L>  /  AlkPhos  71      Urinalysis Basic - ( 2022 21:01 )  Color: Manuela / Appearance: Clear / S.011 / pH: x  Gluc: x / Ketone: Small  / Bili: Negative / Urobili: <2 mg/dL   Blood: x / Protein: Negative / Nitrite: Negative   Leuk Esterase: Small / RBC: 1 /HPF / WBC 14 /HPF   Sq Epi: x / Non Sq Epi: 1 /HPF / Bacteria: Few    COVID-19 PCR: Mirlande (22 @ 20:49) 13.1   7.88  )-----------( 330      ( 2022 20:49 )             41.4         137  |  100  |  10  ----------------------------<  103<H>  3.5   |  24  |  0.42<L>    Ca    8.8      2022 20:49    TPro  7.1  /  Alb  3.8  /  TBili  0.5  /  DBili  x   /  AST  15  /  ALT  <5<L>  /  AlkPhos  71      Urinalysis Basic - ( 2022 21:01 )  Color: Manuela / Appearance: Clear / S.011 / pH: x  Gluc: x / Ketone: Small  / Bili: Negative / Urobili: <2 mg/dL   Blood: x / Protein: Negative / Nitrite: Negative   Leuk Esterase: Small / RBC: 1 /HPF / WBC 14 /HPF   Sq Epi: x / Non Sq Epi: 1 /HPF / Bacteria: Few    COVID-19 PCR: NotDetec (22 @ 20:49)    EKG personally reviewed - upright p and T in aVR suspect lead reversal, assuming lead reversal: SR w/1st degree AVB, frequent PVCs, Q in I, aVL, QTc 486ms -> repeat ordered

## 2022-11-20 NOTE — DIETITIAN INITIAL EVALUATION ADULT - PERTINENT MEDS FT
MEDICATIONS  (STANDING):  carbidopa/levodopa  25/100 2 Tablet(s) Oral <User Schedule>  cefTRIAXone   IVPB 1000 milliGRAM(s) IV Intermittent every 24 hours  clopidogrel Tablet 75 milliGRAM(s) Oral daily  enoxaparin Injectable 30 milliGRAM(s) SubCutaneous every 24 hours  mirtazapine 30 milliGRAM(s) Oral at bedtime  QUEtiapine 12.5 milliGRAM(s) Oral two times a day  senna 2 Tablet(s) Oral at bedtime    MEDICATIONS  (PRN):  acetaminophen     Tablet .. 650 milliGRAM(s) Oral every 6 hours PRN Mild Pain (1 - 3)  melatonin 3 milliGRAM(s) Oral at bedtime PRN Insomnia

## 2022-11-20 NOTE — H&P ADULT - PROBLEM SELECTOR PLAN 5
mirtazepine recently increased at SNF from 15mg to 30mg, continue w/quetiapine  screening ekg pending, f/u to eval QT mirtazepine recently increased at SNF from 15mg to 30mg, continue w/quetiapine  screening ekg pending, f/u to eval QT  denies SI, reports depressed secondary to not wanting to be at SNF  would reach out to CM on Monday re finding alternate facility  repeat TSH in AM, was elevated on prior admission w/nml T4 mirtazepine recently increased at SNF from 15mg to 30mg, continue w/quetiapine  screening ekg w/QT 486ms (repeat pending for 0800 however given likely lead reversal)  denies SI, reports depressed secondary to not wanting to be at SNF  would reach out to CM on Monday re finding alternate facility  repeat TSH in AM, was elevated on prior admission w/nml T4

## 2022-11-20 NOTE — DIETITIAN INITIAL EVALUATION ADULT - PERTINENT LABORATORY DATA
11-20    139  |  105  |  9   ----------------------------<  92  3.5   |  24  |  0.42<L>    Ca    8.4      20 Nov 2022 06:04  Phos  2.7     11-20  Mg     1.80     11-20    TPro  7.1  /  Alb  3.8  /  TBili  0.5  /  DBili  x   /  AST  15  /  ALT  <5<L>  /  AlkPhos  71  11-19  POCT Blood Glucose.: 94 mg/dL (11-20-22 @ 03:30)  A1C with Estimated Average Glucose Result: 5.0 % (10-24-22 @ 06:35)  A1C with Estimated Average Glucose Result: 5.7 % (10-03-22 @ 05:44)

## 2022-11-20 NOTE — PATIENT PROFILE ADULT - FUNCTIONAL ASSESSMENT - DAILY ACTIVITY 3.
Informed patient they will need to stop in the lab to have their INR rechecked as it appears they were due for this on 3/22/19. Patient has verbalized understanding and has no other questions/concerns at this time.   
2 = A lot of assistance

## 2022-11-20 NOTE — H&P ADULT - NSVTERISKASSESS_GEN_ALL_CORE FT
Medical Assessment Completed on: 20-Nov-2022 04:50 Medical Assessment Completed on: 20-Nov-2022 05:40

## 2022-11-20 NOTE — H&P ADULT - NSHPOUTPATIENTPROVIDERS_GEN_ALL_CORE
PMD - Dr. Mir Plata   From [facility] [phone]  Neuro - Dr. Michael Pimentel

## 2022-11-20 NOTE — H&P ADULT - NSHPPHYSICALEXAM_GEN_ALL_CORE
Vital Signs Last 24 Hrs  T(C): 36.7 (20 Nov 2022 04:11), Max: 36.8 (20 Nov 2022 01:30)  T(F): 98 (20 Nov 2022 04:11), Max: 98.3 (20 Nov 2022 01:30)  HR: 77 (20 Nov 2022 04:11) (70 - 84)  BP: 148/69 (20 Nov 2022 04:11) (121/68 - 178/93)  RR: 16 (20 Nov 2022 04:11) (15 - 16)  SpO2: 100% (20 Nov 2022 04:11) (97% - 100%)    Parameters below as of 20 Nov 2022 04:11  Patient On (Oxygen Delivery Method): room air    PHYSICAL EXAM:  GENERAL: NAD, well-developed, thin  HEAD:  Atraumatic, Normocephalic  EYES: EOMI, PERRL, conjunctiva and sclera clear  NECK: Supple, No LAD  CHEST/LUNG: Clear to auscultation bilaterally; No wheezes, rales or rhonchi; normal work of breathing, speaking in full sentences  HEART: Regular rate and rhythm; No murmurs, rubs, or gallops, (+)S1, S2  ABDOMEN: Soft, Nondistended; Normal Bowel sounds; slight suprapubic TTP  EXTREMITIES:  2+ Peripheral Pulses, No clubbing, cyanosis, or edema  PSYCH: normal mood and affect, A&Ox3  NEUROLOGY: no focal neuro deficits, strength 4+/5 x4 extremities, sensation grossly intact, FTN intact b/l, CN II-XII intact, pill rolling tremor b/l hands  SKIN: No rashes or lesions Vital Signs Last 24 Hrs  T(C): 36.7 (20 Nov 2022 04:11), Max: 36.8 (20 Nov 2022 01:30)  T(F): 98 (20 Nov 2022 04:11), Max: 98.3 (20 Nov 2022 01:30)  HR: 77 (20 Nov 2022 04:11) (70 - 84)  BP: 148/69 (20 Nov 2022 04:11) (121/68 - 178/93)  RR: 16 (20 Nov 2022 04:11) (15 - 16)  SpO2: 100% (20 Nov 2022 04:11) (97% - 100%)    Parameters below as of 20 Nov 2022 04:11  Patient On (Oxygen Delivery Method): room air    PHYSICAL EXAM:  GENERAL: NAD, well-developed, thin  HEAD:  Atraumatic, Normocephalic  EYES: EOMI, PERRL, conjunctiva and sclera clear  NECK: Supple, No LAD  CHEST/LUNG: Clear to auscultation bilaterally; No wheezes, rales or rhonchi; normal work of breathing, speaking in full sentences  HEART: Regular rate and rhythm; No murmurs, rubs, or gallops, (+)S1, S2  ABDOMEN: Soft, Nondistended; Normal Bowel sounds; slight suprapubic TTP  EXTREMITIES:  2+ Peripheral Pulses, No clubbing, cyanosis, or edema  PSYCH: normal mood and affect, A&Ox3, denies SI/HI  NEUROLOGY: no focal neuro deficits, strength 4+/5 x4 extremities, sensation grossly intact, FTN intact b/l, CN II-XII intact, pill rolling tremor b/l hands  SKIN: No rashes or lesions

## 2022-11-20 NOTE — ED ADULT NURSE NOTE - OBJECTIVE STATEMENT
Patient is alert and verbally responsive. Noted with slurred speech. General condition stable. No acute distress noted. Breathing with ease. Patient sent from rehabilitation center for failure to thrive. Not eating and drinking for one week according to spouse. Safety maintained, will continue to monitor.

## 2022-11-20 NOTE — PHYSICAL THERAPY INITIAL EVALUATION ADULT - PERTINENT HX OF CURRENT PROBLEM, REHAB EVAL
82 year old Male PMH Parkinson's, HTN, CAD on clopidogrel, DM, pacemaker p/w decreased PO intake. Pt has been in subacute rehab for the past 1.5 weeks after being hospitalized here for hallucinations.

## 2022-11-20 NOTE — DIETITIAN INITIAL EVALUATION ADULT - ADD RECOMMEND
1. Continue to monitor po intake and supplement tolerance  2. Continue to encourage po intake  3. Defer to SLP for diet texture advancement  4. Defer to medical team for fluid restriction adjustment

## 2022-11-20 NOTE — H&P ADULT - ASSESSMENT
82-year-old male with Parkinson's w/dysphagia, HTN, HLD, CAD, DM2, sick sinus s/p PPM, depression/anxiety, PAD, SIADH, presenting from SNF with decreased PO intake for the past 1 week. 82-year-old male with Parkinson's w/dysphagia, HLD, CAD, sick sinus s/p PPM, depression/anxiety, PAD, SIADH, presenting from SNF with decreased PO intake for the past 1 week.

## 2022-11-20 NOTE — PATIENT PROFILE ADULT - FALL HARM RISK - RISK INTERVENTIONS
Assistance OOB with selected safe patient handling equipment/Assistance with ambulation/Communicate Fall Risk and Risk Factors to all staff, patient, and family/Discuss with provider need for PT consult/Monitor gait and stability/Reinforce activity limits and safety measures with patient and family/Visual Cue: Yellow wristband/Bed in lowest position, wheels locked, appropriate side rails in place/Call bell, personal items and telephone in reach/Instruct patient to call for assistance before getting out of bed or chair/Non-slip footwear when patient is out of bed/[address] to call system/Physically safe environment - no spills, clutter or unnecessary equipment/Purposeful Proactive Rounding/Room/bathroom lighting operational, light cord in reach

## 2022-11-20 NOTE — H&P ADULT - NSHPREVIEWOFSYSTEMS_GEN_ALL_CORE
REVIEW OF SYSTEMS:    CONSTITUTIONAL: No weakness, fevers or chills  EYES/ENT: No visual changes; (+) dysphagia; No sore throat; No rhinorrhea; (+)nasal congestion  NECK: No pain or stiffness  RESPIRATORY: (+)dry cough; No wheezing or hemoptysis; No shortness of breath  CARDIOVASCULAR: No chest pain or palpitations; No lower extremity edema  GASTROINTESTINAL: No abdominal or epigastric pain. No nausea, vomiting, or hematemesis; No diarrhea or constipation. No melena or hematochezia.  GENITOURINARY: No dysuria; (+)increased frequency; No hematuria  NEUROLOGICAL: No numbness, paresthesias, or weakness; (+) chronic intermittent frontal HA; (+)chronic LH  MSK: ambulates with walker, no falls  SKIN: No itching, burning, rashes, or lesions   All other review of systems is negative unless indicated above.

## 2022-11-20 NOTE — ED ADULT NURSE REASSESSMENT NOTE - NS ED NURSE REASSESS COMMENT FT1
Report received from mike Rodriguez at midnight. Patient A&Ox3, respirations even and unlabored. Vitals stable. Patient cleaned and changed for comfort and repositioned with PCA assistance. B/l IV lines observed. Patient awaiting bed assignment. Stretcher in lowest position, wheels locked, appropriate side rails in place.

## 2022-11-20 NOTE — H&P ADULT - PROBLEM SELECTOR PLAN 3
previously on atorvastatin, seems to have been discontinued at facility, unclear why given history of PAD, would touch base with provider at facility in AM

## 2022-11-20 NOTE — DIETITIAN INITIAL EVALUATION ADULT - SIGNS/SYMPTOMS
<50% of estimated energy requirement for > 5 days, >5% wt loss x 1 month. <75% of estimated energy requirement for > 5 days, >5% wt loss x 1 month. <75% of estimated energy requirement for > 1 month, >5% wt loss x 1 month.

## 2022-11-20 NOTE — H&P ADULT - PROBLEM SELECTOR PLAN 2
c/w carbidopa/levodopa  fall precautions    #dysphagia - on pureed diet with honey thick liquids at SNF with Mighty Shakes BID, Super Cereal 6oz in AM, and LPS SF 30mL BID   aspiration precautions   dysphagia screen then reinstate above diet c/w carbidopa/levodopa  fall precautions, PT consult    #dysphagia - on pureed diet with honey thick liquids at SNF with Mighty Shakes BID, Super Cereal 6oz in AM, and LPS SF 30mL BID   aspiration precautions   dysphagia screen then reinstate above diet

## 2022-11-20 NOTE — DIETITIAN INITIAL EVALUATION ADULT - NS FNS DIET ORDER
Diet, Pureed:   1000mL Fluid Restriction ([id number])  Moderately Thick Liquids (MODTHICKLIQS)  Liquid Protein Supplement     Qty per Day:  2  Supplement Feeding Modality:  Oral  Glucerna Shake Cans or Servings Per Day:  1       Frequency:  Two Times a day (11-20-22 @ 05:21)

## 2022-11-20 NOTE — H&P ADULT - HISTORY OF PRESENT ILLNESS
82-year-old male with Parkinson's w/dysphagia, HTN, HLD, CAD, DM2, sick sinus s/p PPM, depression/anxiety, PAD, SIADH, presenting from SNF with decreased PO intake for the past 1 week. Patient was recently hospitalized for PNA, UTI, metabolic encephalopathy, discharged to [facility] 11/02/22.  Patient is unhappy at the facility and wants to go home, which is reportedly why he has not been eating and drinking. He feels very depressed because he used to have an active lifestyle prior to the Parkinson's.    In the ED VS:  98.3  70-84  121-178/61-93  15-16  %RA, received ceftriaxone 1g IVPB x1, 1L NS IVF, carbidopa/levodopa 25/100 2tabs PO x1, melatonin 3mg PO x1, mirtazepine 15mg PO x1 and quetiapine 12.5mg PO x1 82-year-old male with Parkinson's w/dysphagia, HLD, CAD, DM2 (recent A1c wnl, basal insulin discontinued on last hospital discharge), sick sinus s/p PPM, depression/anxiety, PAD, SIADH, presenting from SNF with decreased PO intake for the past 1 week. Patient was recently hospitalized for PNA, UTI, metabolic encephalopathy, discharged to [facility] 11/02/22.  Patient is unhappy at the facility and wants to go home, which is reportedly why he has not been eating and drinking. He feels very depressed because he used to have an active lifestyle prior to the Parkinson's.    In the ED VS:  98.3  70-84  121-178/61-93  15-16  %RA, received ceftriaxone 1g IVPB x1, 1L NS IVF, carbidopa/levodopa 25/100 2tabs PO x1, melatonin 3mg PO x1, mirtazepine 15mg PO x1 and quetiapine 12.5mg PO x1

## 2022-11-20 NOTE — DIETITIAN INITIAL EVALUATION ADULT - ORAL INTAKE PTA/DIET HISTORY
As per chart, pt receives pureed diet with honey thick liquids at [facility] with Mighty Shakes BID, Super Cereal 6 oz in AM and LSP SF 30mL BID, has not been eating and drinking at SNF PTA.  As per chart, pt received pureed diet with honey thick liquids at [facility] with Mighty Shakes BID, Super Cereal 6 oz in AM and LSP SF 30mL BID, has not been eating and drinking at SNF PTA.  As per chart, pt received pureed diet with honey thick liquids at SNF with Mighty Shakes BID, Super Cereal 6 oz in AM and LPS SF 30mL BID, has not been eating and drinking at SNF PTA. As per previous RD note, hx of poor po intake last admission.

## 2022-11-20 NOTE — DIETITIAN NUTRITION RISK NOTIFICATION - TREATMENT: THE FOLLOWING DIET HAS BEEN RECOMMENDED
Diet, Pureed:   1000mL Fluid Restriction ([id number])  Moderately Thick Liquids (MODTHICKLIQS)  Liquid Protein Supplement     Qty per Day:  2  Supplement Feeding Modality:  Oral  Glucerna Shake Cans or Servings Per Day:  1       Frequency:  Two Times a day (11-20-22 @ 05:21) [Active]

## 2022-11-20 NOTE — PHYSICAL THERAPY INITIAL EVALUATION ADULT - ADDITIONAL COMMENTS
Pt unable to provided substantial verbal subjective secondary to mental status and limited ability to follow commands; per medical documentation, pt is presenting from rehab, unable to gather history of function prior to rehab admission.    Pt left semi-supine in bed, all lines intact, all needs in reach bed alarm set, in NAD. ANALY Rausch aware.

## 2022-11-20 NOTE — DIETITIAN INITIAL EVALUATION ADULT - NS FNS WEIGHT CHANGE REASON
Wt hx as per HIE, 10/23 141 lbs, 10/3 128.5 lbs/unintentional Wt hx as per HIE, 10/23 141 lbs (likely incorrect), 10/3 128.5 lbs, July 2021 176 lbs, likely to be UBW./unintentional

## 2022-11-20 NOTE — ED ADULT NURSE NOTE - CHIEF COMPLAINT QUOTE
Pt brought in for poor appetite  x 1 week.  Pt confused at base line with hx of Parkinson.  FS 72 at triage.  IV R FA placed at facility yesterday

## 2022-11-21 LAB
ANION GAP SERPL CALC-SCNC: 16 MMOL/L — HIGH (ref 7–14)
BUN SERPL-MCNC: 9 MG/DL — SIGNIFICANT CHANGE UP (ref 7–23)
CALCIUM SERPL-MCNC: 8.7 MG/DL — SIGNIFICANT CHANGE UP (ref 8.4–10.5)
CHLORIDE SERPL-SCNC: 100 MMOL/L — SIGNIFICANT CHANGE UP (ref 98–107)
CO2 SERPL-SCNC: 22 MMOL/L — SIGNIFICANT CHANGE UP (ref 22–31)
CREAT SERPL-MCNC: 0.43 MG/DL — LOW (ref 0.5–1.3)
CULTURE RESULTS: SIGNIFICANT CHANGE UP
EGFR: 105 ML/MIN/1.73M2 — SIGNIFICANT CHANGE UP
GLUCOSE SERPL-MCNC: 93 MG/DL — SIGNIFICANT CHANGE UP (ref 70–99)
HCT VFR BLD CALC: 41.6 % — SIGNIFICANT CHANGE UP (ref 39–50)
HGB BLD-MCNC: 12.8 G/DL — LOW (ref 13–17)
MAGNESIUM SERPL-MCNC: 1.8 MG/DL — SIGNIFICANT CHANGE UP (ref 1.6–2.6)
MCHC RBC-ENTMCNC: 29 PG — SIGNIFICANT CHANGE UP (ref 27–34)
MCHC RBC-ENTMCNC: 30.8 GM/DL — LOW (ref 32–36)
MCV RBC AUTO: 94.1 FL — SIGNIFICANT CHANGE UP (ref 80–100)
NRBC # BLD: 0 /100 WBCS — SIGNIFICANT CHANGE UP (ref 0–0)
NRBC # FLD: 0 K/UL — SIGNIFICANT CHANGE UP (ref 0–0)
PHOSPHATE SERPL-MCNC: 3 MG/DL — SIGNIFICANT CHANGE UP (ref 2.5–4.5)
PLATELET # BLD AUTO: 311 K/UL — SIGNIFICANT CHANGE UP (ref 150–400)
POTASSIUM SERPL-MCNC: 4 MMOL/L — SIGNIFICANT CHANGE UP (ref 3.5–5.3)
POTASSIUM SERPL-SCNC: 4 MMOL/L — SIGNIFICANT CHANGE UP (ref 3.5–5.3)
RBC # BLD: 4.42 M/UL — SIGNIFICANT CHANGE UP (ref 4.2–5.8)
RBC # FLD: 13.1 % — SIGNIFICANT CHANGE UP (ref 10.3–14.5)
SODIUM SERPL-SCNC: 138 MMOL/L — SIGNIFICANT CHANGE UP (ref 135–145)
SPECIMEN SOURCE: SIGNIFICANT CHANGE UP
T4 FREE SERPL-MCNC: 1.5 NG/DL — SIGNIFICANT CHANGE UP (ref 0.9–1.8)
WBC # BLD: 11.23 K/UL — HIGH (ref 3.8–10.5)
WBC # FLD AUTO: 11.23 K/UL — HIGH (ref 3.8–10.5)

## 2022-11-21 PROCEDURE — 99233 SBSQ HOSP IP/OBS HIGH 50: CPT

## 2022-11-21 RX ORDER — ATORVASTATIN CALCIUM 80 MG/1
40 TABLET, FILM COATED ORAL AT BEDTIME
Refills: 0 | Status: DISCONTINUED | OUTPATIENT
Start: 2022-11-21 | End: 2022-11-23

## 2022-11-21 RX ADMIN — QUETIAPINE FUMARATE 12.5 MILLIGRAM(S): 200 TABLET, FILM COATED ORAL at 17:30

## 2022-11-21 RX ADMIN — CARBIDOPA AND LEVODOPA 2 TABLET(S): 25; 100 TABLET ORAL at 12:11

## 2022-11-21 RX ADMIN — SENNA PLUS 2 TABLET(S): 8.6 TABLET ORAL at 21:54

## 2022-11-21 RX ADMIN — QUETIAPINE FUMARATE 12.5 MILLIGRAM(S): 200 TABLET, FILM COATED ORAL at 05:47

## 2022-11-21 RX ADMIN — CEFTRIAXONE 100 MILLIGRAM(S): 500 INJECTION, POWDER, FOR SOLUTION INTRAMUSCULAR; INTRAVENOUS at 21:53

## 2022-11-21 RX ADMIN — CARBIDOPA AND LEVODOPA 2 TABLET(S): 25; 100 TABLET ORAL at 14:43

## 2022-11-21 RX ADMIN — CARBIDOPA AND LEVODOPA 2 TABLET(S): 25; 100 TABLET ORAL at 17:30

## 2022-11-21 RX ADMIN — CLOPIDOGREL BISULFATE 75 MILLIGRAM(S): 75 TABLET, FILM COATED ORAL at 12:06

## 2022-11-21 RX ADMIN — CARBIDOPA AND LEVODOPA 2 TABLET(S): 25; 100 TABLET ORAL at 05:49

## 2022-11-21 RX ADMIN — ENOXAPARIN SODIUM 30 MILLIGRAM(S): 100 INJECTION SUBCUTANEOUS at 12:07

## 2022-11-21 RX ADMIN — ATORVASTATIN CALCIUM 40 MILLIGRAM(S): 80 TABLET, FILM COATED ORAL at 22:02

## 2022-11-21 RX ADMIN — MIRTAZAPINE 30 MILLIGRAM(S): 45 TABLET, ORALLY DISINTEGRATING ORAL at 21:54

## 2022-11-21 NOTE — SWALLOW BEDSIDE ASSESSMENT ADULT - ORAL PREPARATORY PHASE
Refuses to accept bolus into oral cavity
Refuses to accept bolus into oral cavity
Spontaneous, unlabored and symmetrical

## 2022-11-22 ENCOUNTER — TRANSCRIPTION ENCOUNTER (OUTPATIENT)
Age: 82
End: 2022-11-22

## 2022-11-22 LAB
ANION GAP SERPL CALC-SCNC: 13 MMOL/L — SIGNIFICANT CHANGE UP (ref 7–14)
BUN SERPL-MCNC: 14 MG/DL — SIGNIFICANT CHANGE UP (ref 7–23)
CALCIUM SERPL-MCNC: 8.8 MG/DL — SIGNIFICANT CHANGE UP (ref 8.4–10.5)
CHLORIDE SERPL-SCNC: 101 MMOL/L — SIGNIFICANT CHANGE UP (ref 98–107)
CO2 SERPL-SCNC: 24 MMOL/L — SIGNIFICANT CHANGE UP (ref 22–31)
CREAT SERPL-MCNC: 0.43 MG/DL — LOW (ref 0.5–1.3)
EGFR: 105 ML/MIN/1.73M2 — SIGNIFICANT CHANGE UP
GLUCOSE SERPL-MCNC: 115 MG/DL — HIGH (ref 70–99)
HCT VFR BLD CALC: 40.8 % — SIGNIFICANT CHANGE UP (ref 39–50)
HGB BLD-MCNC: 12.8 G/DL — LOW (ref 13–17)
MAGNESIUM SERPL-MCNC: 1.9 MG/DL — SIGNIFICANT CHANGE UP (ref 1.6–2.6)
MCHC RBC-ENTMCNC: 29.5 PG — SIGNIFICANT CHANGE UP (ref 27–34)
MCHC RBC-ENTMCNC: 31.4 GM/DL — LOW (ref 32–36)
MCV RBC AUTO: 94 FL — SIGNIFICANT CHANGE UP (ref 80–100)
NRBC # BLD: 0 /100 WBCS — SIGNIFICANT CHANGE UP (ref 0–0)
NRBC # FLD: 0 K/UL — SIGNIFICANT CHANGE UP (ref 0–0)
PHOSPHATE SERPL-MCNC: 2.9 MG/DL — SIGNIFICANT CHANGE UP (ref 2.5–4.5)
PLATELET # BLD AUTO: 286 K/UL — SIGNIFICANT CHANGE UP (ref 150–400)
POTASSIUM SERPL-MCNC: 3.6 MMOL/L — SIGNIFICANT CHANGE UP (ref 3.5–5.3)
POTASSIUM SERPL-SCNC: 3.6 MMOL/L — SIGNIFICANT CHANGE UP (ref 3.5–5.3)
RBC # BLD: 4.34 M/UL — SIGNIFICANT CHANGE UP (ref 4.2–5.8)
RBC # FLD: 13.2 % — SIGNIFICANT CHANGE UP (ref 10.3–14.5)
SODIUM SERPL-SCNC: 138 MMOL/L — SIGNIFICANT CHANGE UP (ref 135–145)
WBC # BLD: 9.83 K/UL — SIGNIFICANT CHANGE UP (ref 3.8–10.5)
WBC # FLD AUTO: 9.83 K/UL — SIGNIFICANT CHANGE UP (ref 3.8–10.5)

## 2022-11-22 PROCEDURE — 99232 SBSQ HOSP IP/OBS MODERATE 35: CPT

## 2022-11-22 RX ADMIN — CARBIDOPA AND LEVODOPA 2 TABLET(S): 25; 100 TABLET ORAL at 06:18

## 2022-11-22 RX ADMIN — CEFTRIAXONE 100 MILLIGRAM(S): 500 INJECTION, POWDER, FOR SOLUTION INTRAMUSCULAR; INTRAVENOUS at 21:06

## 2022-11-22 RX ADMIN — CARBIDOPA AND LEVODOPA 2 TABLET(S): 25; 100 TABLET ORAL at 10:10

## 2022-11-22 RX ADMIN — ENOXAPARIN SODIUM 30 MILLIGRAM(S): 100 INJECTION SUBCUTANEOUS at 11:35

## 2022-11-22 RX ADMIN — ATORVASTATIN CALCIUM 40 MILLIGRAM(S): 80 TABLET, FILM COATED ORAL at 21:09

## 2022-11-22 RX ADMIN — QUETIAPINE FUMARATE 12.5 MILLIGRAM(S): 200 TABLET, FILM COATED ORAL at 17:08

## 2022-11-22 RX ADMIN — SENNA PLUS 2 TABLET(S): 8.6 TABLET ORAL at 21:09

## 2022-11-22 RX ADMIN — Medication 650 MILLIGRAM(S): at 00:08

## 2022-11-22 RX ADMIN — CARBIDOPA AND LEVODOPA 2 TABLET(S): 25; 100 TABLET ORAL at 13:57

## 2022-11-22 RX ADMIN — CLOPIDOGREL BISULFATE 75 MILLIGRAM(S): 75 TABLET, FILM COATED ORAL at 11:35

## 2022-11-22 RX ADMIN — Medication 650 MILLIGRAM(S): at 21:38

## 2022-11-22 RX ADMIN — MIRTAZAPINE 30 MILLIGRAM(S): 45 TABLET, ORALLY DISINTEGRATING ORAL at 21:09

## 2022-11-22 RX ADMIN — CARBIDOPA AND LEVODOPA 2 TABLET(S): 25; 100 TABLET ORAL at 17:08

## 2022-11-22 RX ADMIN — QUETIAPINE FUMARATE 12.5 MILLIGRAM(S): 200 TABLET, FILM COATED ORAL at 06:18

## 2022-11-22 RX ADMIN — Medication 650 MILLIGRAM(S): at 00:41

## 2022-11-22 RX ADMIN — Medication 650 MILLIGRAM(S): at 21:08

## 2022-11-22 RX ADMIN — Medication 3 MILLIGRAM(S): at 21:08

## 2022-11-22 NOTE — DISCHARGE NOTE PROVIDER - ATTENDING DISCHARGE PHYSICAL EXAMINATION:
PHYSICAL EXAM:  Constitutional: frail elderly M, sleeping comfortably in bed; NAD; easily arousable; mumbling incoherently   Head: NC/AT  Eyes: PERRL, EOMI, anicteric sclera  ENT: no nasal discharge; MMM  Neck: supple; no JVD  Respiratory: CTA B/L but with poor inspiratory effort; no W/R/R; on RA without respiratory distress  Cardiac: +S1/S2; RRR; no M/R/G  Gastrointestinal: soft, scaphoid abdomen, NT/ND; no rebound or guarding; +BSx4  Extremities: WWP, no clubbing or cyanosis; no peripheral edema  Musculoskeletal: moves all extremities spontaneously  Vascular: 2+ radial, DP/PT pulses B/L  Dermatologic: skin warm, dry and intact; no rashes, wounds, or scars  Neurologic: AAOx1 (self); CNII-XII grossly intact; no focal deficits  Psychiatric: confused

## 2022-11-22 NOTE — CHART NOTE - NSCHARTNOTEFT_GEN_A_CORE
Spoke with pt's wife, Tamar, who still is unsure of rehab. Says she needs to talk to pt and decide. Plans on coming in this afternoon around 4pm and will discuss with him and make a decision today. I discussed that it is in his best interest to go to a rehab facility to become stronger and have more around the clock attention regarding needs and PO status as I am concerned he will be too dependent on her if he were to go home. She seemed to acknowledge this concern. Says she will let us know tomorrow morning the next steps moving forward.

## 2022-11-22 NOTE — DISCHARGE NOTE PROVIDER - HOSPITAL COURSE
#Discharge: do not delete    Pt is an 81 yo M with PMH PD (c/b dysphagia), CAD, PAD, SIADH (1L fluid restriction), T2D, HTN, HLD, SSS (s/p PPM), and anxiety/depression p/w poor PO intake and confusion x1wk. Found to have UTI and on IV CTX. PT recs [facility]. Wife not yet sure if she wants pt to go back to [facility].     Problem List/Main Diagnoses (system-based):   Problem/Plan - 1:  ·  Problem: Acute UTI.   ·  Plan: c/w CTX  UCx:  likely contributing to AMS on arrival and poor PO intake  recent Klebsiella pneumoniae UTI sensitive to CTX.     Problem/Plan - 2:  ·  Problem: Parkinsons.   ·  Plan: c/w carbidopa/levodopa  home med: [facility] -- wife to bring in when filled 11/23  fall precautions, PT recs [facility]    #dysphagia - on pureed diet with honey thick liquids at [facility] with Mighty Shakes BID, Super Cereal 6oz in AM, and LPS SF 30mL BID   aspiration precautions   c/w diet.     Problem/Plan - 3:  ·  Problem: Hyperlipidemia.   ·  Plan: previously on atorvastatin, seems to have been discontinued at facility, unclear why given history of PAD   wife amenable to reinitiation.     Problem/Plan - 4:  ·  Problem: CAD (coronary artery disease).   ·  Plan: unclear why statin discontinued; c/w plavix  c/w statin as above    #PAD  as above.     Problem/Plan - 5:  ·  Problem: Other depression.   ·  Plan: mirtazepine recently increased at [facility] from 15mg to 30mg, continue w/quetiapine  screening ekg w/QT 486ms    TSH 5.32 (age appropriate) and FT4 WNL.    #previous history of DM, recent A1c in non-DM range, regular diet  #history of SIADH - 1L fluid restriction    Patient was discharged to:       New medications:  Changes to old medications: none  Medications that were stopped: none    Items to follow up as outpatient: #Discharge: do not delete    Pt is an 83 yo M with PMH PD (c/b dysphagia), CAD, PAD, SIADH (1L fluid restriction), T2D, HTN, HLD, SSS (s/p PPM), and anxiety/depression p/w poor PO intake and confusion x1wk. Found to have UTI and on IV CTX. PT recs [facility], wife amenable.    Problem List/Main Diagnoses (system-based):   Problem/Plan - 1:  ·  Problem: Acute UTI.   ·  Plan:   s/p 5d CTX, will complete 2 more d cefpodoxime on DC  likely contributing to AMS on arrival and poor PO intake  recent Klebsiella pneumoniae UTI sensitive to CTX.     Problem/Plan - 2:  ·  Problem: Parkinsons.   ·  Plan: c/w carbidopa/levodopa  home med: [facility] -- wife to bring in when filled 11/23  fall precautions, PT recs [facility]    #dysphagia - on pureed diet with honey thick liquids at SNF with Mighty Shakes BID, Super Cereal 6oz in AM, and LPS SF 30mL BID   aspiration precautions   c/w diet.     Problem/Plan - 3:  ·  Problem: Hyperlipidemia.   ·  Plan: previously on atorvastatin, seems to have been discontinued at facility, unclear why given history of PAD   wife amenable to reinitiation.     Problem/Plan - 4:  ·  Problem: CAD (coronary artery disease).   ·  Plan: unclear why statin discontinued; c/w plavix  c/w statin as above    #PAD  as above.     Problem/Plan - 5:  ·  Problem: Other depression.   ·  Plan: mirtazepine recently increased at SNF from 15mg to 30mg, continue w/quetiapine  screening ekg w/QT 486ms    TSH 5.32 (age appropriate) and FT4 WNL.    #previous history of DM, recent A1c in non-DM range, regular diet  #history of SIADH - 1L fluid restriction    Patient was discharged to:       New medications: 2d cefpodoxime   Changes to old medications: none  Medications that were stopped: none

## 2022-11-22 NOTE — DISCHARGE NOTE PROVIDER - NSDCCPCAREPLAN_GEN_ALL_CORE_FT
PRINCIPAL DISCHARGE DIAGNOSIS  Diagnosis: Adult failure to thrive  Assessment and Plan of Treatment: You came to the hospital with minimal oral intake and were found to have a urinary tract infection. You received IV antibiotics and started to feel better. Physical therapy evaluated you and recommended subacute rehab.      SECONDARY DISCHARGE DIAGNOSES  Diagnosis: Acute UTI  Assessment and Plan of Treatment:      PRINCIPAL DISCHARGE DIAGNOSIS  Diagnosis: Adult failure to thrive  Assessment and Plan of Treatment: You came to the hospital with minimal oral intake and were found to have a urinary tract infection. You received IV antibiotics and started to feel better. Physical therapy evaluated you and recommended subacute rehab.      SECONDARY DISCHARGE DIAGNOSES  Diagnosis: Acute UTI  Assessment and Plan of Treatment: You received 4 days of IV antibiotics and need to continue an additional 3 days of oral cefpodoxime for your urinary tract infection.

## 2022-11-22 NOTE — DISCHARGE NOTE PROVIDER - CARE PROVIDER_API CALL
Mir Plata  GASTROENTEROLOGY  [address] [place] [id number]  Phone: [phone]  Fax: [phone]  Follow Up Time: 2 weeks

## 2022-11-22 NOTE — DISCHARGE NOTE PROVIDER - NSDCMRMEDTOKEN_GEN_ALL_CORE_FT
acetaminophen 325 mg oral tablet: 2 tab(s) orally every 6 hours, As needed, Temp greater or equal to 38C (100.4F), Mild Pain (1 - 3)  carbidopa-levodopa 25 mg-100 mg oral tablet: 2 tab(s) orally every 6 hours   clopidogrel 75 mg oral tablet: 1 tab(s) orally once a day  insulin glargine 100 units/mL subcutaneous solution: 6 unit(s) subcutaneous once a day (at bedtime)  melatonin 3 mg oral tablet: 1 tab(s) orally once a day (at bedtime), As needed, Insomnia  mirtazapine 30 mg oral tablet: 1 tab(s) orally once a day (at bedtime)  QUEtiapine 25 mg oral tablet: 0.5 tab(s) orally 2 times a day   senna leaf extract oral tablet: 2 tab(s) orally once a day (at bedtime)   acetaminophen 325 mg oral tablet: 2 tab(s) orally every 6 hours, As needed, Mild Pain (1 - 3)  atorvastatin 40 mg oral tablet: 1 tab(s) orally once a day (at bedtime)  carbidopa-levodopa 25 mg-100 mg oral tablet: 2 tab(s) orally 6am, 10am, 1400, 1800  clopidogrel 75 mg oral tablet: 1 tab(s) orally once a day  melatonin 3 mg oral tablet: 1 tab(s) orally once a day (at bedtime), As needed, Insomnia  mirtazapine 30 mg oral tablet: 1 tab(s) orally once a day (at bedtime)  QUEtiapine: 12.5 milligram(s) orally 2 times a day  senna leaf extract oral tablet: 2 tab(s) orally once a day (at bedtime)

## 2022-11-22 NOTE — DISCHARGE NOTE PROVIDER - DETAILS OF MALNUTRITION DIAGNOSIS/DIAGNOSES
This patient has been assessed with a concern for Malnutrition and was treated during this hospitalization for the following Nutrition diagnosis/diagnoses:     -  11/20/2022: Severe protein-calorie malnutrition   -  11/20/2022: Underweight (BMI < 19)

## 2022-11-23 ENCOUNTER — TRANSCRIPTION ENCOUNTER (OUTPATIENT)
Age: 82
End: 2022-11-23

## 2022-11-23 VITALS
SYSTOLIC BLOOD PRESSURE: 149 MMHG | TEMPERATURE: 98 F | DIASTOLIC BLOOD PRESSURE: 89 MMHG | OXYGEN SATURATION: 97 % | RESPIRATION RATE: 16 BRPM | HEART RATE: 74 BPM

## 2022-11-23 PROCEDURE — 99239 HOSP IP/OBS DSCHRG MGMT >30: CPT

## 2022-11-23 RX ORDER — CARBIDOPA AND LEVODOPA 25; 100 MG/1; MG/1
2 TABLET ORAL
Qty: 0 | Refills: 0 | DISCHARGE
Start: 2022-11-23

## 2022-11-23 RX ORDER — MIRTAZAPINE 45 MG/1
1 TABLET, ORALLY DISINTEGRATING ORAL
Qty: 0 | Refills: 0 | DISCHARGE
Start: 2022-11-23

## 2022-11-23 RX ORDER — ACETAMINOPHEN 500 MG
2 TABLET ORAL
Qty: 0 | Refills: 0 | DISCHARGE
Start: 2022-11-23

## 2022-11-23 RX ORDER — CLOPIDOGREL BISULFATE 75 MG/1
1 TABLET, FILM COATED ORAL
Qty: 0 | Refills: 0 | DISCHARGE
Start: 2022-11-23

## 2022-11-23 RX ORDER — QUETIAPINE FUMARATE 200 MG/1
12.5 TABLET, FILM COATED ORAL
Qty: 0 | Refills: 0 | DISCHARGE
Start: 2022-11-23

## 2022-11-23 RX ORDER — MIRTAZAPINE 45 MG/1
1 TABLET, ORALLY DISINTEGRATING ORAL
Qty: 0 | Refills: 0 | DISCHARGE

## 2022-11-23 RX ORDER — CEFPODOXIME PROXETIL 100 MG
1 TABLET ORAL
Qty: 6 | Refills: 0
Start: 2022-11-23 | End: 2022-11-25

## 2022-11-23 RX ORDER — ATORVASTATIN CALCIUM 80 MG/1
1 TABLET, FILM COATED ORAL
Qty: 0 | Refills: 0 | DISCHARGE
Start: 2022-11-23

## 2022-11-23 RX ORDER — INSULIN GLARGINE 100 [IU]/ML
6 INJECTION, SOLUTION SUBCUTANEOUS
Qty: 0 | Refills: 0 | DISCHARGE

## 2022-11-23 RX ADMIN — QUETIAPINE FUMARATE 12.5 MILLIGRAM(S): 200 TABLET, FILM COATED ORAL at 05:02

## 2022-11-23 RX ADMIN — ENOXAPARIN SODIUM 30 MILLIGRAM(S): 100 INJECTION SUBCUTANEOUS at 11:18

## 2022-11-23 RX ADMIN — CARBIDOPA AND LEVODOPA 2 TABLET(S): 25; 100 TABLET ORAL at 05:02

## 2022-11-23 RX ADMIN — CARBIDOPA AND LEVODOPA 2 TABLET(S): 25; 100 TABLET ORAL at 12:45

## 2022-11-23 RX ADMIN — CLOPIDOGREL BISULFATE 75 MILLIGRAM(S): 75 TABLET, FILM COATED ORAL at 11:17

## 2022-11-23 NOTE — PROGRESS NOTE ADULT - NUTRITIONAL ASSESSMENT
This patient has been assessed with a concern for Malnutrition and has been determined to have a diagnosis/diagnoses of Severe protein-calorie malnutrition and Underweight (BMI < 19).    This patient is being managed with:   Diet Pureed-  1000mL Fluid Restriction ([id number])  Moderately Thick Liquids (MODTHICKLIQS)  No Carb Prosource (1pkg = 15gms Protein)     Qty per Day:  2  Supplement Feeding Modality:  Oral  Glucerna Shake Cans or Servings Per Day:  1       Frequency:  Three Times a day  Entered: Nov 21 2022 12:05PM    
This patient has been assessed with a concern for Malnutrition and has been determined to have a diagnosis/diagnoses of Severe protein-calorie malnutrition and Underweight (BMI < 19).    This patient is being managed with:   Diet Pureed-  1000mL Fluid Restriction ([id number])  Moderately Thick Liquids (MODTHICKLIQS)  No Carb Prosource (1pkg = 15gms Protein)     Qty per Day:  2  Supplement Feeding Modality:  Oral  Glucerna Shake Cans or Servings Per Day:  1       Frequency:  Three Times a day  Entered: Nov 21 2022 12:05PM    
This patient has been assessed with a concern for Malnutrition and has been determined to have a diagnosis/diagnoses of Severe protein-calorie malnutrition and Underweight (BMI < 19).    This patient is being managed with:   Diet Pureed-  1000mL Fluid Restriction ([id number])  Moderately Thick Liquids (MODTHICKLIQS)  No Carb Prosource (1pkg = 15gms Protein)     Qty per Day:  2  Supplement Feeding Modality:  Oral  Glucerna Shake Cans or Servings Per Day:  1       Frequency:  Two Times a day  Entered: Nov 20 2022  3:11PM    
This patient has been assessed with a concern for Malnutrition and has been determined to have a diagnosis/diagnoses of Severe protein-calorie malnutrition and Underweight (BMI < 19).    This patient is being managed with:   Diet Pureed-  1000mL Fluid Restriction ([id number])  Moderately Thick Liquids (MODTHICKLIQS)  No Carb Prosource (1pkg = 15gms Protein)     Qty per Day:  2  Supplement Feeding Modality:  Oral  Glucerna Shake Cans or Servings Per Day:  1       Frequency:  Three Times a day  Entered: Nov 21 2022 12:05PM

## 2022-11-23 NOTE — PROGRESS NOTE ADULT - PROBLEM SELECTOR PLAN 4
unclear why statin discontinued; c/w plavix  start statin as above    #PAD  as above
unclear why statin discontinued, would reach out to provider at [facility] in AM; c/w clopidogrel
unclear why statin discontinued; c/w plavix  start statin as above    #PAD  as above
unclear why statin discontinued; c/w plavix  start statin as above    #PAD  as above

## 2022-11-23 NOTE — PROGRESS NOTE ADULT - ASSESSMENT
82-year-old male with Parkinson's w/dysphagia, HLD, CAD, sick sinus s/p PPM, depression/anxiety, PAD, SIADH, presenting from SNF with decreased PO intake for the past 1 week.
Pt is an 81 yo M with PMH PD (c/b dysphagia), CAD, PAD, SIADH (1L fluid restriction), T2D, HTN, HLD, SSS (s/p PPM), and anxiety/depression p/w poor PO intake and confusion x1wk. Found to have UTI and on IV CTX. PT recs [facility]. Wife now amenable to DC to [facility] today.
Pt is an 81 yo M with PMH PD (c/b dysphagia), CAD, PAD, SIADH (1L fluid restriction), T2D, HTN, HLD, SSS (s/p PPM), and anxiety/depression p/w poor PO intake and confusion x1wk. Found to have UTI and on IV CTX. PT recs [facility]. Wife not yet sure if she wants pt to go back to [facility]. 
Pt is an 81 yo M with PMH PD (c/b dysphagia), CAD, PAD, SIADH (1L fluid restriction), T2D, HTN, HLD, SSS (s/p PPM), and anxiety/depression p/w poor PO intake and confusion x1wk. Found to have UTI and on IV CTX. PT recs [facility]. Wife not yet sure if she wants pt to go back to [facility].

## 2022-11-23 NOTE — PROGRESS NOTE ADULT - PROBLEM SELECTOR PLAN 1
c/w CTX  f/u UCx  likely contributing to AMS on arrival and poor PO intake  recent Klebsiella pneumoniae UTI sensitive to CTX
c/w CTX  f/u [facility]x  likely contributing to AMS on arrival and poor PO intake  recent Klebsiella pneumoniae UTI sensitive to CTX
c/w CTX  f/u [facility]  recent Klebsiella pneumoniae UTI sensitive to CTX
c/w CTX  f/u [facility]  likely contributing to AMS on arrival and poor PO intake  recent Klebsiella pneumoniae UTI sensitive to CTX

## 2022-11-23 NOTE — PROGRESS NOTE ADULT - PROBLEM SELECTOR PLAN 5
mirtazepine recently increased at [facility] from 15mg to 30mg, continue w/quetiapine  screening ekg w/QT 486ms    appreciate CM involvement - wife to discuss with CM  TSH 5.32 (age appropriate) and FT4 WNL
mirtazepine recently increased at [facility] from 15mg to 30mg, continue w/quetiapine  screening ekg w/QT 486ms    appreciate CM involvement - wife to discuss with CM  TSH 5.32 (age appropriate) and FT4 WNL
mirtazepine recently increased at [facility] from 15mg to 30mg, continue w/quetiapine  screening ekg w/QT 486ms    appreciate CM involvement - wife to discuss with CM  TSH 5.32 (age appropriate) and FT4 WNL
mirtazepine recently increased at SNF from 15mg to 30mg, continue w/quetiapine  screening ekg w/QT 486ms (repeat pending for 0800 however given likely lead reversal)  denies SI, reports depressed secondary to not wanting to be at SNF  would reach out to CM on Monday re finding alternate facility   TSH mildly elevated, check free t4 in am  TSH was elevated on prior admission w/nml T4

## 2022-11-23 NOTE — PROGRESS NOTE ADULT - PROBLEM SELECTOR PLAN 3
previously on atorvastatin, seems to have been discontinued at facility, unclear why given history of PAD   wife amenable to reinitiation
previously on atorvastatin, seems to have been discontinued at facility, unclear why given history of PAD, would touch base with provider at facility in AM

## 2022-11-23 NOTE — PROGRESS NOTE ADULT - PROBLEM SELECTOR PLAN 2
c/w carbidopa/levodopa  fall precautions, PT consult    #dysphagia - on pureed diet with honey thick liquids at SNF with Mighty Shakes BID, Super Cereal 6oz in AM, and LPS SF 30mL BID   aspiration precautions   dysphagia screen then reinstate above diet
c/w carbidopa/levodopa  home med: [facility] -- wife to bring in when filled 11/23  fall precautions, PT recs [facility] -- wife not sure yet if pt should go back to [facility], will consider    #dysphagia - on pureed diet with honey thick liquids at [facility] with Mighty Shakes BID, Super Cereal 6oz in AM, and LPS SF 30mL BID   aspiration precautions   c/w diet
c/w carbidopa/levodopa  home med: [facility] -- wife to bring in when filled 11/23  fall precautions, PT recs [facility] -- wife not sure yet if pt should go back to [facility], will consider    #dysphagia - on pureed diet with honey thick liquids at  with Mighty Shakes BID, Super Cereal 6oz in AM, and LPS SF 30mL BID   aspiration precautions   c/w diet
c/w carbidopa/levodopa  home med: [facility] -- wife to bring in when filled 11/23  fall precautions, PT recs [facility]      #dysphagia - on pureed diet with honey thick liquids at SNF with Mighty Shakes BID, Super Cereal 6oz in AM, and LPS SF 30mL BID   aspiration precautions   c/w diet

## 2022-11-23 NOTE — DISCHARGE NOTE NURSING/CASE MANAGEMENT/SOCIAL WORK - PATIENT PORTAL LINK FT
You can access the FollowMyHealth Patient Portal offered by [facility] by registering at the following website: [email or web address]. By joining [employer]’s FollowMyHealth portal, you will also be able to view your health information using other applications (apps) compatible with our system.

## 2022-11-23 NOTE — PROGRESS NOTE ADULT - SUBJECTIVE AND OBJECTIVE BOX
Patient is a 82y old  Male who presents with a chief complaint of Failure to thrive in adult     (2022 11:57)      SUBJECTIVE / OVERNIGHT EVENTS: Pt seen and examined at 11:55am, no overnight events, pt is alert, awake, appears a little restless in the bed, denies any complaints. No other new issues reported.    MEDICATIONS  (STANDING):  carbidopa/levodopa  25/100 2 Tablet(s) Oral <User Schedule>  cefTRIAXone   IVPB 1000 milliGRAM(s) IV Intermittent every 24 hours  clopidogrel Tablet 75 milliGRAM(s) Oral daily  enoxaparin Injectable 30 milliGRAM(s) SubCutaneous every 24 hours  mirtazapine 30 milliGRAM(s) Oral at bedtime  QUEtiapine 12.5 milliGRAM(s) Oral two times a day  senna 2 Tablet(s) Oral at bedtime    MEDICATIONS  (PRN):  acetaminophen     Tablet .. 650 milliGRAM(s) Oral every 6 hours PRN Mild Pain (1 - 3)  melatonin 3 milliGRAM(s) Oral at bedtime PRN Insomnia      Vital Signs Last 24 Hrs  T(C): 36.8 (2022 13:05), Max: 36.8 (2022 01:30)  T(F): 98.2 (2022 13:05), Max: 98.3 (2022 01:30)  HR: 75 (2022 13:05) (70 - 84)  BP: 140/65 (2022 13:05) (121/68 - 178/93)  BP(mean): --  RR: 17 (2022 13:05) (15 - 17)  SpO2: 100% (2022 13:05) (97% - 100%)    Parameters below as of 2022 13:05  Patient On (Oxygen Delivery Method): room air      CAPILLARY BLOOD GLUCOSE      POCT Blood Glucose.: 94 mg/dL (2022 03:30)  POCT Blood Glucose.: 80 mg/dL (2022 18:15)  POCT Blood Glucose.: 72 mg/dL (2022 17:52)    I&O's Summary      PHYSICAL EXAM:  GENERAL: NAD, well-developed  CHEST/LUNG: Clear to auscultation bilaterally; No wheeze  HEART: Regular rate and rhythm  ABDOMEN: Soft, Nontender, Nondistended  EXTREMITIES: no LE edema  PSYCH: Restless  NEUROLOGY: AAOx3  SKIN: No rashes or lesions    LABS:                        12.4   6.85  )-----------( 267      ( 2022 06:04 )             39.0         139  |  105  |  9   ----------------------------<  92  3.5   |  24  |  0.42<L>    Ca    8.4      2022 06:04  Phos  2.7       Mg     1.80         TPro  7.1  /  Alb  3.8  /  TBili  0.5  /  DBili  x   /  AST  15  /  ALT  <5<L>  /  AlkPhos  71            Urinalysis Basic - ( 2022 21:01 )    Color: Manuela / Appearance: Clear / S.011 / pH: x  Gluc: x / Ketone: Small  / Bili: Negative / Urobili: <2 mg/dL   Blood: x / Protein: Negative / Nitrite: Negative   Leuk Esterase: Small / RBC: 1 /HPF / WBC 14 /HPF   Sq Epi: x / Non Sq Epi: 1 /HPF / Bacteria: Few        RADIOLOGY & ADDITIONAL TESTS:    Imaging Personally Reviewed:    Consultant(s) Notes Reviewed:      Care Discussed with Consultants/Other Providers:  
[facility] Department of Hospital Medicine  Lynnette Lott DO  Available on MS Teams  Pager: [phone]    Patient is a 82y old  Male who presents with a chief complaint of UTI (20 Nov 2022 15:15)    Subjective:  Pt seen and examined at bedside in no acute distress. Plan to go to [facility] today. Wife in agreement. Updated via telephone and says she came to visit yesterday, though not witnessed. Says pt is very agitated and screaming and refusing meds and would benefit from longer hospitalization. I explained we have not seen this and he has been taking meds as prescribed and it is in his best interest to go to rehab facility. She voiced agreement.    ROS: unable to obtain 2/2 pt participation and mental status.    Vital Signs Last 24 Hrs  T(C): 36.3 (23 Nov 2022 04:55), Max: 36.8 (22 Nov 2022 12:45)  T(F): 97.3 (23 Nov 2022 04:55), Max: 98.2 (22 Nov 2022 12:45)  HR: 77 (23 Nov 2022 04:55) (76 - 78)  BP: 146/77 (23 Nov 2022 04:55) (120/74 - 148/69)  BP(mean): --  RR: 17 (23 Nov 2022 04:55) (17 - 18)  SpO2: 100% (23 Nov 2022 04:55) (99% - 100%)    Parameters below as of 23 Nov 2022 04:55  Patient On (Oxygen Delivery Method): room air    PHYSICAL EXAM:  Constitutional: frail elderly M, sleeping comfortably in bed; NAD; easily arousable; mumbling incoherently   Head: NC/AT  Eyes: PERRL, EOMI, anicteric sclera  ENT: no nasal discharge; MMM  Neck: supple; no JVD  Respiratory: CTA B/L but with poor inspiratory effort; no W/R/R; on RA without respiratory distress  Cardiac: +S1/S2; RRR; no M/R/G  Gastrointestinal: soft, scaphoid abdomen, NT/ND; no rebound or guarding; +BSx4  Extremities: WWP, no clubbing or cyanosis; no peripheral edema  Musculoskeletal: moves all extremities spontaneously  Vascular: 2+ radial, DP/PT pulses B/L  Dermatologic: skin warm, dry and intact; no rashes, wounds, or scars  Neurologic: AAOx1 (self); CNII-XII grossly intact; no focal deficits  Psychiatric: confused    MEDICATIONS  (STANDING):  carbidopa/levodopa  25/100 2 Tablet(s) Oral <User Schedule>  cefTRIAXone   IVPB 1000 milliGRAM(s) IV Intermittent every 24 hours  clopidogrel Tablet 75 milliGRAM(s) Oral daily  enoxaparin Injectable 30 milliGRAM(s) SubCutaneous every 24 hours  mirtazapine 30 milliGRAM(s) Oral at bedtime  QUEtiapine 12.5 milliGRAM(s) Oral two times a day  senna 2 Tablet(s) Oral at bedtime    MEDICATIONS  (PRN):  acetaminophen     Tablet .. 650 milliGRAM(s) Oral every 6 hours PRN Mild Pain (1 - 3)  melatonin 3 milliGRAM(s) Oral at bedtime PRN Insomnia    LABS:                        12.8   9.83  )-----------( 286      ( 22 Nov 2022 05:20 )             40.8     11-22    138  |  101  |  14  ----------------------------<  115<H>  3.6   |  24  |  0.43<L>    Ca    8.8      22 Nov 2022 05:20  Phos  2.9     11-22  Mg     1.90     11-22          CAPILLARY BLOOD GLUCOSE          RADIOLOGY & ADDITIONAL TESTS: Reviewed.  
[facility] Department of Hospital Medicine  Lynnette Lott DO  Available on MS Teams  Pager: [phone]    Patient is a 82y old  Male who presents with a chief complaint of UTI (20 Nov 2022 15:15)    Subjective:  Pt seen and examined at bedside in no acute distress. Wife to visit today and will further discuss dispo planning with SW. Recommended that pt go to [facility] as I do not believe she can care for pt independently at home.     ROS: unable to obtain 2/2 pt participation and mental status.    Vital Signs Last 24 Hrs  T(C): 36.4 (22 Nov 2022 06:12), Max: 36.7 (21 Nov 2022 15:32)  T(F): 97.6 (22 Nov 2022 06:12), Max: 98 (21 Nov 2022 15:32)  HR: 72 (22 Nov 2022 06:12) (72 - 80)  BP: 122/64 (22 Nov 2022 06:12) (122/64 - 148/76)  BP(mean): --  RR: 16 (22 Nov 2022 06:12) (16 - 18)  SpO2: 100% (22 Nov 2022 06:12) (100% - 100%)    Parameters below as of 22 Nov 2022 06:12  Patient On (Oxygen Delivery Method): room air    PHYSICAL EXAM:  Constitutional: frail elderly M, sleeping comfortably in bed; NAD; easily arousable; mumbling incoherently   Head: NC/AT  Eyes: PERRL, EOMI, anicteric sclera  ENT: no nasal discharge; MMM  Neck: supple; no JVD  Respiratory: CTA B/L but with poor inspiratory effort; no W/R/R; on RA without respiratory distress  Cardiac: +S1/S2; RRR; no M/R/G  Gastrointestinal: soft, scaphoid abdomen, NT/ND; no rebound or guarding; +BSx4  Extremities: WWP, no clubbing or cyanosis; no peripheral edema  Musculoskeletal: moves all extremities spontaneously  Vascular: 2+ radial, DP/PT pulses B/L  Dermatologic: skin warm, dry and intact; no rashes, wounds, or scars  Neurologic: AAOx1 (self); CNII-XII grossly intact; no focal deficits  Psychiatric: confused    MEDICATIONS  (STANDING):  carbidopa/levodopa  25/100 2 Tablet(s) Oral <User Schedule>  cefTRIAXone   IVPB 1000 milliGRAM(s) IV Intermittent every 24 hours  clopidogrel Tablet 75 milliGRAM(s) Oral daily  enoxaparin Injectable 30 milliGRAM(s) SubCutaneous every 24 hours  mirtazapine 30 milliGRAM(s) Oral at bedtime  QUEtiapine 12.5 milliGRAM(s) Oral two times a day  senna 2 Tablet(s) Oral at bedtime    MEDICATIONS  (PRN):  acetaminophen     Tablet .. 650 milliGRAM(s) Oral every 6 hours PRN Mild Pain (1 - 3)  melatonin 3 milliGRAM(s) Oral at bedtime PRN Insomnia    LABS:                        12.8   9.83  )-----------( 286      ( 22 Nov 2022 05:20 )             40.8     11-22    138  |  101  |  14  ----------------------------<  115<H>  3.6   |  24  |  0.43<L>    Ca    8.8      22 Nov 2022 05:20  Phos  2.9     11-22  Mg     1.90     11-22          CAPILLARY BLOOD GLUCOSE          RADIOLOGY & ADDITIONAL TESTS: Reviewed.
[facility] Department of Hospital Medicine  Lynnette Lott DO  Available on MS Teams  Pager: [phone]    Patient is a 82y old  Male who presents with a chief complaint of UTI (2022 15:15)    Subjective:  Pt seen and examined at bedside in no acute distress. Mumbling to himself and difficult to understand. Says he wants to go home. Wife Tamar contacted via telephone, appreciated updates. Plans on visiting tomorrow. Provided cell number: [phone].     ROS: unable to obtain 2/2 pt participation and mental status.    VITAL SIGNS:  T(C): 36.5 (22 @ 06:03), Max: 36.5 (22 @ 19:20)  T(F): 97.7 (22 @ 06:03), Max: 97.7 (22 @ 19:20)  HR: 90 (22 @ 06:03) (79 - 90)  BP: 144/78 (22 @ 06:03) (144/78 - 155/62)  BP(mean): --  RR: 18 (22 @ 06:03) (18 - 18)  SpO2: 100% (22 @ 06:03) (100% - 100%)  Wt(kg): --    PHYSICAL EXAM:  Constitutional: frail elderly M, sleeping comfortably in bed; NAD; easily arousable; mumbling incoherently   Head: NC/AT  Eyes: PERRL, EOMI, anicteric sclera  ENT: no nasal discharge; MMM  Neck: supple; no JVD  Respiratory: CTA B/L but with poor inspiratory effort; no W/R/R; on RA without respiratory distress  Cardiac: +S1/S2; RRR; no M/R/G  Gastrointestinal: soft, scaphoid abdomen, NT/ND; no rebound or guarding; +BSx4  Extremities: WWP, no clubbing or cyanosis; no peripheral edema  Musculoskeletal: moves all extremities spontaneously  Vascular: 2+ radial, DP/PT pulses B/L  Dermatologic: skin warm, dry and intact; no rashes, wounds, or scars  Neurologic: AAOx1 (self); CNII-XII grossly intact; no focal deficits  Psychiatric: confused    MEDICATIONS  (STANDING):  carbidopa/levodopa  25/100 2 Tablet(s) Oral <User Schedule>  cefTRIAXone   IVPB 1000 milliGRAM(s) IV Intermittent every 24 hours  clopidogrel Tablet 75 milliGRAM(s) Oral daily  enoxaparin Injectable 30 milliGRAM(s) SubCutaneous every 24 hours  mirtazapine 30 milliGRAM(s) Oral at bedtime  QUEtiapine 12.5 milliGRAM(s) Oral two times a day  senna 2 Tablet(s) Oral at bedtime    MEDICATIONS  (PRN):  acetaminophen     Tablet .. 650 milliGRAM(s) Oral every 6 hours PRN Mild Pain (1 - 3)  melatonin 3 milliGRAM(s) Oral at bedtime PRN Insomnia    LABS:                        12.8   11.23 )-----------( 311      ( 2022 05:30 )             41.6         138  |  100  |  9   ----------------------------<  93  4.0   |  22  |  0.43<L>    Ca    8.7      2022 05:30  Phos  3.0       Mg     1.80         TPro  7.1  /  Alb  3.8  /  TBili  0.5  /  DBili  x   /  AST  15  /  ALT  <5<L>  /  AlkPhos  71        Urinalysis Basic - ( 2022 21:01 )    Color: Manuela / Appearance: Clear / S.011 / pH: x  Gluc: x / Ketone: Small  / Bili: Negative / Urobili: <2 mg/dL   Blood: x / Protein: Negative / Nitrite: Negative   Leuk Esterase: Small / RBC: 1 /HPF / WBC 14 /HPF   Sq Epi: x / Non Sq Epi: 1 /HPF / Bacteria: Few      CAPILLARY BLOOD GLUCOSE      POCT Blood Glucose.: 94 mg/dL (2022 03:30)      RADIOLOGY & ADDITIONAL TESTS: Reviewed.

## 2022-11-23 NOTE — PROGRESS NOTE ADULT - PROBLEM SELECTOR PLAN 6
enoxaparin for DVT ppx    #previous history of DM, recent A1c in non-DM range, regular diet  #history of SIADH - 1L fluid restriction, monitor Na    Plan discussed with ACP  Wife updated via telephone today. Will bring in home med ongentys when filled 11/23.
enoxaparin for DVT ppx    #previous history of DM, recent A1c in non-DM range, regular diet  #history of SIADH - 1L fluid restriction, monitor Na    Plan discussed with ACP  Wife updated via telephone today - plans to visit tomorrow. Will bring in home med ongentys when filled 11/23.
enoxaparin for DVT ppx    #previous history of DM, recent A1c in non-DM range, regular diet  #history of SIADH - 1L fluid restriction, monitor Na    Plan discussed with ACP
enoxaparin for DVT ppx    #previous history of DM, recent A1c in non-DM range, regular diet  #history of SIADH - 1L fluid restriction, monitor Na    Plan discussed with ACP  Wife updated via telephone today - plans to visit today. Will bring in home med ongentys when filled 11/23.

## 2022-12-10 NOTE — PROGRESS NOTE ADULT - PROBLEM SELECTOR PLAN 1
- CXR with LLL opacity consistent with PNA, outpatient CXR correlated,   - WBC 11.88, nl lactate  - c/w Ceftriaxone/Zithromax  - Blood Cx 10/23 NTD musculoskeletal

## 2022-12-20 NOTE — DISCHARGE NOTE PROVIDER - DISCHARGE SERVICE FOR PATIENT
Inpatient Rehabilitation - Speech Language Pathology Treatment Note    [facility]     Patient Name: Christie Avendaño  : 1966  MRN: [id number]    Today's Date: 2022                   Admit Date: 2022       Visit Dx:      ICD-10-CM ICD-9-CM   1. Impaired functional mobility, balance, gait, and endurance  Z74.09 V49.89   2. 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)   • Non-small cell lung cancer, right (HCC)   • Renal mass, right   • SIRS (systemic inflammatory response syndrome) (HCC)   • Hyperlipidemia   • Malignant melanoma of right lower extremity including hip (HCC)   • High grade glioma    • 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)   • Glioma (HCC)       Past Medical History:   Diagnosis Date   • Arthritis    • Asthma    • BRCA2 positive    • Diabetes mellitus (HCC)    • H/O Liver masses 2017    x3   • H/O Lung masses 2017    1 in right lower lobe and 1 in the left infrahilar location   • H/O Ovarian cyst    • H/O Right adrenal mass (CMS/HCC) 2017   • Lung cancer (HCC)    • Melanoma (HCC)     right foot   • PONV (postoperative nausea and vomiting)    • Thyroid disease        Past Surgical History:   Procedure Laterality Date   • APPENDECTOMY     • BRAIN BIOPSY Right 2022    Procedure: Right frontal stereotactic needle brain biopsy;  Surgeon: Manuel Thompson MD;  Location: [facility] MAIN OR;  Service: Neurosurgery;  Laterality: Right;   • BREAST IMPLANT SURGERY Bilateral    • CHOLECYSTECTOMY     • HYSTERECTOMY      • MASTECTOMY Bilateral    • OVARIAN CYST SURGERY     • THORACOSCOPY VIDEO ASSISTED WITH LOBECTOMY Right 10/9/2020    Procedure: BRONCHOSCOPY, THORACOSCOPY VIDEO ASSISTED WITH ANTERIOR BASAL SEGMENTECTOMY, INTERCOSTAL NERVE BLOCK;  Surgeon: Flaca Crisostomo MD;  Location: [facility] OR;  Service: Thoracic;  Laterality: Right;   • TONSILLECTOMY         SLP Recommendation and Plan                                                            SLP EVALUATION (last 72 hours)     SLP SLC Evaluation     Row Name 12/20/22 1200 12/20/22 0830 12/19/22 1200 12/19/22 1030          Communication Assessment/Intervention    Document Type therapy note (daily note)  -SR therapy note (daily note)  -SR therapy note (daily note)  -SR therapy note (daily note)  -SR     Subjective Information no complaints  -SR no complaints  -SR no complaints  -SR no complaints  -SR     Patient Observations alert;cooperative;agree to therapy  -SR alert;cooperative;agree to therapy  -SR alert;cooperative;agree to therapy  -SR alert;cooperative;agree to therapy  -SR     Patient Effort good  -SR good  -SR good  -SR good  -SR     Symptoms Noted During/After Treatment none  -SR none  -SR none  -SR none  -SR        Pain Scale: Numbers Pre/Post-Treatment    Pretreatment Pain Rating 0/10 - no pain  -SR -- 0/10 - no pain  -SR 0/10 - no pain  -SR     Posttreatment Pain Rating 0/10 - no pain  -SR -- 0/10 - no pain  -SR 0/10 - no pain  -SR           User Key  (r) = Recorded By, (t) = Taken By, (c) = Cosigned By    Initials Name Effective Dates    SR Minal Bennett CCC-SLP 11/10/22 -                    EDUCATION    The patient has been educated in the following areas:       Cognitive Impairment.             SLP GOALS     Row Name 12/20/22 1200 12/20/22 0830 12/19/22 1200       Attention Goal 1 (SLP)    Improve Attention by Goal 1 (SLP) -- attending to task;80%;with minimal cues (75-90%)  -SR attending to task;80%;with minimal cues (75-90%)  -SR    Time Frame  (Attention Goal 1, SLP) -- by discharge  -SR by discharge  -SR    Progress/Outcomes (Attention Goal 1, SLP) -- goal ongoing  -SR goal ongoing  -SR    Comment (Attention Goal 1, SLP) -- Engaged in structured conversation with SLP during AM therapy session with background noise present. Attended to conversation with no redirection.  -SR Written directions; patient completed activity from previous session with 40% acc given no cues; 80% acc given min-mod cues.  -SR       Memory Skills Goal 1 (SLP)    Improve Memory Skills Through Goal 1 (SLP) -- recall details from a word list;visual memory task;listen to a paragraph and answer questions;use memory strategies;80%;with minimal cues (75-90%)  -SR --    Time Frame (Memory Skills Goal 1, SLP) -- by discharge  -SR --    Progress/Outcomes (Memory Skills Goal 1, SLP) -- good progress toward goal  -SR --    Comment (Memory Skills Goal 1, SLP) -- Patient listened to short paragraph and answered inference based questions during immediate recall activity with 80% acc given no cues.  -SR --       Functional Math Skills Goal 1 (SLP)    Improve Functional Math Skills Through Goal 1 (SLP) -- complete word problems involving money;complete word problems involving time;complete checkbook task;complete functional math task;80%;with minimal cues (75-90%)  -SR complete word problems involving money;complete word problems involving time;complete checkbook task;complete functional math task;80%;with minimal cues (75-90%)  -SR    Time Frame (Functional Math Skills Goal 1, SLP) -- by discharge  -SR by discharge  -SR    Progress/Outcomes (Functional Math Skills Goal 1, SLP) -- goal ongoing  -SR goal ongoing  -SR    Comment (Functional Math Skills Goal 1, SLP) -- Patient answered questions involving time/time management with 50% acc given no cues; 70% acc given min-mod cues.  -SR Baking/cooking conversions; patient answered math-based scenerios using baking conversion chart with 25% acc given  no cues; 75% acc given mod cues.  ST encouraged patient to take notes from the chart to aid in recall of information. Extended time to complete task due to prolonged response time.  -SR       Executive Functional Skills Goal 1 (SLP)    Improve Executive Function Skills Goal 1 (SLP) identify strategies, strengths, limitations;organization/planning activity;90%;with minimal cues (75-90%)  -SR -- --    Time Frame (Executive Function Skills Goal 1, SLP) by discharge  -SR -- --    Progress/Outcomes (Executive Function Skills Goal 1, SLP) good progress toward goal;continuing progress toward goal  -SR -- --    Comment (Executive Function Skills Goal 1, SLP) Medication management; patient followed verbal directions and sorted 10 mock medications by day and frequency using pill organizer with 40% acc given no cues; 80% acc given mod cues. Required verbal cue x2 to scan to L side. SLP encouraged patient to have organizer at home to sort medications following discharge. Agreeable to supervision of medications/finances following d/c. Extended time to complete therapy task.  -SR -- --       Right Hemisphere Function Goal 1 (SLP)    Improve Right Hemisphere Function Through Goal 1 (SLP) -- -- complete visuo-spatial activities (visual closure, trail making, mazes;80%;with minimal cues (75-90%)  -SR    Time Frame (Right Hemisphere Function Goal 1, SLP) -- -- by discharge  -SR    Progress/Outcomes (Right Hemisphere Function Goal 1, SLP) -- -- goal ongoing  -SR    Comment (Right Hemisphere Function Goal 1, SLP) -- -- Visual scanning; using horizontal scanning technique, patient located 23/25 stimulus items given min cues. Accuracy increased with bolded line on L side of the page.  -SR    Row Name 12/19/22 1030             Attention Goal 1 (SLP)    Improve Attention by Goal 1 (SLP) attending to task;80%;with minimal cues (75-90%)  -SR      Time Frame (Attention Goal 1, SLP) by discharge  -SR      Progress/Outcomes (Attention Goal 1,  "SLP) goal ongoing  -SR      Comment (Attention Goal 1, SLP) Patient followed \"if-then\" written directions targeting attention to detail, sustained, and selective attention. Patient said, \"I get ahead of myself sometimes, and I often miss information on the L side.\" ST provided visual of a blue bolded line on patient's L side to encourage her to scan. Activity incomplete due to time constraint. Patient completed 3/4 items given min cues. Will complete activity during next session.  -SR         Memory Skills Goal 1 (SLP)    Improve Memory Skills Through Goal 1 (SLP) recall details from a word list;visual memory task;listen to a paragraph and answer questions;use memory strategies;80%;with minimal cues (75-90%)  -SR      Time Frame (Memory Skills Goal 1, SLP) by discharge  -SR      Progress/Outcomes (Memory Skills Goal 1, SLP) good progress toward goal  -SR      Comment (Memory Skills Goal 1, SLP) Using Constant Therapy amber, patient listened to voicemail and answered questions about the content during immediate recall activity with 90% acc given no cues. Each voicemail was played 2x each.  -SR            User Key  (r) = Recorded By, (t) = Taken By, (c) = Cosigned By    Initials Name Provider Type    SR Minal Bennett CCC-SLP Speech and Language Pathologist                            Time Calculation:        Time Calculation- SLP     Row Name 12/20/22 1539 12/20/22 0900          Time Calculation- SLP    SLP Start Time 1230  -SR 0830  -SR     SLP Stop Time 1300  -SR 0900  -SR     SLP Time Calculation (min) 30 min  -SR 30 min  -SR           User Key  (r) = Recorded By, (t) = Taken By, (c) = Cosigned By    Initials Name Provider Type     Minal Bennett CCC-SLP Speech and Language Pathologist                  Therapy Charges for Today     Code Description Service Date Service Provider Modifiers Qty    87067074696 HC ST DEV OF COGN SKILLS INITIAL 15 MIN 12/19/2022 Minal Bennett CCC-SLP  1    [id number]  ST DEV OF " COGN SKILLS EACH ADDT'L 15 MIN 12/19/2022 Minal Bennett CCC-SLP  3    17584051708 HC ST DEV OF COGN SKILLS INITIAL 15 MIN 12/20/2022 Minal Bennett CCC-SLP  1    [id number] HC ST DEV OF COGN SKILLS EACH ADDT'L 15 MIN 12/20/2022 Minal Bennett CCC-SLP  3                           SHARITA Flowers  12/20/2022     on the discharge service for the patient. I have reviewed and made amendments to the documentation where necessary.

## 2023-03-26 NOTE — PROGRESS NOTE ADULT - PROBLEM/PLAN-4
DISPLAY PLAN FREE TEXT
01-Jan-2000

## 2023-05-25 ENCOUNTER — EMERGENCY (EMERGENCY)
Facility: HOSPITAL | Age: 83
LOS: 1 days | Discharge: HOME | End: 2023-05-25
Attending: EMERGENCY MEDICINE | Admitting: EMERGENCY MEDICINE
Payer: MEDICARE

## 2023-05-25 VITALS
DIASTOLIC BLOOD PRESSURE: 67 MMHG | OXYGEN SATURATION: 100 % | HEART RATE: 78 BPM | TEMPERATURE: 98 F | RESPIRATION RATE: 18 BRPM | SYSTOLIC BLOOD PRESSURE: 115 MMHG

## 2023-05-25 PROCEDURE — 99284 EMERGENCY DEPT VISIT MOD MDM: CPT | Mod: GC

## 2023-05-25 PROCEDURE — 93010 ELECTROCARDIOGRAM REPORT: CPT | Mod: GW

## 2023-05-25 PROCEDURE — 74018 RADEX ABDOMEN 1 VIEW: CPT | Mod: 26,GW

## 2023-05-25 RX ORDER — SENNA PLUS 8.6 MG/1
1 TABLET ORAL ONCE
Refills: 0 | Status: COMPLETED | OUTPATIENT
Start: 2023-05-25 | End: 2023-05-25

## 2023-05-25 NOTE — ED PROVIDER NOTE - ATTENDING CONTRIBUTION TO CARE
I performed a face-to-face evaluation of the patient and performed a history and physical examination. I agree with the history and physical examination. If this was a PA visit, I personally saw the patient with the PA and performed a substantive portion of the visit including all aspects of the medical decision making.    Pre-syncope while straining for a BM. No fall. No HA/CP/SOB. No bleeding. Likely vasovagal syncope. Check EKG. If unremarkable. then dc home.

## 2023-05-25 NOTE — ED PROVIDER NOTE - PATIENT PORTAL LINK FT
You can access the FollowMyHealth Patient Portal offered by Health system by registering at the following website: [email or web address]. By joining [facility]’s FollowMyHealth portal, you will also be able to view your health information using other applications (apps) compatible with our system.

## 2023-05-25 NOTE — ED PROVIDER NOTE - NSFOLLOWUPINSTRUCTIONS_ED_ALL_ED_FT
No signs of emergency medical condition on today's workup.  Presumptive diagnosis made as vasovagal syncope, but further evaluation may be required by your primary care doctor or specialist for a definitive diagnosis.  Return with any worsening or changing symptoms. Follow up with your primary doctor within the next 3-5 days. Therefore, follow up as directed and if symptoms change/worsen or any emergency conditions, please return to the ER.

## 2023-05-25 NOTE — ED PROVIDER NOTE - NS ED ROS FT
General: denies fever, chills  HENT: denies nasal congestion, rhinorrhea  CV: denies chest pain, palpitations  Resp: denies difficulty breathing, cough  Abdominal: denies nausea, vomiting, diarrhea, abdominal pain  : denies urinary pain   Neuro: denies headaches

## 2023-05-25 NOTE — ED PROVIDER NOTE - CLINICAL SUMMARY MEDICAL DECISION MAKING FREE TEXT BOX
82-year-old male history of Parkinson's disease with dysphagia CAD PAD SIADH type 2 diabetes hyperlipidemia sick sinus with pacemaker presents for presyncopal episode while having a bowel movement at 6 PM.  Patient said he was with a abdomen straining to have a bowel movement she had given him a suppository at 3 PM he was able to pass stool.  But straining and slumped over did not fall did not hit head.  On arrival wife says patient has baseline patient is able to say he has no pain.  On arrival vitals within normal limits.  Physical exam no pertinent positives.  Will check basic labs for electrolyte disturbance give senna and check EKG.  EKG unchanged from prior.  Do not think this patient had a cardiac event think this was a vasovagal syncope given circumstance and patient back to baseline.  Do not think this patient had a intracranial Joyce no focal neurodeficits on exam and wife says patient is baseline.  Will check labs and send 82-year-old male history of Parkinson's disease with dysphagia CAD PAD SIADH type 2 diabetes hyperlipidemia sick sinus with pacemaker presents for presyncopal episode while having a bowel movement at 6 PM.  Patient said he was with a abdomen straining to have a bowel movement she had given him a suppository at 3 PM he was able to pass stool.  But straining and slumped over did not fall did not hit head.  On arrival wife says patient has baseline patient is able to say he has no pain.  On arrival vitals within normal limits.  Physical exam no pertinent positives.  Will check basic labs for electrolyte disturbance give senna and check EKG.  EKG unchanged from prior.  Do not think this patient had a cardiac event think this was a vasovagal syncope given circumstance and patient back to baseline.  Do not think this patient had a intracranial bleed: no focal neurodeficits on exam and wife says patient is baseline.  Will check EKG.

## 2023-05-25 NOTE — ED PROVIDER NOTE - PHYSICAL EXAMINATION
GENERAL: well appearing in no acute distress, non-toxic appearing  HEAD: normocephalic, atraumatic  CARDIAC: regular rate and rhythm, normal S1S2, no appreciable murmurs,   PULM: normal breath sounds, clear to ascultation bilaterally, no rales, rhonchi, wheezing  GI: abdomen nondistended, soft, nontender, no guarding, rebound tenderness  NEURO: no focal motor or sensory deficits,   MSK: no peripheral edema, no calf tenderness b/l  PSYCH: appropriate mood and affect

## 2023-05-25 NOTE — ED PROVIDER NOTE - OBJECTIVE STATEMENT
81 yo M with PMH PD (c/b dysphagia), CAD, PAD, SIADH (1L fluid restriction), T2D, HTN, HLD, SSS (s/p PPM), and anxiety/depression presents for presyncopal episode while having bowel movement at 6 pm. pt wife said he was straining, slumped over, did not fall or hit head. was able to pass BM bc wife gave him suppository at 3pm. wife said he is baseline now, but last night did not sleep and today had decreased PO. pt says he has no pain on arrival. on arrival normotensive, hr 84, afebrile 100% on RA.

## 2023-05-26 VITALS
OXYGEN SATURATION: 100 % | SYSTOLIC BLOOD PRESSURE: 156 MMHG | RESPIRATION RATE: 18 BRPM | HEART RATE: 69 BPM | TEMPERATURE: 98 F | DIASTOLIC BLOOD PRESSURE: 79 MMHG

## 2023-05-26 LAB
ALBUMIN SERPL ELPH-MCNC: 3.6 G/DL — SIGNIFICANT CHANGE UP (ref 3.3–5)
ALP SERPL-CCNC: 57 U/L — SIGNIFICANT CHANGE UP (ref 40–120)
ALT FLD-CCNC: <5 U/L — SIGNIFICANT CHANGE UP (ref 4–41)
ANION GAP SERPL CALC-SCNC: 10 MMOL/L — SIGNIFICANT CHANGE UP (ref 7–14)
AST SERPL-CCNC: 11 U/L — SIGNIFICANT CHANGE UP (ref 4–40)
BILIRUB SERPL-MCNC: 0.4 MG/DL — SIGNIFICANT CHANGE UP (ref 0.2–1.2)
BUN SERPL-MCNC: 14 MG/DL — SIGNIFICANT CHANGE UP (ref 7–23)
CALCIUM SERPL-MCNC: 8.6 MG/DL — SIGNIFICANT CHANGE UP (ref 8.4–10.5)
CHLORIDE SERPL-SCNC: 104 MMOL/L — SIGNIFICANT CHANGE UP (ref 98–107)
CO2 SERPL-SCNC: 24 MMOL/L — SIGNIFICANT CHANGE UP (ref 22–31)
CREAT SERPL-MCNC: 0.35 MG/DL — LOW (ref 0.5–1.3)
EGFR: 112 ML/MIN/1.73M2 — SIGNIFICANT CHANGE UP
GLUCOSE SERPL-MCNC: 145 MG/DL — HIGH (ref 70–99)
HCT VFR BLD CALC: 35.8 % — LOW (ref 39–50)
HGB BLD-MCNC: 11.5 G/DL — LOW (ref 13–17)
MCHC RBC-ENTMCNC: 29.8 PG — SIGNIFICANT CHANGE UP (ref 27–34)
MCHC RBC-ENTMCNC: 32.1 GM/DL — SIGNIFICANT CHANGE UP (ref 32–36)
MCV RBC AUTO: 92.7 FL — SIGNIFICANT CHANGE UP (ref 80–100)
NRBC # BLD: 0 /100 WBCS — SIGNIFICANT CHANGE UP (ref 0–0)
NRBC # FLD: 0 K/UL — SIGNIFICANT CHANGE UP (ref 0–0)
PLATELET # BLD AUTO: 247 K/UL — SIGNIFICANT CHANGE UP (ref 150–400)
POTASSIUM SERPL-MCNC: 4 MMOL/L — SIGNIFICANT CHANGE UP (ref 3.5–5.3)
POTASSIUM SERPL-SCNC: 4 MMOL/L — SIGNIFICANT CHANGE UP (ref 3.5–5.3)
PROT SERPL-MCNC: 6.1 G/DL — SIGNIFICANT CHANGE UP (ref 6–8.3)
RBC # BLD: 3.86 M/UL — LOW (ref 4.2–5.8)
RBC # FLD: 13.4 % — SIGNIFICANT CHANGE UP (ref 10.3–14.5)
SODIUM SERPL-SCNC: 138 MMOL/L — SIGNIFICANT CHANGE UP (ref 135–145)
WBC # BLD: 11.19 K/UL — HIGH (ref 3.8–10.5)
WBC # FLD AUTO: 11.19 K/UL — HIGH (ref 3.8–10.5)

## 2023-05-26 RX ADMIN — SENNA PLUS 1 TABLET(S): 8.6 TABLET ORAL at 01:09

## 2023-05-26 NOTE — PROVIDER CONTACT NOTE (OTHER) - BACKGROUND
Pt with hx of Parkinson's disease and non ambulatory. Provider outreached writer for assistance with d/c transport. Writer met with pt's wife at bedside. Pt will require [facility] for transport.

## 2023-05-26 NOTE — ED ADULT NURSE NOTE - NSFALLHARMRISKINTERV_ED_ALL_ED
Assistance OOB with selected safe patient handling equipment if applicable/Assistance with ambulation/Communicate risk of Fall with Harm to all staff, patient, and family/Monitor gait and stability/Provide visual cue: red socks, yellow wristband, yellow gown, etc/Reinforce activity limits and safety measures with patient and family/Bed in lowest position, wheels locked, appropriate side rails in place/Call bell, personal items and telephone in reach/Instruct patient to call for assistance before getting out of bed/chair/stretcher/Non-slip footwear applied when patient is off stretcher/[place] to call system/Physically safe environment - no spills, clutter or unnecessary equipment/Purposeful Proactive Rounding/Room/bathroom lighting operational, light cord in reach

## 2023-05-26 NOTE — ED ADULT NURSE NOTE - CHIEF COMPLAINT QUOTE
I reviewed the H&P, I examined the patient, and there are no changes in the patient's condition.    
pt had syncopal episode on commode, pt able to follow commands and make needs known, per family pt completely back to baseline, no head strike or blood thinners, hx parkinson's, DMT2

## 2023-05-26 NOTE — PROVIDER CONTACT NOTE (OTHER) - ASSESSMENT
Wife will travel with pt. Home address confirmed. Writer arranged transport with [facility] #[phone] as bill back. Writer spoke with Robert who provided trip #85A. Non emergent transportation form placed in chart.

## 2023-05-26 NOTE — ED ADULT NURSE NOTE - OBJECTIVE STATEMENT
As per family decreased appetite with food and fluids along with constipation. Yesterday noted by aide with syncope after straining to have bowel movent on toilet. States he was slumped over. Here in ED patient AOX2. Speech at baseline. No acute distress noted. Denies pain. Family at bedside.

## 2023-08-11 NOTE — PROGRESS NOTE ADULT - PROBLEM/PLAN-6
DISPLAY PLAN FREE TEXT
Strong peripheral pulses/Capillary refill less/equal to 2 seconds

## 2023-11-29 NOTE — BH CONSULTATION LIAISON PROGRESS NOTE - NSBHMSESPEECH_PSY_A_CORE
Abnormal as indicated, otherwise normal...
never

## 2023-11-30 NOTE — DIETITIAN INITIAL EVALUATION ADULT - PERTINENT LABORATORY DATA
Caller: [phone]    Doctor: n/a    Reason for call: Called to ask if patient has any appts scheduled with ortho. None scheduled at this time.     Call back#: n/a 10-25    138  |  102  |  15  ----------------------------<  98  3.8   |  22  |  0.42<L>    Ca    8.5      25 Oct 2022 05:00    TPro  6.5  /  Alb  3.6  /  TBili  0.4  /  DBili  x   /  AST  12  /  ALT  <5<L>  /  AlkPhos  60  10-25  POCT Blood Glucose.: 70 mg/dL (10-25-22 @ 08:03)  A1C with Estimated Average Glucose Result: 5.0 % (10-24-22 @ 06:35)  A1C with Estimated Average Glucose Result: 5.7 % (10-03-22 @ 05:44)

## 2024-01-04 NOTE — SWALLOW BEDSIDE ASSESSMENT ADULT - COMMENTS
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 See consults/reports for details.     Patient received upright in bed, asleep, with wife and RN present at bedside. Patient easily alerted via verbal and tactile cues, however upon arousal noted with emotional lability with difficulty being redirected to task. Per wife, patient "having a bad day" and has been crying for most of the day. 4 = No assist / stand by assistance

## 2025-07-09 NOTE — PROGRESS NOTE ADULT - SUBJECTIVE AND OBJECTIVE BOX
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 No nausea, vomiting, or hematemesis; No diarrhea or constipation. No melena or hematochezia.  GENITOURINARY: No dysuria, frequency, hematuria, or incontinence  NEUROLOGICAL: No headaches, memory loss, loss of strength, numbness, or tremors  MUSCULOSKELETAL: No joint pain or swelling; No muscle, back, or extremity pain        Medications:  MEDICATIONS  (STANDING):  aspirin enteric coated 325 milliGRAM(s) Oral daily  buDESOnide 160 MICROgram(s)/formoterol 4.5 MICROgram(s) Inhaler 2 Puff(s) Inhalation two times a day  carbidopa/levodopa  25/100 5 Tablet(s) Oral every 4 hours  dextrose 5%. 1000 milliLiter(s) (50 mL/Hr) IV Continuous <Continuous>  dextrose 50% Injectable 12.5 Gram(s) IV Push once  dextrose 50% Injectable 25 Gram(s) IV Push once  dextrose 50% Injectable 25 Gram(s) IV Push once  docusate sodium 100 milliGRAM(s) Oral three times a day  enoxaparin Injectable 80 milliGRAM(s) SubCutaneous two times a day  insulin glargine Injectable (LANTUS) 16 Unit(s) SubCutaneous at bedtime  insulin lispro (HumaLOG) corrective regimen sliding scale   SubCutaneous at bedtime  insulin lispro (HumaLOG) corrective regimen sliding scale   SubCutaneous three times a day before meals  insulin lispro Injectable (HumaLOG) 3 Unit(s) SubCutaneous before dinner  insulin lispro Injectable (HumaLOG) 5 Unit(s) SubCutaneous before breakfast  insulin lispro Injectable (HumaLOG) 4 Unit(s) SubCutaneous before lunch  losartan 50 milliGRAM(s) Oral daily  polyethylene glycol 3350 17 Gram(s) Oral two times a day    MEDICATIONS  (PRN):  ALBUTerol   0.042% 1.25 milliGRAM(s) Nebulizer every 6 hours PRN Shortness of Breath and/or Wheezing  ALPRAZolam 0.5 milliGRAM(s) Oral at bedtime PRN Insomnia/Tremulousness  dextrose Gel 1 Dose(s) Oral once PRN Blood Glucose LESS THAN 70 milliGRAM(s)/deciliter  glucagon  Injectable 1 milliGRAM(s) IntraMuscular once PRN Glucose LESS THAN 70 milligrams/deciliter  oxyCODONE    IR 5 milliGRAM(s) Oral every 4 hours PRN Moderate Pain (4 - 6)    	    PHYSICAL EXAM:  T(C): 37.1 (01-30-18 @ 12:37), Max: 37.1 (01-30-18 @ 12:37)  HR: 79 (01-30-18 @ 12:37) (71 - 80)  BP: 138/76 (01-30-18 @ 12:37) (116/67 - 178/72)  RR: 18 (01-30-18 @ 12:37) (17 - 18)  SpO2: 95% (01-30-18 @ 12:37) (95% - 98%)  Wt(kg): --  I&O's Summary    29 Jan 2018 07:01  -  30 Jan 2018 07:00  --------------------------------------------------------  IN: 360 mL / OUT: 0 mL / NET: 360 mL    30 Jan 2018 07:01  -  30 Jan 2018 13:15  --------------------------------------------------------  IN: 360 mL / OUT: 0 mL / NET: 360 mL      Appearance: Normal	  HEENT:   Normal oral mucosa, PERRL, EOMI	  Lymphatic: No lymphadenopathy  Cardiovascular: Normal S1 S2, No JVD, No murmurs, No edema  Respiratory: dec wheezes   Psychiatry: A & O x 3,   Gastrointestinal:  Soft, Non-tender, + BS	  Skin: No rashes, No ecchymoses, No cyanosis	  Neurologic: Non-focal parkinsonian movements  Extremities: Normal range of motion, No clubbing, cyanosis or edema  Vascular: Peripheral pulses palpable     LABS:	 	    CARDIAC MARKERS:                                11.8   12.16 )-----------( 266      ( 30 Jan 2018 09:02 )             38.1     01-30    139  |  100  |  20  ----------------------------<  114<H>  3.5   |  26  |  0.77    Ca    9.0      30 Jan 2018 09:03      proBNP:   Lipid Profile:   HgA1c:   TSH: Not fully oriented...